# Patient Record
Sex: MALE | Race: BLACK OR AFRICAN AMERICAN | NOT HISPANIC OR LATINO
[De-identification: names, ages, dates, MRNs, and addresses within clinical notes are randomized per-mention and may not be internally consistent; named-entity substitution may affect disease eponyms.]

---

## 2017-01-03 ENCOUNTER — APPOINTMENT (OUTPATIENT)
Dept: CARDIOLOGY | Facility: CLINIC | Age: 52
End: 2017-01-03

## 2017-01-03 ENCOUNTER — NON-APPOINTMENT (OUTPATIENT)
Age: 52
End: 2017-01-03

## 2017-01-03 VITALS
WEIGHT: 268 LBS | OXYGEN SATURATION: 98 % | DIASTOLIC BLOOD PRESSURE: 85 MMHG | HEART RATE: 83 BPM | HEIGHT: 73 IN | SYSTOLIC BLOOD PRESSURE: 166 MMHG | BODY MASS INDEX: 35.52 KG/M2 | RESPIRATION RATE: 17 BRPM

## 2017-01-03 VITALS — HEART RATE: 72 BPM | DIASTOLIC BLOOD PRESSURE: 90 MMHG | SYSTOLIC BLOOD PRESSURE: 110 MMHG

## 2017-01-06 ENCOUNTER — APPOINTMENT (OUTPATIENT)
Dept: FAMILY MEDICINE | Facility: HOSPITAL | Age: 52
End: 2017-01-06

## 2017-01-06 ENCOUNTER — OUTPATIENT (OUTPATIENT)
Dept: OUTPATIENT SERVICES | Facility: HOSPITAL | Age: 52
LOS: 1 days | End: 2017-01-06
Payer: MEDICAID

## 2017-01-06 VITALS
SYSTOLIC BLOOD PRESSURE: 160 MMHG | OXYGEN SATURATION: 98 % | DIASTOLIC BLOOD PRESSURE: 95 MMHG | WEIGHT: 271 LBS | HEART RATE: 60 BPM | TEMPERATURE: 98 F | BODY MASS INDEX: 35.75 KG/M2 | RESPIRATION RATE: 16 BRPM

## 2017-01-06 DIAGNOSIS — R09.81 NASAL CONGESTION: ICD-10-CM

## 2017-01-06 DIAGNOSIS — R05 COUGH: ICD-10-CM

## 2017-01-06 PROCEDURE — 85610 PROTHROMBIN TIME: CPT

## 2017-01-06 PROCEDURE — G0463: CPT

## 2017-01-11 ENCOUNTER — APPOINTMENT (OUTPATIENT)
Dept: ELECTROPHYSIOLOGY | Facility: CLINIC | Age: 52
End: 2017-01-11

## 2017-01-11 ENCOUNTER — NON-APPOINTMENT (OUTPATIENT)
Age: 52
End: 2017-01-11

## 2017-01-11 VITALS
WEIGHT: 266 LBS | OXYGEN SATURATION: 96 % | HEART RATE: 54 BPM | HEIGHT: 73 IN | BODY MASS INDEX: 35.25 KG/M2 | DIASTOLIC BLOOD PRESSURE: 72 MMHG | SYSTOLIC BLOOD PRESSURE: 120 MMHG

## 2017-01-12 RX ORDER — WARFARIN 5 MG/1
5 TABLET ORAL DAILY
Qty: 30 | Refills: 0 | Status: DISCONTINUED | COMMUNITY
Start: 2017-01-06 | End: 2017-01-12

## 2017-01-20 ENCOUNTER — OUTPATIENT (OUTPATIENT)
Dept: OUTPATIENT SERVICES | Facility: HOSPITAL | Age: 52
LOS: 1 days | End: 2017-01-20
Payer: MEDICAID

## 2017-01-20 DIAGNOSIS — Z00.00 ENCOUNTER FOR GENERAL ADULT MEDICAL EXAMINATION WITHOUT ABNORMAL FINDINGS: ICD-10-CM

## 2017-01-20 PROCEDURE — 85610 PROTHROMBIN TIME: CPT

## 2017-01-20 PROCEDURE — 36415 COLL VENOUS BLD VENIPUNCTURE: CPT

## 2017-01-29 ENCOUNTER — MEDICATION RENEWAL (OUTPATIENT)
Age: 52
End: 2017-01-29

## 2017-01-31 ENCOUNTER — APPOINTMENT (OUTPATIENT)
Dept: FAMILY MEDICINE | Facility: HOSPITAL | Age: 52
End: 2017-01-31

## 2017-01-31 ENCOUNTER — OUTPATIENT (OUTPATIENT)
Dept: OUTPATIENT SERVICES | Facility: HOSPITAL | Age: 52
LOS: 1 days | End: 2017-01-31
Payer: MEDICAID

## 2017-01-31 VITALS
HEART RATE: 44 BPM | DIASTOLIC BLOOD PRESSURE: 82 MMHG | OXYGEN SATURATION: 97 % | BODY MASS INDEX: 36.55 KG/M2 | WEIGHT: 277 LBS | RESPIRATION RATE: 16 BRPM | TEMPERATURE: 97.5 F | SYSTOLIC BLOOD PRESSURE: 151 MMHG

## 2017-01-31 VITALS — DIASTOLIC BLOOD PRESSURE: 90 MMHG | HEART RATE: 81 BPM | SYSTOLIC BLOOD PRESSURE: 130 MMHG

## 2017-01-31 DIAGNOSIS — L25.9 UNSPECIFIED CONTACT DERMATITIS, UNSPECIFIED CAUSE: ICD-10-CM

## 2017-01-31 DIAGNOSIS — I50.41 ACUTE COMBINED SYSTOLIC (CONGESTIVE) AND DIASTOLIC (CONGESTIVE) HEART FAILURE: ICD-10-CM

## 2017-01-31 DIAGNOSIS — Z51.81 ENCOUNTER FOR THERAPEUTIC DRUG LEVEL MONITORING: ICD-10-CM

## 2017-02-03 PROCEDURE — G0463: CPT

## 2017-02-03 PROCEDURE — 85610 PROTHROMBIN TIME: CPT

## 2017-02-03 PROCEDURE — 36415 COLL VENOUS BLD VENIPUNCTURE: CPT

## 2017-02-07 ENCOUNTER — NON-APPOINTMENT (OUTPATIENT)
Age: 52
End: 2017-02-07

## 2017-02-07 ENCOUNTER — APPOINTMENT (OUTPATIENT)
Dept: CARDIOLOGY | Facility: CLINIC | Age: 52
End: 2017-02-07

## 2017-02-07 VITALS
BODY MASS INDEX: 36.05 KG/M2 | WEIGHT: 272 LBS | HEIGHT: 73 IN | OXYGEN SATURATION: 90 % | RESPIRATION RATE: 16 BRPM | HEART RATE: 78 BPM | DIASTOLIC BLOOD PRESSURE: 88 MMHG | SYSTOLIC BLOOD PRESSURE: 133 MMHG

## 2017-02-07 VITALS — HEART RATE: 72 BPM

## 2017-02-10 ENCOUNTER — OUTPATIENT (OUTPATIENT)
Dept: OUTPATIENT SERVICES | Facility: HOSPITAL | Age: 52
LOS: 1 days | End: 2017-02-10
Payer: MEDICAID

## 2017-02-10 DIAGNOSIS — I50.9 HEART FAILURE, UNSPECIFIED: ICD-10-CM

## 2017-02-10 PROCEDURE — 36415 COLL VENOUS BLD VENIPUNCTURE: CPT

## 2017-02-10 PROCEDURE — 80069 RENAL FUNCTION PANEL: CPT

## 2017-02-17 ENCOUNTER — OUTPATIENT (OUTPATIENT)
Dept: OUTPATIENT SERVICES | Facility: HOSPITAL | Age: 52
LOS: 1 days | End: 2017-02-17
Payer: MEDICAID

## 2017-02-17 DIAGNOSIS — Z51.81 ENCOUNTER FOR THERAPEUTIC DRUG LEVEL MONITORING: ICD-10-CM

## 2017-02-17 PROCEDURE — 85610 PROTHROMBIN TIME: CPT

## 2017-02-17 PROCEDURE — 36415 COLL VENOUS BLD VENIPUNCTURE: CPT

## 2017-02-24 ENCOUNTER — OUTPATIENT (OUTPATIENT)
Dept: OUTPATIENT SERVICES | Facility: HOSPITAL | Age: 52
LOS: 1 days | End: 2017-02-24
Payer: MEDICAID

## 2017-02-24 DIAGNOSIS — Z51.81 ENCOUNTER FOR THERAPEUTIC DRUG LEVEL MONITORING: ICD-10-CM

## 2017-02-24 PROCEDURE — 36415 COLL VENOUS BLD VENIPUNCTURE: CPT

## 2017-02-24 PROCEDURE — 85610 PROTHROMBIN TIME: CPT

## 2017-02-27 ENCOUNTER — INPATIENT (INPATIENT)
Facility: HOSPITAL | Age: 52
LOS: 0 days | Discharge: HOME | DRG: 227 | End: 2017-02-28
Attending: INTERNAL MEDICINE | Admitting: INTERNAL MEDICINE
Payer: MEDICAID

## 2017-02-27 ENCOUNTER — TRANSCRIPTION ENCOUNTER (OUTPATIENT)
Age: 52
End: 2017-02-27

## 2017-02-27 VITALS
HEIGHT: 73 IN | TEMPERATURE: 98 F | SYSTOLIC BLOOD PRESSURE: 150 MMHG | RESPIRATION RATE: 16 BRPM | DIASTOLIC BLOOD PRESSURE: 90 MMHG | HEART RATE: 84 BPM | WEIGHT: 266.98 LBS | OXYGEN SATURATION: 98 %

## 2017-02-27 DIAGNOSIS — I25.5 ISCHEMIC CARDIOMYOPATHY: ICD-10-CM

## 2017-02-27 DIAGNOSIS — I42 CARDIOMYOPATHY: ICD-10-CM

## 2017-02-27 DIAGNOSIS — Z90.89 ACQUIRED ABSENCE OF OTHER ORGANS: Chronic | ICD-10-CM

## 2017-02-27 DIAGNOSIS — I42.9 CARDIOMYOPATHY, UNSPECIFIED: ICD-10-CM

## 2017-02-27 LAB
ALBUMIN SERPL ELPH-MCNC: 4.2 G/DL — SIGNIFICANT CHANGE UP (ref 3.3–5)
ALP SERPL-CCNC: 53 U/L — SIGNIFICANT CHANGE UP (ref 40–120)
ALT FLD-CCNC: 26 U/L RC — SIGNIFICANT CHANGE UP (ref 10–45)
ANION GAP SERPL CALC-SCNC: 14 MMOL/L — SIGNIFICANT CHANGE UP (ref 5–17)
APTT BLD: 37.9 SEC — HIGH (ref 27.5–37.4)
AST SERPL-CCNC: 26 U/L — SIGNIFICANT CHANGE UP (ref 10–40)
BILIRUB SERPL-MCNC: 0.5 MG/DL — SIGNIFICANT CHANGE UP (ref 0.2–1.2)
BUN SERPL-MCNC: 15 MG/DL — SIGNIFICANT CHANGE UP (ref 7–23)
CALCIUM SERPL-MCNC: 9 MG/DL — SIGNIFICANT CHANGE UP (ref 8.4–10.5)
CHLORIDE SERPL-SCNC: 104 MMOL/L — SIGNIFICANT CHANGE UP (ref 96–108)
CO2 SERPL-SCNC: 25 MMOL/L — SIGNIFICANT CHANGE UP (ref 22–31)
CREAT SERPL-MCNC: 1.06 MG/DL — SIGNIFICANT CHANGE UP (ref 0.5–1.3)
GLUCOSE SERPL-MCNC: 87 MG/DL — SIGNIFICANT CHANGE UP (ref 70–99)
HCT VFR BLD CALC: 43.1 % — SIGNIFICANT CHANGE UP (ref 39–50)
HGB BLD-MCNC: 14.2 G/DL — SIGNIFICANT CHANGE UP (ref 13–17)
INR BLD: 1.39 RATIO — HIGH (ref 0.88–1.16)
MCHC RBC-ENTMCNC: 28.4 PG — SIGNIFICANT CHANGE UP (ref 27–34)
MCHC RBC-ENTMCNC: 32.8 GM/DL — SIGNIFICANT CHANGE UP (ref 32–36)
MCV RBC AUTO: 86.4 FL — SIGNIFICANT CHANGE UP (ref 80–100)
PLATELET # BLD AUTO: 196 K/UL — SIGNIFICANT CHANGE UP (ref 150–400)
POTASSIUM SERPL-MCNC: 3.7 MMOL/L — SIGNIFICANT CHANGE UP (ref 3.5–5.3)
POTASSIUM SERPL-SCNC: 3.7 MMOL/L — SIGNIFICANT CHANGE UP (ref 3.5–5.3)
PROT SERPL-MCNC: 7.4 G/DL — SIGNIFICANT CHANGE UP (ref 6–8.3)
PROTHROM AB SERPL-ACNC: 15.2 SEC — HIGH (ref 10–13.1)
RBC # BLD: 4.99 M/UL — SIGNIFICANT CHANGE UP (ref 4.2–5.8)
RBC # FLD: 14.1 % — SIGNIFICANT CHANGE UP (ref 10.3–14.5)
SODIUM SERPL-SCNC: 143 MMOL/L — SIGNIFICANT CHANGE UP (ref 135–145)
WBC # BLD: 7.7 K/UL — SIGNIFICANT CHANGE UP (ref 3.8–10.5)
WBC # FLD AUTO: 7.7 K/UL — SIGNIFICANT CHANGE UP (ref 3.8–10.5)

## 2017-02-27 PROCEDURE — 71010: CPT | Mod: 26

## 2017-02-27 PROCEDURE — 33249 INSJ/RPLCMT DEFIB W/LEAD(S): CPT | Mod: 59,Q0

## 2017-02-27 PROCEDURE — 93010 ELECTROCARDIOGRAM REPORT: CPT

## 2017-02-27 RX ORDER — FUROSEMIDE 40 MG
40 TABLET ORAL DAILY
Qty: 0 | Refills: 0 | Status: DISCONTINUED | OUTPATIENT
Start: 2017-02-27 | End: 2017-02-28

## 2017-02-27 RX ORDER — HYDRALAZINE HCL 50 MG
25 TABLET ORAL THREE TIMES A DAY
Qty: 0 | Refills: 0 | Status: DISCONTINUED | OUTPATIENT
Start: 2017-02-27 | End: 2017-02-28

## 2017-02-27 RX ORDER — TAMSULOSIN HYDROCHLORIDE 0.4 MG/1
0.4 CAPSULE ORAL AT BEDTIME
Qty: 0 | Refills: 0 | Status: DISCONTINUED | OUTPATIENT
Start: 2017-02-27 | End: 2017-02-28

## 2017-02-27 RX ORDER — ISOSORBIDE DINITRATE 5 MG/1
5 TABLET ORAL THREE TIMES A DAY
Qty: 0 | Refills: 0 | Status: DISCONTINUED | OUTPATIENT
Start: 2017-02-27 | End: 2017-02-28

## 2017-02-27 RX ORDER — ATORVASTATIN CALCIUM 80 MG/1
40 TABLET, FILM COATED ORAL AT BEDTIME
Qty: 0 | Refills: 0 | Status: DISCONTINUED | OUTPATIENT
Start: 2017-02-27 | End: 2017-02-28

## 2017-02-27 RX ORDER — FLUTICASONE PROPIONATE AND SALMETEROL 50; 250 UG/1; UG/1
1 POWDER ORAL; RESPIRATORY (INHALATION)
Qty: 0 | Refills: 0 | Status: DISCONTINUED | OUTPATIENT
Start: 2017-02-27 | End: 2017-02-28

## 2017-02-27 RX ORDER — ASPIRIN/CALCIUM CARB/MAGNESIUM 324 MG
81 TABLET ORAL DAILY
Qty: 0 | Refills: 0 | Status: DISCONTINUED | OUTPATIENT
Start: 2017-02-27 | End: 2017-02-28

## 2017-02-27 RX ORDER — WARFARIN SODIUM 2.5 MG/1
10 TABLET ORAL ONCE
Qty: 0 | Refills: 0 | Status: COMPLETED | OUTPATIENT
Start: 2017-02-27 | End: 2017-02-27

## 2017-02-27 RX ORDER — LISINOPRIL 2.5 MG/1
5 TABLET ORAL DAILY
Qty: 0 | Refills: 0 | Status: DISCONTINUED | OUTPATIENT
Start: 2017-02-27 | End: 2017-02-28

## 2017-02-27 RX ORDER — CEFAZOLIN SODIUM 1 G
1000 VIAL (EA) INJECTION EVERY 8 HOURS
Qty: 0 | Refills: 0 | Status: COMPLETED | OUTPATIENT
Start: 2017-02-27 | End: 2017-02-28

## 2017-02-27 RX ORDER — CARVEDILOL PHOSPHATE 80 MG/1
12.5 CAPSULE, EXTENDED RELEASE ORAL EVERY 12 HOURS
Qty: 0 | Refills: 0 | Status: DISCONTINUED | OUTPATIENT
Start: 2017-02-27 | End: 2017-02-28

## 2017-02-27 RX ORDER — ACETAMINOPHEN 500 MG
650 TABLET ORAL EVERY 6 HOURS
Qty: 0 | Refills: 0 | Status: DISCONTINUED | OUTPATIENT
Start: 2017-02-27 | End: 2017-02-28

## 2017-02-27 RX ORDER — CEFAZOLIN SODIUM 1 G
1000 VIAL (EA) INJECTION EVERY 8 HOURS
Qty: 0 | Refills: 0 | Status: DISCONTINUED | OUTPATIENT
Start: 2017-02-27 | End: 2017-02-27

## 2017-02-27 RX ADMIN — Medication 25 MILLIGRAM(S): at 21:53

## 2017-02-27 RX ADMIN — Medication 20 MILLIGRAM(S): at 17:44

## 2017-02-27 RX ADMIN — ISOSORBIDE DINITRATE 5 MILLIGRAM(S): 5 TABLET ORAL at 17:43

## 2017-02-27 RX ADMIN — Medication 650 MILLIGRAM(S): at 15:15

## 2017-02-27 RX ADMIN — LISINOPRIL 5 MILLIGRAM(S): 2.5 TABLET ORAL at 14:42

## 2017-02-27 RX ADMIN — Medication 25 MILLIGRAM(S): at 14:42

## 2017-02-27 RX ADMIN — Medication 650 MILLIGRAM(S): at 14:42

## 2017-02-27 RX ADMIN — ATORVASTATIN CALCIUM 40 MILLIGRAM(S): 80 TABLET, FILM COATED ORAL at 21:53

## 2017-02-27 RX ADMIN — ISOSORBIDE DINITRATE 5 MILLIGRAM(S): 5 TABLET ORAL at 21:54

## 2017-02-27 RX ADMIN — FLUTICASONE PROPIONATE AND SALMETEROL 1 DOSE(S): 50; 250 POWDER ORAL; RESPIRATORY (INHALATION) at 21:56

## 2017-02-27 RX ADMIN — CARVEDILOL PHOSPHATE 12.5 MILLIGRAM(S): 80 CAPSULE, EXTENDED RELEASE ORAL at 17:44

## 2017-02-27 RX ADMIN — TAMSULOSIN HYDROCHLORIDE 0.4 MILLIGRAM(S): 0.4 CAPSULE ORAL at 21:54

## 2017-02-27 RX ADMIN — Medication 100 MILLIGRAM(S): at 17:43

## 2017-02-27 RX ADMIN — WARFARIN SODIUM 10 MILLIGRAM(S): 2.5 TABLET ORAL at 21:53

## 2017-02-27 RX ADMIN — Medication 40 MILLIGRAM(S): at 14:42

## 2017-02-27 NOTE — H&P CARDIOLOGY - HISTORY OF PRESENT ILLNESS
51 year old male with PMH of parox afib on coumadin, idiopathic cardiomyopathy with chronic systolic heart failure (EF 10-15% on echo on 9/2016, life vest, asthma presented to [facility] with c/o chest pain, SOB.  The patient describes the pain as sharp in nature.  Cardiac enzymes were negative times 3.    Echo on 12/21/16:  Global LV systolic function was severely decreased.  EF estimated at 30-35%, mild-moderateTR, mild AR, moderately enlarged LA,  pulmonary hypertension 52 y/o male with PMH paroxysmal Afib on Coumadin (last dose 2/23/17), cardiomyopathy with combined systolic and diastolic heart failure (EF 30% on 12/21/16 echo), asthma,  HTN, HLD, prediabetes, presents for ICD implant today. 50 y/o male with PMH paroxysmal Afib on Coumadin (last dose 2/23/17), cardiomyopathy with combined systolic and diastolic heart failure (EF 30-35% on 12/21/16 echo) with lifevest in place, asthma,  HTN, HLD, prediabetes, presents for ICD implant today. 52 y/o male with PMH paroxysmal Afib on Coumadin (last dose 2/23/17), cardiomyopathy with combined systolic and diastolic heart failure (EF 30-35% on 12/21/16 echo) with lifevest in place, asthma,  HTN, HLD, prediabetes, with c/o exertional chest pain and dyspnea (12/2016 cardiac cath revealed non-obstructive disease), presents for ICD implant today.

## 2017-02-27 NOTE — H&P CARDIOLOGY - PMH
Acute combined systolic and diastolic congestive heart failure    Anxiety    Asthma    Atrial fibrillation, unspecified type    Cardiomyopathy    Cardiomyopathy, unspecified type    Essential hypertension    Hyperlipidemia, unspecified hyperlipidemia type    Prediabetes    Prolonged QT interval Anxiety    Asthma    Atrial fibrillation, unspecified type    Cardiomyopathy    Cardiomyopathy, unspecified type    Chronic combined systolic and diastolic congestive heart failure    Essential hypertension    Hyperlipidemia, unspecified hyperlipidemia type    Prediabetes    Prolonged QT interval

## 2017-02-28 VITALS
SYSTOLIC BLOOD PRESSURE: 150 MMHG | TEMPERATURE: 98 F | HEART RATE: 99 BPM | OXYGEN SATURATION: 93 % | DIASTOLIC BLOOD PRESSURE: 84 MMHG | RESPIRATION RATE: 17 BRPM

## 2017-02-28 LAB
ANION GAP SERPL CALC-SCNC: 18 MMOL/L — HIGH (ref 5–17)
BASOPHILS # BLD AUTO: 0 K/UL — SIGNIFICANT CHANGE UP (ref 0–0.2)
BASOPHILS # BLD AUTO: 0 K/UL — SIGNIFICANT CHANGE UP (ref 0–0.2)
BASOPHILS NFR BLD AUTO: 0.1 % — SIGNIFICANT CHANGE UP (ref 0–2)
BASOPHILS NFR BLD AUTO: 0.2 % — SIGNIFICANT CHANGE UP (ref 0–2)
BUN SERPL-MCNC: 25 MG/DL — HIGH (ref 7–23)
CALCIUM SERPL-MCNC: 8.4 MG/DL — SIGNIFICANT CHANGE UP (ref 8.4–10.5)
CHLORIDE SERPL-SCNC: 102 MMOL/L — SIGNIFICANT CHANGE UP (ref 96–108)
CO2 SERPL-SCNC: 21 MMOL/L — LOW (ref 22–31)
CREAT SERPL-MCNC: 1.16 MG/DL — SIGNIFICANT CHANGE UP (ref 0.5–1.3)
EOSINOPHIL # BLD AUTO: 0 K/UL — SIGNIFICANT CHANGE UP (ref 0–0.5)
EOSINOPHIL # BLD AUTO: 0 K/UL — SIGNIFICANT CHANGE UP (ref 0–0.5)
EOSINOPHIL NFR BLD AUTO: 0.1 % — SIGNIFICANT CHANGE UP (ref 0–6)
EOSINOPHIL NFR BLD AUTO: 0.2 % — SIGNIFICANT CHANGE UP (ref 0–6)
GLUCOSE SERPL-MCNC: 163 MG/DL — HIGH (ref 70–99)
HBA1C BLD-MCNC: 5.5 % — SIGNIFICANT CHANGE UP (ref 4–5.6)
HCT VFR BLD CALC: 40.2 % — SIGNIFICANT CHANGE UP (ref 39–50)
HCT VFR BLD CALC: 40.3 % — SIGNIFICANT CHANGE UP (ref 39–50)
HGB BLD-MCNC: 13 G/DL — SIGNIFICANT CHANGE UP (ref 13–17)
HGB BLD-MCNC: 13 G/DL — SIGNIFICANT CHANGE UP (ref 13–17)
INR BLD: 1.28 RATIO — HIGH (ref 0.88–1.16)
LG PLATELETS BLD QL AUTO: SLIGHT — SIGNIFICANT CHANGE UP
LYMPHOCYTES # BLD AUTO: 1.2 K/UL — SIGNIFICANT CHANGE UP (ref 1–3.3)
LYMPHOCYTES # BLD AUTO: 1.4 K/UL — SIGNIFICANT CHANGE UP (ref 1–3.3)
LYMPHOCYTES # BLD AUTO: 10.3 % — LOW (ref 13–44)
LYMPHOCYTES # BLD AUTO: 8.8 % — LOW (ref 13–44)
MCHC RBC-ENTMCNC: 28.3 PG — SIGNIFICANT CHANGE UP (ref 27–34)
MCHC RBC-ENTMCNC: 28.3 PG — SIGNIFICANT CHANGE UP (ref 27–34)
MCHC RBC-ENTMCNC: 32.4 GM/DL — SIGNIFICANT CHANGE UP (ref 32–36)
MCHC RBC-ENTMCNC: 32.5 GM/DL — SIGNIFICANT CHANGE UP (ref 32–36)
MCV RBC AUTO: 87.3 FL — SIGNIFICANT CHANGE UP (ref 80–100)
MCV RBC AUTO: 87.4 FL — SIGNIFICANT CHANGE UP (ref 80–100)
MONOCYTES # BLD AUTO: 1 K/UL — HIGH (ref 0–0.9)
MONOCYTES # BLD AUTO: 1.1 K/UL — HIGH (ref 0–0.9)
MONOCYTES NFR BLD AUTO: 7.2 % — SIGNIFICANT CHANGE UP (ref 2–14)
MONOCYTES NFR BLD AUTO: 7.6 % — SIGNIFICANT CHANGE UP (ref 2–14)
NEUTROPHILS # BLD AUTO: 11.3 K/UL — HIGH (ref 1.8–7.4)
NEUTROPHILS # BLD AUTO: 11.3 K/UL — HIGH (ref 1.8–7.4)
NEUTROPHILS NFR BLD AUTO: 81.8 % — HIGH (ref 43–77)
NEUTROPHILS NFR BLD AUTO: 83.7 % — HIGH (ref 43–77)
PLAT MORPH BLD: NORMAL — SIGNIFICANT CHANGE UP
PLATELET # BLD AUTO: 186 K/UL — SIGNIFICANT CHANGE UP (ref 150–400)
PLATELET # BLD AUTO: 202 K/UL — SIGNIFICANT CHANGE UP (ref 150–400)
POTASSIUM SERPL-MCNC: 4.2 MMOL/L — SIGNIFICANT CHANGE UP (ref 3.5–5.3)
POTASSIUM SERPL-SCNC: 4.2 MMOL/L — SIGNIFICANT CHANGE UP (ref 3.5–5.3)
PROTHROM AB SERPL-ACNC: 13.9 SEC — HIGH (ref 10–13.1)
RBC # BLD: 4.6 M/UL — SIGNIFICANT CHANGE UP (ref 4.2–5.8)
RBC # BLD: 4.6 M/UL — SIGNIFICANT CHANGE UP (ref 4.2–5.8)
RBC # FLD: 13.7 % — SIGNIFICANT CHANGE UP (ref 10.3–14.5)
RBC # FLD: 14.2 % — SIGNIFICANT CHANGE UP (ref 10.3–14.5)
RBC BLD AUTO: SIGNIFICANT CHANGE UP
SODIUM SERPL-SCNC: 141 MMOL/L — SIGNIFICANT CHANGE UP (ref 135–145)
WBC # BLD: 13.5 K/UL — HIGH (ref 3.8–10.5)
WBC # BLD: 13.8 K/UL — HIGH (ref 3.8–10.5)
WBC # FLD AUTO: 13.5 K/UL — HIGH (ref 3.8–10.5)
WBC # FLD AUTO: 13.8 K/UL — HIGH (ref 3.8–10.5)

## 2017-02-28 PROCEDURE — 93010 ELECTROCARDIOGRAM REPORT: CPT

## 2017-02-28 RX ORDER — DEXTROSE 50 % IN WATER 50 %
25 SYRINGE (ML) INTRAVENOUS ONCE
Qty: 0 | Refills: 0 | Status: DISCONTINUED | OUTPATIENT
Start: 2017-02-28 | End: 2017-02-28

## 2017-02-28 RX ORDER — CARVEDILOL PHOSPHATE 80 MG/1
1.5 CAPSULE, EXTENDED RELEASE ORAL
Qty: 0 | Refills: 0 | DISCHARGE
Start: 2017-02-28 | End: 2017-03-30

## 2017-02-28 RX ORDER — ISOSORBIDE DINITRATE 5 MG/1
1 TABLET ORAL
Qty: 90 | Refills: 0
Start: 2017-02-28 | End: 2017-03-30

## 2017-02-28 RX ORDER — ATORVASTATIN CALCIUM 80 MG/1
1 TABLET, FILM COATED ORAL
Qty: 0 | Refills: 0 | COMMUNITY

## 2017-02-28 RX ORDER — HYDRALAZINE HCL 50 MG
1 TABLET ORAL
Qty: 90 | Refills: 0
Start: 2017-02-28 | End: 2017-03-30

## 2017-02-28 RX ORDER — CEPHALEXIN 500 MG
1 CAPSULE ORAL
Qty: 3 | Refills: 0 | OUTPATIENT
Start: 2017-02-28 | End: 2017-03-01

## 2017-02-28 RX ORDER — FUROSEMIDE 40 MG
1 TABLET ORAL
Qty: 30 | Refills: 0 | OUTPATIENT
Start: 2017-02-28 | End: 2017-03-30

## 2017-02-28 RX ORDER — ASPIRIN/CALCIUM CARB/MAGNESIUM 324 MG
1 TABLET ORAL
Qty: 90 | Refills: 0 | OUTPATIENT
Start: 2017-02-28 | End: 2017-05-29

## 2017-02-28 RX ORDER — FENTANYL CITRATE 50 UG/ML
25 INJECTION INTRAVENOUS ONCE
Qty: 0 | Refills: 0 | Status: DISCONTINUED | OUTPATIENT
Start: 2017-02-28 | End: 2017-02-28

## 2017-02-28 RX ORDER — GLUCAGON INJECTION, SOLUTION 0.5 MG/.1ML
1 INJECTION, SOLUTION SUBCUTANEOUS ONCE
Qty: 0 | Refills: 0 | Status: DISCONTINUED | OUTPATIENT
Start: 2017-02-28 | End: 2017-02-28

## 2017-02-28 RX ORDER — DEXTROSE 50 % IN WATER 50 %
12.5 SYRINGE (ML) INTRAVENOUS ONCE
Qty: 0 | Refills: 0 | Status: DISCONTINUED | OUTPATIENT
Start: 2017-02-28 | End: 2017-02-28

## 2017-02-28 RX ORDER — SODIUM CHLORIDE 9 MG/ML
1000 INJECTION, SOLUTION INTRAVENOUS
Qty: 0 | Refills: 0 | Status: DISCONTINUED | OUTPATIENT
Start: 2017-02-28 | End: 2017-02-28

## 2017-02-28 RX ORDER — LISINOPRIL 2.5 MG/1
1 TABLET ORAL
Qty: 0 | Refills: 0 | COMMUNITY

## 2017-02-28 RX ORDER — INSULIN LISPRO 100/ML
VIAL (ML) SUBCUTANEOUS AT BEDTIME
Qty: 0 | Refills: 0 | Status: DISCONTINUED | OUTPATIENT
Start: 2017-02-28 | End: 2017-02-28

## 2017-02-28 RX ORDER — WARFARIN SODIUM 2.5 MG/1
1 TABLET ORAL
Qty: 30 | Refills: 0 | OUTPATIENT
Start: 2017-02-28 | End: 2017-03-30

## 2017-02-28 RX ORDER — INSULIN LISPRO 100/ML
VIAL (ML) SUBCUTANEOUS
Qty: 0 | Refills: 0 | Status: DISCONTINUED | OUTPATIENT
Start: 2017-02-28 | End: 2017-02-28

## 2017-02-28 RX ORDER — OXYCODONE HYDROCHLORIDE 5 MG/1
1 TABLET ORAL
Qty: 8 | Refills: 0 | OUTPATIENT
Start: 2017-02-28 | End: 2017-03-02

## 2017-02-28 RX ORDER — CARVEDILOL PHOSPHATE 80 MG/1
1 CAPSULE, EXTENDED RELEASE ORAL
Qty: 60 | Refills: 0 | OUTPATIENT
Start: 2017-02-28 | End: 2017-03-30

## 2017-02-28 RX ORDER — TAMSULOSIN HYDROCHLORIDE 0.4 MG/1
1 CAPSULE ORAL
Qty: 30 | Refills: 0
Start: 2017-02-28 | End: 2017-03-30

## 2017-02-28 RX ORDER — LISINOPRIL 2.5 MG/1
1 TABLET ORAL
Qty: 30 | Refills: 0 | OUTPATIENT
Start: 2017-02-28 | End: 2017-03-30

## 2017-02-28 RX ORDER — ATORVASTATIN CALCIUM 80 MG/1
1 TABLET, FILM COATED ORAL
Qty: 30 | Refills: 0 | OUTPATIENT
Start: 2017-02-28 | End: 2017-03-30

## 2017-02-28 RX ORDER — DEXTROSE 50 % IN WATER 50 %
1 SYRINGE (ML) INTRAVENOUS ONCE
Qty: 0 | Refills: 0 | Status: DISCONTINUED | OUTPATIENT
Start: 2017-02-28 | End: 2017-02-28

## 2017-02-28 RX ADMIN — Medication 100 MILLIGRAM(S): at 01:28

## 2017-02-28 RX ADMIN — Medication 40 MILLIGRAM(S): at 05:23

## 2017-02-28 RX ADMIN — Medication 100 MILLIGRAM(S): at 09:34

## 2017-02-28 RX ADMIN — CARVEDILOL PHOSPHATE 12.5 MILLIGRAM(S): 80 CAPSULE, EXTENDED RELEASE ORAL at 05:23

## 2017-02-28 RX ADMIN — FLUTICASONE PROPIONATE AND SALMETEROL 1 DOSE(S): 50; 250 POWDER ORAL; RESPIRATORY (INHALATION) at 09:00

## 2017-02-28 RX ADMIN — Medication 25 MILLIGRAM(S): at 05:23

## 2017-02-28 RX ADMIN — LISINOPRIL 5 MILLIGRAM(S): 2.5 TABLET ORAL at 05:23

## 2017-02-28 RX ADMIN — ISOSORBIDE DINITRATE 5 MILLIGRAM(S): 5 TABLET ORAL at 05:23

## 2017-02-28 NOTE — DISCHARGE NOTE ADULT - MEDICATION SUMMARY - MEDICATIONS TO TAKE
I will START or STAY ON the medications listed below when I get home from the hospital:    acetaminophen-oxyCODONE 325 mg-5 mg oral tablet  -- 1 tab(s) by mouth every 6 hours, As needed, Severe Pain (7 - 10) MDD:4  -- Indication: For Incisional Pain    aspirin 81 mg oral delayed release tablet  -- 1 tab(s) by mouth once a day MDD:1  -- Indication: For Cardiomyopathy    lisinopril 5 mg oral tablet  -- 1 tab(s) by mouth once a day MDD:1  -- Indication: For Cardiomyopathy    Flomax 0.4 mg oral capsule  -- 1 cap(s) by mouth once a day MDD:1  -- Indication: For BPH    isosorbide dinitrate 5 mg oral tablet  -- 1 tab(s) by mouth 3 times a day MDD:3  -- Indication: For Hypertension    Coumadin 7.5 mg oral tablet  -- 1 tab(s) by mouth once a day MDD:1  -- Indication: For Atrial fibrillation, unspecified type    PARoxetine 20 mg oral tablet  -- 1 tab(s) by mouth once a day  -- Indication: For depression    Lipitor 40 mg oral tablet  -- 1 tab(s) by mouth once a day (at bedtime) MDD:1  -- Indication: For Hyperlipidemia, unspecified hyperlipidemia type    Coreg 12.5 mg oral tablet  -- 1 tab(s) by mouth every 12 hours MDD:2  -- Indication: For Cardiomyopathy    fluticasone-salmeterol 250 mcg-50 mcg inhalation powder  -- 1 puff(s) inhaled 2 times a day   -- Indication: For COPD    Lasix 40 mg oral tablet  -- 1 tab(s) by mouth once a day MDD:1  -- Indication: For Cardiomyopathy    hydrALAZINE 25 mg oral tablet  -- 1 tab(s) by mouth 3 times a day MDD:3  -- Indication: For Hypertension

## 2017-02-28 NOTE — DISCHARGE NOTE ADULT - PLAN OF CARE
Your heart rate will be controlled. Continue with follow-up visits to your electrophysiology team and with your home remote device monitoring (if applicable). Continue your medications as prescribed. Your LDL cholesterol will be less than 70mg/dL Continue with your cholesterol medications. Eat a heart healthy diet that is low in saturated fats and salt, and includes whole grains, fruits, vegetables and lean protein; exercise regularly (consult with your physician or cardiologist first); maintain a heart healthy weight. Continue to follow with your primary physician or cardiologist for treatment goals, continue medication, have liver function testing every 3 months as anti lipid medications can cause liver irritation. If you smoke - quit (A resource to help you stop smoking is the [facility] for Tobacco Control – phone number 861-411-2003.). Your blood pressure will be controlled. Continue with your blood pressure medications; eat a heart healthy diet with low salt diet; exercise regularly (consult with your physician or cardiologist first); maintain a heart healthy weight; if you smoke - quit (A resource to help you stop smoking is the [facility] for Tobacco Control – phone number [phone].); include healthy ways to manage stress. Continue to follow with your primary care physician or cardiologist.

## 2017-02-28 NOTE — DISCHARGE NOTE ADULT - CARE PROVIDERS DIRECT ADDRESSES
,[email or web address],DirectAddress_Unknown,[email or web address]

## 2017-02-28 NOTE — PROVIDER CONTACT NOTE (OTHER) - BACKGROUND
s/p ICD implant, site CDI, open to air, no hematoma, no bleeding, positive b/l radial pulses s/p ICD implant, site CDI, open to air, no bleeding, positive b/l radial pulses

## 2017-02-28 NOTE — DISCHARGE NOTE ADULT - CARE PLAN
Principal Discharge DX:	SOB (shortness of breath)  Goal:	Your heart rate will be controlled.  Instructions for follow-up, activity and diet:	Continue with follow-up visits to your electrophysiology team and with your home remote device monitoring (if applicable). Continue your medications as prescribed.  Secondary Diagnosis:	Hyperlipidemia, unspecified hyperlipidemia type  Goal:	Your LDL cholesterol will be less than 70mg/dL  Instructions for follow-up, activity and diet:	Continue with your cholesterol medications. Eat a heart healthy diet that is low in saturated fats and salt, and includes whole grains, fruits, vegetables and lean protein; exercise regularly (consult with your physician or cardiologist first); maintain a heart healthy weight. Continue to follow with your primary physician or cardiologist for treatment goals, continue medication, have liver function testing every 3 months as anti lipid medications can cause liver irritation. If you smoke - quit (A resource to help you stop smoking is the [facility] [employer] for Tobacco Control – phone number [phone].).  Secondary Diagnosis:	Essential hypertension  Goal:	Your blood pressure will be controlled.  Instructions for follow-up, activity and diet:	Continue with your blood pressure medications; eat a heart healthy diet with low salt diet; exercise regularly (consult with your physician or cardiologist first); maintain a heart healthy weight; if you smoke - quit (A resource to help you stop smoking is the [facility] [employer] Control – phone number [phone].); include healthy ways to manage stress. Continue to follow with your primary care physician or cardiologist. Principal Discharge DX:	SOB (shortness of breath)  Goal:	Your heart rate will be controlled.  Instructions for follow-up, activity and diet:	Continue with follow-up visits to your electrophysiology team and with your home remote device monitoring (if applicable). Continue your medications as prescribed.  Secondary Diagnosis:	Hyperlipidemia, unspecified hyperlipidemia type  Goal:	Your LDL cholesterol will be less than 70mg/dL  Instructions for follow-up, activity and diet:	Continue with your cholesterol medications. Eat a heart healthy diet that is low in saturated fats and salt, and includes whole grains, fruits, vegetables and lean protein; exercise regularly (consult with your physician or cardiologist first); maintain a heart healthy weight. Continue to follow with your primary physician or cardiologist for treatment goals, continue medication, have liver function testing every 3 months as anti lipid medications can cause liver irritation. If you smoke - quit (A resource to help you stop smoking is the [facility] [employer] for Tobacco Control – phone number 279-421-6343.).  Secondary Diagnosis:	Essential hypertension  Goal:	Your blood pressure will be controlled.  Instructions for follow-up, activity and diet:	Continue with your blood pressure medications; eat a heart healthy diet with low salt diet; exercise regularly (consult with your physician or cardiologist first); maintain a heart healthy weight; if you smoke - quit (A resource to help you stop smoking is the [facility] [employer] [facility] – phone number 501-237-8494.); include healthy ways to manage stress. Continue to follow with your primary care physician or cardiologist. Principal Discharge DX:	SOB (shortness of breath)  Goal:	Your heart rate will be controlled.  Instructions for follow-up, activity and diet:	Continue with follow-up visits to your electrophysiology team and with your home remote device monitoring (if applicable). Continue your medications as prescribed.  Secondary Diagnosis:	Hyperlipidemia, unspecified hyperlipidemia type  Goal:	Your LDL cholesterol will be less than 70mg/dL  Instructions for follow-up, activity and diet:	Continue with your cholesterol medications. Eat a heart healthy diet that is low in saturated fats and salt, and includes whole grains, fruits, vegetables and lean protein; exercise regularly (consult with your physician or cardiologist first); maintain a heart healthy weight. Continue to follow with your primary physician or cardiologist for treatment goals, continue medication, have liver function testing every 3 months as anti lipid medications can cause liver irritation. If you smoke - quit (A resource to help you stop smoking is the [facility] [employer] for Tobacco Control – phone number [phone].).  Secondary Diagnosis:	Essential hypertension  Goal:	Your blood pressure will be controlled.  Instructions for follow-up, activity and diet:	Continue with your blood pressure medications; eat a heart healthy diet with low salt diet; exercise regularly (consult with your physician or cardiologist first); maintain a heart healthy weight; if you smoke - quit (A resource to help you stop smoking is the [facility] [employer] Control – phone number [phone].); include healthy ways to manage stress. Continue to follow with your primary care physician or cardiologist. Principal Discharge DX:	SOB (shortness of breath)  Goal:	Your heart rate will be controlled.  Instructions for follow-up, activity and diet:	Continue with follow-up visits to your electrophysiology team and with your home remote device monitoring (if applicable). Continue your medications as prescribed.  Secondary Diagnosis:	Hyperlipidemia, unspecified hyperlipidemia type  Goal:	Your LDL cholesterol will be less than 70mg/dL  Instructions for follow-up, activity and diet:	Continue with your cholesterol medications. Eat a heart healthy diet that is low in saturated fats and salt, and includes whole grains, fruits, vegetables and lean protein; exercise regularly (consult with your physician or cardiologist first); maintain a heart healthy weight. Continue to follow with your primary physician or cardiologist for treatment goals, continue medication, have liver function testing every 3 months as anti lipid medications can cause liver irritation. If you smoke - quit (A resource to help you stop smoking is the [facility] [employer] for Tobacco Control – phone number [phone].).  Secondary Diagnosis:	Essential hypertension  Goal:	Your blood pressure will be controlled.  Instructions for follow-up, activity and diet:	Continue with your blood pressure medications; eat a heart healthy diet with low salt diet; exercise regularly (consult with your physician or cardiologist first); maintain a heart healthy weight; if you smoke - quit (A resource to help you stop smoking is the [facility] [employer] Control – phone number 103-678-5790.); include healthy ways to manage stress. Continue to follow with your primary care physician or cardiologist.

## 2017-02-28 NOTE — DISCHARGE NOTE ADULT - CARE PROVIDER_API CALL
Melvin Penn (MD), Cardiac Electrophysiology; Cardiology; Internal Medicine  [address] [place] [address]  Phone: [phone]  Fax: [phone]    Dinesh go  Phone: (   )    -  Fax: (   )    -

## 2017-02-28 NOTE — DISCHARGE NOTE ADULT - PATIENT PORTAL LINK FT
“You can access the FollowHealth Patient Portal, offered by [facility], by registering with the following website: [email or web address]”

## 2017-02-28 NOTE — DISCHARGE NOTE ADULT - NS MD DC FALL RISK RISK
For information on Fall & Injury Prevention, visit [email or web address]

## 2017-02-28 NOTE — DISCHARGE NOTE ADULT - HOSPITAL COURSE
52 y/o male with PMH paroxysmal Afib on Coumadin (last dose 2/23/17), cardiomyopathy with combined systolic and diastolic heart failure (EF 30-35% on 12/21/16 echo) with lifevest in place, asthma,  HTN, HLD, prediabetes, with c/o exertional chest pain and dyspnea (12/2016 cardiac cath revealed non-obstructive disease), presents for ICD implant today. Pt is now s/p ICD implant 50 y/o male with PMH paroxysmal Afib on Coumadin (last dose 2/23/17), cardiomyopathy with combined systolic and diastolic heart failure (EF 30-35% on 12/21/16 echo) with lifevest in place, asthma,  HTN, HLD, prediabetes, with c/o exertional chest pain and dyspnea (12/2016 cardiac cath revealed non-obstructive disease), presents for ICD implant today. Pt is now s/p ICD implant with hematoma inferior to the incision site resolving after pressure dressing maintained.  Pt seen by EP NP cleared for d/c home on Coumadin with leukocytosis with shift to continue on Keflex 500mg TID x 1 day.  Pt to followup with Dr. Burroughs, Cardiologist on 3/7/17 and with EP as scheduled for wound check at EP clinic.  Pt stable for d/c home.

## 2017-03-03 ENCOUNTER — OUTPATIENT (OUTPATIENT)
Dept: OUTPATIENT SERVICES | Facility: HOSPITAL | Age: 52
LOS: 1 days | End: 2017-03-03
Payer: MEDICAID

## 2017-03-03 ENCOUNTER — APPOINTMENT (OUTPATIENT)
Dept: FAMILY MEDICINE | Facility: HOSPITAL | Age: 52
End: 2017-03-03

## 2017-03-03 VITALS
WEIGHT: 281 LBS | DIASTOLIC BLOOD PRESSURE: 85 MMHG | OXYGEN SATURATION: 95 % | TEMPERATURE: 98 F | BODY MASS INDEX: 37.24 KG/M2 | RESPIRATION RATE: 16 BRPM | HEART RATE: 43 BPM | SYSTOLIC BLOOD PRESSURE: 158 MMHG | HEIGHT: 73 IN

## 2017-03-03 DIAGNOSIS — Z95.810 PRESENCE OF AUTOMATIC (IMPLANTABLE) CARDIAC DEFIBRILLATOR: ICD-10-CM

## 2017-03-03 DIAGNOSIS — Z90.89 ACQUIRED ABSENCE OF OTHER ORGANS: Chronic | ICD-10-CM

## 2017-03-03 DIAGNOSIS — I50.9 HEART FAILURE, UNSPECIFIED: ICD-10-CM

## 2017-03-03 PROCEDURE — G0463: CPT

## 2017-03-03 PROCEDURE — 36415 COLL VENOUS BLD VENIPUNCTURE: CPT

## 2017-03-03 PROCEDURE — 85610 PROTHROMBIN TIME: CPT

## 2017-03-07 ENCOUNTER — APPOINTMENT (OUTPATIENT)
Dept: CARDIOLOGY | Facility: CLINIC | Age: 52
End: 2017-03-07

## 2017-03-07 ENCOUNTER — OUTPATIENT (OUTPATIENT)
Dept: OUTPATIENT SERVICES | Facility: HOSPITAL | Age: 52
LOS: 1 days | End: 2017-03-07
Payer: MEDICAID

## 2017-03-07 ENCOUNTER — NON-APPOINTMENT (OUTPATIENT)
Age: 52
End: 2017-03-07

## 2017-03-07 VITALS
RESPIRATION RATE: 17 BRPM | BODY MASS INDEX: 36.84 KG/M2 | DIASTOLIC BLOOD PRESSURE: 76 MMHG | HEIGHT: 73 IN | WEIGHT: 278 LBS | HEART RATE: 67 BPM | OXYGEN SATURATION: 97 % | SYSTOLIC BLOOD PRESSURE: 116 MMHG

## 2017-03-07 VITALS — DIASTOLIC BLOOD PRESSURE: 80 MMHG | HEART RATE: 60 BPM | SYSTOLIC BLOOD PRESSURE: 120 MMHG

## 2017-03-07 DIAGNOSIS — I50.9 HEART FAILURE, UNSPECIFIED: ICD-10-CM

## 2017-03-07 DIAGNOSIS — Z90.89 ACQUIRED ABSENCE OF OTHER ORGANS: Chronic | ICD-10-CM

## 2017-03-07 PROCEDURE — 36415 COLL VENOUS BLD VENIPUNCTURE: CPT

## 2017-03-07 PROCEDURE — 80069 RENAL FUNCTION PANEL: CPT

## 2017-03-10 ENCOUNTER — NON-APPOINTMENT (OUTPATIENT)
Age: 52
End: 2017-03-10

## 2017-03-10 ENCOUNTER — APPOINTMENT (OUTPATIENT)
Dept: ELECTROPHYSIOLOGY | Facility: CLINIC | Age: 52
End: 2017-03-10

## 2017-03-10 VITALS
WEIGHT: 278 LBS | SYSTOLIC BLOOD PRESSURE: 143 MMHG | HEIGHT: 73 IN | DIASTOLIC BLOOD PRESSURE: 74 MMHG | HEART RATE: 48 BPM | BODY MASS INDEX: 36.84 KG/M2

## 2017-03-10 LAB
INR PPP: 5.13 RATIO
PT BLD: 56.8 SEC

## 2017-03-13 ENCOUNTER — OUTPATIENT (OUTPATIENT)
Dept: OUTPATIENT SERVICES | Facility: HOSPITAL | Age: 52
LOS: 1 days | End: 2017-03-13
Payer: MEDICAID

## 2017-03-13 ENCOUNTER — APPOINTMENT (OUTPATIENT)
Dept: FAMILY MEDICINE | Facility: HOSPITAL | Age: 52
End: 2017-03-13

## 2017-03-13 VITALS
RESPIRATION RATE: 15 BRPM | HEART RATE: 48 BPM | OXYGEN SATURATION: 98 % | SYSTOLIC BLOOD PRESSURE: 138 MMHG | TEMPERATURE: 98 F | DIASTOLIC BLOOD PRESSURE: 88 MMHG | HEIGHT: 73 IN

## 2017-03-13 VITALS — WEIGHT: 286 LBS | BODY MASS INDEX: 37.73 KG/M2

## 2017-03-13 DIAGNOSIS — I25.10 ATHEROSCLEROTIC HEART DISEASE OF NATIVE CORONARY ARTERY WITHOUT ANGINA PECTORIS: ICD-10-CM

## 2017-03-13 DIAGNOSIS — Z90.89 ACQUIRED ABSENCE OF OTHER ORGANS: Chronic | ICD-10-CM

## 2017-03-13 DIAGNOSIS — I50.9 HEART FAILURE, UNSPECIFIED: ICD-10-CM

## 2017-03-13 DIAGNOSIS — R05 COUGH: ICD-10-CM

## 2017-03-13 DIAGNOSIS — I48.91 UNSPECIFIED ATRIAL FIBRILLATION: ICD-10-CM

## 2017-03-13 DIAGNOSIS — Z95.810 PRESENCE OF AUTOMATIC (IMPLANTABLE) CARDIAC DEFIBRILLATOR: ICD-10-CM

## 2017-03-13 PROCEDURE — G0463: CPT

## 2017-03-13 PROCEDURE — 36415 COLL VENOUS BLD VENIPUNCTURE: CPT

## 2017-03-13 PROCEDURE — 85610 PROTHROMBIN TIME: CPT

## 2017-03-17 ENCOUNTER — OUTPATIENT (OUTPATIENT)
Dept: OUTPATIENT SERVICES | Facility: HOSPITAL | Age: 52
LOS: 1 days | End: 2017-03-17
Payer: MEDICAID

## 2017-03-17 ENCOUNTER — APPOINTMENT (OUTPATIENT)
Dept: FAMILY MEDICINE | Facility: HOSPITAL | Age: 52
End: 2017-03-17

## 2017-03-17 VITALS
DIASTOLIC BLOOD PRESSURE: 89 MMHG | OXYGEN SATURATION: 98 % | HEART RATE: 59 BPM | RESPIRATION RATE: 15 BRPM | WEIGHT: 294 LBS | BODY MASS INDEX: 38.97 KG/M2 | SYSTOLIC BLOOD PRESSURE: 122 MMHG | TEMPERATURE: 97.3 F | HEIGHT: 73 IN

## 2017-03-17 DIAGNOSIS — Z90.89 ACQUIRED ABSENCE OF OTHER ORGANS: Chronic | ICD-10-CM

## 2017-03-17 DIAGNOSIS — Z00.00 ENCOUNTER FOR GENERAL ADULT MEDICAL EXAMINATION WITHOUT ABNORMAL FINDINGS: ICD-10-CM

## 2017-03-17 PROCEDURE — 85610 PROTHROMBIN TIME: CPT

## 2017-03-17 PROCEDURE — G0463: CPT

## 2017-03-17 PROCEDURE — 36415 COLL VENOUS BLD VENIPUNCTURE: CPT

## 2017-03-20 ENCOUNTER — OTHER (OUTPATIENT)
Age: 52
End: 2017-03-20

## 2017-03-24 ENCOUNTER — APPOINTMENT (OUTPATIENT)
Dept: FAMILY MEDICINE | Facility: HOSPITAL | Age: 52
End: 2017-03-24

## 2017-03-24 ENCOUNTER — OUTPATIENT (OUTPATIENT)
Dept: OUTPATIENT SERVICES | Facility: HOSPITAL | Age: 52
LOS: 1 days | End: 2017-03-24
Payer: MEDICAID

## 2017-03-24 VITALS
BODY MASS INDEX: 38.83 KG/M2 | RESPIRATION RATE: 15 BRPM | HEIGHT: 73 IN | WEIGHT: 293 LBS | HEART RATE: 62 BPM | TEMPERATURE: 97.2 F

## 2017-03-24 VITALS — SYSTOLIC BLOOD PRESSURE: 144 MMHG | DIASTOLIC BLOOD PRESSURE: 90 MMHG

## 2017-03-24 DIAGNOSIS — Z90.89 ACQUIRED ABSENCE OF OTHER ORGANS: Chronic | ICD-10-CM

## 2017-03-24 DIAGNOSIS — J45.909 UNSPECIFIED ASTHMA, UNCOMPLICATED: ICD-10-CM

## 2017-03-24 PROCEDURE — G0463: CPT

## 2017-03-24 PROCEDURE — 36415 COLL VENOUS BLD VENIPUNCTURE: CPT

## 2017-03-24 PROCEDURE — 85610 PROTHROMBIN TIME: CPT

## 2017-03-31 ENCOUNTER — OUTPATIENT (OUTPATIENT)
Dept: OUTPATIENT SERVICES | Facility: HOSPITAL | Age: 52
LOS: 1 days | End: 2017-03-31
Payer: MEDICAID

## 2017-03-31 DIAGNOSIS — Z51.81 ENCOUNTER FOR THERAPEUTIC DRUG LEVEL MONITORING: ICD-10-CM

## 2017-03-31 DIAGNOSIS — Z90.89 ACQUIRED ABSENCE OF OTHER ORGANS: Chronic | ICD-10-CM

## 2017-03-31 PROCEDURE — 36415 COLL VENOUS BLD VENIPUNCTURE: CPT

## 2017-03-31 PROCEDURE — 85610 PROTHROMBIN TIME: CPT

## 2017-04-05 ENCOUNTER — APPOINTMENT (OUTPATIENT)
Dept: PULMONOLOGY | Facility: CLINIC | Age: 52
End: 2017-04-05

## 2017-04-05 VITALS
BODY MASS INDEX: 39.36 KG/M2 | TEMPERATURE: 97.5 F | DIASTOLIC BLOOD PRESSURE: 100 MMHG | WEIGHT: 297 LBS | OXYGEN SATURATION: 96 % | HEART RATE: 84 BPM | HEIGHT: 73 IN | RESPIRATION RATE: 16 BRPM | SYSTOLIC BLOOD PRESSURE: 170 MMHG

## 2017-04-06 ENCOUNTER — OUTPATIENT (OUTPATIENT)
Dept: OUTPATIENT SERVICES | Facility: HOSPITAL | Age: 52
LOS: 1 days | End: 2017-04-06
Payer: MEDICAID

## 2017-04-06 ENCOUNTER — APPOINTMENT (OUTPATIENT)
Dept: SLEEP CENTER | Facility: CLINIC | Age: 52
End: 2017-04-06

## 2017-04-06 DIAGNOSIS — Z90.89 ACQUIRED ABSENCE OF OTHER ORGANS: Chronic | ICD-10-CM

## 2017-04-06 PROCEDURE — 95810 POLYSOM 6/> YRS 4/> PARAM: CPT

## 2017-04-07 DIAGNOSIS — G47.33 OBSTRUCTIVE SLEEP APNEA (ADULT) (PEDIATRIC): ICD-10-CM

## 2017-04-12 ENCOUNTER — APPOINTMENT (OUTPATIENT)
Dept: PULMONOLOGY | Facility: CLINIC | Age: 52
End: 2017-04-12

## 2017-04-12 VITALS — HEART RATE: 56 BPM | DIASTOLIC BLOOD PRESSURE: 88 MMHG | SYSTOLIC BLOOD PRESSURE: 140 MMHG

## 2017-04-12 VITALS
HEART RATE: 86 BPM | RESPIRATION RATE: 15 BRPM | BODY MASS INDEX: 39.76 KG/M2 | WEIGHT: 300 LBS | DIASTOLIC BLOOD PRESSURE: 100 MMHG | TEMPERATURE: 98.4 F | HEIGHT: 73 IN | SYSTOLIC BLOOD PRESSURE: 160 MMHG

## 2017-04-12 RX ORDER — PREDNISONE 20 MG/1
20 TABLET ORAL
Qty: 6 | Refills: 0 | Status: DISCONTINUED | COMMUNITY
Start: 2017-03-17 | End: 2017-04-12

## 2017-04-15 ENCOUNTER — APPOINTMENT (OUTPATIENT)
Dept: FAMILY MEDICINE | Facility: HOSPITAL | Age: 52
End: 2017-04-15

## 2017-04-15 ENCOUNTER — OUTPATIENT (OUTPATIENT)
Dept: OUTPATIENT SERVICES | Facility: HOSPITAL | Age: 52
LOS: 1 days | End: 2017-04-15
Payer: MEDICAID

## 2017-04-15 VITALS
BODY MASS INDEX: 39.98 KG/M2 | HEART RATE: 86 BPM | SYSTOLIC BLOOD PRESSURE: 148 MMHG | TEMPERATURE: 97.8 F | OXYGEN SATURATION: 96 % | DIASTOLIC BLOOD PRESSURE: 107 MMHG | RESPIRATION RATE: 15 BRPM | WEIGHT: 303 LBS

## 2017-04-15 DIAGNOSIS — E66.01 MORBID (SEVERE) OBESITY DUE TO EXCESS CALORIES: ICD-10-CM

## 2017-04-15 DIAGNOSIS — Z90.89 ACQUIRED ABSENCE OF OTHER ORGANS: Chronic | ICD-10-CM

## 2017-04-15 DIAGNOSIS — Z00.00 ENCOUNTER FOR GENERAL ADULT MEDICAL EXAMINATION WITHOUT ABNORMAL FINDINGS: ICD-10-CM

## 2017-04-15 DIAGNOSIS — R51 HEADACHE: ICD-10-CM

## 2017-04-15 DIAGNOSIS — H53.8 OTHER VISUAL DISTURBANCES: ICD-10-CM

## 2017-04-15 PROCEDURE — G0463: CPT

## 2017-04-15 PROCEDURE — 85610 PROTHROMBIN TIME: CPT

## 2017-04-15 RX ORDER — BENZONATATE 200 MG/1
200 CAPSULE ORAL
Qty: 1 | Refills: 0 | Status: DISCONTINUED | COMMUNITY
Start: 2017-03-17 | End: 2017-04-15

## 2017-04-18 ENCOUNTER — RESULT REVIEW (OUTPATIENT)
Age: 52
End: 2017-04-18

## 2017-04-19 ENCOUNTER — EMERGENCY (EMERGENCY)
Facility: HOSPITAL | Age: 52
LOS: 1 days | Discharge: HOME | End: 2017-04-19
Attending: EMERGENCY MEDICINE | Admitting: EMERGENCY MEDICINE
Payer: MEDICAID

## 2017-04-19 ENCOUNTER — RESULT REVIEW (OUTPATIENT)
Age: 52
End: 2017-04-19

## 2017-04-19 ENCOUNTER — OUTPATIENT (OUTPATIENT)
Dept: OUTPATIENT SERVICES | Facility: HOSPITAL | Age: 52
LOS: 1 days | End: 2017-04-19
Payer: MEDICAID

## 2017-04-19 VITALS
OXYGEN SATURATION: 99 % | RESPIRATION RATE: 18 BRPM | TEMPERATURE: 98 F | DIASTOLIC BLOOD PRESSURE: 93 MMHG | HEART RATE: 94 BPM | SYSTOLIC BLOOD PRESSURE: 189 MMHG

## 2017-04-19 VITALS
RESPIRATION RATE: 19 BRPM | DIASTOLIC BLOOD PRESSURE: 94 MMHG | HEART RATE: 90 BPM | SYSTOLIC BLOOD PRESSURE: 145 MMHG | OXYGEN SATURATION: 97 %

## 2017-04-19 DIAGNOSIS — Z90.89 ACQUIRED ABSENCE OF OTHER ORGANS: Chronic | ICD-10-CM

## 2017-04-19 DIAGNOSIS — Z79.899 OTHER LONG TERM (CURRENT) DRUG THERAPY: ICD-10-CM

## 2017-04-19 DIAGNOSIS — I11.0 HYPERTENSIVE HEART DISEASE WITH HEART FAILURE: ICD-10-CM

## 2017-04-19 DIAGNOSIS — R50.9 FEVER, UNSPECIFIED: ICD-10-CM

## 2017-04-19 DIAGNOSIS — R07.89 OTHER CHEST PAIN: ICD-10-CM

## 2017-04-19 DIAGNOSIS — J45.909 UNSPECIFIED ASTHMA, UNCOMPLICATED: ICD-10-CM

## 2017-04-19 DIAGNOSIS — E78.5 HYPERLIPIDEMIA, UNSPECIFIED: ICD-10-CM

## 2017-04-19 DIAGNOSIS — Z79.82 LONG TERM (CURRENT) USE OF ASPIRIN: ICD-10-CM

## 2017-04-19 DIAGNOSIS — F41.9 ANXIETY DISORDER, UNSPECIFIED: ICD-10-CM

## 2017-04-19 DIAGNOSIS — I48.91 UNSPECIFIED ATRIAL FIBRILLATION: ICD-10-CM

## 2017-04-19 DIAGNOSIS — I50.42 CHRONIC COMBINED SYSTOLIC (CONGESTIVE) AND DIASTOLIC (CONGESTIVE) HEART FAILURE: ICD-10-CM

## 2017-04-19 LAB
ALBUMIN SERPL ELPH-MCNC: 4 G/DL — SIGNIFICANT CHANGE UP (ref 3.3–5)
ALP SERPL-CCNC: 57 U/L — SIGNIFICANT CHANGE UP (ref 40–120)
ALT FLD-CCNC: 24 U/L RC — SIGNIFICANT CHANGE UP (ref 10–45)
ANION GAP SERPL CALC-SCNC: 13 MMOL/L — SIGNIFICANT CHANGE UP (ref 5–17)
APTT BLD: 34.2 SEC — SIGNIFICANT CHANGE UP (ref 27.5–37.4)
AST SERPL-CCNC: 27 U/L — SIGNIFICANT CHANGE UP (ref 10–40)
BASOPHILS # BLD AUTO: 0.1 K/UL — SIGNIFICANT CHANGE UP (ref 0–0.2)
BASOPHILS NFR BLD AUTO: 0.8 % — SIGNIFICANT CHANGE UP (ref 0–2)
BILIRUB SERPL-MCNC: 0.2 MG/DL — SIGNIFICANT CHANGE UP (ref 0.2–1.2)
BUN SERPL-MCNC: 13 MG/DL — SIGNIFICANT CHANGE UP (ref 7–23)
CALCIUM SERPL-MCNC: 8.7 MG/DL — SIGNIFICANT CHANGE UP (ref 8.4–10.5)
CHLORIDE SERPL-SCNC: 105 MMOL/L — SIGNIFICANT CHANGE UP (ref 96–108)
CO2 SERPL-SCNC: 25 MMOL/L — SIGNIFICANT CHANGE UP (ref 22–31)
CREAT SERPL-MCNC: 1.03 MG/DL — SIGNIFICANT CHANGE UP (ref 0.5–1.3)
EOSINOPHIL # BLD AUTO: 0.6 K/UL — HIGH (ref 0–0.5)
EOSINOPHIL NFR BLD AUTO: 7.4 % — HIGH (ref 0–6)
GAS PNL BLDV: SIGNIFICANT CHANGE UP
GLUCOSE SERPL-MCNC: 69 MG/DL — LOW (ref 70–99)
HCT VFR BLD CALC: 41.9 % — SIGNIFICANT CHANGE UP (ref 39–50)
HGB BLD-MCNC: 13.7 G/DL — SIGNIFICANT CHANGE UP (ref 13–17)
INR BLD: 0.98 RATIO — SIGNIFICANT CHANGE UP (ref 0.88–1.16)
LYMPHOCYTES # BLD AUTO: 1.8 K/UL — SIGNIFICANT CHANGE UP (ref 1–3.3)
LYMPHOCYTES # BLD AUTO: 22.6 % — SIGNIFICANT CHANGE UP (ref 13–44)
MCHC RBC-ENTMCNC: 28.8 PG — SIGNIFICANT CHANGE UP (ref 27–34)
MCHC RBC-ENTMCNC: 32.7 GM/DL — SIGNIFICANT CHANGE UP (ref 32–36)
MCV RBC AUTO: 87.9 FL — SIGNIFICANT CHANGE UP (ref 80–100)
MONOCYTES # BLD AUTO: 1.3 K/UL — HIGH (ref 0–0.9)
MONOCYTES NFR BLD AUTO: 17 % — HIGH (ref 2–14)
NEUTROPHILS # BLD AUTO: 4.1 K/UL — SIGNIFICANT CHANGE UP (ref 1.8–7.4)
NEUTROPHILS NFR BLD AUTO: 52.2 % — SIGNIFICANT CHANGE UP (ref 43–77)
PLATELET # BLD AUTO: 159 K/UL — SIGNIFICANT CHANGE UP (ref 150–400)
POTASSIUM SERPL-MCNC: 3.7 MMOL/L — SIGNIFICANT CHANGE UP (ref 3.5–5.3)
POTASSIUM SERPL-SCNC: 3.7 MMOL/L — SIGNIFICANT CHANGE UP (ref 3.5–5.3)
PROT SERPL-MCNC: 7.3 G/DL — SIGNIFICANT CHANGE UP (ref 6–8.3)
PROTHROM AB SERPL-ACNC: 10.6 SEC — SIGNIFICANT CHANGE UP (ref 9.8–12.7)
RBC # BLD: 4.77 M/UL — SIGNIFICANT CHANGE UP (ref 4.2–5.8)
RBC # FLD: 13.7 % — SIGNIFICANT CHANGE UP (ref 10.3–14.5)
SODIUM SERPL-SCNC: 143 MMOL/L — SIGNIFICANT CHANGE UP (ref 135–145)
WBC # BLD: 7.8 K/UL — SIGNIFICANT CHANGE UP (ref 3.8–10.5)
WBC # FLD AUTO: 7.8 K/UL — SIGNIFICANT CHANGE UP (ref 3.8–10.5)

## 2017-04-19 PROCEDURE — 85027 COMPLETE CBC AUTOMATED: CPT

## 2017-04-19 PROCEDURE — 82803 BLOOD GASES ANY COMBINATION: CPT

## 2017-04-19 PROCEDURE — 71046 X-RAY EXAM CHEST 2 VIEWS: CPT

## 2017-04-19 PROCEDURE — 36415 COLL VENOUS BLD VENIPUNCTURE: CPT

## 2017-04-19 PROCEDURE — 83605 ASSAY OF LACTIC ACID: CPT

## 2017-04-19 PROCEDURE — 87040 BLOOD CULTURE FOR BACTERIA: CPT

## 2017-04-19 PROCEDURE — 99284 EMERGENCY DEPT VISIT MOD MDM: CPT | Mod: 25

## 2017-04-19 PROCEDURE — 82947 ASSAY GLUCOSE BLOOD QUANT: CPT

## 2017-04-19 PROCEDURE — 80053 COMPREHEN METABOLIC PANEL: CPT

## 2017-04-19 PROCEDURE — 84132 ASSAY OF SERUM POTASSIUM: CPT

## 2017-04-19 PROCEDURE — 85610 PROTHROMBIN TIME: CPT

## 2017-04-19 PROCEDURE — 99284 EMERGENCY DEPT VISIT MOD MDM: CPT

## 2017-04-19 PROCEDURE — 93005 ELECTROCARDIOGRAM TRACING: CPT

## 2017-04-19 PROCEDURE — 85730 THROMBOPLASTIN TIME PARTIAL: CPT

## 2017-04-19 PROCEDURE — 82330 ASSAY OF CALCIUM: CPT

## 2017-04-19 PROCEDURE — 71020: CPT | Mod: 26

## 2017-04-19 PROCEDURE — 82435 ASSAY OF BLOOD CHLORIDE: CPT

## 2017-04-19 PROCEDURE — 82565 ASSAY OF CREATININE: CPT

## 2017-04-19 PROCEDURE — 84295 ASSAY OF SERUM SODIUM: CPT

## 2017-04-19 PROCEDURE — 85014 HEMATOCRIT: CPT

## 2017-04-19 RX ORDER — SODIUM CHLORIDE 9 MG/ML
1000 INJECTION INTRAMUSCULAR; INTRAVENOUS; SUBCUTANEOUS ONCE
Qty: 0 | Refills: 0 | Status: COMPLETED | OUTPATIENT
Start: 2017-04-19 | End: 2017-04-19

## 2017-04-19 RX ADMIN — SODIUM CHLORIDE 1000 MILLILITER(S): 9 INJECTION INTRAMUSCULAR; INTRAVENOUS; SUBCUTANEOUS at 18:00

## 2017-04-19 NOTE — ED ADULT NURSE NOTE - OBJECTIVE STATEMENT
51y male arrived to ED (ambulatory) complaining of pain in his left shoulder s/p IAD placement. Placement was 3 months ago, pain began 3 days ago with radiation to shoulder. Pain described as sharp and itchy. Patient denies chest pain, SOB, nausea, vomiting, chills, diarrhea.

## 2017-04-19 NOTE — ED PROVIDER NOTE - MEDICAL DECISION MAKING DETAILS
50 y/o M extensive cardiac history 2 mo s/p AICD placement by Dr. Penn, presenting with concern of infection to site secondary to pain at AICD site, warmth and fevers and chills for last two days. PE remarkable only for tenderness, appearnce is noninfectious. Will obtain bloods, CXR, EKG and contaact cardiology.

## 2017-04-19 NOTE — ED PROVIDER NOTE - OBJECTIVE STATEMENT
50 y/o M pmhx paroxysmal afib on coumadin, cardiomyopathy with combined systolic and diastolic heart failure (EF 30-35% on 12/21/16 echo) with lifevest in place, asthma, HTN, HLD, prediabetes, AICD placement performed by Dr. Penn on 2/27 presenting with concern for pain and warmth around AICD site for last 3 days. Pt states that he has been having fever and chills for last 2 days as well, did not check is temperature but felt subjectively very warm. Pt denies any nausea or vomiting. Denies any drainage from site.

## 2017-04-19 NOTE — ED PROVIDER NOTE - PLAN OF CARE
1. Take tylenol as needed for pain   2. Continue your home medications as directed   3. Follow-up with Dr. Penn tomorrow for reevaluation. See him in the clinic. Call in AM to make appointment and see him tomorrow.   4. Return to the ER for any new or worsening symptoms.

## 2017-04-19 NOTE — ED PROVIDER NOTE - ATTENDING CONTRIBUTION TO CARE
I have examined and evaluated this patient with the above resident or PA, and agree with the documented clinical history, exam and plan.   Briefly: 52yo M with complex PMH including mutliple cardiac diagnoses (including combined sys/diastolic heart failure) and is s/p AICD placement 2/27/17; now presenting to ED c/o feeling pain at site of AICD, and feeling like it has shifted in location.  On exam, patient is well appearing, cardiac s1/s2, lungs ctabl, abd soft nt/nd, LE have no significant edema.  Skin:  AICD site appears without erythema; mild tenderness to palpation. No pus draining from site.  Surgical incision appears well-healed without evidence of dehiscence.  Cardiology service consulted and labs, cxr obtained: AICD appears in similar position to prior CXR.  Cardiology agrees that there appears to be no apparent disruption or sign of infection related to AICD.  Plan to dc with outpatient f/u.  -Reji

## 2017-04-19 NOTE — ED PROVIDER NOTE - PHYSICAL EXAMINATION
GEN: Well Appearing, Nontoxic, NAD  HEENT: Symm Facies, PERRL, EOMI, MMM, posterior pharynx clear  CV: No JVD/Bruits or stridor;  RRR w/o m/g/r  RESP: CTAB w/o w/r/r  ABD: Soft, nt/nd, +bs, no guarding/rebound, no RUQ tender;  No CVAT  EXT: No lower extremity edema or calf tenderness. WWP, palpable pulses. FROMx4  SKIN: No erythema, lesions or rash. AICD site appears without erythema; tenderness to palpation.   Neuro: Grossly intact, AOX3 with normal speech, CN II-XII intact; Sensation intact, motor 5/5 throughout; gait normal GEN: Well Appearing, Nontoxic, NAD  HEENT: Symm Facies, PERRL, EOMI, MMM, posterior pharynx clear  CV: No JVD/Bruits or stridor;  irregular rhythm, normal rate w/o m/g/r  RESP: CTAB w/o w/r/r  ABD: Soft, nt/nd, +bs, no guarding/rebound, no RUQ tender;  No CVAT  EXT: No lower extremity edema or calf tenderness. WWP, palpable pulses. FROMx4  SKIN: No erythema, lesions or rash. AICD site appears without erythema; mild tenderness to palpation. No pus draining from site.  Surgical incision appears well-healed without evidence of dehiscence.   Neuro: Grossly intact, AOX3 with normal speech, CN II-XII intact; Sensation intact, motor 5/5 throughout; gait normal

## 2017-04-19 NOTE — ED PROVIDER NOTE - NS ED ROS FT
Constitutional: +fever and chills  Eyes: No visual changes, eye pain or redness  HEENT: No throat pain, ear pain, nasal pain. No nose bleeding.  CV: No chest pain or lower extremity edema  Resp: No SOB no cough  GI: No abd pain. No nausea or vomiting. No diarrhea. No constipation.   : No dysuria, hematuria.   MSK: No musculoskeletal pain  Skin: tenderness to L chest at AICD site, warmth.   Neuro: No headache. No numbness or tingling. No weakness.

## 2017-04-19 NOTE — ED PROVIDER NOTE - CARE PLAN
Principal Discharge DX:	Chest wall pain  Instructions for follow-up, activity and diet:	1. Take tylenol as needed for pain   2. Continue your home medications as directed   3. Follow-up with Dr. Penn tomorrow for reevaluation. See him in the clinic. Call in AM to make appointment and see him tomorrow.   4. Return to the ER for any new or worsening symptoms.

## 2017-04-19 NOTE — ED PROVIDER NOTE - PROGRESS NOTE DETAILS
pt without temperature and without leukocytosis. discussion with cardiology fellow, as patient has not infectious and is highly concerned that his pacemaker moved, and he states patient can go home with follow-up with Dr. Penn in his office tomorrow. -Amanda Tran PA-C

## 2017-04-19 NOTE — ED PROVIDER NOTE - PMH
Anxiety    Asthma    Atrial fibrillation, unspecified type    Cardiomyopathy    Cardiomyopathy, unspecified type    Chronic combined systolic and diastolic congestive heart failure    Essential hypertension    Hyperlipidemia, unspecified hyperlipidemia type    Prediabetes    Prolonged QT interval

## 2017-04-19 NOTE — ED ADULT NURSE NOTE - CHPI ED SYMPTOMS NEG
no numbness/no vomiting/no chills/no dizziness/no weakness/no decreased eating/drinking/no fever/no nausea/no tingling

## 2017-04-24 ENCOUNTER — APPOINTMENT (OUTPATIENT)
Dept: ELECTROPHYSIOLOGY | Facility: CLINIC | Age: 52
End: 2017-04-24

## 2017-04-24 ENCOUNTER — NON-APPOINTMENT (OUTPATIENT)
Age: 52
End: 2017-04-24

## 2017-04-24 VITALS — DIASTOLIC BLOOD PRESSURE: 68 MMHG | OXYGEN SATURATION: 98 % | HEART RATE: 50 BPM | SYSTOLIC BLOOD PRESSURE: 96 MMHG

## 2017-04-24 LAB
CULTURE RESULTS: SIGNIFICANT CHANGE UP
CULTURE RESULTS: SIGNIFICANT CHANGE UP
SPECIMEN SOURCE: SIGNIFICANT CHANGE UP
SPECIMEN SOURCE: SIGNIFICANT CHANGE UP

## 2017-04-26 ENCOUNTER — LABORATORY RESULT (OUTPATIENT)
Age: 52
End: 2017-04-26

## 2017-05-01 ENCOUNTER — APPOINTMENT (OUTPATIENT)
Dept: PULMONOLOGY | Facility: CLINIC | Age: 52
End: 2017-05-01

## 2017-05-02 ENCOUNTER — OUTPATIENT (OUTPATIENT)
Dept: OUTPATIENT SERVICES | Facility: HOSPITAL | Age: 52
LOS: 1 days | End: 2017-05-02
Payer: MEDICAID

## 2017-05-02 DIAGNOSIS — I48.91 UNSPECIFIED ATRIAL FIBRILLATION: ICD-10-CM

## 2017-05-02 DIAGNOSIS — Z90.89 ACQUIRED ABSENCE OF OTHER ORGANS: Chronic | ICD-10-CM

## 2017-05-02 PROCEDURE — 36415 COLL VENOUS BLD VENIPUNCTURE: CPT

## 2017-05-02 PROCEDURE — 85610 PROTHROMBIN TIME: CPT

## 2017-05-16 ENCOUNTER — APPOINTMENT (OUTPATIENT)
Dept: OPHTHALMOLOGY | Facility: CLINIC | Age: 52
End: 2017-05-16

## 2017-05-17 ENCOUNTER — APPOINTMENT (OUTPATIENT)
Dept: PULMONOLOGY | Facility: CLINIC | Age: 52
End: 2017-05-17

## 2017-05-24 NOTE — DISCHARGE NOTE ADULT - NSTOBACCONEVERSMOKERY/N_GEN_A
GASTROENTEROLOGY CONSULT    HPI: Javier Woods is a  88 year old male who presents for follow-up of rectal pressure, for the last year has been complaining of 20-30 urges to have a bowel movement daily, only produces small amount of stool each time. This is causing significant stress, discomfort, inability to sleep due to nocturnal symptoms. He has been back-and-forth to the emergency department twice in April and a saw his primary care provider for this issue earlier in May. Flex sigmoidoscopy on May 4 revealed type rectal sphincter, recommended biofeedback therapy. CT scan has been consistent with diverticulosis. Valium had initially been started, 5 mg at bedtime which helped significantly, however this was discouraged by primary care provider who evidently was concerned about age. Since then he has not been sleeping at all, very frustrated. He uses magnesium citrate every 2 weeks to help produce bowel movements, gives him some good relief for a few days afterwards. Does use a half capful of MiraLAX twice daily.        Past Medical History:   Diagnosis Date   • Anxiety    • Arthritis    • Atrial fibrillation (CMS/[facility])    • Atrial fibrillation, unspecified 5/18/2015   • Coronary artery disease    • Esophageal reflux    • Hyperlipidemia    • Hypertension    • Prostate Cancer 2005   • Subdural hemorrhage (CMS/HCC) 11/2006    Left subdural hemorrhage   • Type II or unspecified type diabetes mellitus without mention of complication, not stated as uncontrolled 6/20/2013   • Unspecified malignant neoplasm of skin, site unspecified    • Varicose veins of lower extremities with ulcer (CMS/HCC) 10/24/2012       Past Surgical History:   Procedure Laterality Date   • ABDOMEN SURGERY  2009    Attempted peritoneal repair with \"artificial skin\" per patient   • ANESTH,SHOULDER,CLOSED PROCEDURE  1948    Left shoulder   • APPENDECTOMY  2006   • CARDIAC VALVE REPLACEMENT  10/2012    2 replaced, one repaired   • COLONOSCOPY REMOVE  LESION BY SNARE  4/10/2014    Diverticulosis/Polyps, F/U PRN (Dr. King)   • COLONOSCOPY W BIOPSY  4/21/2008    Dr. King, Diverticulosis/Hemorrhoids, F/U 10 years   • CRYOSURGICAL ABLATION/PROSTATE  2/11/2005    TCAP, suprapubic tube   • EXCISION OF TONSIL TAGS      Childhood   • LAPAROSCOPY, APPENDECTOMY  1/20/06    Ruptured   • MOHS 1 STAGE UPTO5 TISSUE BLOCKS [facility]  7/14    Dr Mai Mohs skin cancer   • REMV CATARACT EXTRACAP INSERT LENS  4/18/2011    Cataract Removal Lens Implant, right   • REPAIR INCISIONAL HERNIA,REDUCIBLE  8/31/2009    incisional henia with alloderm mesh beneath fascial closures (Dr. Seth)   • REPAIR ING HERNIA,5+Y/O,REDUCIBL      Hernia, inguinal, left   • REPR DFCT ART,AORTOILIAC  5/30/2007    AF2 reconsturction; 18x9 - clarissa   • TONSILLECTOMY AND ADENOIDECTOMY     • VASCULAR SURGERY  2007    Abdominal  aneurysm repair with graft (Dr. Seth)       Current Outpatient Prescriptions   Medication   • lidocaine 5 % rectal cream   • DIAZepam (VALIUM) 5 MG tablet   • HYDROcodone-acetaminophen (NORCO) 5-325 MG per tablet   • Nitroglycerin (RECTIV) 0.4 % Ointment   • traZODone (DESYREL) 50 MG tablet   • tamsulosin (FLOMAX) 0.4 MG Cap   • simvastatin (ZOCOR) 40 MG tablet   • lisinopril-hydroCHLOROthiazide (PRINZIDE,ZESTORETIC) 10-12.5 MG per tablet   • finasteride (PROSCAR) 5 MG tablet   • omeprazole (PRILOSEC) 20 MG capsule   • warfarin (COUMADIN) 4 MG tablet   • aspirin 81 MG tablet   • amoxicillin (AMOXIL) 500 MG capsule   • polyethylene glycol (GLYCOLAX, MIRALAX) packet   • Multiple Vitamins-Minerals (MULTIVITAMIN & MINERAL PO)     No current facility-administered medications for this visit.      Facility-Administered Medications Ordered in Other Visits   Medication   • LIDOCAINE HCL 4 % EX SOLN Pyxis Override       ROS:  GEN: Patient denies fevers, chills, night sweats, anorexia and weight loss.  RESP:  Denies SOB, dyspnea on exertion, cough or hemoptysis.    CARDIAC: Denies anginal chest  pain, orthopnea, peripheral edema.  GI:  See above.    PHYSICAL EXAM:  Visit Vitals   • /87 (BP Location: E, Patient Position: Sitting, Cuff Size: Regular)   • Pulse 74   • Wt 74.8 kg   • BMI 24.37 kg/m2     GEN:  Well developed, well nourished, appears in no acute distress.  SKIN: Warm, intact.  No visible lesions, rashes or nodules.  RESP: Lungs clear throughout without wheezes, rales or rhonchi.  CARDIAC:  RRR with no murmurs, rubs or gallops.  No visible pedal edema.  GI:  Abdomen soft, round, nondistended, nontender, with active bowel sounds.  No guarding or rebound. No palpable organomegaly.     ASSESSMENT:   1. Rectal or anal pain        PLAN:  Rectal pressure and urgency, secondary to tight rectal sphincter. I spent over 30 minutes with this patient with greater than 50% of the time spent counseling, reviewing medications and diagnoses. Patient will start biofeedback at earliest convenience. These orders have been placed. We discussed the options of 5 mg of Ambien to help him sleep, although this will not help decrease rectal pressure. Also discussed nortriptyline 10 mg which could help him sleep and decrease nerve sensitivity, although has the potential to worsen constipation. We will start with half of a tablet of 5 mg Valium twice daily. If worse or not better and symptoms, with the addition of biofeedback therapy as well, will likely proceed with 10 mg nortriptyline.    All questions answered.  The patient indicated understanding of the diagnosis and agreed with the plan of care. Will return to clinic for any new or worsening symptoms.       JEMIMA Solis     No

## 2017-05-30 ENCOUNTER — APPOINTMENT (OUTPATIENT)
Dept: CARDIOLOGY | Facility: CLINIC | Age: 52
End: 2017-05-30

## 2017-05-30 ENCOUNTER — NON-APPOINTMENT (OUTPATIENT)
Age: 52
End: 2017-05-30

## 2017-05-30 VITALS — SYSTOLIC BLOOD PRESSURE: 130 MMHG | HEART RATE: 56 BPM | DIASTOLIC BLOOD PRESSURE: 96 MMHG

## 2017-05-30 VITALS
HEART RATE: 88 BPM | RESPIRATION RATE: 17 BRPM | BODY MASS INDEX: 40.16 KG/M2 | HEIGHT: 73 IN | DIASTOLIC BLOOD PRESSURE: 95 MMHG | SYSTOLIC BLOOD PRESSURE: 151 MMHG | OXYGEN SATURATION: 97 % | WEIGHT: 303 LBS

## 2017-06-06 ENCOUNTER — OUTPATIENT (OUTPATIENT)
Dept: OUTPATIENT SERVICES | Facility: HOSPITAL | Age: 52
LOS: 1 days | End: 2017-06-06
Payer: MEDICAID

## 2017-06-06 DIAGNOSIS — Z90.89 ACQUIRED ABSENCE OF OTHER ORGANS: Chronic | ICD-10-CM

## 2017-06-06 DIAGNOSIS — I48.91 UNSPECIFIED ATRIAL FIBRILLATION: ICD-10-CM

## 2017-06-06 PROCEDURE — 36415 COLL VENOUS BLD VENIPUNCTURE: CPT

## 2017-06-06 PROCEDURE — 85610 PROTHROMBIN TIME: CPT

## 2017-06-07 ENCOUNTER — APPOINTMENT (OUTPATIENT)
Dept: SLEEP CENTER | Facility: CLINIC | Age: 52
End: 2017-06-07

## 2017-06-07 ENCOUNTER — APPOINTMENT (OUTPATIENT)
Dept: PULMONOLOGY | Facility: CLINIC | Age: 52
End: 2017-06-07

## 2017-06-07 ENCOUNTER — OUTPATIENT (OUTPATIENT)
Dept: OUTPATIENT SERVICES | Facility: HOSPITAL | Age: 52
LOS: 1 days | End: 2017-06-07
Payer: MEDICAID

## 2017-06-07 VITALS
WEIGHT: 306 LBS | TEMPERATURE: 99.1 F | BODY MASS INDEX: 40.56 KG/M2 | HEART RATE: 84 BPM | DIASTOLIC BLOOD PRESSURE: 80 MMHG | RESPIRATION RATE: 18 BRPM | SYSTOLIC BLOOD PRESSURE: 150 MMHG | OXYGEN SATURATION: 97 % | HEIGHT: 73 IN

## 2017-06-07 DIAGNOSIS — Z90.89 ACQUIRED ABSENCE OF OTHER ORGANS: Chronic | ICD-10-CM

## 2017-06-07 PROCEDURE — 95811 POLYSOM 6/>YRS CPAP 4/> PARM: CPT

## 2017-06-08 DIAGNOSIS — G47.33 OBSTRUCTIVE SLEEP APNEA (ADULT) (PEDIATRIC): ICD-10-CM

## 2017-06-13 ENCOUNTER — APPOINTMENT (OUTPATIENT)
Dept: ELECTROPHYSIOLOGY | Facility: CLINIC | Age: 52
End: 2017-06-13

## 2017-06-14 ENCOUNTER — APPOINTMENT (OUTPATIENT)
Dept: FAMILY MEDICINE | Facility: HOSPITAL | Age: 52
End: 2017-06-14

## 2017-06-16 ENCOUNTER — NON-APPOINTMENT (OUTPATIENT)
Age: 52
End: 2017-06-16

## 2017-06-16 ENCOUNTER — APPOINTMENT (OUTPATIENT)
Dept: ELECTROPHYSIOLOGY | Facility: CLINIC | Age: 52
End: 2017-06-16

## 2017-06-16 VITALS — OXYGEN SATURATION: 98 % | SYSTOLIC BLOOD PRESSURE: 147 MMHG | DIASTOLIC BLOOD PRESSURE: 85 MMHG | HEART RATE: 74 BPM

## 2017-06-23 ENCOUNTER — APPOINTMENT (OUTPATIENT)
Dept: FAMILY MEDICINE | Facility: HOSPITAL | Age: 52
End: 2017-06-23

## 2017-06-26 ENCOUNTER — OUTPATIENT (OUTPATIENT)
Dept: OUTPATIENT SERVICES | Facility: HOSPITAL | Age: 52
LOS: 1 days | End: 2017-06-26
Payer: MEDICAID

## 2017-06-26 DIAGNOSIS — Z90.89 ACQUIRED ABSENCE OF OTHER ORGANS: Chronic | ICD-10-CM

## 2017-06-26 DIAGNOSIS — I48.91 UNSPECIFIED ATRIAL FIBRILLATION: ICD-10-CM

## 2017-06-26 PROCEDURE — 85610 PROTHROMBIN TIME: CPT

## 2017-06-26 PROCEDURE — 36415 COLL VENOUS BLD VENIPUNCTURE: CPT

## 2017-06-27 ENCOUNTER — MED ADMIN CHARGE (OUTPATIENT)
Age: 52
End: 2017-06-27

## 2017-06-27 ENCOUNTER — APPOINTMENT (OUTPATIENT)
Dept: FAMILY MEDICINE | Facility: HOSPITAL | Age: 52
End: 2017-06-27

## 2017-06-27 ENCOUNTER — OUTPATIENT (OUTPATIENT)
Dept: OUTPATIENT SERVICES | Facility: HOSPITAL | Age: 52
LOS: 1 days | End: 2017-06-27
Payer: MEDICAID

## 2017-06-27 VITALS
TEMPERATURE: 96.9 F | BODY MASS INDEX: 40.82 KG/M2 | OXYGEN SATURATION: 95 % | WEIGHT: 308 LBS | SYSTOLIC BLOOD PRESSURE: 155 MMHG | HEIGHT: 73 IN | RESPIRATION RATE: 16 BRPM | HEART RATE: 93 BPM | DIASTOLIC BLOOD PRESSURE: 114 MMHG

## 2017-06-27 DIAGNOSIS — E78.5 HYPERLIPIDEMIA, UNSPECIFIED: ICD-10-CM

## 2017-06-27 DIAGNOSIS — I50.9 HEART FAILURE, UNSPECIFIED: ICD-10-CM

## 2017-06-27 DIAGNOSIS — I48.91 UNSPECIFIED ATRIAL FIBRILLATION: ICD-10-CM

## 2017-06-27 DIAGNOSIS — Z23 ENCOUNTER FOR IMMUNIZATION: ICD-10-CM

## 2017-06-27 DIAGNOSIS — I25.10 ATHEROSCLEROTIC HEART DISEASE OF NATIVE CORONARY ARTERY WITHOUT ANGINA PECTORIS: ICD-10-CM

## 2017-06-27 DIAGNOSIS — Z90.89 ACQUIRED ABSENCE OF OTHER ORGANS: Chronic | ICD-10-CM

## 2017-06-27 DIAGNOSIS — Z51.81 ENCOUNTER FOR THERAPEUTIC DRUG LEVEL MONITORING: ICD-10-CM

## 2017-06-27 DIAGNOSIS — Z79.01 LONG TERM (CURRENT) USE OF ANTICOAGULANTS: ICD-10-CM

## 2017-06-27 RX ORDER — HYDROCORTISONE 10 MG/G
1 CREAM TOPICAL TWICE DAILY
Qty: 1 | Refills: 0 | Status: DISCONTINUED | COMMUNITY
Start: 2017-01-31 | End: 2017-06-27

## 2017-06-27 RX ORDER — WARFARIN 1 MG/1
1 TABLET ORAL
Qty: 90 | Refills: 2 | Status: DISCONTINUED | COMMUNITY
Start: 2017-01-12 | End: 2017-06-27

## 2017-06-27 RX ORDER — FLUTICASONE PROPIONATE 50 UG/1
50 SPRAY, METERED NASAL DAILY
Qty: 1 | Refills: 0 | Status: DISCONTINUED | COMMUNITY
Start: 2017-03-17 | End: 2017-06-27

## 2017-06-27 RX ORDER — FLUTICASONE PROPIONATE 50 UG/1
50 SPRAY, METERED NASAL DAILY
Qty: 1 | Refills: 0 | Status: DISCONTINUED | COMMUNITY
Start: 2017-01-06 | End: 2017-06-27

## 2017-06-27 RX ORDER — FLUTICASONE PROPIONATE 50 UG/1
50 SPRAY, METERED NASAL DAILY
Qty: 1 | Refills: 0 | Status: DISCONTINUED | COMMUNITY
Start: 2017-03-13 | End: 2017-06-27

## 2017-06-27 RX ORDER — BENZONATATE 100 MG/1
100 CAPSULE ORAL
Qty: 21 | Refills: 0 | Status: DISCONTINUED | COMMUNITY
Start: 2017-01-06 | End: 2017-06-27

## 2017-06-28 ENCOUNTER — APPOINTMENT (OUTPATIENT)
Dept: PULMONOLOGY | Facility: CLINIC | Age: 52
End: 2017-06-28

## 2017-06-29 PROCEDURE — 90732 PPSV23 VACC 2 YRS+ SUBQ/IM: CPT

## 2017-06-29 PROCEDURE — 86803 HEPATITIS C AB TEST: CPT

## 2017-06-29 PROCEDURE — 86762 RUBELLA ANTIBODY: CPT

## 2017-06-29 PROCEDURE — 86765 RUBEOLA ANTIBODY: CPT

## 2017-06-29 PROCEDURE — 90471 IMMUNIZATION ADMIN: CPT

## 2017-06-29 PROCEDURE — 36415 COLL VENOUS BLD VENIPUNCTURE: CPT

## 2017-06-29 PROCEDURE — 86735 MUMPS ANTIBODY: CPT

## 2017-06-29 PROCEDURE — G0463: CPT

## 2017-06-29 PROCEDURE — 86706 HEP B SURFACE ANTIBODY: CPT

## 2017-07-07 ENCOUNTER — APPOINTMENT (OUTPATIENT)
Dept: FAMILY MEDICINE | Facility: HOSPITAL | Age: 52
End: 2017-07-07

## 2017-07-19 ENCOUNTER — APPOINTMENT (OUTPATIENT)
Dept: PULMONOLOGY | Facility: CLINIC | Age: 52
End: 2017-07-19

## 2017-07-19 VITALS
SYSTOLIC BLOOD PRESSURE: 118 MMHG | BODY MASS INDEX: 41.04 KG/M2 | RESPIRATION RATE: 14 BRPM | OXYGEN SATURATION: 97 % | HEART RATE: 79 BPM | WEIGHT: 303 LBS | TEMPERATURE: 97.6 F | DIASTOLIC BLOOD PRESSURE: 70 MMHG | HEIGHT: 72 IN

## 2017-07-24 ENCOUNTER — APPOINTMENT (OUTPATIENT)
Dept: ELECTROPHYSIOLOGY | Facility: CLINIC | Age: 52
End: 2017-07-24

## 2017-07-27 ENCOUNTER — MED ADMIN CHARGE (OUTPATIENT)
Age: 52
End: 2017-07-27

## 2017-07-27 ENCOUNTER — APPOINTMENT (OUTPATIENT)
Dept: FAMILY MEDICINE | Facility: HOSPITAL | Age: 52
End: 2017-07-27

## 2017-07-27 ENCOUNTER — OUTPATIENT (OUTPATIENT)
Dept: OUTPATIENT SERVICES | Facility: HOSPITAL | Age: 52
LOS: 1 days | End: 2017-07-27
Payer: MEDICAID

## 2017-07-27 VITALS
RESPIRATION RATE: 14 BRPM | SYSTOLIC BLOOD PRESSURE: 111 MMHG | BODY MASS INDEX: 41.72 KG/M2 | HEART RATE: 71 BPM | WEIGHT: 308 LBS | TEMPERATURE: 98 F | OXYGEN SATURATION: 95 % | DIASTOLIC BLOOD PRESSURE: 68 MMHG | HEIGHT: 72 IN

## 2017-07-27 DIAGNOSIS — R73.03 PREDIABETES: ICD-10-CM

## 2017-07-27 DIAGNOSIS — E78.5 HYPERLIPIDEMIA, UNSPECIFIED: ICD-10-CM

## 2017-07-27 DIAGNOSIS — E66.01 MORBID (SEVERE) OBESITY DUE TO EXCESS CALORIES: ICD-10-CM

## 2017-07-27 DIAGNOSIS — Z90.89 ACQUIRED ABSENCE OF OTHER ORGANS: Chronic | ICD-10-CM

## 2017-07-27 DIAGNOSIS — Z23 ENCOUNTER FOR IMMUNIZATION: ICD-10-CM

## 2017-07-27 PROCEDURE — 85025 COMPLETE CBC W/AUTO DIFF WBC: CPT

## 2017-07-27 PROCEDURE — 36415 COLL VENOUS BLD VENIPUNCTURE: CPT

## 2017-07-27 PROCEDURE — 80061 LIPID PANEL: CPT

## 2017-07-27 PROCEDURE — G0463: CPT

## 2017-07-27 PROCEDURE — 83036 HEMOGLOBIN GLYCOSYLATED A1C: CPT

## 2017-07-27 PROCEDURE — 80048 BASIC METABOLIC PNL TOTAL CA: CPT

## 2017-08-16 ENCOUNTER — APPOINTMENT (OUTPATIENT)
Dept: PULMONOLOGY | Facility: CLINIC | Age: 52
End: 2017-08-16
Payer: MEDICAID

## 2017-08-16 VITALS
DIASTOLIC BLOOD PRESSURE: 90 MMHG | WEIGHT: 308 LBS | BODY MASS INDEX: 41.72 KG/M2 | HEART RATE: 71 BPM | HEIGHT: 72 IN | SYSTOLIC BLOOD PRESSURE: 140 MMHG | TEMPERATURE: 98.2 F | RESPIRATION RATE: 16 BRPM

## 2017-08-16 PROCEDURE — 99213 OFFICE O/P EST LOW 20 MIN: CPT | Mod: GC

## 2017-08-24 ENCOUNTER — NON-APPOINTMENT (OUTPATIENT)
Age: 52
End: 2017-08-24

## 2017-08-24 ENCOUNTER — APPOINTMENT (OUTPATIENT)
Dept: CARDIOLOGY | Facility: CLINIC | Age: 52
End: 2017-08-24
Payer: MEDICAID

## 2017-08-24 VITALS — SYSTOLIC BLOOD PRESSURE: 110 MMHG | HEART RATE: 70 BPM | DIASTOLIC BLOOD PRESSURE: 70 MMHG

## 2017-08-24 VITALS
WEIGHT: 306 LBS | OXYGEN SATURATION: 96 % | HEART RATE: 70 BPM | DIASTOLIC BLOOD PRESSURE: 71 MMHG | HEIGHT: 72 IN | SYSTOLIC BLOOD PRESSURE: 108 MMHG | BODY MASS INDEX: 41.45 KG/M2 | RESPIRATION RATE: 15 BRPM

## 2017-08-24 PROCEDURE — 93000 ELECTROCARDIOGRAM COMPLETE: CPT

## 2017-08-24 PROCEDURE — 99215 OFFICE O/P EST HI 40 MIN: CPT

## 2017-08-28 ENCOUNTER — OUTPATIENT (OUTPATIENT)
Dept: OUTPATIENT SERVICES | Facility: HOSPITAL | Age: 52
LOS: 1 days | End: 2017-08-28
Payer: MEDICAID

## 2017-08-28 ENCOUNTER — APPOINTMENT (OUTPATIENT)
Dept: FAMILY MEDICINE | Facility: HOSPITAL | Age: 52
End: 2017-08-28

## 2017-08-28 VITALS
TEMPERATURE: 98.4 F | HEART RATE: 94 BPM | BODY MASS INDEX: 40.96 KG/M2 | RESPIRATION RATE: 16 BRPM | OXYGEN SATURATION: 95 % | SYSTOLIC BLOOD PRESSURE: 151 MMHG | WEIGHT: 302 LBS | DIASTOLIC BLOOD PRESSURE: 91 MMHG

## 2017-08-28 DIAGNOSIS — Z86.69 PERSONAL HISTORY OF OTHER DISEASES OF THE NERVOUS SYSTEM AND SENSE ORGANS: ICD-10-CM

## 2017-08-28 DIAGNOSIS — J45.909 UNSPECIFIED ASTHMA, UNCOMPLICATED: ICD-10-CM

## 2017-08-28 DIAGNOSIS — I50.42 CHRONIC COMBINED SYSTOLIC (CONGESTIVE) AND DIASTOLIC (CONGESTIVE) HEART FAILURE: ICD-10-CM

## 2017-08-28 DIAGNOSIS — Z87.2 PERSONAL HISTORY OF DISEASES OF THE SKIN AND SUBCUTANEOUS TISSUE: ICD-10-CM

## 2017-08-28 DIAGNOSIS — R73.03 PREDIABETES.: ICD-10-CM

## 2017-08-28 DIAGNOSIS — L30.4 ERYTHEMA INTERTRIGO: ICD-10-CM

## 2017-08-28 DIAGNOSIS — I45.89 OTHER SPECIFIED CONDUCTION DISORDERS: ICD-10-CM

## 2017-08-28 DIAGNOSIS — I50.41 ACUTE COMBINED SYSTOLIC (CONGESTIVE) AND DIASTOLIC (CONGESTIVE) HEART FAILURE: ICD-10-CM

## 2017-08-28 DIAGNOSIS — Z86.79 PERSONAL HISTORY OF OTHER DISEASES OF THE CIRCULATORY SYSTEM: ICD-10-CM

## 2017-08-28 DIAGNOSIS — I50.20 UNSPECIFIED SYSTOLIC (CONGESTIVE) HEART FAILURE: ICD-10-CM

## 2017-08-28 DIAGNOSIS — Z90.89 ACQUIRED ABSENCE OF OTHER ORGANS: Chronic | ICD-10-CM

## 2017-08-28 DIAGNOSIS — Z79.01 ENCOUNTER FOR THERAPEUTIC DRUG LVL MONITORING: ICD-10-CM

## 2017-08-28 DIAGNOSIS — Z51.81 ENCOUNTER FOR THERAPEUTIC DRUG LVL MONITORING: ICD-10-CM

## 2017-08-28 DIAGNOSIS — Z87.898 PERSONAL HISTORY OF OTHER SPECIFIED CONDITIONS: ICD-10-CM

## 2017-08-28 DIAGNOSIS — I50.30 UNSPECIFIED DIASTOLIC (CONGESTIVE) HEART FAILURE: ICD-10-CM

## 2017-08-28 PROCEDURE — G0463: CPT

## 2017-08-31 ENCOUNTER — APPOINTMENT (OUTPATIENT)
Dept: CARDIOLOGY | Facility: CLINIC | Age: 52
End: 2017-08-31

## 2017-09-06 ENCOUNTER — APPOINTMENT (OUTPATIENT)
Dept: OPHTHALMOLOGY | Facility: CLINIC | Age: 52
End: 2017-09-06
Payer: MEDICAID

## 2017-09-06 PROCEDURE — 92004 COMPRE OPH EXAM NEW PT 1/>: CPT

## 2017-09-18 ENCOUNTER — APPOINTMENT (OUTPATIENT)
Dept: ELECTROPHYSIOLOGY | Facility: CLINIC | Age: 52
End: 2017-09-18
Payer: MEDICAID

## 2017-09-18 ENCOUNTER — APPOINTMENT (OUTPATIENT)
Dept: FAMILY MEDICINE | Facility: HOSPITAL | Age: 52
End: 2017-09-18

## 2017-09-18 ENCOUNTER — MED ADMIN CHARGE (OUTPATIENT)
Age: 52
End: 2017-09-18

## 2017-09-18 ENCOUNTER — OUTPATIENT (OUTPATIENT)
Dept: OUTPATIENT SERVICES | Facility: HOSPITAL | Age: 52
LOS: 1 days | End: 2017-09-18
Payer: MEDICAID

## 2017-09-18 VITALS
RESPIRATION RATE: 16 BRPM | HEART RATE: 77 BPM | SYSTOLIC BLOOD PRESSURE: 120 MMHG | OXYGEN SATURATION: 97 % | DIASTOLIC BLOOD PRESSURE: 80 MMHG | BODY MASS INDEX: 42.45 KG/M2 | WEIGHT: 313 LBS | TEMPERATURE: 98.1 F

## 2017-09-18 DIAGNOSIS — Z23 ENCOUNTER FOR IMMUNIZATION: ICD-10-CM

## 2017-09-18 DIAGNOSIS — Z12.11 ENCOUNTER FOR SCREENING FOR MALIGNANT NEOPLASM OF COLON: ICD-10-CM

## 2017-09-18 DIAGNOSIS — I25.10 ATHEROSCLEROTIC HEART DISEASE OF NATIVE CORONARY ARTERY WITHOUT ANGINA PECTORIS: ICD-10-CM

## 2017-09-18 DIAGNOSIS — Z90.89 ACQUIRED ABSENCE OF OTHER ORGANS: Chronic | ICD-10-CM

## 2017-09-18 DIAGNOSIS — E66.01 MORBID (SEVERE) OBESITY DUE TO EXCESS CALORIES: ICD-10-CM

## 2017-09-18 DIAGNOSIS — I10 ESSENTIAL (PRIMARY) HYPERTENSION: ICD-10-CM

## 2017-09-18 PROCEDURE — 93295 DEV INTERROG REMOTE 1/2/MLT: CPT

## 2017-09-18 PROCEDURE — G0008: CPT

## 2017-09-18 PROCEDURE — G0463: CPT

## 2017-09-25 ENCOUNTER — APPOINTMENT (OUTPATIENT)
Dept: ELECTROPHYSIOLOGY | Facility: CLINIC | Age: 52
End: 2017-09-25

## 2017-09-27 ENCOUNTER — APPOINTMENT (OUTPATIENT)
Dept: PULMONOLOGY | Facility: CLINIC | Age: 52
End: 2017-09-27
Payer: MEDICAID

## 2017-09-27 ENCOUNTER — NON-APPOINTMENT (OUTPATIENT)
Age: 52
End: 2017-09-27

## 2017-09-27 ENCOUNTER — APPOINTMENT (OUTPATIENT)
Dept: CARDIOLOGY | Facility: CLINIC | Age: 52
End: 2017-09-27
Payer: MEDICAID

## 2017-09-27 VITALS
OXYGEN SATURATION: 97 % | RESPIRATION RATE: 15 BRPM | HEART RATE: 65 BPM | WEIGHT: 305 LBS | HEIGHT: 72 IN | SYSTOLIC BLOOD PRESSURE: 130 MMHG | BODY MASS INDEX: 41.31 KG/M2 | DIASTOLIC BLOOD PRESSURE: 80 MMHG

## 2017-09-27 VITALS
DIASTOLIC BLOOD PRESSURE: 80 MMHG | HEART RATE: 97 BPM | BODY MASS INDEX: 40.9 KG/M2 | WEIGHT: 302 LBS | OXYGEN SATURATION: 94 % | SYSTOLIC BLOOD PRESSURE: 121 MMHG | TEMPERATURE: 96.9 F | RESPIRATION RATE: 16 BRPM | HEIGHT: 72 IN

## 2017-09-27 DIAGNOSIS — Z86.79 PERSONAL HISTORY OF OTHER DISEASES OF THE CIRCULATORY SYSTEM: ICD-10-CM

## 2017-09-27 PROCEDURE — 99214 OFFICE O/P EST MOD 30 MIN: CPT

## 2017-09-27 PROCEDURE — 93000 ELECTROCARDIOGRAM COMPLETE: CPT

## 2017-09-27 PROCEDURE — 99213 OFFICE O/P EST LOW 20 MIN: CPT | Mod: GC

## 2017-10-11 ENCOUNTER — APPOINTMENT (OUTPATIENT)
Dept: PULMONOLOGY | Facility: CLINIC | Age: 52
End: 2017-10-11

## 2017-10-18 ENCOUNTER — RX RENEWAL (OUTPATIENT)
Age: 52
End: 2017-10-18

## 2017-10-20 ENCOUNTER — CHART COPY (OUTPATIENT)
Age: 52
End: 2017-10-20

## 2017-10-24 ENCOUNTER — NON-APPOINTMENT (OUTPATIENT)
Age: 52
End: 2017-10-24

## 2017-10-24 ENCOUNTER — APPOINTMENT (OUTPATIENT)
Dept: ELECTROPHYSIOLOGY | Facility: CLINIC | Age: 52
End: 2017-10-24
Payer: MEDICAID

## 2017-10-24 VITALS — HEART RATE: 78 BPM | OXYGEN SATURATION: 97 % | DIASTOLIC BLOOD PRESSURE: 60 MMHG | SYSTOLIC BLOOD PRESSURE: 100 MMHG

## 2017-10-24 PROCEDURE — 93283 PRGRMG EVAL IMPLANTABLE DFB: CPT

## 2017-10-30 ENCOUNTER — OUTPATIENT (OUTPATIENT)
Dept: OUTPATIENT SERVICES | Facility: HOSPITAL | Age: 52
LOS: 1 days | End: 2017-10-30
Payer: MEDICAID

## 2017-10-30 ENCOUNTER — APPOINTMENT (OUTPATIENT)
Dept: FAMILY MEDICINE | Facility: HOSPITAL | Age: 52
End: 2017-10-30

## 2017-10-30 VITALS
WEIGHT: 302 LBS | RESPIRATION RATE: 16 BRPM | TEMPERATURE: 98.1 F | BODY MASS INDEX: 40.96 KG/M2 | OXYGEN SATURATION: 98 % | SYSTOLIC BLOOD PRESSURE: 134 MMHG | DIASTOLIC BLOOD PRESSURE: 83 MMHG | HEART RATE: 76 BPM

## 2017-10-30 DIAGNOSIS — Z90.89 ACQUIRED ABSENCE OF OTHER ORGANS: Chronic | ICD-10-CM

## 2017-10-30 DIAGNOSIS — I48.91 UNSPECIFIED ATRIAL FIBRILLATION: ICD-10-CM

## 2017-10-30 DIAGNOSIS — I50.42 CHRONIC COMBINED SYSTOLIC (CONGESTIVE) AND DIASTOLIC (CONGESTIVE) HEART FAILURE: ICD-10-CM

## 2017-10-30 PROCEDURE — G0463: CPT

## 2017-11-08 ENCOUNTER — APPOINTMENT (OUTPATIENT)
Dept: PULMONOLOGY | Facility: CLINIC | Age: 52
End: 2017-11-08
Payer: MEDICAID

## 2017-11-08 VITALS
SYSTOLIC BLOOD PRESSURE: 120 MMHG | BODY MASS INDEX: 40.77 KG/M2 | TEMPERATURE: 96.9 F | DIASTOLIC BLOOD PRESSURE: 80 MMHG | WEIGHT: 301 LBS | OXYGEN SATURATION: 94 % | HEART RATE: 105 BPM | RESPIRATION RATE: 16 BRPM | HEIGHT: 72 IN

## 2017-11-08 PROCEDURE — 99213 OFFICE O/P EST LOW 20 MIN: CPT | Mod: GC

## 2017-11-20 ENCOUNTER — RX RENEWAL (OUTPATIENT)
Age: 52
End: 2017-11-20

## 2017-11-28 ENCOUNTER — NON-APPOINTMENT (OUTPATIENT)
Age: 52
End: 2017-11-28

## 2017-11-28 ENCOUNTER — APPOINTMENT (OUTPATIENT)
Dept: CARDIOLOGY | Facility: CLINIC | Age: 52
End: 2017-11-28
Payer: MEDICAID

## 2017-11-28 VITALS — DIASTOLIC BLOOD PRESSURE: 90 MMHG | HEART RATE: 72 BPM | SYSTOLIC BLOOD PRESSURE: 120 MMHG

## 2017-11-28 VITALS
RESPIRATION RATE: 16 BRPM | WEIGHT: 312 LBS | SYSTOLIC BLOOD PRESSURE: 147 MMHG | DIASTOLIC BLOOD PRESSURE: 97 MMHG | OXYGEN SATURATION: 96 % | HEIGHT: 72 IN | BODY MASS INDEX: 42.26 KG/M2 | HEART RATE: 68 BPM

## 2017-11-28 PROCEDURE — 93000 ELECTROCARDIOGRAM COMPLETE: CPT

## 2017-11-28 PROCEDURE — 99214 OFFICE O/P EST MOD 30 MIN: CPT

## 2017-12-04 ENCOUNTER — APPOINTMENT (OUTPATIENT)
Dept: FAMILY MEDICINE | Facility: HOSPITAL | Age: 52
End: 2017-12-04

## 2017-12-04 ENCOUNTER — OUTPATIENT (OUTPATIENT)
Dept: OUTPATIENT SERVICES | Facility: HOSPITAL | Age: 52
LOS: 1 days | End: 2017-12-04
Payer: MEDICAID

## 2017-12-04 VITALS
OXYGEN SATURATION: 97 % | WEIGHT: 309 LBS | RESPIRATION RATE: 16 BRPM | SYSTOLIC BLOOD PRESSURE: 136 MMHG | HEART RATE: 76 BPM | TEMPERATURE: 97.5 F | DIASTOLIC BLOOD PRESSURE: 97 MMHG | BODY MASS INDEX: 41.91 KG/M2

## 2017-12-04 DIAGNOSIS — Z90.89 ACQUIRED ABSENCE OF OTHER ORGANS: Chronic | ICD-10-CM

## 2017-12-04 DIAGNOSIS — Z00.00 ENCOUNTER FOR GENERAL ADULT MEDICAL EXAMINATION WITHOUT ABNORMAL FINDINGS: ICD-10-CM

## 2017-12-04 PROCEDURE — G0463: CPT

## 2017-12-05 ENCOUNTER — OTHER (OUTPATIENT)
Age: 52
End: 2017-12-05

## 2017-12-05 DIAGNOSIS — J01.90 ACUTE SINUSITIS, UNSPECIFIED: ICD-10-CM

## 2017-12-05 DIAGNOSIS — J45.909 UNSPECIFIED ASTHMA, UNCOMPLICATED: ICD-10-CM

## 2017-12-15 PROCEDURE — C1898: CPT

## 2017-12-15 PROCEDURE — 94640 AIRWAY INHALATION TREATMENT: CPT

## 2017-12-15 PROCEDURE — C1777: CPT

## 2017-12-15 PROCEDURE — 93005 ELECTROCARDIOGRAM TRACING: CPT

## 2017-12-15 PROCEDURE — C1892: CPT

## 2017-12-15 PROCEDURE — 33249 INSJ/RPLCMT DEFIB W/LEAD(S): CPT | Mod: Q0

## 2017-12-15 PROCEDURE — C1894: CPT

## 2017-12-15 PROCEDURE — C1721: CPT

## 2017-12-15 PROCEDURE — 80053 COMPREHEN METABOLIC PANEL: CPT

## 2017-12-15 PROCEDURE — 80048 BASIC METABOLIC PNL TOTAL CA: CPT

## 2017-12-15 PROCEDURE — 85610 PROTHROMBIN TIME: CPT

## 2017-12-15 PROCEDURE — 83036 HEMOGLOBIN GLYCOSYLATED A1C: CPT

## 2017-12-15 PROCEDURE — 85027 COMPLETE CBC AUTOMATED: CPT

## 2017-12-15 PROCEDURE — 71045 X-RAY EXAM CHEST 1 VIEW: CPT

## 2017-12-15 PROCEDURE — 85730 THROMBOPLASTIN TIME PARTIAL: CPT

## 2017-12-18 ENCOUNTER — OUTPATIENT (OUTPATIENT)
Dept: OUTPATIENT SERVICES | Facility: HOSPITAL | Age: 52
LOS: 1 days | End: 2017-12-18
Payer: MEDICAID

## 2017-12-18 ENCOUNTER — APPOINTMENT (OUTPATIENT)
Dept: FAMILY MEDICINE | Facility: HOSPITAL | Age: 52
End: 2017-12-18

## 2017-12-18 ENCOUNTER — APPOINTMENT (OUTPATIENT)
Dept: ELECTROPHYSIOLOGY | Facility: CLINIC | Age: 52
End: 2017-12-18
Payer: MEDICAID

## 2017-12-18 VITALS
SYSTOLIC BLOOD PRESSURE: 143 MMHG | HEART RATE: 86 BPM | WEIGHT: 315 LBS | BODY MASS INDEX: 42.86 KG/M2 | OXYGEN SATURATION: 96 % | RESPIRATION RATE: 16 BRPM | TEMPERATURE: 97.6 F | DIASTOLIC BLOOD PRESSURE: 88 MMHG

## 2017-12-18 VITALS
OXYGEN SATURATION: 97 % | BODY MASS INDEX: 41.99 KG/M2 | DIASTOLIC BLOOD PRESSURE: 92 MMHG | SYSTOLIC BLOOD PRESSURE: 134 MMHG | HEART RATE: 71 BPM | HEIGHT: 72 IN | WEIGHT: 310 LBS

## 2017-12-18 VITALS — BODY MASS INDEX: 42.45 KG/M2 | WEIGHT: 313 LBS

## 2017-12-18 DIAGNOSIS — Z90.89 ACQUIRED ABSENCE OF OTHER ORGANS: Chronic | ICD-10-CM

## 2017-12-18 DIAGNOSIS — Z00.00 ENCOUNTER FOR GENERAL ADULT MEDICAL EXAMINATION WITHOUT ABNORMAL FINDINGS: ICD-10-CM

## 2017-12-18 PROCEDURE — G0463: CPT

## 2017-12-18 PROCEDURE — 93283 PRGRMG EVAL IMPLANTABLE DFB: CPT

## 2017-12-19 DIAGNOSIS — J01.90 ACUTE SINUSITIS, UNSPECIFIED: ICD-10-CM

## 2018-01-29 ENCOUNTER — APPOINTMENT (OUTPATIENT)
Dept: ELECTROPHYSIOLOGY | Facility: CLINIC | Age: 53
End: 2018-01-29

## 2018-02-27 ENCOUNTER — APPOINTMENT (OUTPATIENT)
Dept: CARDIOLOGY | Facility: CLINIC | Age: 53
End: 2018-02-27
Payer: MEDICAID

## 2018-02-27 ENCOUNTER — NON-APPOINTMENT (OUTPATIENT)
Age: 53
End: 2018-02-27

## 2018-02-27 VITALS
RESPIRATION RATE: 17 BRPM | SYSTOLIC BLOOD PRESSURE: 112 MMHG | OXYGEN SATURATION: 97 % | WEIGHT: 304 LBS | DIASTOLIC BLOOD PRESSURE: 78 MMHG | HEART RATE: 81 BPM | HEIGHT: 72 IN | BODY MASS INDEX: 41.17 KG/M2

## 2018-02-27 VITALS — SYSTOLIC BLOOD PRESSURE: 100 MMHG | HEART RATE: 76 BPM | DIASTOLIC BLOOD PRESSURE: 72 MMHG

## 2018-02-27 PROCEDURE — 93000 ELECTROCARDIOGRAM COMPLETE: CPT

## 2018-02-27 PROCEDURE — 99215 OFFICE O/P EST HI 40 MIN: CPT

## 2018-03-08 ENCOUNTER — OUTPATIENT (OUTPATIENT)
Dept: OUTPATIENT SERVICES | Facility: HOSPITAL | Age: 53
LOS: 1 days | End: 2018-03-08
Payer: MEDICAID

## 2018-03-08 ENCOUNTER — APPOINTMENT (OUTPATIENT)
Dept: FAMILY MEDICINE | Facility: HOSPITAL | Age: 53
End: 2018-03-08

## 2018-03-08 VITALS
RESPIRATION RATE: 16 BRPM | OXYGEN SATURATION: 95 % | HEART RATE: 78 BPM | SYSTOLIC BLOOD PRESSURE: 131 MMHG | TEMPERATURE: 97.3 F | DIASTOLIC BLOOD PRESSURE: 84 MMHG | BODY MASS INDEX: 42.04 KG/M2 | WEIGHT: 310 LBS

## 2018-03-08 DIAGNOSIS — Z90.89 ACQUIRED ABSENCE OF OTHER ORGANS: Chronic | ICD-10-CM

## 2018-03-08 DIAGNOSIS — Z00.00 ENCOUNTER FOR GENERAL ADULT MEDICAL EXAMINATION WITHOUT ABNORMAL FINDINGS: ICD-10-CM

## 2018-03-08 PROCEDURE — G0463: CPT

## 2018-03-08 RX ORDER — AMOXICILLIN AND CLAVULANATE POTASSIUM 875; 125 MG/1; MG/1
875-125 TABLET, COATED ORAL
Qty: 20 | Refills: 0 | Status: DISCONTINUED | COMMUNITY
Start: 2017-12-04 | End: 2018-03-08

## 2018-03-08 RX ORDER — CLOTRIMAZOLE 10 MG/G
1 CREAM TOPICAL TWICE DAILY
Qty: 1 | Refills: 0 | Status: DISCONTINUED | COMMUNITY
Start: 2017-07-27 | End: 2018-03-08

## 2018-03-08 RX ORDER — LORATADINE 5 MG/5 ML
10 SOLUTION, ORAL ORAL DAILY
Qty: 30 | Refills: 2 | Status: DISCONTINUED | COMMUNITY
Start: 2017-03-17 | End: 2018-03-08

## 2018-03-08 RX ORDER — ASPIRIN 81 MG
6.5 TABLET, DELAYED RELEASE (ENTERIC COATED) ORAL
Qty: 1 | Refills: 0 | Status: DISCONTINUED | COMMUNITY
Start: 2017-03-17 | End: 2018-03-08

## 2018-03-08 RX ORDER — FLUTICASONE PROPIONATE 50 UG/1
50 SPRAY, METERED NASAL DAILY
Qty: 1 | Refills: 5 | Status: DISCONTINUED | COMMUNITY
Start: 2017-08-16 | End: 2018-03-08

## 2018-03-13 DIAGNOSIS — F41.9 ANXIETY DISORDER, UNSPECIFIED: ICD-10-CM

## 2018-03-13 DIAGNOSIS — G47.00 INSOMNIA, UNSPECIFIED: ICD-10-CM

## 2018-03-19 ENCOUNTER — APPOINTMENT (OUTPATIENT)
Dept: ELECTROPHYSIOLOGY | Facility: CLINIC | Age: 53
End: 2018-03-19
Payer: MEDICAID

## 2018-03-19 PROCEDURE — 93295 DEV INTERROG REMOTE 1/2/MLT: CPT

## 2018-03-26 ENCOUNTER — APPOINTMENT (OUTPATIENT)
Dept: FAMILY MEDICINE | Facility: HOSPITAL | Age: 53
End: 2018-03-26

## 2018-03-26 ENCOUNTER — APPOINTMENT (OUTPATIENT)
Dept: ELECTROPHYSIOLOGY | Facility: CLINIC | Age: 53
End: 2018-03-26

## 2018-04-04 ENCOUNTER — APPOINTMENT (OUTPATIENT)
Dept: FAMILY MEDICINE | Facility: HOSPITAL | Age: 53
End: 2018-04-04

## 2018-04-11 ENCOUNTER — NON-APPOINTMENT (OUTPATIENT)
Age: 53
End: 2018-04-11

## 2018-04-11 ENCOUNTER — APPOINTMENT (OUTPATIENT)
Dept: PULMONOLOGY | Facility: CLINIC | Age: 53
End: 2018-04-11
Payer: MEDICAID

## 2018-04-11 VITALS
TEMPERATURE: 97.6 F | RESPIRATION RATE: 19 BRPM | SYSTOLIC BLOOD PRESSURE: 170 MMHG | DIASTOLIC BLOOD PRESSURE: 90 MMHG | OXYGEN SATURATION: 97 % | HEART RATE: 78 BPM | HEIGHT: 72 IN | BODY MASS INDEX: 42.66 KG/M2 | WEIGHT: 315 LBS

## 2018-04-11 PROCEDURE — 99213 OFFICE O/P EST LOW 20 MIN: CPT | Mod: GC

## 2018-04-12 LAB
ANION GAP SERPL CALC-SCNC: 15 MMOL/L
BASOPHILS # BLD AUTO: 0.02 K/UL
BASOPHILS NFR BLD AUTO: 0.3 %
BUN SERPL-MCNC: 13 MG/DL
CALCIUM SERPL-MCNC: 9.1 MG/DL
CHLORIDE SERPL-SCNC: 104 MMOL/L
CO2 SERPL-SCNC: 23 MMOL/L
CREAT SERPL-MCNC: 1 MG/DL
EOSINOPHIL # BLD AUTO: 0.21 K/UL
EOSINOPHIL NFR BLD AUTO: 3.2 %
GLUCOSE SERPL-MCNC: 82 MG/DL
HCT VFR BLD CALC: 41 %
HGB BLD-MCNC: 12.8 G/DL
IMM GRANULOCYTES NFR BLD AUTO: 0.3 %
LYMPHOCYTES # BLD AUTO: 1.9 K/UL
LYMPHOCYTES NFR BLD AUTO: 29.4 %
MAN DIFF?: NORMAL
MCHC RBC-ENTMCNC: 27.6 PG
MCHC RBC-ENTMCNC: 31.2 GM/DL
MCV RBC AUTO: 88.6 FL
MONOCYTES # BLD AUTO: 0.84 K/UL
MONOCYTES NFR BLD AUTO: 13 %
NEUTROPHILS # BLD AUTO: 3.48 K/UL
NEUTROPHILS NFR BLD AUTO: 53.8 %
NT-PROBNP SERPL-MCNC: 111 PG/ML
PLATELET # BLD AUTO: 202 K/UL
POTASSIUM SERPL-SCNC: 4.1 MMOL/L
RBC # BLD: 4.63 M/UL
RBC # FLD: 15.2 %
SODIUM SERPL-SCNC: 142 MMOL/L
WBC # FLD AUTO: 6.47 K/UL

## 2018-04-18 ENCOUNTER — OUTPATIENT (OUTPATIENT)
Dept: OUTPATIENT SERVICES | Facility: HOSPITAL | Age: 53
LOS: 1 days | End: 2018-04-18
Payer: MEDICAID

## 2018-04-18 ENCOUNTER — APPOINTMENT (OUTPATIENT)
Dept: FAMILY MEDICINE | Facility: HOSPITAL | Age: 53
End: 2018-04-18

## 2018-04-18 VITALS — DIASTOLIC BLOOD PRESSURE: 80 MMHG | SYSTOLIC BLOOD PRESSURE: 140 MMHG

## 2018-04-18 VITALS
OXYGEN SATURATION: 97 % | HEIGHT: 72 IN | TEMPERATURE: 98.1 F | DIASTOLIC BLOOD PRESSURE: 100 MMHG | WEIGHT: 315 LBS | RESPIRATION RATE: 18 BRPM | SYSTOLIC BLOOD PRESSURE: 156 MMHG | BODY MASS INDEX: 42.66 KG/M2 | HEART RATE: 81 BPM

## 2018-04-18 DIAGNOSIS — Z90.89 ACQUIRED ABSENCE OF OTHER ORGANS: Chronic | ICD-10-CM

## 2018-04-18 DIAGNOSIS — Z00.00 ENCOUNTER FOR GENERAL ADULT MEDICAL EXAMINATION WITHOUT ABNORMAL FINDINGS: ICD-10-CM

## 2018-04-19 DIAGNOSIS — E78.5 HYPERLIPIDEMIA, UNSPECIFIED: ICD-10-CM

## 2018-04-19 PROCEDURE — 36415 COLL VENOUS BLD VENIPUNCTURE: CPT

## 2018-04-19 PROCEDURE — 80061 LIPID PANEL: CPT

## 2018-04-19 PROCEDURE — G0463: CPT

## 2018-04-23 ENCOUNTER — APPOINTMENT (OUTPATIENT)
Dept: FAMILY MEDICINE | Facility: HOSPITAL | Age: 53
End: 2018-04-23

## 2018-04-23 DIAGNOSIS — R06.09 OTHER FORMS OF DYSPNEA: ICD-10-CM

## 2018-04-30 ENCOUNTER — APPOINTMENT (OUTPATIENT)
Dept: ELECTROPHYSIOLOGY | Facility: CLINIC | Age: 53
End: 2018-04-30

## 2018-05-09 ENCOUNTER — APPOINTMENT (OUTPATIENT)
Dept: PULMONOLOGY | Facility: CLINIC | Age: 53
End: 2018-05-09
Payer: MEDICAID

## 2018-05-09 VITALS
RESPIRATION RATE: 16 BRPM | HEART RATE: 69 BPM | SYSTOLIC BLOOD PRESSURE: 140 MMHG | DIASTOLIC BLOOD PRESSURE: 70 MMHG | BODY MASS INDEX: 41.72 KG/M2 | HEIGHT: 72 IN | OXYGEN SATURATION: 96 % | WEIGHT: 308 LBS | TEMPERATURE: 97.5 F

## 2018-05-09 PROCEDURE — 99213 OFFICE O/P EST LOW 20 MIN: CPT | Mod: GC

## 2018-05-14 ENCOUNTER — APPOINTMENT (OUTPATIENT)
Dept: FAMILY MEDICINE | Facility: HOSPITAL | Age: 53
End: 2018-05-14

## 2018-06-04 ENCOUNTER — APPOINTMENT (OUTPATIENT)
Dept: FAMILY MEDICINE | Facility: HOSPITAL | Age: 53
End: 2018-06-04

## 2018-06-06 ENCOUNTER — APPOINTMENT (OUTPATIENT)
Dept: CARDIOLOGY | Facility: CLINIC | Age: 53
End: 2018-06-06
Payer: MEDICAID

## 2018-06-06 ENCOUNTER — NON-APPOINTMENT (OUTPATIENT)
Age: 53
End: 2018-06-06

## 2018-06-06 VITALS
HEART RATE: 45 BPM | WEIGHT: 301 LBS | BODY MASS INDEX: 40.77 KG/M2 | OXYGEN SATURATION: 97 % | DIASTOLIC BLOOD PRESSURE: 79 MMHG | SYSTOLIC BLOOD PRESSURE: 138 MMHG | RESPIRATION RATE: 15 BRPM | HEIGHT: 72 IN

## 2018-06-06 VITALS — DIASTOLIC BLOOD PRESSURE: 60 MMHG | SYSTOLIC BLOOD PRESSURE: 120 MMHG | HEART RATE: 70 BPM

## 2018-06-06 PROCEDURE — 93000 ELECTROCARDIOGRAM COMPLETE: CPT

## 2018-06-06 PROCEDURE — 99214 OFFICE O/P EST MOD 30 MIN: CPT

## 2018-06-13 ENCOUNTER — APPOINTMENT (OUTPATIENT)
Dept: FAMILY MEDICINE | Facility: HOSPITAL | Age: 53
End: 2018-06-13

## 2018-06-19 ENCOUNTER — OUTPATIENT (OUTPATIENT)
Dept: OUTPATIENT SERVICES | Facility: HOSPITAL | Age: 53
LOS: 1 days | End: 2018-06-19
Payer: MEDICAID

## 2018-06-19 ENCOUNTER — APPOINTMENT (OUTPATIENT)
Dept: SLEEP CENTER | Facility: CLINIC | Age: 53
End: 2018-06-19
Payer: MEDICAID

## 2018-06-19 DIAGNOSIS — Z90.89 ACQUIRED ABSENCE OF OTHER ORGANS: Chronic | ICD-10-CM

## 2018-06-19 PROCEDURE — 95811 POLYSOM 6/>YRS CPAP 4/> PARM: CPT

## 2018-06-19 PROCEDURE — 95811 POLYSOM 6/>YRS CPAP 4/> PARM: CPT | Mod: 26

## 2018-06-21 ENCOUNTER — TRANSCRIPTION ENCOUNTER (OUTPATIENT)
Age: 53
End: 2018-06-21

## 2018-06-21 DIAGNOSIS — G47.33 OBSTRUCTIVE SLEEP APNEA (ADULT) (PEDIATRIC): ICD-10-CM

## 2018-06-25 ENCOUNTER — APPOINTMENT (OUTPATIENT)
Dept: ELECTROPHYSIOLOGY | Facility: CLINIC | Age: 53
End: 2018-06-25
Payer: MEDICAID

## 2018-06-25 VITALS
WEIGHT: 300 LBS | DIASTOLIC BLOOD PRESSURE: 85 MMHG | BODY MASS INDEX: 40.69 KG/M2 | SYSTOLIC BLOOD PRESSURE: 128 MMHG | OXYGEN SATURATION: 97 % | HEART RATE: 57 BPM

## 2018-06-25 PROCEDURE — 93283 PRGRMG EVAL IMPLANTABLE DFB: CPT

## 2018-07-09 ENCOUNTER — RESULT REVIEW (OUTPATIENT)
Age: 53
End: 2018-07-09

## 2018-07-20 PROBLEM — R94.31 ABNORMAL ELECTROCARDIOGRAM [ECG] [EKG]: Chronic | Status: ACTIVE | Noted: 2017-02-27

## 2018-07-20 PROBLEM — I10 ESSENTIAL (PRIMARY) HYPERTENSION: Chronic | Status: ACTIVE | Noted: 2017-02-27

## 2018-07-20 PROBLEM — F41.9 ANXIETY DISORDER, UNSPECIFIED: Chronic | Status: ACTIVE | Noted: 2017-02-27

## 2018-07-20 PROBLEM — R73.03 PREDIABETES: Chronic | Status: ACTIVE | Noted: 2017-02-27

## 2018-07-20 PROBLEM — I42.9 CARDIOMYOPATHY, UNSPECIFIED: Chronic | Status: ACTIVE | Noted: 2017-02-27

## 2018-07-20 PROBLEM — I48.91 UNSPECIFIED ATRIAL FIBRILLATION: Chronic | Status: ACTIVE | Noted: 2017-02-27

## 2018-07-30 ENCOUNTER — APPOINTMENT (OUTPATIENT)
Dept: PULMONOLOGY | Facility: CLINIC | Age: 53
End: 2018-07-30
Payer: MEDICAID

## 2018-07-30 VITALS
TEMPERATURE: 97.1 F | HEART RATE: 80 BPM | WEIGHT: 313 LBS | RESPIRATION RATE: 18 BRPM | DIASTOLIC BLOOD PRESSURE: 70 MMHG | HEIGHT: 72 IN | BODY MASS INDEX: 42.39 KG/M2 | OXYGEN SATURATION: 96 % | SYSTOLIC BLOOD PRESSURE: 130 MMHG

## 2018-07-30 PROCEDURE — 99215 OFFICE O/P EST HI 40 MIN: CPT | Mod: GC

## 2018-08-01 ENCOUNTER — OUTPATIENT (OUTPATIENT)
Dept: OUTPATIENT SERVICES | Facility: HOSPITAL | Age: 53
LOS: 1 days | End: 2018-08-01

## 2018-08-01 DIAGNOSIS — Z90.89 ACQUIRED ABSENCE OF OTHER ORGANS: Chronic | ICD-10-CM

## 2018-08-27 ENCOUNTER — INPATIENT (INPATIENT)
Facility: HOSPITAL | Age: 53
LOS: 2 days | Discharge: HOME | End: 2018-08-30
Attending: INTERNAL MEDICINE | Admitting: INTERNAL MEDICINE
Payer: MEDICAID

## 2018-08-27 ENCOUNTER — APPOINTMENT (OUTPATIENT)
Dept: PULMONOLOGY | Facility: CLINIC | Age: 53
End: 2018-08-27
Payer: MEDICAID

## 2018-08-27 VITALS
TEMPERATURE: 97.6 F | HEART RATE: 76 BPM | SYSTOLIC BLOOD PRESSURE: 150 MMHG | WEIGHT: 315 LBS | BODY MASS INDEX: 42.66 KG/M2 | HEIGHT: 72 IN | DIASTOLIC BLOOD PRESSURE: 96 MMHG | RESPIRATION RATE: 16 BRPM

## 2018-08-27 VITALS
HEART RATE: 82 BPM | TEMPERATURE: 99 F | OXYGEN SATURATION: 98 % | RESPIRATION RATE: 20 BRPM | DIASTOLIC BLOOD PRESSURE: 113 MMHG | SYSTOLIC BLOOD PRESSURE: 211 MMHG

## 2018-08-27 DIAGNOSIS — I48.91 UNSPECIFIED ATRIAL FIBRILLATION: ICD-10-CM

## 2018-08-27 DIAGNOSIS — I10 ESSENTIAL (PRIMARY) HYPERTENSION: ICD-10-CM

## 2018-08-27 DIAGNOSIS — E78.5 HYPERLIPIDEMIA, UNSPECIFIED: ICD-10-CM

## 2018-08-27 DIAGNOSIS — Z90.89 ACQUIRED ABSENCE OF OTHER ORGANS: Chronic | ICD-10-CM

## 2018-08-27 DIAGNOSIS — G47.30 SLEEP APNEA, UNSPECIFIED: ICD-10-CM

## 2018-08-27 DIAGNOSIS — R07.9 CHEST PAIN, UNSPECIFIED: ICD-10-CM

## 2018-08-27 DIAGNOSIS — J45.909 UNSPECIFIED ASTHMA, UNCOMPLICATED: ICD-10-CM

## 2018-08-27 DIAGNOSIS — Z29.9 ENCOUNTER FOR PROPHYLACTIC MEASURES, UNSPECIFIED: ICD-10-CM

## 2018-08-27 DIAGNOSIS — I50.9 HEART FAILURE, UNSPECIFIED: ICD-10-CM

## 2018-08-27 LAB
ALBUMIN SERPL ELPH-MCNC: 4.3 G/DL — SIGNIFICANT CHANGE UP (ref 3.3–5)
ALP SERPL-CCNC: 63 U/L — SIGNIFICANT CHANGE UP (ref 40–120)
ALT FLD-CCNC: 28 U/L — SIGNIFICANT CHANGE UP (ref 4–41)
APTT BLD: 35.6 SEC — SIGNIFICANT CHANGE UP (ref 27.5–37.4)
AST SERPL-CCNC: 29 U/L — SIGNIFICANT CHANGE UP (ref 4–40)
BASOPHILS # BLD AUTO: 0.02 K/UL — SIGNIFICANT CHANGE UP (ref 0–0.2)
BASOPHILS NFR BLD AUTO: 0.3 % — SIGNIFICANT CHANGE UP (ref 0–2)
BILIRUB SERPL-MCNC: 0.3 MG/DL — SIGNIFICANT CHANGE UP (ref 0.2–1.2)
BLD GP AB SCN SERPL QL: NEGATIVE — SIGNIFICANT CHANGE UP
BUN SERPL-MCNC: 14 MG/DL — SIGNIFICANT CHANGE UP (ref 7–23)
CALCIUM SERPL-MCNC: 8.9 MG/DL — SIGNIFICANT CHANGE UP (ref 8.4–10.5)
CHLORIDE SERPL-SCNC: 102 MMOL/L — SIGNIFICANT CHANGE UP (ref 98–107)
CO2 SERPL-SCNC: 24 MMOL/L — SIGNIFICANT CHANGE UP (ref 22–31)
CREAT SERPL-MCNC: 1.06 MG/DL — SIGNIFICANT CHANGE UP (ref 0.5–1.3)
EOSINOPHIL # BLD AUTO: 0.28 K/UL — SIGNIFICANT CHANGE UP (ref 0–0.5)
EOSINOPHIL NFR BLD AUTO: 4.7 % — SIGNIFICANT CHANGE UP (ref 0–6)
GLUCOSE SERPL-MCNC: 82 MG/DL — SIGNIFICANT CHANGE UP (ref 70–99)
HCT VFR BLD CALC: 42.3 % — SIGNIFICANT CHANGE UP (ref 39–50)
HGB BLD-MCNC: 13.3 G/DL — SIGNIFICANT CHANGE UP (ref 13–17)
IMM GRANULOCYTES # BLD AUTO: 0.02 # — SIGNIFICANT CHANGE UP
IMM GRANULOCYTES NFR BLD AUTO: 0.3 % — SIGNIFICANT CHANGE UP (ref 0–1.5)
INR BLD: 0.94 — SIGNIFICANT CHANGE UP (ref 0.88–1.17)
LYMPHOCYTES # BLD AUTO: 1.72 K/UL — SIGNIFICANT CHANGE UP (ref 1–3.3)
LYMPHOCYTES # BLD AUTO: 29 % — SIGNIFICANT CHANGE UP (ref 13–44)
MCHC RBC-ENTMCNC: 27.4 PG — SIGNIFICANT CHANGE UP (ref 27–34)
MCHC RBC-ENTMCNC: 31.4 % — LOW (ref 32–36)
MCV RBC AUTO: 87 FL — SIGNIFICANT CHANGE UP (ref 80–100)
MONOCYTES # BLD AUTO: 0.7 K/UL — SIGNIFICANT CHANGE UP (ref 0–0.9)
MONOCYTES NFR BLD AUTO: 11.8 % — SIGNIFICANT CHANGE UP (ref 2–14)
NEUTROPHILS # BLD AUTO: 3.19 K/UL — SIGNIFICANT CHANGE UP (ref 1.8–7.4)
NEUTROPHILS NFR BLD AUTO: 53.9 % — SIGNIFICANT CHANGE UP (ref 43–77)
NRBC # FLD: 0 — SIGNIFICANT CHANGE UP
OB PNL STL: NEGATIVE — SIGNIFICANT CHANGE UP
PLATELET # BLD AUTO: 205 K/UL — SIGNIFICANT CHANGE UP (ref 150–400)
PMV BLD: 11 FL — SIGNIFICANT CHANGE UP (ref 7–13)
POTASSIUM SERPL-MCNC: 4 MMOL/L — SIGNIFICANT CHANGE UP (ref 3.5–5.3)
POTASSIUM SERPL-SCNC: 4 MMOL/L — SIGNIFICANT CHANGE UP (ref 3.5–5.3)
PROT SERPL-MCNC: 7.6 G/DL — SIGNIFICANT CHANGE UP (ref 6–8.3)
PROTHROM AB SERPL-ACNC: 10.8 SEC — SIGNIFICANT CHANGE UP (ref 9.8–13.1)
RBC # BLD: 4.86 M/UL — SIGNIFICANT CHANGE UP (ref 4.2–5.8)
RBC # FLD: 14.2 % — SIGNIFICANT CHANGE UP (ref 10.3–14.5)
RH IG SCN BLD-IMP: POSITIVE — SIGNIFICANT CHANGE UP
RH IG SCN BLD-IMP: POSITIVE — SIGNIFICANT CHANGE UP
SODIUM SERPL-SCNC: 140 MMOL/L — SIGNIFICANT CHANGE UP (ref 135–145)
TROPONIN T, HIGH SENSITIVITY: 15 NG/L — SIGNIFICANT CHANGE UP (ref ?–14)
TROPONIN T, HIGH SENSITIVITY: 16 NG/L — SIGNIFICANT CHANGE UP (ref ?–14)
WBC # BLD: 5.93 K/UL — SIGNIFICANT CHANGE UP (ref 3.8–10.5)
WBC # FLD AUTO: 5.93 K/UL — SIGNIFICANT CHANGE UP (ref 3.8–10.5)

## 2018-08-27 PROCEDURE — 71046 X-RAY EXAM CHEST 2 VIEWS: CPT | Mod: 26

## 2018-08-27 PROCEDURE — 99215 OFFICE O/P EST HI 40 MIN: CPT

## 2018-08-27 RX ORDER — FLUOXETINE HCL 10 MG
20 CAPSULE ORAL DAILY
Qty: 0 | Refills: 0 | Status: DISCONTINUED | OUTPATIENT
Start: 2018-08-27 | End: 2018-08-30

## 2018-08-27 RX ORDER — CARVEDILOL PHOSPHATE 80 MG/1
12.5 CAPSULE, EXTENDED RELEASE ORAL EVERY 12 HOURS
Qty: 0 | Refills: 0 | Status: DISCONTINUED | OUTPATIENT
Start: 2018-08-27 | End: 2018-08-27

## 2018-08-27 RX ORDER — ISOSORBIDE DINITRATE 5 MG/1
5 TABLET ORAL THREE TIMES A DAY
Qty: 0 | Refills: 0 | Status: DISCONTINUED | OUTPATIENT
Start: 2018-08-28 | End: 2018-08-30

## 2018-08-27 RX ORDER — FUROSEMIDE 40 MG
80 TABLET ORAL DAILY
Qty: 0 | Refills: 0 | Status: DISCONTINUED | OUTPATIENT
Start: 2018-08-27 | End: 2018-08-27

## 2018-08-27 RX ORDER — ACETAMINOPHEN 500 MG
650 TABLET ORAL ONCE
Qty: 0 | Refills: 0 | Status: COMPLETED | OUTPATIENT
Start: 2018-08-27 | End: 2018-08-27

## 2018-08-27 RX ORDER — APIXABAN 2.5 MG/1
5 TABLET, FILM COATED ORAL EVERY 12 HOURS
Qty: 0 | Refills: 0 | Status: DISCONTINUED | OUTPATIENT
Start: 2018-08-27 | End: 2018-08-30

## 2018-08-27 RX ORDER — HYDRALAZINE HCL 50 MG
25 TABLET ORAL ONCE
Qty: 0 | Refills: 0 | Status: COMPLETED | OUTPATIENT
Start: 2018-08-27 | End: 2018-08-27

## 2018-08-27 RX ORDER — ATORVASTATIN CALCIUM 80 MG/1
80 TABLET, FILM COATED ORAL AT BEDTIME
Qty: 0 | Refills: 0 | Status: DISCONTINUED | OUTPATIENT
Start: 2018-08-27 | End: 2018-08-30

## 2018-08-27 RX ORDER — CARVEDILOL PHOSPHATE 80 MG/1
18.75 CAPSULE, EXTENDED RELEASE ORAL EVERY 12 HOURS
Qty: 0 | Refills: 0 | Status: DISCONTINUED | OUTPATIENT
Start: 2018-08-27 | End: 2018-08-30

## 2018-08-27 RX ORDER — ASPIRIN/CALCIUM CARB/MAGNESIUM 324 MG
81 TABLET ORAL DAILY
Qty: 0 | Refills: 0 | Status: DISCONTINUED | OUTPATIENT
Start: 2018-08-28 | End: 2018-08-30

## 2018-08-27 RX ORDER — FUROSEMIDE 40 MG
40 TABLET ORAL AT BEDTIME
Qty: 0 | Refills: 0 | Status: DISCONTINUED | OUTPATIENT
Start: 2018-08-27 | End: 2018-08-27

## 2018-08-27 RX ORDER — FUROSEMIDE 40 MG
40 TABLET ORAL DAILY
Qty: 0 | Refills: 0 | Status: DISCONTINUED | OUTPATIENT
Start: 2018-08-27 | End: 2018-08-30

## 2018-08-27 RX ORDER — ALBUTEROL 90 UG/1
2 AEROSOL, METERED ORAL EVERY 6 HOURS
Qty: 0 | Refills: 0 | Status: DISCONTINUED | OUTPATIENT
Start: 2018-08-27 | End: 2018-08-30

## 2018-08-27 RX ORDER — LORATADINE 10 MG/1
10 TABLET ORAL DAILY
Qty: 0 | Refills: 0 | Status: DISCONTINUED | OUTPATIENT
Start: 2018-08-27 | End: 2018-08-30

## 2018-08-27 RX ORDER — LISINOPRIL 2.5 MG/1
20 TABLET ORAL DAILY
Qty: 0 | Refills: 0 | Status: DISCONTINUED | OUTPATIENT
Start: 2018-08-27 | End: 2018-08-30

## 2018-08-27 RX ORDER — LISINOPRIL 2.5 MG/1
1 TABLET ORAL
Qty: 0 | Refills: 0 | COMMUNITY

## 2018-08-27 RX ORDER — FLUTICASONE PROPIONATE 50 MCG
1 SPRAY, SUSPENSION NASAL
Qty: 0 | Refills: 0 | Status: DISCONTINUED | OUTPATIENT
Start: 2018-08-27 | End: 2018-08-30

## 2018-08-27 RX ORDER — MONTELUKAST 4 MG/1
10 TABLET, CHEWABLE ORAL DAILY
Qty: 0 | Refills: 0 | Status: DISCONTINUED | OUTPATIENT
Start: 2018-08-27 | End: 2018-08-30

## 2018-08-27 RX ORDER — HYDRALAZINE HCL 50 MG
25 TABLET ORAL THREE TIMES A DAY
Qty: 0 | Refills: 0 | Status: DISCONTINUED | OUTPATIENT
Start: 2018-08-28 | End: 2018-08-30

## 2018-08-27 RX ORDER — LORATADINE 10 MG/1
1 TABLET ORAL
Qty: 0 | Refills: 0 | COMMUNITY

## 2018-08-27 RX ORDER — ASPIRIN/CALCIUM CARB/MAGNESIUM 324 MG
162 TABLET ORAL ONCE
Qty: 0 | Refills: 0 | Status: COMPLETED | OUTPATIENT
Start: 2018-08-27 | End: 2018-08-27

## 2018-08-27 RX ORDER — TAMSULOSIN HYDROCHLORIDE 0.4 MG/1
0.4 CAPSULE ORAL AT BEDTIME
Qty: 0 | Refills: 0 | Status: DISCONTINUED | OUTPATIENT
Start: 2018-08-27 | End: 2018-08-30

## 2018-08-27 RX ORDER — ISOSORBIDE DINITRATE 5 MG/1
5 TABLET ORAL ONCE
Qty: 0 | Refills: 0 | Status: COMPLETED | OUTPATIENT
Start: 2018-08-27 | End: 2018-08-27

## 2018-08-27 RX ADMIN — Medication 25 MILLIGRAM(S): at 21:18

## 2018-08-27 RX ADMIN — Medication 20 MILLIGRAM(S): at 23:10

## 2018-08-27 RX ADMIN — ATORVASTATIN CALCIUM 80 MILLIGRAM(S): 80 TABLET, FILM COATED ORAL at 23:10

## 2018-08-27 RX ADMIN — Medication 162 MILLIGRAM(S): at 21:18

## 2018-08-27 RX ADMIN — APIXABAN 5 MILLIGRAM(S): 2.5 TABLET, FILM COATED ORAL at 23:10

## 2018-08-27 RX ADMIN — TAMSULOSIN HYDROCHLORIDE 0.4 MILLIGRAM(S): 0.4 CAPSULE ORAL at 23:11

## 2018-08-27 RX ADMIN — ISOSORBIDE DINITRATE 5 MILLIGRAM(S): 5 TABLET ORAL at 21:18

## 2018-08-27 RX ADMIN — LISINOPRIL 20 MILLIGRAM(S): 2.5 TABLET ORAL at 23:11

## 2018-08-27 RX ADMIN — Medication 650 MILLIGRAM(S): at 20:58

## 2018-08-27 NOTE — H&P ADULT - NSHPRISKHIVSCREEN_GEN_ALL_CORE
Patient awake and alert watching television with parents at the bedside. Patient tolerated food and fluids. Awaiting disposition, will continue to monitor. Unable to offer due to clinical condition

## 2018-08-27 NOTE — ED PROVIDER NOTE - CARE PLAN
Principal Discharge DX:	Chest pain  Secondary Diagnosis:	Cardiomyopathy  Secondary Diagnosis:	Atrial fibrillation, unspecified type

## 2018-08-27 NOTE — ED ADULT NURSE NOTE - NSIMPLEMENTINTERV_GEN_ALL_ED
Implemented All Universal Safety Interventions:  [place] to call system. Call bell, personal items and telephone within reach. Instruct patient to call for assistance. Room bathroom lighting operational. Non-slip footwear when patient is off stretcher. Physically safe environment: no spills, clutter or unnecessary equipment. Stretcher in lowest position, wheels locked, appropriate side rails in place.

## 2018-08-27 NOTE — H&P ADULT - ASSESSMENT
53 y/o M with h/o paroxysmal a. fib on eliquis, cardiomyopathy, CHF with EF 30-35% with lifevest, asthma, HTN, HLD, prediabetes, ICD, anxiety, asthma presents to the Ed for chest pain. Admit to telemetry to r/o ACS.

## 2018-08-27 NOTE — H&P ADULT - ATTENDING COMMENTS
Pt seen and examined at bedside. Agree with assessment and plan as above   Admitted with acute on chronic systolic heart failure   s/p ICD  IV lasix   Monitor UO  Obtain outpatient cardiac records

## 2018-08-27 NOTE — H&P ADULT - NSHPPHYSICALEXAM_GEN_ALL_CORE
GENERAL APPEARANCE: Well developed, well nourished, alert and cooperative, and appears to be in no acute distress.  HEAD: normocephalic.  EYES: PERRL, EOMI.   EARS: External auditory canals clear, hearing grossly intact.  NECK: Neck supple, non-tender without lymphadenopathy, masses or thyromegaly.  CARDIAC: Normal S1 and S2. No S3, S4. + murmur. Rhythm is regular.   LUNGS: Clear to auscultation without rales, rhonchi, wheezing or diminished breath sounds.  ABDOMEN: Positive bowel sounds. Soft, nondistended, nontender. No guarding or rebound. No masses.  EXTREMITIES: No significant deformity or joint abnormality. No edema. Peripheral pulses intact.   SKIN: Skin normal color, texture and turgor with no lesions or eruptions.  PSYCHIATRIC: The mental examination revealed the patient was oriented to person, place, and time. The patient was able to demonstrate good judgement and reason, without hallucinations, abnormal affect or abnormal behaviors during the examination. Patient is not suicidal.

## 2018-08-27 NOTE — ED PROVIDER NOTE - OBJECTIVE STATEMENT
52M pmhx paroxysmal afib on coumadin, cardiomyopathy with combined systolic and diastolic heart failure (EF 30-35% on 12/21/16 echo) with lifevest in place, asthma, HTN, HLD, prediabetes, AICD placement c/o chest pain x5 days, not worsening but persistent, describes it as pressure like. Pt hasn't had these symptoms before. Reason pt chose today to come back is because his cardiologist (pt does not recall his name) told him he should come in for a cardiac workup today. On ROS pt endorses some blood in his stool as well but denies f/c, ab pain, n/v/d. No recent travel, surgeries.

## 2018-08-27 NOTE — ED ADULT NURSE REASSESSMENT NOTE - NS ED NURSE REASSESS COMMENT FT1
pt resting, offers no additional complaints at this time. cardiac monitor in place in nsr. vs as noted. MD Galarza aware of VS, no further intervention at this time. safety maintained, plan to admit pt to hospital.

## 2018-08-27 NOTE — ED ADULT TRIAGE NOTE - CHIEF COMPLAINT QUOTE
Pt sent from sleep clinic for dizzy spells, CP, SOB, feels like room is spinning, and blood in stool

## 2018-08-27 NOTE — ED PROVIDER NOTE - PMH
Afib    AICD (automatic cardioverter/defibrillator) present    Anxiety    Asthma    Atrial fibrillation, unspecified type    Cardiomyopathy    Cardiomyopathy, unspecified type    CHF (congestive heart failure)    Chronic combined systolic and diastolic congestive heart failure    Essential hypertension    Hyperlipidemia    Hyperlipidemia, unspecified hyperlipidemia type    Hypertension    Prediabetes    Prolonged QT interval

## 2018-08-27 NOTE — H&P ADULT - PROBLEM SELECTOR PLAN 1
Admit to telemetry.   EKG PRN chest pain. Trend CE.   Pending NST vs. cardiac cath.   TTE ordered.   check cbc,tsh,lipid, hemoglobin a1c, bmp with mag and phos.  f/u MD note Admit to telemetry.   EKG PRN chest pain. Trend CE.   TTE ordered.   check cbc,tsh,lipid, hemoglobin a1c, bmp with mag and phos.

## 2018-08-27 NOTE — H&P ADULT - PROBLEM SELECTOR PLAN 2
Patient is euvolemic at this time.   Continue with lasix 40 mg QD. lasix 40 mg IV bid   Monitor UO and Cr

## 2018-08-27 NOTE — ED ADULT NURSE NOTE - OBJECTIVE STATEMENT
received pt to room 17 for evaluation of mid-sternal cp radiating to left arm, back and neck x 5 days, with constant sob, dizziness, n/v and subjective fevers. pt presents awake a&ox4, endorses dizziness, denies ha or visual disturbances. skin warm, dry, appropriate for race. respirations even, unlabored. endorses cp, sob. abdomen soft nontender nondistended. endorses n/v, denies diarrhea, endorses subjective fevers and chills. pt also endorsing b/l le edema. + pedal pulses noted. ivl placed, bloods drawn and sent. cardiac monitor in place in nsr. safety maintained, will reassess.

## 2018-08-27 NOTE — ED PROVIDER NOTE - PROGRESS NOTE DETAILS
AK: Accepted by Tele Doc of Day. Paging Tele PA. Likely get stress test. Patient does NOT know name of his cardiologist.

## 2018-08-27 NOTE — H&P ADULT - NSHPREVIEWOFSYSTEMS_GEN_ALL_CORE
Constitutional: No fever, fatigue or weight loss.  Skin: No rash.  Eyes: No recent vision problems or eye pain.  ENT: No congestion, ear pain, or sore throat.  Endocrine: No thyroid problems.  Cardiovascular: + chest pain. No palpitation.  Respiratory: + SOB. No cough,  congestion, or wheezing.  Gastrointestinal: No abdominal pain, nausea, vomiting, or diarrhea.  Genitourinary: No dysuria.  Musculoskeletal: No joint swelling.  Neurologic: No headache.

## 2018-08-27 NOTE — H&P ADULT - NSHPLABSRESULTS_GEN_ALL_CORE
LABS:                        13.3   5.93  )-----------( 205      ( 27 Aug 2018 17:26 )             42.3     08-27    140  |  102  |  14  ----------------------------<  82  4.0   |  24  |  1.06    Ca    8.9      27 Aug 2018 17:26    TPro  7.6  /  Alb  4.3  /  TBili  0.3  /  DBili  x   /  AST  29  /  ALT  28  /  AlkPhos  63  08-27    PT/INR - ( 27 Aug 2018 17:26 )   PT: 10.8 SEC;   INR: 0.94          PTT - ( 27 Aug 2018 17:26 )  PTT:35.6 SEC    CAPILLARY BLOOD GLUCOSE    EKG shows NSR @ 67 bpm TWI in III, AVF, V1

## 2018-08-27 NOTE — ED PROVIDER NOTE - GASTROINTESTINAL, MLM
Abdomen soft, non-tender, no guarding. Abdomen soft, non-tender, no guarding. Rectal: no external hemorrhoids, no masses, brown stool.

## 2018-08-27 NOTE — H&P ADULT - HISTORY OF PRESENT ILLNESS
53 y/o M with h/o a. fib on eliquis, PATTIE on CPAP, cardiomyopathy, CHF with EF 30-35%),asthma, HTN, HLD, prediabetes, AICD, presents to the ED for chest pain. Pt states he has been having chest pain for 5 days. Pt states the chest pain is on the left side of the chest radiating to the left arm. Pt states the pain is intermittent and last for a couple of minutes. Pt also states it is pleuritic. Pt states chest pain is nonexertional and nonpositional. Pt states he also has a headache which is pressure like and in the back of the head. Pt states his headache starts when his blood pressure becomes high. Pt states he saw his doctor told who told him to go to the ER to be evaluated. Pt states he doesn't remember when he got a cardiac cath or stress test last time. Pt denies LOC, syncope, fever, chills, N/V/D/C, recent infection, palpitations, PND, swelling, abdominal pain, dysuria, urinary/bowel incontinence or any other complaints at this time.

## 2018-08-27 NOTE — ED PROVIDER NOTE - ATTENDING CONTRIBUTION TO CARE
I performed a face-to-face evaluation of the patient and performed a history and physical examination. I agree with the history and physical examination.    Geovanni: Middle-age man w/ CHF and PATTIE p/w painless rectal bleeding, nasal congestion and on-and-off HA, and SSCP radiating to L chest and L arm. Appears well. No external hemorrhoids. Check trop and CBC. Possible CDU.

## 2018-08-27 NOTE — H&P ADULT - PMH
Afib    AICD (automatic cardioverter/defibrillator) present    Anxiety    Asthma    Atrial fibrillation, unspecified type    Cardiomyopathy, unspecified type    CHF (congestive heart failure)    Essential hypertension    Hyperlipidemia    PATTIE (obstructive sleep apnea)    Prediabetes    Prolonged QT interval

## 2018-08-28 PROBLEM — I50.42 CHRONIC COMBINED SYSTOLIC (CONGESTIVE) AND DIASTOLIC (CONGESTIVE) HEART FAILURE: Chronic | Status: INACTIVE | Noted: 2017-02-27 | Resolved: 2018-08-27

## 2018-08-28 PROBLEM — E78.5 HYPERLIPIDEMIA, UNSPECIFIED: Chronic | Status: INACTIVE | Noted: 2017-02-27 | Resolved: 2018-08-27

## 2018-08-28 LAB
BUN SERPL-MCNC: 16 MG/DL — SIGNIFICANT CHANGE UP (ref 7–23)
CALCIUM SERPL-MCNC: 8.7 MG/DL — SIGNIFICANT CHANGE UP (ref 8.4–10.5)
CHLORIDE SERPL-SCNC: 102 MMOL/L — SIGNIFICANT CHANGE UP (ref 98–107)
CHOLEST SERPL-MCNC: 195 MG/DL — SIGNIFICANT CHANGE UP (ref 120–199)
CK MB BLD-MCNC: 7.21 NG/ML — HIGH (ref 1–6.6)
CK MB BLD-MCNC: 7.23 NG/ML — HIGH (ref 1–6.6)
CK SERPL-CCNC: 849 U/L — HIGH (ref 30–200)
CK SERPL-CCNC: 857 U/L — HIGH (ref 30–200)
CO2 SERPL-SCNC: 23 MMOL/L — SIGNIFICANT CHANGE UP (ref 22–31)
CREAT SERPL-MCNC: 1.04 MG/DL — SIGNIFICANT CHANGE UP (ref 0.5–1.3)
GLUCOSE SERPL-MCNC: 112 MG/DL — HIGH (ref 70–99)
HBA1C BLD-MCNC: 6 % — HIGH (ref 4–5.6)
HCT VFR BLD CALC: 41.7 % — SIGNIFICANT CHANGE UP (ref 39–50)
HDLC SERPL-MCNC: 46 MG/DL — SIGNIFICANT CHANGE UP (ref 35–55)
HGB BLD-MCNC: 13.3 G/DL — SIGNIFICANT CHANGE UP (ref 13–17)
LIPID PNL WITH DIRECT LDL SERPL: 130 MG/DL — SIGNIFICANT CHANGE UP
MAGNESIUM SERPL-MCNC: 2.2 MG/DL — SIGNIFICANT CHANGE UP (ref 1.6–2.6)
MCHC RBC-ENTMCNC: 27.9 PG — SIGNIFICANT CHANGE UP (ref 27–34)
MCHC RBC-ENTMCNC: 31.9 % — LOW (ref 32–36)
MCV RBC AUTO: 87.6 FL — SIGNIFICANT CHANGE UP (ref 80–100)
NRBC # FLD: 0 — SIGNIFICANT CHANGE UP
PHOSPHATE SERPL-MCNC: 3.9 MG/DL — SIGNIFICANT CHANGE UP (ref 2.5–4.5)
PLATELET # BLD AUTO: 204 K/UL — SIGNIFICANT CHANGE UP (ref 150–400)
PMV BLD: 10.9 FL — SIGNIFICANT CHANGE UP (ref 7–13)
POTASSIUM SERPL-MCNC: 3.8 MMOL/L — SIGNIFICANT CHANGE UP (ref 3.5–5.3)
POTASSIUM SERPL-SCNC: 3.8 MMOL/L — SIGNIFICANT CHANGE UP (ref 3.5–5.3)
RBC # BLD: 4.76 M/UL — SIGNIFICANT CHANGE UP (ref 4.2–5.8)
RBC # FLD: 14.6 % — HIGH (ref 10.3–14.5)
SODIUM SERPL-SCNC: 138 MMOL/L — SIGNIFICANT CHANGE UP (ref 135–145)
TRIGL SERPL-MCNC: 209 MG/DL — HIGH (ref 10–149)
TROPONIN T, HIGH SENSITIVITY: 14 NG/L — SIGNIFICANT CHANGE UP (ref ?–14)
TROPONIN T, HIGH SENSITIVITY: 16 NG/L — SIGNIFICANT CHANGE UP (ref ?–14)
TSH SERPL-MCNC: 2.56 UIU/ML — SIGNIFICANT CHANGE UP (ref 0.27–4.2)
WBC # BLD: 5.98 K/UL — SIGNIFICANT CHANGE UP (ref 3.8–10.5)
WBC # FLD AUTO: 5.98 K/UL — SIGNIFICANT CHANGE UP (ref 3.8–10.5)

## 2018-08-28 RX ADMIN — APIXABAN 5 MILLIGRAM(S): 2.5 TABLET, FILM COATED ORAL at 11:38

## 2018-08-28 RX ADMIN — CARVEDILOL PHOSPHATE 18.75 MILLIGRAM(S): 80 CAPSULE, EXTENDED RELEASE ORAL at 05:38

## 2018-08-28 RX ADMIN — Medication 1 SPRAY(S): at 18:20

## 2018-08-28 RX ADMIN — ATORVASTATIN CALCIUM 80 MILLIGRAM(S): 80 TABLET, FILM COATED ORAL at 22:26

## 2018-08-28 RX ADMIN — Medication 25 MILLIGRAM(S): at 16:01

## 2018-08-28 RX ADMIN — ISOSORBIDE DINITRATE 5 MILLIGRAM(S): 5 TABLET ORAL at 05:39

## 2018-08-28 RX ADMIN — Medication 1 SPRAY(S): at 10:03

## 2018-08-28 RX ADMIN — Medication 650 MILLIGRAM(S): at 00:18

## 2018-08-28 RX ADMIN — ISOSORBIDE DINITRATE 5 MILLIGRAM(S): 5 TABLET ORAL at 16:01

## 2018-08-28 RX ADMIN — LORATADINE 10 MILLIGRAM(S): 10 TABLET ORAL at 11:38

## 2018-08-28 RX ADMIN — Medication 40 MILLIGRAM(S): at 05:39

## 2018-08-28 RX ADMIN — MONTELUKAST 10 MILLIGRAM(S): 4 TABLET, CHEWABLE ORAL at 11:38

## 2018-08-28 RX ADMIN — CARVEDILOL PHOSPHATE 18.75 MILLIGRAM(S): 80 CAPSULE, EXTENDED RELEASE ORAL at 18:20

## 2018-08-28 RX ADMIN — LISINOPRIL 20 MILLIGRAM(S): 2.5 TABLET ORAL at 05:39

## 2018-08-28 RX ADMIN — APIXABAN 5 MILLIGRAM(S): 2.5 TABLET, FILM COATED ORAL at 18:20

## 2018-08-28 RX ADMIN — Medication 20 MILLIGRAM(S): at 11:38

## 2018-08-28 RX ADMIN — ISOSORBIDE DINITRATE 5 MILLIGRAM(S): 5 TABLET ORAL at 22:26

## 2018-08-28 RX ADMIN — Medication 81 MILLIGRAM(S): at 11:38

## 2018-08-28 RX ADMIN — Medication 25 MILLIGRAM(S): at 22:26

## 2018-08-28 RX ADMIN — Medication 25 MILLIGRAM(S): at 05:39

## 2018-08-28 RX ADMIN — TAMSULOSIN HYDROCHLORIDE 0.4 MILLIGRAM(S): 0.4 CAPSULE ORAL at 22:27

## 2018-08-28 NOTE — CONSULT NOTE ADULT - ASSESSMENT
53 y/o M with h/o paroxysmal a. fib on eliquis, cardiomyopathy, CHF with EF 30-35% s/p aicd, asthma, HTN, HLD, prediabetes, anxiety, asthma admitted with chest pain and chf exac    Chest pain   Atypical   mildly positive CK  obtain outpatient records.  cards f/u  tele monitor    echo       CHF  diuresis with lasix iv as per cards  daily wt  strict i/o  f/u echo    : Atrial fibrillation  AC with eliquis  rate control with coreg    melena  monitor cbc  ppi  gi consult pending      Asthma.  stable  Continue with fluticasone and albuterol.     cont other home meds

## 2018-08-28 NOTE — CONSULT NOTE ADULT - SUBJECTIVE AND OBJECTIVE BOX
OFE KHALIL  52y Male  MRN:[id number]    Patient is a 52y old  Male who presents with a chief complaint of chest pain (27 Aug 2018 22:01)    HPI:  51 y/o M with h/o a. fib on eliquis, PATTIE on CPAP, cardiomyopathy, CHF with EF 30-35%),asthma, HTN, HLD, prediabetes, AICD, presents to the ED for chest pain. Pt states he has been having chest pain for 5 days. Pt states the chest pain is on the left side of the chest radiating to the left arm. Pt states the pain is intermittent and last for a couple of minutes. Pt also states it is pleuritic. Pt states chest pain is nonexertional and nonpositional. Pt states he also has a headache which is pressure like and in the back of the head. Pt states his headache starts when his blood pressure becomes high. Pt states he saw his doctor told who told him to go to the ER to be evaluated. Pt states he doesn't remember when he got a cardiac cath or stress test last time. Pt denies LOC, syncope, fever, chills, N/V/D/C, recent infection, palpitations, PND, swelling, abdominal pain, dysuria, urinary/bowel incontinence or any other complaints at this time. (27 Aug 2018 22:01)      Patient seen and evaluated at bedside. No acute events overnight except as noted.    Interval HPI: no further chest pain	    PAST MEDICAL & SURGICAL HISTORY:  PATTIE (obstructive sleep apnea)  Afib  CHF (congestive heart failure)  Hyperlipidemia  AICD (automatic cardioverter/defibrillator) present  Cardiomyopathy, unspecified type  Prolonged QT interval  Prediabetes  Essential hypertension  Atrial fibrillation, unspecified type  Anxiety  Asthma  History of tonsillectomy    soc:  non smoker  no drug/alcohol abuse    fam hx:  non cont       REVIEW OF SYSTEMS:  Constitutional: No fever, chills, fatigue or weight loss.  Skin: No rash.  Eyes: No recent vision problems or eye pain.  ENT: No congestion, ear pain, or sore throat.  Endocrine: No thyroid problems.  Cardiovascular: as per hpi  Respiratory: as per hpi  Gastrointestinal: No abdominal pain, nausea, vomiting, or diarrhea.  Genitourinary: No dysuria.  Musculoskeletal: No joint swelling.  Neurologic: No headache.    VITALS:  Vital Signs Last 24 Hrs  T(C): 36.7 (28 Aug 2018 20:14), Max: 36.8 (27 Aug 2018 22:52)  T(F): 98 (28 Aug 2018 20:14), Max: 98.3 (27 Aug 2018 22:52)  HR: 69 (28 Aug 2018 22:16) (63 - 92)  BP: 124/86 (28 Aug 2018 22:16) (110/63 - 158/112)  BP(mean): --  RR: 18 (28 Aug 2018 20:14) (15 - 18)  SpO2: 97% (28 Aug 2018 20:14) (95% - 100%)  CAPILLARY BLOOD GLUCOSE        I&O's Summary      PHYSICAL EXAM:  GENERAL: NAD, well-developed  HEAD:  Atraumatic, Normocephalic  EYES: EOMI, PERRLA, conjunctiva and sclera clear  NECK: Supple, No JVD  CHEST/LUNG: Clear to auscultation bilaterally; No wheeze  HEART: S1, S2; No murmurs, rubs, or gallops  ABDOMEN: Soft, Nontender, Nondistended; Bowel sounds present  EXTREMITIES:  2+ Peripheral Pulses, No clubbing, cyanosis. + edema  PSYCH: Normal affect  NEUROLOGY: AAOX3; non-focal  SKIN: No rashes or lesions    Consultant(s) Notes Reviewed:  [x ] YES  [ ] NO  Care Discussed with Consultants/Other Providers [ x] YES  [ ] NO    MEDS:  MEDICATIONS  (STANDING):  apixaban 5 milliGRAM(s) Oral every 12 hours  aspirin  chewable 81 milliGRAM(s) Oral daily  atorvastatin 80 milliGRAM(s) Oral at bedtime  carvedilol 18.75 milliGRAM(s) Oral every 12 hours  FLUoxetine 20 milliGRAM(s) Oral daily  fluticasone propionate 50 MICROgram(s)/spray Nasal Spray 1 Spray(s) Both Nostrils two times a day  furosemide    Tablet 40 milliGRAM(s) Oral daily  hydrALAZINE 25 milliGRAM(s) Oral three times a day  isosorbide   dinitrate Tablet (ISORDIL) 5 milliGRAM(s) Oral three times a day  lisinopril 20 milliGRAM(s) Oral daily  loratadine 10 milliGRAM(s) Oral daily  montelukast 10 milliGRAM(s) Oral daily  tamsulosin 0.4 milliGRAM(s) Oral at bedtime    MEDICATIONS  (PRN):  ALBUTerol    90 MICROgram(s) HFA Inhaler 2 Puff(s) Inhalation every 6 hours PRN Shortness of Breath and/or Wheezing    ALLERGIES:  No Known Allergies      LABS:                        13.3   5.98  )-----------( 204      ( 28 Aug 2018 10:00 )             41.7     08-28    138  |  102  |  16  ----------------------------<  112<H>  3.8   |  23  |  1.04    Ca    8.7      28 Aug 2018 05:30  Phos  3.9     08-28  Mg     2.2     08-28    TPro  7.6  /  Alb  4.3  /  TBili  0.3  /  DBili  x   /  AST  29  /  ALT  28  /  AlkPhos  63  08-27    PT/INR - ( 27 Aug 2018 17:26 )   PT: 10.8 SEC;   INR: 0.94          PTT - ( 27 Aug 2018 17:26 )  PTT:35.6 SEC  CARDIAC MARKERS ( 28 Aug 2018 05:30 )  x     / x     / 849 u/L / 7.21 ng/mL / x      CARDIAC MARKERS ( 28 Aug 2018 00:55 )  x     / x     / 857 u/L / 7.23 ng/mL / x          LIVER FUNCTIONS - ( 27 Aug 2018 17:26 )  Alb: 4.3 g/dL / Pro: 7.6 g/dL / ALK PHOS: 63 u/L / ALT: 28 u/L / AST: 29 u/L / GGT: x             TSH: Thyroid Stimulating Hormone, Serum: 2.56 uIU/mL (08-28 @ 05:30)    A1c:Hemoglobin A1C, Whole Blood: 6.0 % (08-28 @ 10:00)

## 2018-08-29 DIAGNOSIS — Z71.89 OTHER SPECIFIED COUNSELING: ICD-10-CM

## 2018-08-29 LAB
BUN SERPL-MCNC: 19 MG/DL — SIGNIFICANT CHANGE UP (ref 7–23)
CALCIUM SERPL-MCNC: 8.5 MG/DL — SIGNIFICANT CHANGE UP (ref 8.4–10.5)
CHLORIDE SERPL-SCNC: 103 MMOL/L — SIGNIFICANT CHANGE UP (ref 98–107)
CO2 SERPL-SCNC: 22 MMOL/L — SIGNIFICANT CHANGE UP (ref 22–31)
CREAT SERPL-MCNC: 1.02 MG/DL — SIGNIFICANT CHANGE UP (ref 0.5–1.3)
GLUCOSE SERPL-MCNC: 92 MG/DL — SIGNIFICANT CHANGE UP (ref 70–99)
HCT VFR BLD CALC: 38.1 % — LOW (ref 39–50)
HGB BLD-MCNC: 12.5 G/DL — LOW (ref 13–17)
MAGNESIUM SERPL-MCNC: 2.2 MG/DL — SIGNIFICANT CHANGE UP (ref 1.6–2.6)
MCHC RBC-ENTMCNC: 28.9 PG — SIGNIFICANT CHANGE UP (ref 27–34)
MCHC RBC-ENTMCNC: 32.8 % — SIGNIFICANT CHANGE UP (ref 32–36)
MCV RBC AUTO: 88 FL — SIGNIFICANT CHANGE UP (ref 80–100)
NRBC # FLD: 0 — SIGNIFICANT CHANGE UP
PLATELET # BLD AUTO: 191 K/UL — SIGNIFICANT CHANGE UP (ref 150–400)
PMV BLD: 11 FL — SIGNIFICANT CHANGE UP (ref 7–13)
POTASSIUM SERPL-MCNC: 4 MMOL/L — SIGNIFICANT CHANGE UP (ref 3.5–5.3)
POTASSIUM SERPL-SCNC: 4 MMOL/L — SIGNIFICANT CHANGE UP (ref 3.5–5.3)
RBC # BLD: 4.33 M/UL — SIGNIFICANT CHANGE UP (ref 4.2–5.8)
RBC # FLD: 14.5 % — SIGNIFICANT CHANGE UP (ref 10.3–14.5)
SODIUM SERPL-SCNC: 138 MMOL/L — SIGNIFICANT CHANGE UP (ref 135–145)
WBC # BLD: 5.62 K/UL — SIGNIFICANT CHANGE UP (ref 3.8–10.5)
WBC # FLD AUTO: 5.62 K/UL — SIGNIFICANT CHANGE UP (ref 3.8–10.5)

## 2018-08-29 PROCEDURE — 93306 TTE W/DOPPLER COMPLETE: CPT | Mod: 26

## 2018-08-29 RX ORDER — ACETAMINOPHEN 500 MG
650 TABLET ORAL ONCE
Qty: 0 | Refills: 0 | Status: COMPLETED | OUTPATIENT
Start: 2018-08-29 | End: 2018-08-29

## 2018-08-29 RX ADMIN — Medication 25 MILLIGRAM(S): at 13:28

## 2018-08-29 RX ADMIN — LORATADINE 10 MILLIGRAM(S): 10 TABLET ORAL at 13:28

## 2018-08-29 RX ADMIN — MONTELUKAST 10 MILLIGRAM(S): 4 TABLET, CHEWABLE ORAL at 13:28

## 2018-08-29 RX ADMIN — ISOSORBIDE DINITRATE 5 MILLIGRAM(S): 5 TABLET ORAL at 13:28

## 2018-08-29 RX ADMIN — Medication 650 MILLIGRAM(S): at 20:53

## 2018-08-29 RX ADMIN — Medication 650 MILLIGRAM(S): at 20:23

## 2018-08-29 RX ADMIN — Medication 40 MILLIGRAM(S): at 13:27

## 2018-08-29 RX ADMIN — Medication 20 MILLIGRAM(S): at 13:27

## 2018-08-29 RX ADMIN — ISOSORBIDE DINITRATE 5 MILLIGRAM(S): 5 TABLET ORAL at 06:39

## 2018-08-29 RX ADMIN — Medication 25 MILLIGRAM(S): at 21:05

## 2018-08-29 RX ADMIN — TAMSULOSIN HYDROCHLORIDE 0.4 MILLIGRAM(S): 0.4 CAPSULE ORAL at 21:05

## 2018-08-29 RX ADMIN — CARVEDILOL PHOSPHATE 18.75 MILLIGRAM(S): 80 CAPSULE, EXTENDED RELEASE ORAL at 17:20

## 2018-08-29 RX ADMIN — Medication 1 SPRAY(S): at 17:20

## 2018-08-29 RX ADMIN — Medication 81 MILLIGRAM(S): at 13:28

## 2018-08-29 RX ADMIN — APIXABAN 5 MILLIGRAM(S): 2.5 TABLET, FILM COATED ORAL at 17:20

## 2018-08-29 RX ADMIN — ATORVASTATIN CALCIUM 80 MILLIGRAM(S): 80 TABLET, FILM COATED ORAL at 21:05

## 2018-08-29 RX ADMIN — APIXABAN 5 MILLIGRAM(S): 2.5 TABLET, FILM COATED ORAL at 06:40

## 2018-08-29 RX ADMIN — LISINOPRIL 20 MILLIGRAM(S): 2.5 TABLET ORAL at 13:28

## 2018-08-29 RX ADMIN — Medication 25 MILLIGRAM(S): at 06:40

## 2018-08-29 RX ADMIN — ISOSORBIDE DINITRATE 5 MILLIGRAM(S): 5 TABLET ORAL at 21:05

## 2018-08-29 RX ADMIN — CARVEDILOL PHOSPHATE 18.75 MILLIGRAM(S): 80 CAPSULE, EXTENDED RELEASE ORAL at 06:40

## 2018-08-29 RX ADMIN — Medication 1 SPRAY(S): at 06:40

## 2018-08-29 NOTE — PROGRESS NOTE ADULT - PROBLEM SELECTOR PLAN 6
Check lipid  Continue with current medications.   Low salt, low cholesterol diet
Check lipid  Continue with current medications.   Low salt, low cholesterol diet

## 2018-08-29 NOTE — PROGRESS NOTE ADULT - ASSESSMENT
53 y/o M with h/o paroxysmal a. fib on eliquis, cardiomyopathy, CHF with EF 30-35% s/p aicd, asthma, HTN, HLD, prediabetes, anxiety, asthma admitted with chest pain and chf exac    Chest pain   Atypical   mildly positive CK  obtain outpatient records.  cards f/u  tele monitor    echo pending       CHF  diuresis with lasix iv as per cards  daily wt  strict i/o  f/u echo    : Atrial fibrillation  AC with eliquis  rate control with coreg    reported melena  monitor cbc  ppi  gi consult pending  f/u guaiac -- negative      Asthma.  stable  Continue with fluticasone and albuterol.     cont other home meds
53 y/o M with h/o paroxysmal a. fib on eliquis, cardiomyopathy, CHF with EF 30-35% with lifevest, asthma, HTN, HLD, prediabetes, ICD, anxiety, asthma presents to the Ed for chest pain. Admit to telemetry to r/o ACS.
53 y/o M with h/o paroxysmal a. fib on eliquis, cardiomyopathy, CHF with EF 30-35% with lifevest, asthma, HTN, HLD, prediabetes, ICD, anxiety, asthma presents to the Ed for chest pain. Admit to telemetry to r/o ACS.

## 2018-08-29 NOTE — PROGRESS NOTE ADULT - PROBLEM SELECTOR PLAN 3
[id number] score of 2. Continue with coreg.   For now Continue with eliquis.  GI consult given ?[email or web address]
[id number] score of 2. Continue with coreg.   For now Continue with eliquis.  GI consult given ?gabrielleena

## 2018-08-29 NOTE — CHART NOTE - NSCHARTNOTEFT_GEN_A_CORE
Patient complained of pain, numbness and tingling at ICD site for 1 hour.  ICD was placed at [facility] Feb 2017 and has not had ay pain for past year.  Denies chest pain or radiating pain.      On exam: ICD site with healed incision.  No erythema or swelling noted but has tenderness on palpation    A/P 51 y/o M with h/o paroxysmal AFib on Eliquis, cardiomyopathy, CHF with EF 30-35% with ICD placed Feb 2017, asthma, HTN, HLD, prediabetes, anxiety presents to the ED for chest pain    1. ICD site pain: will order Tylenol 650mg x 1 for pain.  If still with complaints will need further evaluation.  Continue to monitor.

## 2018-08-29 NOTE — PROGRESS NOTE ADULT - SUBJECTIVE AND OBJECTIVE BOX
OFE KHALIL  52y Male  MRN:[id number]    Patient is a 52y old  Male who presents with a chief complaint of chest pain (27 Aug 2018 22:01)    HPI:  53 y/o M with h/o a. fib on eliquis, PATTIE on CPAP, cardiomyopathy, CHF with EF 30-35%),asthma, HTN, HLD, prediabetes, AICD, presents to the ED for chest pain. Pt states he has been having chest pain for 5 days. Pt states the chest pain is on the left side of the chest radiating to the left arm. Pt states the pain is intermittent and last for a couple of minutes. Pt also states it is pleuritic. Pt states chest pain is nonexertional and nonpositional. Pt states he also has a headache which is pressure like and in the back of the head. Pt states his headache starts when his blood pressure becomes high. Pt states he saw his doctor told who told him to go to the ER to be evaluated. Pt states he doesn't remember when he got a cardiac cath or stress test last time. Pt denies LOC, syncope, fever, chills, N/V/D/C, recent infection, palpitations, PND, swelling, abdominal pain, dysuria, urinary/bowel incontinence or any other complaints at this time. (27 Aug 2018 22:01)      Patient seen and evaluated at bedside. No acute events overnight except as noted.    Interval HPI: no further chest pain.  feels well.    PAST MEDICAL & SURGICAL HISTORY:  PATTIE (obstructive sleep apnea)  Afib  CHF (congestive heart failure)  Hyperlipidemia  AICD (automatic cardioverter/defibrillator) present  Cardiomyopathy, unspecified type  Prolonged QT interval  Prediabetes  Essential hypertension  Atrial fibrillation, unspecified type  Anxiety  Asthma  History of tonsillectomy    soc:  non smoker  no drug/alcohol abuse    fam hx:  non cont       REVIEW OF SYSTEMS:  as per hpi    VITALS:  Vital Signs Last 24 Hrs  T(C): 36.6 (29 Aug 2018 13:18), Max: 37 (29 Aug 2018 02:15)  T(F): 97.9 (29 Aug 2018 13:18), Max: 98.6 (29 Aug 2018 02:15)  HR: 74 (29 Aug 2018 13:28) (63 - 92)  BP: 125/78 (29 Aug 2018 13:28) (103/67 - 133/83)  BP(mean): --  RR: 17 (29 Aug 2018 13:18) (17 - 18)  SpO2: 98% (29 Aug 2018 13:18) (95% - 98%)      PHYSICAL EXAM:  GENERAL: NAD, well-developed  HEAD:  Atraumatic, Normocephalic  EYES: EOMI, PERRLA, conjunctiva and sclera clear  NECK: Supple, No JVD  CHEST/LUNG: Clear to auscultation bilaterally; No wheeze  HEART: S1, S2; No murmurs, rubs, or gallops  ABDOMEN: Soft, Nontender, Nondistended; Bowel sounds present  EXTREMITIES:  2+ Peripheral Pulses, No clubbing, cyanosis. + edema  PSYCH: Normal affect  NEUROLOGY: AAOX3; non-focal  SKIN: No rashes or lesions    Consultant(s) Notes Reviewed:  [x ] YES  [ ] NO  Care Discussed with Consultants/Other Providers [ x] YES  [ ] NO    MEDS:  MEDICATIONS  (STANDING):  apixaban 5 milliGRAM(s) Oral every 12 hours  aspirin  chewable 81 milliGRAM(s) Oral daily  atorvastatin 80 milliGRAM(s) Oral at bedtime  carvedilol 18.75 milliGRAM(s) Oral every 12 hours  FLUoxetine 20 milliGRAM(s) Oral daily  fluticasone propionate 50 MICROgram(s)/spray Nasal Spray 1 Spray(s) Both Nostrils two times a day  furosemide    Tablet 40 milliGRAM(s) Oral daily  hydrALAZINE 25 milliGRAM(s) Oral three times a day  isosorbide   dinitrate Tablet (ISORDIL) 5 milliGRAM(s) Oral three times a day  lisinopril 20 milliGRAM(s) Oral daily  loratadine 10 milliGRAM(s) Oral daily  montelukast 10 milliGRAM(s) Oral daily  tamsulosin 0.4 milliGRAM(s) Oral at bedtime    MEDICATIONS  (PRN):  ALBUTerol    90 MICROgram(s) HFA Inhaler 2 Puff(s) Inhalation every 6 hours PRN Shortness of Breath and/or Wheezing      ALLERGIES:  No Known Allergies      LABS:                                       12.5   5.62  )-----------( 191      ( 29 Aug 2018 05:21 )             38.1   08-29    138  |  103  |  19  ----------------------------<  92  4.0   |  22  |  1.02    Ca    8.5      29 Aug 2018 05:21  Phos  3.9     08-28  Mg     2.2     08-29    TPro  7.6  /  Alb  4.3  /  TBili  0.3  /  DBili  x   /  AST  29  /  ALT  28  /  AlkPhos  63  08-27
Subjective: Patient seen and examined. No new events except as noted.     REVIEW OF SYSTEMS:    CONSTITUTIONAL: No weakness, fevers or chills  EYES/ENT: No visual changes;  No vertigo or throat pain   NECK: No pain or stiffness  RESPIRATORY: No cough, wheezing, hemoptysis; No shortness of breath  CARDIOVASCULAR: No chest pain or palpitations  GASTROINTESTINAL: No abdominal or epigastric pain. No nausea, vomiting, or hematemesis; No diarrhea or constipation. No melena or hematochezia.  GENITOURINARY: No dysuria, frequency or hematuria  NEUROLOGICAL: No numbness or weakness  SKIN: No itching, burning, rashes, or lesions   All other review of systems is negative unless indicated above.    MEDICATIONS:  MEDICATIONS  (STANDING):  apixaban 5 milliGRAM(s) Oral every 12 hours  aspirin  chewable 81 milliGRAM(s) Oral daily  atorvastatin 80 milliGRAM(s) Oral at bedtime  carvedilol 18.75 milliGRAM(s) Oral every 12 hours  FLUoxetine 20 milliGRAM(s) Oral daily  fluticasone propionate 50 MICROgram(s)/spray Nasal Spray 1 Spray(s) Both Nostrils two times a day  furosemide    Tablet 40 milliGRAM(s) Oral daily  hydrALAZINE 25 milliGRAM(s) Oral three times a day  isosorbide   dinitrate Tablet (ISORDIL) 5 milliGRAM(s) Oral three times a day  lisinopril 20 milliGRAM(s) Oral daily  loratadine 10 milliGRAM(s) Oral daily  montelukast 10 milliGRAM(s) Oral daily  tamsulosin 0.4 milliGRAM(s) Oral at bedtime      PHYSICAL EXAM:  T(C): 36.5 (08-29-18 @ 06:15), Max: 37 (08-29-18 @ 02:15)  HR: 70 (08-29-18 @ 07:25) (63 - 92)  BP: 103/67 (08-29-18 @ 06:15) (103/67 - 133/83)  RR: 17 (08-29-18 @ 06:15) (17 - 18)  SpO2: 96% (08-29-18 @ 07:25) (95% - 98%)  Wt(kg): --  I&O's Summary    Height (cm): 185.42 (08-28 @ 22:16)  Weight (kg): 144 (08-28 @ 22:16)  BMI (kg/m2): 41.9 (08-28 @ 22:16)  BSA (m2): 2.62 (08-28 @ 22:16)    Appearance: Normal	  HEENT:   Normal oral mucosa, PERRL, EOMI	  Lymphatic: No lymphadenopathy , no edema  Cardiovascular: Normal S1 S2, No JVD, No murmurs , Peripheral pulses palpable 2+ bilaterally  Respiratory: Lungs clear to auscultation, normal effort 	  Gastrointestinal:  Soft, Non-tender, + BS	  Skin: No rashes, No ecchymoses, No cyanosis, warm to touch  Musculoskeletal: Normal range of motion, normal strength  Psychiatry:  Mood & affect appropriate  Ext: + edema      LABS:    CARDIAC MARKERS:  CARDIAC MARKERS ( 28 Aug 2018 05:30 )  x     / x     / 849 u/L / 7.21 ng/mL / x      CARDIAC MARKERS ( 28 Aug 2018 00:55 )  x     / x     / 857 u/L / 7.23 ng/mL / x                                    12.5   5.62  )-----------( 191      ( 29 Aug 2018 05:21 )             38.1     08-29    138  |  103  |  19  ----------------------------<  92  4.0   |  22  |  1.02    Ca    8.5      29 Aug 2018 05:21  Phos  3.9     08-28  Mg     2.2     08-29    TPro  7.6  /  Alb  4.3  /  TBili  0.3  /  DBili  x   /  AST  29  /  ALT  28  /  AlkPhos  63  08-27    proBNP:   Lipid Profile:   HgA1c:   TSH:             TELEMETRY: SR	    ECG:  	  RADIOLOGY:   DIAGNOSTIC TESTING:  [ ] Echocardiogram:  [ ]  Catheterization:  [ ] Stress Test:    OTHER:
Subjective: Patient seen and examined. No new events except as noted.   +SOb   No cp   +bloody stools   REVIEW OF SYSTEMS:    CONSTITUTIONAL: + weakness, fevers or chills  EYES/ENT: No visual changes;  No vertigo or throat pain   NECK: No pain or stiffness  RESPIRATORY: No cough, wheezing, hemoptysis; + shortness of breath  CARDIOVASCULAR: No chest pain or palpitations  GASTROINTESTINAL: No abdominal or epigastric pain. No nausea, vomiting, or hematemesis; No diarrhea or constipation. No melena + hematochezia.  GENITOURINARY: No dysuria, frequency or hematuria  NEUROLOGICAL: No numbness or weakness  SKIN: No itching, burning, rashes, or lesions   All other review of systems is negative unless indicated above.    MEDICATIONS:  MEDICATIONS  (STANDING):  apixaban 5 milliGRAM(s) Oral every 12 hours  aspirin  chewable 81 milliGRAM(s) Oral daily  atorvastatin 80 milliGRAM(s) Oral at bedtime  carvedilol 18.75 milliGRAM(s) Oral every 12 hours  FLUoxetine 20 milliGRAM(s) Oral daily  fluticasone propionate 50 MICROgram(s)/spray Nasal Spray 1 Spray(s) Both Nostrils two times a day  furosemide    Tablet 40 milliGRAM(s) Oral daily  hydrALAZINE 25 milliGRAM(s) Oral three times a day  isosorbide   dinitrate Tablet (ISORDIL) 5 milliGRAM(s) Oral three times a day  lisinopril 20 milliGRAM(s) Oral daily  loratadine 10 milliGRAM(s) Oral daily  montelukast 10 milliGRAM(s) Oral daily  tamsulosin 0.4 milliGRAM(s) Oral at bedtime      PHYSICAL EXAM:  T(C): 36.8 (08-28-18 @ 03:58), Max: 37 (08-27-18 @ 14:48)  HR: 63 (08-28-18 @ 03:58) (63 - 82)  BP: 140/94 (08-28-18 @ 03:58) (135/106 - 211/113)  RR: 16 (08-28-18 @ 03:58) (16 - 20)  SpO2: 100% (08-28-18 @ 03:58) (98% - 100%)  Wt(kg): --  I&O's Summary        Appearance: Normal	  HEENT:   Normal oral mucosa, PERRL, EOMI	  Lymphatic: No lymphadenopathy  Cardiovascular: irregular S1 S2, No JVD, No murmurs , Peripheral pulses palpable 2+ bilaterally  Respiratory: +crackles   Gastrointestinal:  Soft, Non-tender, + BS	  Skin: No rashes, No ecchymoses, No cyanosis, warm to touch  Musculoskeletal: Normal range of motion, normal strength  Psychiatry:  Mood & affect appropriate  Ext: + edema      LABS:    CARDIAC MARKERS:  CARDIAC MARKERS ( 28 Aug 2018 00:55 )  x     / x     / 857 u/L / 7.23 ng/mL / x                                    13.3   5.93  )-----------( 205      ( 27 Aug 2018 17:26 )             42.3     08-27    140  |  102  |  14  ----------------------------<  82  4.0   |  24  |  1.06    Ca    8.9      27 Aug 2018 17:26    TPro  7.6  /  Alb  4.3  /  TBili  0.3  /  DBili  x   /  AST  29  /  ALT  28  /  AlkPhos  63  08-27    proBNP:   Lipid Profile:   HgA1c:   TSH:             TELEMETRY: AF	    ECG:  	  RADIOLOGY:   DIAGNOSTIC TESTING:  [ ] Echocardiogram:  [ ]  Catheterization:  [ ] Stress Test:    OTHER:

## 2018-08-29 NOTE — PROGRESS NOTE ADULT - PROBLEM SELECTOR PLAN 5
Routine blood pressure check.  Continue with current medications.   Low salt,low cholesterol, DASH diet
Routine blood pressure check.  Continue with current medications.   Low salt,low cholesterol, DASH diet

## 2018-08-30 ENCOUNTER — TRANSCRIPTION ENCOUNTER (OUTPATIENT)
Age: 53
End: 2018-08-30

## 2018-08-30 VITALS — WEIGHT: 315 LBS

## 2018-08-30 LAB
BASOPHILS # BLD AUTO: 0.02 K/UL — SIGNIFICANT CHANGE UP (ref 0–0.2)
BASOPHILS NFR BLD AUTO: 0.4 % — SIGNIFICANT CHANGE UP (ref 0–2)
BUN SERPL-MCNC: 17 MG/DL — SIGNIFICANT CHANGE UP (ref 7–23)
CALCIUM SERPL-MCNC: 8.6 MG/DL — SIGNIFICANT CHANGE UP (ref 8.4–10.5)
CHLORIDE SERPL-SCNC: 103 MMOL/L — SIGNIFICANT CHANGE UP (ref 98–107)
CO2 SERPL-SCNC: 24 MMOL/L — SIGNIFICANT CHANGE UP (ref 22–31)
CREAT SERPL-MCNC: 1.03 MG/DL — SIGNIFICANT CHANGE UP (ref 0.5–1.3)
EOSINOPHIL # BLD AUTO: 0.34 K/UL — SIGNIFICANT CHANGE UP (ref 0–0.5)
EOSINOPHIL NFR BLD AUTO: 6.1 % — HIGH (ref 0–6)
GLUCOSE SERPL-MCNC: 85 MG/DL — SIGNIFICANT CHANGE UP (ref 70–99)
HCT VFR BLD CALC: 39.5 % — SIGNIFICANT CHANGE UP (ref 39–50)
HGB BLD-MCNC: 12.4 G/DL — LOW (ref 13–17)
IMM GRANULOCYTES # BLD AUTO: 0.02 # — SIGNIFICANT CHANGE UP
IMM GRANULOCYTES NFR BLD AUTO: 0.4 % — SIGNIFICANT CHANGE UP (ref 0–1.5)
LYMPHOCYTES # BLD AUTO: 1.54 K/UL — SIGNIFICANT CHANGE UP (ref 1–3.3)
LYMPHOCYTES # BLD AUTO: 27.5 % — SIGNIFICANT CHANGE UP (ref 13–44)
MAGNESIUM SERPL-MCNC: 2.2 MG/DL — SIGNIFICANT CHANGE UP (ref 1.6–2.6)
MCHC RBC-ENTMCNC: 27.5 PG — SIGNIFICANT CHANGE UP (ref 27–34)
MCHC RBC-ENTMCNC: 31.4 % — LOW (ref 32–36)
MCV RBC AUTO: 87.6 FL — SIGNIFICANT CHANGE UP (ref 80–100)
MONOCYTES # BLD AUTO: 0.74 K/UL — SIGNIFICANT CHANGE UP (ref 0–0.9)
MONOCYTES NFR BLD AUTO: 13.2 % — SIGNIFICANT CHANGE UP (ref 2–14)
NEUTROPHILS # BLD AUTO: 2.94 K/UL — SIGNIFICANT CHANGE UP (ref 1.8–7.4)
NEUTROPHILS NFR BLD AUTO: 52.4 % — SIGNIFICANT CHANGE UP (ref 43–77)
NRBC # FLD: 0 — SIGNIFICANT CHANGE UP
PLATELET # BLD AUTO: 179 K/UL — SIGNIFICANT CHANGE UP (ref 150–400)
PMV BLD: 11 FL — SIGNIFICANT CHANGE UP (ref 7–13)
POTASSIUM SERPL-MCNC: 4.1 MMOL/L — SIGNIFICANT CHANGE UP (ref 3.5–5.3)
POTASSIUM SERPL-SCNC: 4.1 MMOL/L — SIGNIFICANT CHANGE UP (ref 3.5–5.3)
RBC # BLD: 4.51 M/UL — SIGNIFICANT CHANGE UP (ref 4.2–5.8)
RBC # FLD: 14.6 % — HIGH (ref 10.3–14.5)
SODIUM SERPL-SCNC: 141 MMOL/L — SIGNIFICANT CHANGE UP (ref 135–145)
WBC # BLD: 5.6 K/UL — SIGNIFICANT CHANGE UP (ref 3.8–10.5)
WBC # FLD AUTO: 5.6 K/UL — SIGNIFICANT CHANGE UP (ref 3.8–10.5)

## 2018-08-30 RX ORDER — FLUTICASONE PROPIONATE 50 MCG
1 SPRAY, SUSPENSION NASAL
Qty: 0 | Refills: 0 | DISCHARGE
Start: 2018-08-30

## 2018-08-30 RX ORDER — FUROSEMIDE 40 MG
1 TABLET ORAL
Qty: 0 | Refills: 0 | COMMUNITY

## 2018-08-30 RX ADMIN — CARVEDILOL PHOSPHATE 18.75 MILLIGRAM(S): 80 CAPSULE, EXTENDED RELEASE ORAL at 06:01

## 2018-08-30 RX ADMIN — Medication 25 MILLIGRAM(S): at 13:08

## 2018-08-30 RX ADMIN — Medication 81 MILLIGRAM(S): at 13:08

## 2018-08-30 RX ADMIN — LISINOPRIL 20 MILLIGRAM(S): 2.5 TABLET ORAL at 06:01

## 2018-08-30 RX ADMIN — Medication 40 MILLIGRAM(S): at 06:01

## 2018-08-30 RX ADMIN — Medication 20 MILLIGRAM(S): at 13:08

## 2018-08-30 RX ADMIN — Medication 1 SPRAY(S): at 06:00

## 2018-08-30 RX ADMIN — APIXABAN 5 MILLIGRAM(S): 2.5 TABLET, FILM COATED ORAL at 06:01

## 2018-08-30 RX ADMIN — Medication 25 MILLIGRAM(S): at 06:01

## 2018-08-30 RX ADMIN — LORATADINE 10 MILLIGRAM(S): 10 TABLET ORAL at 13:08

## 2018-08-30 RX ADMIN — ISOSORBIDE DINITRATE 5 MILLIGRAM(S): 5 TABLET ORAL at 06:00

## 2018-08-30 RX ADMIN — ISOSORBIDE DINITRATE 5 MILLIGRAM(S): 5 TABLET ORAL at 13:08

## 2018-08-30 RX ADMIN — MONTELUKAST 10 MILLIGRAM(S): 4 TABLET, CHEWABLE ORAL at 13:08

## 2018-08-30 NOTE — DISCHARGE NOTE ADULT - MEDICATION SUMMARY - MEDICATIONS TO TAKE
I will START or STAY ON the medications listed below when I get home from the hospital:    aspirin 81 mg oral tablet  -- 1 tab(s) by mouth once a day  -- Indication: For Cardiomyopathy    lisinopril 20 mg oral tablet  -- 1 tab(s) by mouth once a day  -- Indication: For Hypertension    Flomax 0.4 mg oral capsule  -- 1 cap(s) by mouth once a day MDD:1  -- Indication: For BPH    isosorbide dinitrate 5 mg oral tablet  -- 1 tab(s) by mouth 3 times a day MDD:3  -- Indication: For Hypertension    Eliquis 5 mg oral tablet  -- 1 tab(s) by mouth 2 times a day  -- Indication: For Afib    FLUoxetine 20 mg oral capsule  -- 1 cap(s) by mouth once a day  -- Indication: For Depression    loratadine 10 mg oral tablet  -- 1 tab(s) by mouth once a day  -- Indication: For Allergy    atorvastatin 80 mg oral tablet  -- 1 tab(s) by mouth once a day  -- Indication: For Hyperlipidemia    Coreg 12.5 mg oral tablet  -- 1.5 tab(s) by mouth every 12 hours  -- Indication: For Hypertension    albuterol 90 mcg/inh inhalation aerosol  -- 2 puff(s) inhaled 4 times a day, As Needed  -- Indication: For Asthma    fluticasone-salmeterol 250 mcg-50 mcg inhalation powder  -- 1 puff(s) inhaled 2 times a day   -- Indication: For Asthma    furosemide 40 mg oral tablet  -- Indication: For CHF (congestive heart failure)    montelukast 10 mg oral tablet  -- 1 tab(s) by mouth once a day  -- Indication: For Asthma    fluticasone 50 mcg/inh nasal spray  -- 1 spray(s) into nose 2 times a day  -- Indication: For Allergy    hydrALAZINE 25 mg oral tablet  -- 1 tab(s) by mouth 3 times a day MDD:3  -- Indication: For Hypertension I will START or STAY ON the medications listed below when I get home from the hospital:    aspirin 81 mg oral tablet  -- 1 tab(s) by mouth once a day  -- Indication: For Cardiomyopathy    lisinopril 20 mg oral tablet  -- 1 tab(s) by mouth once a day  -- Indication: For Hypertension    Flomax 0.4 mg oral capsule  -- 1 cap(s) by mouth once a day MDD:1  -- Indication: For BPH    isosorbide dinitrate 5 mg oral tablet  -- 1 tab(s) by mouth 3 times a day MDD:3  -- Indication: For Hypertension    Eliquis 5 mg oral tablet  -- 1 tab(s) by mouth 2 times a day  -- Indication: For Afib    FLUoxetine 20 mg oral capsule  -- 1 cap(s) by mouth once a day  -- Indication: For Depression    loratadine 10 mg oral tablet  -- 1 tab(s) by mouth once a day  -- Indication: For Allergy    atorvastatin 80 mg oral tablet  -- 1 tab(s) by mouth once a day  -- Indication: For Hyperlipidemia    Coreg 12.5 mg oral tablet  -- 1.5 tab(s) by mouth every 12 hours  -- Indication: For Hypertension    albuterol 90 mcg/inh inhalation aerosol  -- 2 puff(s) inhaled 4 times a day, As Needed  -- Indication: For Asthma    fluticasone-salmeterol 250 mcg-50 mcg inhalation powder  -- 1 puff(s) inhaled 2 times a day   -- Indication: For Asthma    furosemide 40 mg oral tablet  -- 1 tab(s) by mouth once a day  -- Indication: For CHF (congestive heart failure)    montelukast 10 mg oral tablet  -- 1 tab(s) by mouth once a day  -- Indication: For Asthma    fluticasone 50 mcg/inh nasal spray  -- 1 spray(s) into nose 2 times a day  -- Indication: For Allergy    hydrALAZINE 25 mg oral tablet  -- 1 tab(s) by mouth 3 times a day MDD:3  -- Indication: For Hypertension

## 2018-08-30 NOTE — DISCHARGE NOTE ADULT - PATIENT PORTAL LINK FT
You can access the [facility] Portal, offered by [facility], by registering with the following website: [email or web address]

## 2018-08-30 NOTE — DISCHARGE NOTE ADULT - SECONDARY DIAGNOSIS.
CHF (congestive heart failure) Atrial fibrillation, unspecified type Essential hypertension Hyperlipidemia Asthma PATTIE (obstructive sleep apnea)

## 2018-08-30 NOTE — DISCHARGE NOTE ADULT - PLAN OF CARE
Rule out acute coronary syndrome No acute cardiac event. Please follow up with Dr. Gonzales in his office on Monday for further management, Please call office to confirm appointment. To relieve and prevent worsening symptoms associated with congestive heart failure, to improve quality of life, and to treat underlying conditions such as coronary heart disease, high blood pressure, or diabetes, and to maintain euvolemia. Low salt diet, fluid restriction to 1500 ml daily, monitor your fluid intake and weight daily, exercise as tolerated 30 minutes daily, and follow up with your physician within 1 to 2 weeks. Please take your medications as prescribed.  Continue to take your blood thinner as prescribed and follow with your physician to monitor your levels.  Low fat diet, reduce caffeine intake, and exercise at least 30 minutes daily. Low sodium and fat diet, continue anti-hypertensive medications, and follow up with primary care physician. Low fat diet, exercise daily and continue current medications. Follow up with primary care physician and cardiologist for management. Continue current medications as prescribed.  Avoid exposures to environmental allergens such as carpets, pets and both first-hand and second-hand smoking.  During seasonal allergy period, take a shower as soon as you get home and change your clothes immediately.  Follow up your routine medical appointments. Continue CPAP

## 2018-08-30 NOTE — DISCHARGE NOTE ADULT - CARE PROVIDER_[facility] CALL
Beka Gonzales (), Cardiology; Internal Medicine  [address] [place] [address]  [address]  [place] [address]  Phone: [phone]  Fax: [phone]

## 2018-08-30 NOTE — DISCHARGE NOTE ADULT - CARE PLAN
Principal Discharge DX:	Chest pain  Goal:	Rule out acute coronary syndrome  Assessment and plan of treatment:	No acute cardiac event. Please follow up with Dr. Gonzales in his office on Monday for further management, Please call office to confirm appointment.  Secondary Diagnosis:	CHF (congestive heart failure)  Goal:	To relieve and prevent worsening symptoms associated with congestive heart failure, to improve quality of life, and to treat underlying conditions such as coronary heart disease, high blood pressure, or diabetes, and to maintain euvolemia.  Assessment and plan of treatment:	Low salt diet, fluid restriction to 1500 ml daily, monitor your fluid intake and weight daily, exercise as tolerated 30 minutes daily, and follow up with your physician within 1 to 2 weeks.  Secondary Diagnosis:	Atrial fibrillation, unspecified type  Assessment and plan of treatment:	Please take your medications as prescribed.  Continue to take your blood thinner as prescribed and follow with your physician to monitor your levels.  Low fat diet, reduce caffeine intake, and exercise at least 30 minutes daily.  Secondary Diagnosis:	Essential hypertension  Assessment and plan of treatment:	Low sodium and fat diet, continue anti-hypertensive medications, and follow up with primary care physician.  Secondary Diagnosis:	Hyperlipidemia  Assessment and plan of treatment:	Low fat diet, exercise daily and continue current medications. Follow up with primary care physician and cardiologist for management.  Secondary Diagnosis:	Asthma  Assessment and plan of treatment:	Continue current medications as prescribed.  Avoid exposures to environmental allergens such as carpets, pets and both first-hand and second-hand smoking.  During seasonal allergy period, take a shower as soon as you get home and change your clothes immediately.  Follow up your routine medical appointments.  Secondary Diagnosis:	PATTIE (obstructive sleep apnea)  Assessment and plan of treatment:	Continue CPAP

## 2018-08-30 NOTE — DISCHARGE NOTE ADULT - HOSPITAL COURSE
51 y/o M with h/o a. fib on eliquis, PATTIE on CPAP, cardiomyopathy, CHF with EF 30-35%),asthma, HTN, HLD, prediabetes, AICD, presents to the ED for chest pain. Pt states he has been having chest pain for 5 days. Pt states the chest pain is on the left side of the chest radiating to the left arm. Pt states the pain is intermittent and last for a couple of minutes. Pt also states it is pleuritic. Pt states chest pain is nonexertional and nonpositional. Pt states he also has a headache which is pressure like and in the back of the head. Pt states his headache starts when his blood pressure becomes high. Pt states he saw his doctor told who told him to go to the ER to be evaluated. Pt states he doesn't remember when he got a cardiac cath or stress test last time. Pt denies LOC, syncope, fever, chills, N/V/D/C, recent infection, palpitations, PND, swelling, abdominal pain, dysuria, urinary/bowel incontinence or any other complaints at this time.    Test Results:  Labs:  Trop 16->15  EKG shows NSR @ 67 bpm TWI in III, AVF, V1    CXR: Clear lungs. No pleural effusions or pneumothorax.Left chest wall AICD present with intact leads overlying right atrial and ventricular regions. Cardiac and mediastinal silhouettes otherwise within normal limits. Trachea midline.Unremarkable osseous structures.     8/29 - echo - 1. Mitral annular calcification, otherwise normal mitral  valve. Mild mitral regurgitation.  2. Severe left ventricular enlargement.  3. Endocardial visualization enhanced with intravenous  injection of echo contrast (Definity).  Severe global left  ventricular systolic dysfunction.  4. Right ventricular enlargement with decreased right  ventricular systolic function. Device wire is noted in the  right heart. EF 34%    + Chest pain.  Atypical ACS ruled out with 2 sets neg CE, no events on tele    + CHF (congestive heart failure).  Cont lasix    + Atrial fibrillation, : [id number] score of 2. Continue with coreg. For now Continue with eliquis.    + Asthma Continue with fluticasone and albuterol.     + Hypertension.  Plan: Routine blood pressure check. Continue with current medications. Low salt,low cholesterol, DASH diet.     + Hyperlipidemia. Continue with current medications. Low salt, low cholesterol diet.    + Sleep apnea. Continue with CPAP.     Discussed case with Dr. Gonzales, pt cleared for discharge with outpatient follow up. 53 y/o M with h/o a. fib on eliquis, PATTIE on CPAP, cardiomyopathy, CHF with EF 30-35%),asthma, HTN, HLD, prediabetes, AICD, presents to the ED for atypical chest pain. Pt states he has been having chest pain for 5 days. Pt states the chest pain is on the left side of the chest radiating to the left arm. Pt states the pain is intermittent and last for a couple of minutes. Pt also states it is pleuritic. Pt states chest pain is nonexertional and nonpositional. Pt states he also has a headache which is pressure like and in the back of the head. Pt states his headache starts when his blood pressure becomes high. Pt states he saw his doctor told who told him to go to the ER to be evaluated. Pt states he doesn't remember when he got a cardiac cath or stress test last time. Pt denies LOC, syncope, fever, chills, N/V/D/C, recent infection, palpitations, PND, swelling, abdominal pain, dysuria, urinary/bowel incontinence or any other complaints at this time.  He was treated for acute on chronic systolic heart failure.   Test Results:  Labs:  Trop 16->15  EKG shows NSR @ 67 bpm TWI in III, AVF, V1    CXR: Clear lungs. No pleural effusions or pneumothorax.Left chest wall AICD present with intact leads overlying right atrial and ventricular regions. Cardiac and mediastinal silhouettes otherwise within normal limits. Trachea midline.Unremarkable osseous structures.     8/29 - echo - 1. Mitral annular calcification, otherwise normal mitral  valve. Mild mitral regurgitation.  2. Severe left ventricular enlargement.  3. Endocardial visualization enhanced with intravenous  injection of echo contrast (Definity).  Severe global left  ventricular systolic dysfunction.  4. Right ventricular enlargement with decreased right  ventricular systolic function. Device wire is noted in the  right heart. EF 34%    + Chest pain.  Atypical ACS ruled out with 2 sets neg CE, no events on tele    + CHF (congestive heart failure).  Cont lasix    + Atrial fibrillation, : [id number] score of 2. Continue with coreg. For now Continue with eliquis.    + Asthma Continue with fluticasone and albuterol.     + Hypertension.  Plan: Routine blood pressure check. Continue with current medications. Low salt,low cholesterol, DASH diet.     + Hyperlipidemia. Continue with current medications. Low salt, low cholesterol diet.    + Sleep apnea. Continue with CPAP.     Discussed case with Dr. Gonzales, pt cleared for discharge with outpatient follow up.

## 2018-09-05 ENCOUNTER — OUTPATIENT (OUTPATIENT)
Dept: OUTPATIENT SERVICES | Facility: HOSPITAL | Age: 53
LOS: 1 days | End: 2018-09-05
Payer: MEDICAID

## 2018-09-05 ENCOUNTER — APPOINTMENT (OUTPATIENT)
Dept: FAMILY MEDICINE | Facility: HOSPITAL | Age: 53
End: 2018-09-05

## 2018-09-05 ENCOUNTER — MED ADMIN CHARGE (OUTPATIENT)
Age: 53
End: 2018-09-05

## 2018-09-05 VITALS
DIASTOLIC BLOOD PRESSURE: 73 MMHG | HEART RATE: 62 BPM | BODY MASS INDEX: 42.66 KG/M2 | TEMPERATURE: 97.5 F | SYSTOLIC BLOOD PRESSURE: 118 MMHG | WEIGHT: 315 LBS | RESPIRATION RATE: 16 BRPM | OXYGEN SATURATION: 96 % | HEIGHT: 72 IN

## 2018-09-05 DIAGNOSIS — Z00.00 ENCOUNTER FOR GENERAL ADULT MEDICAL EXAMINATION WITHOUT ABNORMAL FINDINGS: ICD-10-CM

## 2018-09-05 DIAGNOSIS — Z90.89 ACQUIRED ABSENCE OF OTHER ORGANS: Chronic | ICD-10-CM

## 2018-09-05 PROBLEM — G47.33 OBSTRUCTIVE SLEEP APNEA (ADULT) (PEDIATRIC): Chronic | Status: ACTIVE | Noted: 2018-08-27

## 2018-09-05 PROCEDURE — G0463: CPT

## 2018-09-05 NOTE — ASSESSMENT
[FreeTextEntry1] : # A-fib on Eliquis\par - Cont Eliquis 5 mg BID and Coreg\par \par # Bloody Bowel Movements\par - Check FOBT\par - GI follow up \par - CBC from 8/30 reviewed \par \par # HTN - stable\par - Cont meds at the same dosages\par - Low salt diet\par \par # HLD \par - Cont Lipitor to 80 mg QD\par \par # CHF with mild dyspnea on exertion\par - Cont Lasix \par - f/u with Pulmonology\par - Cont Cardio follow up\par \par # PATTIE\par - Cont CPAP\par - Cont Pulm follow up\par

## 2018-09-05 NOTE — HISTORY OF PRESENT ILLNESS
[de-identified] : \par Pt is a 52 year old male with PMH of CHF (diastolic & systolic EF ~30%), s/p AICD, CAD, Pre-diabetes, PATTIE, HTN, A-fib on Eliquis, asthma, obesity presents to the clinic for follow up post hospitalization. Pt was admitted to the hospital last week for chest pain r/o ACS. He was monitored on tele. Cardiac enzymes were NEG, echo was unchanged, no events on tele and patient was discharged home without any change in his medications. Pt also states before his admission he notice blood in his stool for 1 week. Initially they was bright red blood which turned dark and resolved. Did not have anymore episodes. No swelling of his legs. No recent weight gain. Pt has been using his CPAP every night. Denies Orthopnea, Chest pain, fever, chills or PND. \par  \par

## 2018-09-05 NOTE — PHYSICAL EXAM
[No Acute Distress] : no acute distress [Well Nourished] : well nourished [Well Developed] : well developed [Well-Appearing] : well-appearing [No Respiratory Distress] : no respiratory distress  [Clear to Auscultation] : lungs were clear to auscultation bilaterally [No Accessory Muscle Use] : no accessory muscle use [Normal Rate] : normal rate  [Regular Rhythm] : with a regular rhythm [Normal S1, S2] : normal S1 and S2 [No Murmur] : no murmur heard [Soft] : abdomen soft [Non Tender] : non-tender [Non-distended] : non-distended [No Masses] : no abdominal mass palpated [No HSM] : no HSM [Normal Bowel Sounds] : normal bowel sounds [Normal Affect] : the affect was normal [Normal Insight/Judgement] : insight and judgment were intact [de-identified] : No leg edema

## 2018-09-07 DIAGNOSIS — Z23 ENCOUNTER FOR IMMUNIZATION: ICD-10-CM

## 2018-09-07 DIAGNOSIS — Z12.11 ENCOUNTER FOR SCREENING FOR MALIGNANT NEOPLASM OF COLON: ICD-10-CM

## 2018-09-07 DIAGNOSIS — K92.1 MELENA: ICD-10-CM

## 2018-09-17 ENCOUNTER — APPOINTMENT (OUTPATIENT)
Dept: ELECTROPHYSIOLOGY | Facility: CLINIC | Age: 53
End: 2018-09-17
Payer: MEDICAID

## 2018-09-17 DIAGNOSIS — Z95.810 PRESENCE OF AUTOMATIC (IMPLANTABLE) CARDIAC DEFIBRILLATOR: ICD-10-CM

## 2018-09-17 PROCEDURE — 93295 DEV INTERROG REMOTE 1/2/MLT: CPT

## 2018-09-17 PROCEDURE — 93296 REM INTERROG EVL PM/IDS: CPT

## 2018-09-19 ENCOUNTER — APPOINTMENT (OUTPATIENT)
Dept: FAMILY MEDICINE | Facility: HOSPITAL | Age: 53
End: 2018-09-19

## 2018-10-01 ENCOUNTER — OUTPATIENT (OUTPATIENT)
Dept: OUTPATIENT SERVICES | Facility: HOSPITAL | Age: 53
LOS: 1 days | End: 2018-10-01
Payer: MEDICAID

## 2018-10-01 DIAGNOSIS — Z90.89 ACQUIRED ABSENCE OF OTHER ORGANS: Chronic | ICD-10-CM

## 2018-10-01 PROCEDURE — G9001: CPT

## 2018-10-04 ENCOUNTER — APPOINTMENT (OUTPATIENT)
Dept: CARDIOLOGY | Facility: CLINIC | Age: 53
End: 2018-10-04
Payer: MEDICAID

## 2018-10-04 ENCOUNTER — NON-APPOINTMENT (OUTPATIENT)
Age: 53
End: 2018-10-04

## 2018-10-04 VITALS
RESPIRATION RATE: 15 BRPM | BODY MASS INDEX: 42.66 KG/M2 | WEIGHT: 315 LBS | OXYGEN SATURATION: 96 % | DIASTOLIC BLOOD PRESSURE: 89 MMHG | HEIGHT: 72 IN | SYSTOLIC BLOOD PRESSURE: 139 MMHG | HEART RATE: 64 BPM

## 2018-10-04 PROCEDURE — 93000 ELECTROCARDIOGRAM COMPLETE: CPT

## 2018-10-04 PROCEDURE — 99214 OFFICE O/P EST MOD 30 MIN: CPT

## 2018-10-04 PROCEDURE — 93306 TTE W/DOPPLER COMPLETE: CPT

## 2018-10-08 ENCOUNTER — APPOINTMENT (OUTPATIENT)
Dept: FAMILY MEDICINE | Facility: HOSPITAL | Age: 53
End: 2018-10-08

## 2018-10-12 ENCOUNTER — RX RENEWAL (OUTPATIENT)
Age: 53
End: 2018-10-12

## 2018-10-13 ENCOUNTER — RX RENEWAL (OUTPATIENT)
Age: 53
End: 2018-10-13

## 2018-10-17 ENCOUNTER — APPOINTMENT (OUTPATIENT)
Dept: PULMONOLOGY | Facility: CLINIC | Age: 53
End: 2018-10-17

## 2018-10-20 ENCOUNTER — APPOINTMENT (OUTPATIENT)
Dept: FAMILY MEDICINE | Facility: HOSPITAL | Age: 53
End: 2018-10-20

## 2018-10-20 NOTE — PHYSICAL EXAM
[No Acute Distress] : no acute distress [Well Nourished] : well nourished [Well Developed] : well developed [Well-Appearing] : well-appearing [No Respiratory Distress] : no respiratory distress  [Clear to Auscultation] : lungs were clear to auscultation bilaterally [No Accessory Muscle Use] : no accessory muscle use [Normal Rate] : normal rate  [Regular Rhythm] : with a regular rhythm [Normal S1, S2] : normal S1 and S2 [No Murmur] : no murmur heard [Soft] : abdomen soft [Non Tender] : non-tender [Non-distended] : non-distended [No Masses] : no abdominal mass palpated [No HSM] : no HSM [Normal Bowel Sounds] : normal bowel sounds [Normal Affect] : the affect was normal [Alert and Oriented x3] : oriented to person, place, and time [Normal Insight/Judgement] : insight and judgment were intact [de-identified] : No leg edema

## 2018-10-20 NOTE — ASSESSMENT
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

## 2018-10-20 NOTE — HISTORY OF PRESENT ILLNESS
[de-identified] : Pt is a 52 year old male with PMH of CHF (diastolic & systolic EF ~52% seen on recent echo 10/4/2018), s/p AICD, CAD, Pre-diabetes, PATTIE, HTN, A-fib on Eliquis, asthma, obesity presents to the clinic for follow up. No swelling of his legs. No recent weight gain. Pt has been using his CPAP every night. Denies Orthopnea, Chest pain, fever, chills or PND. \par

## 2018-10-31 DIAGNOSIS — Z71.89 OTHER SPECIFIED COUNSELING: ICD-10-CM

## 2018-11-19 ENCOUNTER — RX RENEWAL (OUTPATIENT)
Age: 53
End: 2018-11-19

## 2018-11-26 ENCOUNTER — APPOINTMENT (OUTPATIENT)
Dept: FAMILY MEDICINE | Facility: HOSPITAL | Age: 53
End: 2018-11-26

## 2018-11-26 ENCOUNTER — OUTPATIENT (OUTPATIENT)
Dept: OUTPATIENT SERVICES | Facility: HOSPITAL | Age: 53
LOS: 1 days | End: 2018-11-26
Payer: MEDICAID

## 2018-11-26 VITALS
SYSTOLIC BLOOD PRESSURE: 143 MMHG | HEIGHT: 72 IN | BODY MASS INDEX: 42.66 KG/M2 | DIASTOLIC BLOOD PRESSURE: 92 MMHG | WEIGHT: 315 LBS | HEART RATE: 78 BPM | OXYGEN SATURATION: 98 % | TEMPERATURE: 98.1 F | RESPIRATION RATE: 16 BRPM

## 2018-11-26 DIAGNOSIS — Z00.00 ENCOUNTER FOR GENERAL ADULT MEDICAL EXAMINATION WITHOUT ABNORMAL FINDINGS: ICD-10-CM

## 2018-11-26 DIAGNOSIS — Z90.89 ACQUIRED ABSENCE OF OTHER ORGANS: Chronic | ICD-10-CM

## 2018-11-26 PROCEDURE — G0463: CPT

## 2018-11-26 NOTE — ASSESSMENT
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 Risks of delay discussed with patient in detail. Pt understood and stated will make GI appointment as soon as possible\par \par \par # Colon Cancer Screening\par - GI follow up for colonoscopy\par

## 2018-11-26 NOTE — PHYSICAL EXAM
[No Acute Distress] : no acute distress [Well Nourished] : well nourished [Well Developed] : well developed [Well-Appearing] : well-appearing [No Respiratory Distress] : no respiratory distress  [Clear to Auscultation] : lungs were clear to auscultation bilaterally [No Accessory Muscle Use] : no accessory muscle use [Normal Rate] : normal rate  [Regular Rhythm] : with a regular rhythm [Normal S1, S2] : normal S1 and S2 [No Murmur] : no murmur heard [No Varicosities] : no varicosities [Soft] : abdomen soft [Non Tender] : non-tender [Non-distended] : non-distended [No Masses] : no abdominal mass palpated [No HSM] : no HSM [Normal Bowel Sounds] : normal bowel sounds [Normal Affect] : the affect was normal [Alert and Oriented x3] : oriented to person, place, and time [Normal Insight/Judgement] : insight and judgment were intact [de-identified] : Mild pitting edema b/l lower extrimities

## 2018-11-26 NOTE — HISTORY OF PRESENT ILLNESS
[de-identified] : Pt is a 53 year old male with PMH of CHF (diastolic & systolic EF ~52% seen on recent echo 10/4/2018), s/p AICD, CAD, Pre-diabetes, PATTIE, HTN, Depression, A-fib on Eliquis, asthma, obesity presents to the clinic for follow up. No swelling of his legs. No recent weight gain. Pt has been using his CPAP every night. Denies Orthopnea, Chest pain, fever, chills or PND. \par \par Discussed patients 1 isolated episode of bloody stool. Pt states he hasn’t had nay more bloody stools. Pt was busy and therefore did not make a GI appointment. Discussed the importance of prompt GI follow up for colonoscopy in detail. Risks of delay discussed with patient in detail. Pt understood and stated will make GI appointment as soon as possible

## 2018-11-30 DIAGNOSIS — I50.42 CHRONIC COMBINED SYSTOLIC (CONGESTIVE) AND DIASTOLIC (CONGESTIVE) HEART FAILURE: ICD-10-CM

## 2018-11-30 DIAGNOSIS — I48.91 UNSPECIFIED ATRIAL FIBRILLATION: ICD-10-CM

## 2018-11-30 DIAGNOSIS — Z12.11 ENCOUNTER FOR SCREENING FOR MALIGNANT NEOPLASM OF COLON: ICD-10-CM

## 2018-11-30 DIAGNOSIS — K92.1 MELENA: ICD-10-CM

## 2018-11-30 DIAGNOSIS — J45.909 UNSPECIFIED ASTHMA, UNCOMPLICATED: ICD-10-CM

## 2018-12-26 ENCOUNTER — APPOINTMENT (OUTPATIENT)
Dept: ELECTROPHYSIOLOGY | Facility: CLINIC | Age: 53
End: 2018-12-26
Payer: MEDICAID

## 2018-12-26 ENCOUNTER — RX RENEWAL (OUTPATIENT)
Age: 53
End: 2018-12-26

## 2018-12-26 VITALS
OXYGEN SATURATION: 98 % | SYSTOLIC BLOOD PRESSURE: 127 MMHG | HEART RATE: 78 BPM | WEIGHT: 305 LBS | BODY MASS INDEX: 41.37 KG/M2 | DIASTOLIC BLOOD PRESSURE: 77 MMHG

## 2018-12-26 PROCEDURE — 93283 PRGRMG EVAL IMPLANTABLE DFB: CPT

## 2018-12-27 ENCOUNTER — RX RENEWAL (OUTPATIENT)
Age: 53
End: 2018-12-27

## 2019-01-03 ENCOUNTER — APPOINTMENT (OUTPATIENT)
Dept: CARDIOLOGY | Facility: CLINIC | Age: 54
End: 2019-01-03

## 2019-01-09 ENCOUNTER — APPOINTMENT (OUTPATIENT)
Dept: PULMONOLOGY | Facility: CLINIC | Age: 54
End: 2019-01-09
Payer: MEDICAID

## 2019-01-09 VITALS
BODY MASS INDEX: 42.66 KG/M2 | DIASTOLIC BLOOD PRESSURE: 92 MMHG | HEIGHT: 72 IN | TEMPERATURE: 98.1 F | HEART RATE: 91 BPM | RESPIRATION RATE: 16 BRPM | SYSTOLIC BLOOD PRESSURE: 137 MMHG | WEIGHT: 315 LBS

## 2019-01-09 PROCEDURE — 99213 OFFICE O/P EST LOW 20 MIN: CPT | Mod: GC

## 2019-01-09 NOTE — HISTORY OF PRESENT ILLNESS
[FreeTextEntry1] : Pt is a 52M with PMHx combined CHF s/p AICD (last EF 52%), Afib (on Eliquis), obesity, asthma, chronic insomnia, severe PATTIE, and anxiety presenting to pulmonary clinic for follow-up visit. \par \par Pt says that a few weeks ago he had a coughing fit that was so severe that he passed out. For 2 weeks pt had dizziness, coughing, and dyspnea which is now improved. No fevers, sore throat, runny nose/PND, or chest pain. Dyspnea is worst at night. Pt usually puts his CPAP machine on and falls asleep ~30 minutes late after which he wakes up at night (x2 weeks) but wakes up in the middle of the night with dyspnea. He sits up, starts coughing, goes to the bathroom, and then lies back down and is able to fall back asleep with CPAP on. Pt does feel some post-nasal dripping and also with periods of rhinorrhea. Pt's cough is normally dry, but then starts coughing up clearish-yellow mucous ~45 minutes later. This has happened before and pt improved with Flonase drops. He denies heartburn or wheezing. \par \par The patient was diagnosed with PATTIE in 2017 (AHI 30.9/h overall and 63.2/h during REM sleep and is on CPAP 12 cmH20 via [facility] with good compliance.) He also has chronic insomnia for which he uses melatonin. He is ~2 months overdue for new filters and tubing, and has been calling [facility] regularly. He also uses a humidifier which he cleans with soap and vinegar daily. \par

## 2019-01-09 NOTE — REVIEW OF SYSTEMS
[Shortness Of Breath] : shortness of breath [A.M. Dry Mouth] : a.m. dry mouth [Edema] : ~T edema was present [CHF] : congestive heart failure [Obesity] : obesity [A.M. Headache] : headache present upon awakening [Arthralgias] : arthralgias [Anxious] : anxious [Nasal Congestion] : nasal congestion [Postnasal Drip] : postnasal drip [As Noted in HPI] : as noted in HPI [Cough] : cough [Negative] : Psychiatric [Fever] : no fever [Fatigue] : no fatigue [Recent Wt Gain (___ Lbs)] : no recent weight gain [Recent Wt Loss (___ Lbs)] : no recent weight loss [Eye Irritation] : no ~T irritation of the eyes [Epistaxis] : no nosebleeds [Sinus Problems] : no sinus problems [Sore Throat] : no sore throat [Dry Mouth] : no dry mouth [Sputum] : not coughing up ~M sputum [Dyspnea] : no dyspnea [Chest Tightness] : no chest tightness [Pleuritic Pain] : no pleuritic pain [Wheezing] : no wheezing [Heartburn] : no heartburn [Snoring] : no snoring [Witnessed Apneas] : no witnessed apnea [Chest Pain] : no chest pain [Palpitations] : no palpitations [Thyroid Disease] : no thyroid disease [Diabetes] : no diabetes  [Depression] : no depression [Difficulty Initiating Sleep] : no difficulty falling asleep [Lower Extremity Discomfort] : no lower extremity discomfort [Unusual Sleep Behavior] : no unusual sleep behavior [Cataplexy] :  no cataplexy [FreeTextEntry7] : blood in stool for 5 days

## 2019-01-09 NOTE — PHYSICAL EXAM
[Normal Appearance] : normal appearance [General Appearance - In No Acute Distress] : no acute distress [Normal Conjunctiva] : the conjunctiva exhibited no abnormalities [Nail Clubbing] : no clubbing of the fingernails [Cyanosis, Localized] : no localized cyanosis [No Focal Deficits] : no focal deficits [Oriented To Time, Place, And Person] : oriented to person, place, and time [Affect] : the affect was normal [Mood] : the mood was normal [Elongated Uvula] : elongated uvula [Enlarged Base of the Tongue] : enlargement of the base of the tongue [Respiration, Rhythm And Depth] : normal respiratory rhythm and effort [Involuntary Movements] : no involuntary movements were seen [Skin Color & Pigmentation] : normal skin color and pigmentation [General Appearance - Well Developed] : well developed [General Appearance - Well Nourished] : well nourished [Normal Oropharynx] : normal oropharynx [IV] : IV [Neck Appearance] : the appearance of the neck was normal [Neck Cervical Mass (___cm)] : no neck mass was observed [Heart Rate And Rhythm] : heart rate and rhythm were normal [Heart Sounds] : normal S1 and S2 [Murmurs] : no murmurs present [Arterial Pulses Normal] : the arterial pulses were normal [Edema] : no peripheral edema present [] : no respiratory distress [Exaggerated Use Of Accessory Muscles For Inspiration] : no accessory muscle use [Auscultation Breath Sounds / Voice Sounds] : lungs were clear to auscultation bilaterally [Abnormal Walk] : normal gait [Impaired Insight] : insight and judgment were intact [FreeTextEntry1] : Enlarged tonsils b/l [FreeTextEntry2] : no edema noted

## 2019-01-09 NOTE — ASSESSMENT
[FreeTextEntry1] : Pt is a 52M with PMHx combined CHF s/p AICD (last EF 52%), Afib (on Eliquis), obesity, asthma, chronic insomnia, severe PATTIE, and anxiety presenting to pulmonary clinic for follow-up visit. Pt doing well and is compliant with current medications and CPAP therapy. He c/o post-viral upper airway cough syndrome, which is also inhibiting him from his regular sleep schedule. \par -Recommend 0.9%NS nasal drops for irrigation followed by nasal Fluticasone BID x2 weeks\par -c/w current Advair therapy. Albuterol prn. \par -Cardiac medical management optimized. \par -c/w CPAP therapy @ [id number]. Pt to bring in his chip at next visit. Will need to re-contact [facility] for new supplies asap\par -RTC in 2 weeks if symptoms worsen or do not improve

## 2019-01-16 ENCOUNTER — NON-APPOINTMENT (OUTPATIENT)
Age: 54
End: 2019-01-16

## 2019-01-16 ENCOUNTER — APPOINTMENT (OUTPATIENT)
Dept: CARDIOLOGY | Facility: CLINIC | Age: 54
End: 2019-01-16
Payer: MEDICAID

## 2019-01-16 VITALS
HEART RATE: 83 BPM | HEIGHT: 72 IN | BODY MASS INDEX: 42.66 KG/M2 | WEIGHT: 315 LBS | RESPIRATION RATE: 15 BRPM | SYSTOLIC BLOOD PRESSURE: 144 MMHG | OXYGEN SATURATION: 95 % | DIASTOLIC BLOOD PRESSURE: 96 MMHG

## 2019-01-16 VITALS — DIASTOLIC BLOOD PRESSURE: 84 MMHG | HEART RATE: 76 BPM | SYSTOLIC BLOOD PRESSURE: 120 MMHG

## 2019-01-16 PROCEDURE — 93000 ELECTROCARDIOGRAM COMPLETE: CPT

## 2019-01-16 PROCEDURE — 99214 OFFICE O/P EST MOD 30 MIN: CPT

## 2019-01-16 NOTE — REASON FOR VISIT
[Consultation] : a consultation regarding [FreeTextEntry1] : This is a 53-year-old man presents for cardiac clearance for colonoscopy. The patient has a history of dilated cardiomyopathy and no significant coronary disease. He has been doing fairly well over the last several years. He has had no recent hospitalizations. He feels well at this time. He denies chest discomfort shortness of breath palpitations dizziness or syncope. His last echocardiogram in October of 2014 revealed mildly depressed left ventricular systolic function and an ejection fraction of 53%. He underwent AICD interrogation at [facility] on December 26, 2018 which revealed normal device function. The patient had had paroxysms of atrial fibrillation in the past. He is currently on anticoagulation with Eliquis which will be stopped 3 days prior to the procedure.

## 2019-01-16 NOTE — ASSESSMENT
[FreeTextEntry1] : In summary, the patient is a 53-year-old man scheduled for colonoscopy. There is no absolute contraindication to the procedure and the patient is thereby "cardiac cleared".\par \par I have advised him to hold his Lasix the day prior to the procedure and the day of the procedure to avoid overt dehydration.

## 2019-01-16 NOTE — PHYSICAL EXAM
[General Appearance - Well Developed] : well developed [Normal Appearance] : normal appearance [Well Groomed] : well groomed [General Appearance - Well Nourished] : well nourished [No Deformities] : no deformities [General Appearance - In No Acute Distress] : no acute distress [Normal Oral Mucosa] : normal oral mucosa [No Oral Pallor] : no oral pallor [No Oral Cyanosis] : no oral cyanosis [Normal Jugular Venous A Waves Present] : normal jugular venous A waves present [Normal Jugular Venous V Waves Present] : normal jugular venous V waves present [No Jugular Venous Blair A Waves] : no jugular venous blair A waves [Respiration, Rhythm And Depth] : normal respiratory rhythm and effort [Exaggerated Use Of Accessory Muscles For Inspiration] : no accessory muscle use [Auscultation Breath Sounds / Voice Sounds] : lungs were clear to auscultation bilaterally [Abdomen Soft] : soft [Abdomen Tenderness] : non-tender [Abdomen Mass (___ Cm)] : no abdominal mass palpated [Abnormal Walk] : normal gait [Gait - Sufficient For Exercise Testing] : the gait was sufficient for exercise testing [Nail Clubbing] : no clubbing of the fingernails [Cyanosis, Localized] : no localized cyanosis [Petechial Hemorrhages (___cm)] : no petechial hemorrhages [Skin Color & Pigmentation] : normal skin color and pigmentation [] : no rash [No Venous Stasis] : no venous stasis [Skin Lesions] : no skin lesions [No Skin Ulcers] : no skin ulcer [No Xanthoma] : no  xanthoma was observed [Oriented To Time, Place, And Person] : oriented to person, place, and time [Affect] : the affect was normal [Mood] : the mood was normal [No Anxiety] : not feeling anxious [Normal Rate] : normal [Normal S1] : normal S1 [Normal S2] : normal S2 [No Murmur] : no murmurs heard [2+] : left 2+ [No Abnormalities] : the abdominal aorta was not enlarged and no bruit was heard [No Pitting Edema] : no pitting edema present [S3] : no S3 [S4] : no S4 [Right Carotid Bruit] : no bruit heard over the right carotid [Left Carotid Bruit] : no bruit heard over the left carotid [Right Femoral Bruit] : no bruit heard over the right femoral artery [Left Femoral Bruit] : no bruit heard over the left femoral artery

## 2019-02-13 ENCOUNTER — APPOINTMENT (OUTPATIENT)
Dept: PULMONOLOGY | Facility: CLINIC | Age: 54
End: 2019-02-13

## 2019-02-16 PROBLEM — Z95.810 AICD (AUTOMATIC CARDIOVERTER/DEFIBRILLATOR) PRESENT: Status: RESOLVED | Noted: 2017-03-03 | Resolved: 2019-02-16

## 2019-03-15 ENCOUNTER — RX RENEWAL (OUTPATIENT)
Age: 54
End: 2019-03-15

## 2019-04-17 ENCOUNTER — APPOINTMENT (OUTPATIENT)
Dept: ELECTROPHYSIOLOGY | Facility: HOSPITAL | Age: 54
End: 2019-04-17
Payer: MEDICARE

## 2019-04-17 ENCOUNTER — APPOINTMENT (OUTPATIENT)
Dept: ELECTROPHYSIOLOGY | Facility: CLINIC | Age: 54
End: 2019-04-17

## 2019-04-17 PROCEDURE — 93295 DEV INTERROG REMOTE 1/2/MLT: CPT

## 2019-04-17 PROCEDURE — 93296 REM INTERROG EVL PM/IDS: CPT

## 2019-05-08 ENCOUNTER — RX RENEWAL (OUTPATIENT)
Age: 54
End: 2019-05-08

## 2019-05-16 ENCOUNTER — RX RENEWAL (OUTPATIENT)
Age: 54
End: 2019-05-16

## 2019-05-16 ENCOUNTER — NON-APPOINTMENT (OUTPATIENT)
Age: 54
End: 2019-05-16

## 2019-05-16 ENCOUNTER — APPOINTMENT (OUTPATIENT)
Dept: CARDIOLOGY | Facility: CLINIC | Age: 54
End: 2019-05-16
Payer: MEDICARE

## 2019-05-16 VITALS
HEART RATE: 94 BPM | OXYGEN SATURATION: 94 % | DIASTOLIC BLOOD PRESSURE: 83 MMHG | WEIGHT: 315 LBS | RESPIRATION RATE: 17 BRPM | HEIGHT: 73 IN | BODY MASS INDEX: 41.75 KG/M2 | SYSTOLIC BLOOD PRESSURE: 121 MMHG

## 2019-05-16 VITALS — SYSTOLIC BLOOD PRESSURE: 121 MMHG | HEART RATE: 72 BPM | DIASTOLIC BLOOD PRESSURE: 88 MMHG

## 2019-05-16 PROCEDURE — 99214 OFFICE O/P EST MOD 30 MIN: CPT

## 2019-05-16 PROCEDURE — 93000 ELECTROCARDIOGRAM COMPLETE: CPT

## 2019-05-16 NOTE — REASON FOR VISIT
[Follow-Up - Clinic] : a clinic follow-up of [Cardiomyopathy] : cardiomyopathy [Hypertension] : hypertension [FreeTextEntry1] : Patient returns for followup. Feeling well. Offers no complaints of chest discomfort shortness of breath palpitations dizziness or syncope.\par

## 2019-05-16 NOTE — ASSESSMENT
[FreeTextEntry1] : depression:\par 1 cardiomyopathy currently asymptomatic with improved ef\par 3. History of recurrent atrial fibrillation on Eliquis\par 4. Hypertension well controlled\[place] 6. very mild cad\par \par Plan:\par 1. continue current regimen.

## 2019-05-16 NOTE — PHYSICAL EXAM
[General Appearance - Well Developed] : well developed [Normal Appearance] : normal appearance [Well Groomed] : well groomed [General Appearance - Well Nourished] : well nourished [No Deformities] : no deformities [General Appearance - In No Acute Distress] : no acute distress [Normal Oral Mucosa] : normal oral mucosa [No Oral Pallor] : no oral pallor [No Oral Cyanosis] : no oral cyanosis [Normal Jugular Venous A Waves Present] : normal jugular venous A waves present [Normal Jugular Venous V Waves Present] : normal jugular venous V waves present [No Jugular Venous Blair A Waves] : no jugular venous blair A waves [Respiration, Rhythm And Depth] : normal respiratory rhythm and effort [Exaggerated Use Of Accessory Muscles For Inspiration] : no accessory muscle use [Auscultation Breath Sounds / Voice Sounds] : lungs were clear to auscultation bilaterally [Abdomen Soft] : soft [Abdomen Tenderness] : non-tender [Abdomen Mass (___ Cm)] : no abdominal mass palpated [Gait - Sufficient For Exercise Testing] : the gait was sufficient for exercise testing [Abnormal Walk] : normal gait [Nail Clubbing] : no clubbing of the fingernails [Cyanosis, Localized] : no localized cyanosis [Petechial Hemorrhages (___cm)] : no petechial hemorrhages [Skin Color & Pigmentation] : normal skin color and pigmentation [] : no rash [No Venous Stasis] : no venous stasis [Skin Lesions] : no skin lesions [No Skin Ulcers] : no skin ulcer [No Xanthoma] : no  xanthoma was observed [Oriented To Time, Place, And Person] : oriented to person, place, and time [Affect] : the affect was normal [Mood] : the mood was normal [No Anxiety] : not feeling anxious [Normal Rate] : normal [Premature Beats] : regular with premature beats [Normal S1] : normal S1 [Normal S2] : normal S2 [No Murmur] : no murmurs heard [S4] : no S4 [S3] : no S3 [Right Carotid Bruit] : no bruit heard over the right carotid [Left Carotid Bruit] : no bruit heard over the left carotid [Right Femoral Bruit] : no bruit heard over the right femoral artery [Left Femoral Bruit] : no bruit heard over the left femoral artery [2+] : left 2+ [No Pitting Edema] : no pitting edema present [No Abnormalities] : the abdominal aorta was not enlarged and no bruit was heard

## 2019-05-17 ENCOUNTER — APPOINTMENT (OUTPATIENT)
Dept: FAMILY MEDICINE | Facility: HOSPITAL | Age: 54
End: 2019-05-17

## 2019-05-17 ENCOUNTER — OUTPATIENT (OUTPATIENT)
Dept: OUTPATIENT SERVICES | Facility: HOSPITAL | Age: 54
LOS: 1 days | End: 2019-05-17
Payer: MEDICARE

## 2019-05-17 VITALS
TEMPERATURE: 98.6 F | HEART RATE: 70 BPM | OXYGEN SATURATION: 96 % | WEIGHT: 315 LBS | DIASTOLIC BLOOD PRESSURE: 118 MMHG | RESPIRATION RATE: 18 BRPM | BODY MASS INDEX: 45.65 KG/M2 | SYSTOLIC BLOOD PRESSURE: 153 MMHG

## 2019-05-17 VITALS — SYSTOLIC BLOOD PRESSURE: 145 MMHG | DIASTOLIC BLOOD PRESSURE: 90 MMHG

## 2019-05-17 DIAGNOSIS — Z90.89 ACQUIRED ABSENCE OF OTHER ORGANS: Chronic | ICD-10-CM

## 2019-05-17 DIAGNOSIS — K92.1 MELENA: ICD-10-CM

## 2019-05-17 DIAGNOSIS — Z00.00 ENCOUNTER FOR GENERAL ADULT MEDICAL EXAMINATION WITHOUT ABNORMAL FINDINGS: ICD-10-CM

## 2019-05-17 DIAGNOSIS — E66.01 MORBID (SEVERE) OBESITY DUE TO EXCESS CALORIES: ICD-10-CM

## 2019-05-17 PROCEDURE — 87389 HIV-1 AG W/HIV-1&-2 AB AG IA: CPT

## 2019-05-17 PROCEDURE — 80061 LIPID PANEL: CPT

## 2019-05-17 PROCEDURE — 83036 HEMOGLOBIN GLYCOSYLATED A1C: CPT

## 2019-05-17 PROCEDURE — G0463: CPT

## 2019-05-17 PROCEDURE — 80053 COMPREHEN METABOLIC PANEL: CPT

## 2019-05-17 RX ORDER — ALBUTEROL SULFATE 90 UG/1
108 (90 BASE) AEROSOL, METERED RESPIRATORY (INHALATION)
Qty: 1 | Refills: 3 | Status: DISCONTINUED | COMMUNITY
Start: 2017-06-07 | End: 2019-05-17

## 2019-05-17 RX ORDER — LORATADINE 10 MG/1
10 TABLET ORAL
Qty: 30 | Refills: 4 | Status: DISCONTINUED | COMMUNITY
Start: 2018-10-13 | End: 2019-05-17

## 2019-05-17 NOTE — COUNSELING
[Weight management counseling provided] : Weight management [Healthy eating counseling provided] : healthy eating [Activity counseling provided] : activity [Low Salt Diet] : Low salt diet [___ min/wk activity recommended] : [unfilled] min/wk activity recommended [Walking] : Walking [None] : None [Good understanding] : Patient has a good understanding of lifestyle changes and the steps needed to achieve self management goals [ - Annual Depression Screening] : Annual Depression Screening

## 2019-05-17 NOTE — HEALTH RISK ASSESSMENT
[Good] : ~his/her~  mood as  good [] : No [No falls in past year] : Patient reported no falls in the past year [0] : 2) Feeling down, depressed, or hopeless: Not at all (0) [[id number]] : 0 [None] : None [Fully functional (bathing, dressing, toileting, transferring, walking, feeding)] : Fully functional (bathing, dressing, toileting, transferring, walking, feeding) [Fully functional (using the telephone, shopping, preparing meals, housekeeping, doing laundry, using] : Fully functional and needs no help or supervision to perform IADLs (using the telephone, shopping, preparing meals, housekeeping, doing laundry, using transportation, managing medications and managing finances)

## 2019-05-17 NOTE — ASSESSMENT
[FreeTextEntry1] : #HM\par - CBC, CMP, HIV\par \par #Morbid Obesity\par - Diet and Exercise\par - A1C\par \par #HLD\par - Lipid panel\par - Cont Atorvastatin\par \par #BRBPR /colon CA screening\par - Resolved\par - Patient to follow up with GI ASAP\par - Risk and benefits discussed with patient \par \par #HTN\par - Elevated today, patient did not take Medications\par - 145/90 on repeat manual\par - Cont Coreg, hydralazine and Lisinopril\par \par #CHF\par - Cont Lasix, Coreg, ImDur\par \par #PATTIE\par - Cont CPAP at night \par \par RTC in 3 months for follow up\par \par \par

## 2019-05-17 NOTE — PHYSICAL EXAM
[No Acute Distress] : no acute distress [Well Nourished] : well nourished [Well Developed] : well developed [Well-Appearing] : well-appearing [Normal Sclera/Conjunctiva] : normal sclera/conjunctiva [EOMI] : extraocular movements intact [PERRL] : pupils equal round and reactive to light [Normal Outer Ear/Nose] : the outer ears and nose were normal in appearance [Normal Oropharynx] : the oropharynx was normal [No JVD] : no jugular venous distention [Supple] : supple [No Lymphadenopathy] : no lymphadenopathy [Thyroid Normal, No Nodules] : the thyroid was normal and there were no nodules present [No Respiratory Distress] : no respiratory distress  [Clear to Auscultation] : lungs were clear to auscultation bilaterally [No Accessory Muscle Use] : no accessory muscle use [Normal S1, S2] : normal S1 and S2 [No Murmur] : no murmur heard [No Carotid Bruits] : no carotid bruits [No Varicosities] : no varicosities [No Abdominal Bruit] : a ~M bruit was not heard ~T in the abdomen [Pedal Pulses Present] : the pedal pulses are present [No Extremity Clubbing/Cyanosis] : no extremity clubbing/cyanosis [No Palpable Aorta] : no palpable aorta [Soft] : abdomen soft [Non Tender] : non-tender [Non-distended] : non-distended [No Masses] : no abdominal mass palpated [No HSM] : no HSM [Normal Bowel Sounds] : normal bowel sounds [Normal Posterior Cervical Nodes] : no posterior cervical lymphadenopathy [Normal Anterior Cervical Nodes] : no anterior cervical lymphadenopathy [No CVA Tenderness] : no CVA  tenderness [No Joint Swelling] : no joint swelling [No Spinal Tenderness] : no spinal tenderness [Grossly Normal Strength/Tone] : grossly normal strength/tone [No Rash] : no rash [Normal Gait] : normal gait [Coordination Grossly Intact] : coordination grossly intact [No Focal Deficits] : no focal deficits [Deep Tendon Reflexes (DTR)] : deep tendon reflexes were 2+ and symmetric [Speech Grossly Normal] : speech grossly normal [Memory Grossly Normal] : memory grossly normal [Normal Affect] : the affect was normal [Alert and Oriented x3] : oriented to person, place, and time [Normal Mood] : the mood was normal [Normal Insight/Judgement] : insight and judgment were intact [de-identified] : Morbidly Obese [de-identified] : Irregularly Irregular rate and rhythm [de-identified] : +2 bilateral LE edema up to knees

## 2019-05-17 NOTE — HISTORY OF PRESENT ILLNESS
[de-identified] : 52 YO M with PMH of CHF (diastolic & systolic EF ~30%), s/p AICD, CAD, Pre-diabetes, PATTIE, HTN, A-fib on Eliquis, asthma, obesity presents for CPE.  Patient saw his cardiologist yesterday and does not have any complaints at this time.  He did not taking his medications this morning in anticipation of todays visit.  Reports 2 pillow orthopnea. Occasionally gets SOB after long walks.  BRBPR has resolved but he did not make a GI appointment due to having the flu.  Gets bilateral LE swelling which is improved by compression stockings.  Wears CPAP at night for PATTIE.  Feels palpitations every few days.  Denies chest pain, abdominal pain, headaches, visual changes, nausea, vomiting.

## 2019-05-20 DIAGNOSIS — E78.5 HYPERLIPIDEMIA, UNSPECIFIED: ICD-10-CM

## 2019-05-20 DIAGNOSIS — I48.91 UNSPECIFIED ATRIAL FIBRILLATION: ICD-10-CM

## 2019-05-20 DIAGNOSIS — E66.01 MORBID (SEVERE) OBESITY DUE TO EXCESS CALORIES: ICD-10-CM

## 2019-05-20 DIAGNOSIS — I50.42 CHRONIC COMBINED SYSTOLIC (CONGESTIVE) AND DIASTOLIC (CONGESTIVE) HEART FAILURE: ICD-10-CM

## 2019-05-20 LAB
ALBUMIN SERPL ELPH-MCNC: 4.3 G/DL
ALP BLD-CCNC: 78 U/L
ALT SERPL-CCNC: 59 U/L
ANION GAP SERPL CALC-SCNC: 12 MMOL/L
AST SERPL-CCNC: 40 U/L
BASOPHILS # BLD AUTO: 0.02 K/UL
BASOPHILS NFR BLD AUTO: 0.3 %
BILIRUB SERPL-MCNC: 0.2 MG/DL
BUN SERPL-MCNC: 15 MG/DL
CALCIUM SERPL-MCNC: 9 MG/DL
CHLORIDE SERPL-SCNC: 104 MMOL/L
CHOLEST SERPL-MCNC: 147 MG/DL
CHOLEST/HDLC SERPL: 3.2 RATIO
CO2 SERPL-SCNC: 25 MMOL/L
CREAT SERPL-MCNC: 1.11 MG/DL
EOSINOPHIL # BLD AUTO: 0.48 K/UL
EOSINOPHIL NFR BLD AUTO: 6.3 %
ESTIMATED AVERAGE GLUCOSE: 194 MG/DL
GLUCOSE SERPL-MCNC: 172 MG/DL
HBA1C MFR BLD HPLC: 8.4 %
HCT VFR BLD CALC: 41.4 %
HDLC SERPL-MCNC: 46 MG/DL
HGB BLD-MCNC: 13 G/DL
HIV1+2 AB SPEC QL IA.RAPID: NONREACTIVE
IMM GRANULOCYTES NFR BLD AUTO: 0.5 %
LDLC SERPL CALC-MCNC: 67 MG/DL
LYMPHOCYTES # BLD AUTO: 1.95 K/UL
LYMPHOCYTES NFR BLD AUTO: 25.5 %
MAN DIFF?: NORMAL
MCHC RBC-ENTMCNC: 28.6 PG
MCHC RBC-ENTMCNC: 31.4 GM/DL
MCV RBC AUTO: 91 FL
MONOCYTES # BLD AUTO: 1.03 K/UL
MONOCYTES NFR BLD AUTO: 13.4 %
NEUTROPHILS # BLD AUTO: 4.14 K/UL
NEUTROPHILS NFR BLD AUTO: 54 %
PLATELET # BLD AUTO: 200 K/UL
POTASSIUM SERPL-SCNC: 4.1 MMOL/L
PROT SERPL-MCNC: 7.4 G/DL
RBC # BLD: 4.55 M/UL
RBC # FLD: 14.1 %
SODIUM SERPL-SCNC: 141 MMOL/L
TRIGL SERPL-MCNC: 170 MG/DL
WBC # FLD AUTO: 7.66 K/UL

## 2019-06-05 ENCOUNTER — APPOINTMENT (OUTPATIENT)
Dept: PULMONOLOGY | Facility: CLINIC | Age: 54
End: 2019-06-05
Payer: MEDICARE

## 2019-06-05 VITALS
WEIGHT: 315 LBS | SYSTOLIC BLOOD PRESSURE: 120 MMHG | HEIGHT: 73 IN | BODY MASS INDEX: 41.75 KG/M2 | RESPIRATION RATE: 16 BRPM | HEART RATE: 89 BPM | DIASTOLIC BLOOD PRESSURE: 74 MMHG | TEMPERATURE: 97 F | OXYGEN SATURATION: 94 %

## 2019-06-05 PROCEDURE — 99213 OFFICE O/P EST LOW 20 MIN: CPT | Mod: GC

## 2019-06-05 NOTE — HISTORY OF PRESENT ILLNESS
[FreeTextEntry1] : 52 y/o male with PMH of CHF (last EF of 52%), Afib (on Eliquis), morbid obesity, mild asthma, chronic insomnia, severe PATTIE (AHI 63.2h during REM) and anxiety presenting for follow up. \par \par Chronic cough, acting up for the past 3 weeks. Cough usually dry, yellow mucus occasionally for the past 3 weeks. No fevers or chills. No body aches. Has some chest pain when he coughs. +Rhinorrhea, +congestion. No nausea or vomiting. \par \par Stopped using his inhalers 1 year ago. Doesn't feel any difference in his breathing. Has no SOB at rest, but PEREZ with incline and occasionally when walking on flat ground. Uses nasal spray. \par Uses a CPAP mask at a pressure of 12 cmH2O.

## 2019-06-05 NOTE — DISCUSSION/SUMMARY
[FreeTextEntry1] : 54 y/o male with PMH of CHF (last EF of 52%), Afib (on Eliquis), morbid obesity, mild asthma, chronic insomnia, severe PATTIE (AHI 63.2h during REM) and anxiety presenting for follow up. \par \par PATTIE: \par - Needs to bring the chip from the CPAP machine in to sleep clinic for evaluation. \par - F/u in sleep clinic\par \par Asthma/SOB\par No reversibility seen on PFTs, very mild obstruction. has been off inhalers for a year and does not feel any different. Can continue to hold off on inhalers. Suspect his morbid obesity and heart failure are the main contributors to his PEREZ\par - weight loss encouraged\par - medication compliance encouraged\par \par f/u with pulmonary in 6 months\par

## 2019-06-05 NOTE — PHYSICAL EXAM
[Normal Conjunctiva] : the conjunctiva exhibited no abnormalities [Heart Rate And Rhythm] : heart rate and rhythm were normal [Neck Appearance] : the appearance of the neck was normal [Heart Sounds] : normal S1 and S2 [Abdomen Soft] : soft [Respiration, Rhythm And Depth] : normal respiratory rhythm and effort [Nail Clubbing] : no clubbing of the fingernails [Abnormal Walk] : normal gait [Cyanosis, Localized] : no localized cyanosis [Non-Pitting] : non-pitting [] : no rash [No Focal Deficits] : no focal deficits [Oriented To Time, Place, And Person] : oriented to person, place, and time [Affect] : the affect was normal [FreeTextEntry1] : poor insight

## 2019-06-07 ENCOUNTER — MEDICATION RENEWAL (OUTPATIENT)
Age: 54
End: 2019-06-07

## 2019-06-15 ENCOUNTER — FORM ENCOUNTER (OUTPATIENT)
Age: 54
End: 2019-06-15

## 2019-06-18 ENCOUNTER — APPOINTMENT (OUTPATIENT)
Dept: FAMILY MEDICINE | Facility: CLINIC | Age: 54
End: 2019-06-18

## 2019-07-01 ENCOUNTER — FORM ENCOUNTER (OUTPATIENT)
Age: 54
End: 2019-07-01

## 2019-07-18 ENCOUNTER — APPOINTMENT (OUTPATIENT)
Dept: ELECTROPHYSIOLOGY | Facility: HOSPITAL | Age: 54
End: 2019-07-18
Payer: MEDICARE

## 2019-07-18 PROCEDURE — 93295 DEV INTERROG REMOTE 1/2/MLT: CPT

## 2019-07-18 PROCEDURE — 93296 REM INTERROG EVL PM/IDS: CPT

## 2019-08-07 ENCOUNTER — APPOINTMENT (OUTPATIENT)
Dept: FAMILY MEDICINE | Facility: HOSPITAL | Age: 54
End: 2019-08-07

## 2019-08-07 ENCOUNTER — RESULT CHARGE (OUTPATIENT)
Age: 54
End: 2019-08-07

## 2019-08-07 ENCOUNTER — OUTPATIENT (OUTPATIENT)
Dept: OUTPATIENT SERVICES | Facility: HOSPITAL | Age: 54
LOS: 1 days | End: 2019-08-07
Payer: MEDICARE

## 2019-08-07 VITALS
WEIGHT: 315 LBS | SYSTOLIC BLOOD PRESSURE: 132 MMHG | BODY MASS INDEX: 45.39 KG/M2 | TEMPERATURE: 99 F | HEART RATE: 69 BPM | DIASTOLIC BLOOD PRESSURE: 87 MMHG | OXYGEN SATURATION: 97 % | RESPIRATION RATE: 16 BRPM

## 2019-08-07 DIAGNOSIS — Z00.00 ENCOUNTER FOR GENERAL ADULT MEDICAL EXAMINATION WITHOUT ABNORMAL FINDINGS: ICD-10-CM

## 2019-08-07 DIAGNOSIS — Z90.89 ACQUIRED ABSENCE OF OTHER ORGANS: Chronic | ICD-10-CM

## 2019-08-07 DIAGNOSIS — I34.0 NONRHEUMATIC MITRAL (VALVE) INSUFFICIENCY: ICD-10-CM

## 2019-08-07 DIAGNOSIS — J30.9 ALLERGIC RHINITIS, UNSPECIFIED: ICD-10-CM

## 2019-08-07 LAB
ALBUMIN SERPL ELPH-MCNC: 4.4 G/DL
ALP BLD-CCNC: 77 U/L
ALT SERPL-CCNC: 34 U/L
ANION GAP SERPL CALC-SCNC: 13 MMOL/L
AST SERPL-CCNC: 26 U/L
BILIRUB SERPL-MCNC: 0.3 MG/DL
BUN SERPL-MCNC: 18 MG/DL
CALCIUM SERPL-MCNC: 9.1 MG/DL
CHLORIDE SERPL-SCNC: 104 MMOL/L
CO2 SERPL-SCNC: 22 MMOL/L
CREAT SERPL-MCNC: 1.03 MG/DL
GLUCOSE SERPL-MCNC: 231 MG/DL
HBA1C MFR BLD HPLC: 7.3
POTASSIUM SERPL-SCNC: 4.3 MMOL/L
PROT SERPL-MCNC: 7 G/DL
SODIUM SERPL-SCNC: 139 MMOL/L

## 2019-08-07 PROCEDURE — G0463: CPT

## 2019-08-07 PROCEDURE — 80053 COMPREHEN METABOLIC PANEL: CPT

## 2019-08-07 NOTE — ASSESSMENT
[FreeTextEntry1] : 53 year old male with PMHx of CHF (diastolic and systolic HF EF 52%), s/p AICD, DM, HTN, afib on eliquis, asthma, PATTIE on CPAP here for f/u.\par \par DM2\par - HbA1c 7.3 from 8.4\par - counseled on diet and exercise\par - metformin\par \par Afib\par - continue eliquis\par - continue carvedilol\par - holter monitor in place\par - f/u with cardiologist\par \par Allergic rhinitis\par  - continue xyzal and flonase\par \par Morbid obesity \par - counseled on diet and exercise\par \par HTN\par - continue hydralazine and coreg\par \par CHF\par - s/p AICD\par - continue lasix, imdur, entresto\par - low salt diet\par - monitor weight \par - f/u with cardiologist\par \par PATTIE\par - continue CPAP\par - f/u with pulmonologist\par \par Asthma\par - no recent exacerbation\par - albuterol inhaler PRN\par - f/u with pulmonologist\par \par f/u in 3 months\par D/w Dr. Trent\par

## 2019-08-07 NOTE — HISTORY OF PRESENT ILLNESS
[FreeTextEntry1] : F/u [de-identified] : 53 year old male with PMHx of CHF (diastolic and systolic HF EF 52%), s/p AICD, DM, HTN, afib on eliquis, asthma, PATTIE on CPAP here for f/u. States that he has had palpitations of late which is followed by his cardiologist. States that he has been going in and out of afib, currently has Holter monitor in place. Has a f/u appointment with cardiologist on 8/13. SOB is at baseline. Patient can't lay flat w/o getting SOB. Also endorses PEREZ when walking for long distances and in the hot weather. In addition c/o intermittent nasal congestion, rhinorrhea, sneezing and PND since the summer which is relieved with xyzal and flonase. Denies chest pain, LE edema, weight gain, fever, chills, nausea, vomiting.

## 2019-08-07 NOTE — REVIEW OF SYSTEMS
[Postnasal Drip] : postnasal drip [Itching] : itching [Nasal Discharge] : nasal discharge [Palpitations] : palpitations [Orthopena] : orthopnea [Shortness Of Breath] : shortness of breath [Dyspnea on Exertion] : dyspnea on exertion [Negative] : Constitutional [Chest Pain] : no chest pain [Lower Ext Edema] : no lower extremity edema [Paroysmal Nocturnal Dyspnea] : no paroysmal nocturnal dyspnea [Cough] : no cough [Abdominal Pain] : no abdominal pain [Nausea] : no nausea [Vomiting] : no vomiting

## 2019-08-07 NOTE — PHYSICAL EXAM
[No Acute Distress] : no acute distress [Normal Sclera/Conjunctiva] : normal sclera/conjunctiva [EOMI] : extraocular movements intact [No Accessory Muscle Use] : no accessory muscle use [No Respiratory Distress] : no respiratory distress  [Clear to Auscultation] : lungs were clear to auscultation bilaterally [Normal Rate] : normal rate  [Regular Rhythm] : with a regular rhythm [Normal S1, S2] : normal S1 and S2 [No Edema] : there was no peripheral edema [Soft] : abdomen soft [Non Tender] : non-tender [Normal Gait] : normal gait [Alert and Oriented x3] : oriented to person, place, and time [Normal Mood] : the mood was normal [de-identified] : Obese [de-identified] : holter monitor on chest wall  [de-identified] : + distention

## 2019-08-08 DIAGNOSIS — I34.0 NONRHEUMATIC MITRAL (VALVE) INSUFFICIENCY: ICD-10-CM

## 2019-08-08 DIAGNOSIS — I48.91 UNSPECIFIED ATRIAL FIBRILLATION: ICD-10-CM

## 2019-08-08 DIAGNOSIS — I50.42 CHRONIC COMBINED SYSTOLIC (CONGESTIVE) AND DIASTOLIC (CONGESTIVE) HEART FAILURE: ICD-10-CM

## 2019-08-08 DIAGNOSIS — E78.5 HYPERLIPIDEMIA, UNSPECIFIED: ICD-10-CM

## 2019-08-08 DIAGNOSIS — G47.33 OBSTRUCTIVE SLEEP APNEA (ADULT) (PEDIATRIC): ICD-10-CM

## 2019-08-08 DIAGNOSIS — I10 ESSENTIAL (PRIMARY) HYPERTENSION: ICD-10-CM

## 2019-08-08 DIAGNOSIS — J45.909 UNSPECIFIED ASTHMA, UNCOMPLICATED: ICD-10-CM

## 2019-08-08 DIAGNOSIS — E11.9 TYPE 2 DIABETES MELLITUS WITHOUT COMPLICATIONS: ICD-10-CM

## 2019-09-16 ENCOUNTER — TRANSCRIPTION ENCOUNTER (OUTPATIENT)
Age: 54
End: 2019-09-16

## 2019-09-19 ENCOUNTER — APPOINTMENT (OUTPATIENT)
Dept: CARDIOLOGY | Facility: CLINIC | Age: 54
End: 2019-09-19
Payer: MEDICARE

## 2019-09-19 ENCOUNTER — NON-APPOINTMENT (OUTPATIENT)
Age: 54
End: 2019-09-19

## 2019-09-19 VITALS — HEART RATE: 48 BPM | DIASTOLIC BLOOD PRESSURE: 80 MMHG | SYSTOLIC BLOOD PRESSURE: 110 MMHG

## 2019-09-19 VITALS
OXYGEN SATURATION: 98 % | HEART RATE: 63 BPM | WEIGHT: 315 LBS | BODY MASS INDEX: 41.75 KG/M2 | SYSTOLIC BLOOD PRESSURE: 125 MMHG | RESPIRATION RATE: 15 BRPM | HEIGHT: 73 IN | DIASTOLIC BLOOD PRESSURE: 83 MMHG

## 2019-09-19 PROCEDURE — 93000 ELECTROCARDIOGRAM COMPLETE: CPT

## 2019-09-19 PROCEDURE — 99214 OFFICE O/P EST MOD 30 MIN: CPT

## 2019-09-19 RX ORDER — CARVEDILOL 12.5 MG/1
12.5 TABLET, FILM COATED ORAL
Qty: 180 | Refills: 3 | Status: DISCONTINUED | COMMUNITY
Start: 2017-08-24 | End: 2019-09-19

## 2019-09-19 NOTE — PHYSICAL EXAM
[General Appearance - Well Developed] : well developed [Normal Appearance] : normal appearance [Well Groomed] : well groomed [General Appearance - Well Nourished] : well nourished [No Deformities] : no deformities [General Appearance - In No Acute Distress] : no acute distress [Normal Oral Mucosa] : normal oral mucosa [No Oral Pallor] : no oral pallor [No Oral Cyanosis] : no oral cyanosis [Normal Jugular Venous A Waves Present] : normal jugular venous A waves present [Normal Jugular Venous V Waves Present] : normal jugular venous V waves present [No Jugular Venous Blair A Waves] : no jugular venous blair A waves [Respiration, Rhythm And Depth] : normal respiratory rhythm and effort [Exaggerated Use Of Accessory Muscles For Inspiration] : no accessory muscle use [Auscultation Breath Sounds / Voice Sounds] : lungs were clear to auscultation bilaterally [Abdomen Soft] : soft [Abdomen Tenderness] : non-tender [Abdomen Mass (___ Cm)] : no abdominal mass palpated [Abnormal Walk] : normal gait [Gait - Sufficient For Exercise Testing] : the gait was sufficient for exercise testing [Nail Clubbing] : no clubbing of the fingernails [Cyanosis, Localized] : no localized cyanosis [Petechial Hemorrhages (___cm)] : no petechial hemorrhages [Skin Color & Pigmentation] : normal skin color and pigmentation [] : no rash [No Venous Stasis] : no venous stasis [Skin Lesions] : no skin lesions [No Skin Ulcers] : no skin ulcer [No Xanthoma] : no  xanthoma was observed [Oriented To Time, Place, And Person] : oriented to person, place, and time [Affect] : the affect was normal [Mood] : the mood was normal [No Anxiety] : not feeling anxious [Normal Rate] : normal [Premature Beats] : regular with premature beats [Normal S1] : normal S1 [Normal S2] : normal S2 [S3] : no S3 [S4] : no S4 [No Murmur] : no murmurs heard [Right Carotid Bruit] : no bruit heard over the right carotid [Left Carotid Bruit] : no bruit heard over the left carotid [Right Femoral Bruit] : no bruit heard over the right femoral artery [Left Femoral Bruit] : no bruit heard over the left femoral artery [2+] : left 2+ [No Abnormalities] : the abdominal aorta was not enlarged and no bruit was heard [No Pitting Edema] : no pitting edema present

## 2019-09-19 NOTE — ED PROVIDER NOTE - TEMPLATE, MLM
Please let patient know that I got results back from EMG. It shows a pinched nerve closer to the spine than the shoulder which I previously discussed with patient were both options. Because this test confirmed findings, I will order MRI to see if we should stick with just PT or engage in other consults or therapies (like neurosurgery to release the nerves) tell her not to worry too much as therapy can really help this problem and please get update on how its going so far   
Pt returned call. Discussed Dr. Bliss's note/recommendations and gave her Central Scheduling's # and let her know she also needs Creatinine level drawn within 30 days of MRI. Pt verbalized understanding and thanked me for call.  
This RN left non-detailed msg re: Dr. Bliss's note. Also entered Creatinine order \"pre-MRI\" for pt.  
Cardiac

## 2019-09-19 NOTE — REASON FOR VISIT
[Follow-Up - Clinic] : a clinic follow-up of [Atrial Fibrillation] : atrial fibrillation [Cardiomyopathy] : cardiomyopathy [FreeTextEntry1] : Patient returns for followup. Feeling well. Offers no complaints of chest discomfort shortness of breath palpitations dizziness or syncope.\par

## 2019-09-25 ENCOUNTER — APPOINTMENT (OUTPATIENT)
Dept: ELECTROPHYSIOLOGY | Facility: CLINIC | Age: 54
End: 2019-09-25
Payer: MEDICARE

## 2019-09-25 VITALS
OXYGEN SATURATION: 96 % | SYSTOLIC BLOOD PRESSURE: 136 MMHG | DIASTOLIC BLOOD PRESSURE: 87 MMHG | HEIGHT: 73 IN | HEART RATE: 79 BPM

## 2019-09-25 PROCEDURE — 93283 PRGRMG EVAL IMPLANTABLE DFB: CPT

## 2019-10-07 ENCOUNTER — RX RENEWAL (OUTPATIENT)
Age: 54
End: 2019-10-07

## 2019-10-18 ENCOUNTER — APPOINTMENT (OUTPATIENT)
Dept: ELECTROPHYSIOLOGY | Facility: CLINIC | Age: 54
End: 2019-10-18

## 2019-11-08 ENCOUNTER — MEDICATION RENEWAL (OUTPATIENT)
Age: 54
End: 2019-11-08

## 2019-12-11 ENCOUNTER — APPOINTMENT (OUTPATIENT)
Dept: PULMONOLOGY | Facility: CLINIC | Age: 54
End: 2019-12-11

## 2019-12-26 ENCOUNTER — APPOINTMENT (OUTPATIENT)
Dept: ELECTROPHYSIOLOGY | Facility: CLINIC | Age: 54
End: 2019-12-26
Payer: MEDICARE

## 2019-12-26 PROCEDURE — 93295 DEV INTERROG REMOTE 1/2/MLT: CPT

## 2019-12-26 PROCEDURE — 93296 REM INTERROG EVL PM/IDS: CPT

## 2020-01-16 ENCOUNTER — APPOINTMENT (OUTPATIENT)
Dept: CARDIOLOGY | Facility: CLINIC | Age: 55
End: 2020-01-16
Payer: MEDICARE

## 2020-01-16 ENCOUNTER — NON-APPOINTMENT (OUTPATIENT)
Age: 55
End: 2020-01-16

## 2020-01-16 VITALS
WEIGHT: 315 LBS | OXYGEN SATURATION: 97 % | HEIGHT: 73 IN | DIASTOLIC BLOOD PRESSURE: 80 MMHG | SYSTOLIC BLOOD PRESSURE: 121 MMHG | HEART RATE: 71 BPM | BODY MASS INDEX: 41.75 KG/M2 | RESPIRATION RATE: 16 BRPM

## 2020-01-16 VITALS — DIASTOLIC BLOOD PRESSURE: 80 MMHG | HEART RATE: 72 BPM | SYSTOLIC BLOOD PRESSURE: 120 MMHG

## 2020-01-16 PROCEDURE — 99214 OFFICE O/P EST MOD 30 MIN: CPT

## 2020-01-16 PROCEDURE — 93000 ELECTROCARDIOGRAM COMPLETE: CPT

## 2020-01-16 NOTE — REASON FOR VISIT
[Follow-Up - Clinic] : a clinic follow-up of [Cardiomyopathy] : cardiomyopathy [Atrial Fibrillation] : atrial fibrillation [FreeTextEntry1] : Patient returns for followup. Feeling well. Offers no complaints of chest discomfort shortness of breath palpitations dizziness or syncope.\par

## 2020-01-16 NOTE — PHYSICAL EXAM
[Normal Appearance] : normal appearance [General Appearance - Well Developed] : well developed [General Appearance - Well Nourished] : well nourished [Well Groomed] : well groomed [No Deformities] : no deformities [General Appearance - In No Acute Distress] : no acute distress [Normal Oral Mucosa] : normal oral mucosa [Normal Jugular Venous A Waves Present] : normal jugular venous A waves present [No Oral Cyanosis] : no oral cyanosis [No Oral Pallor] : no oral pallor [Normal Jugular Venous V Waves Present] : normal jugular venous V waves present [No Jugular Venous Blair A Waves] : no jugular venous blair A waves [Respiration, Rhythm And Depth] : normal respiratory rhythm and effort [Exaggerated Use Of Accessory Muscles For Inspiration] : no accessory muscle use [Abdomen Soft] : soft [Auscultation Breath Sounds / Voice Sounds] : lungs were clear to auscultation bilaterally [Abdomen Tenderness] : non-tender [Abdomen Mass (___ Cm)] : no abdominal mass palpated [Gait - Sufficient For Exercise Testing] : the gait was sufficient for exercise testing [Abnormal Walk] : normal gait [Cyanosis, Localized] : no localized cyanosis [Petechial Hemorrhages (___cm)] : no petechial hemorrhages [Nail Clubbing] : no clubbing of the fingernails [Skin Color & Pigmentation] : normal skin color and pigmentation [] : no rash [No Venous Stasis] : no venous stasis [No Skin Ulcers] : no skin ulcer [Skin Lesions] : no skin lesions [Oriented To Time, Place, And Person] : oriented to person, place, and time [No Xanthoma] : no  xanthoma was observed [No Anxiety] : not feeling anxious [Mood] : the mood was normal [Affect] : the affect was normal [Premature Beats] : regular with premature beats [Normal Rate] : normal [Normal S1] : normal S1 [Normal S2] : normal S2 [S3] : no S3 [S4] : no S4 [No Murmur] : no murmurs heard [Right Carotid Bruit] : no bruit heard over the right carotid [Left Carotid Bruit] : no bruit heard over the left carotid [Right Femoral Bruit] : no bruit heard over the right femoral artery [Left Femoral Bruit] : no bruit heard over the left femoral artery [No Abnormalities] : the abdominal aorta was not enlarged and no bruit was heard [2+] : right 2+ [No Pitting Edema] : no pitting edema present

## 2020-02-10 ENCOUNTER — APPOINTMENT (OUTPATIENT)
Dept: PULMONOLOGY | Facility: CLINIC | Age: 55
End: 2020-02-10
Payer: MEDICARE

## 2020-02-10 VITALS
HEART RATE: 72 BPM | WEIGHT: 235 LBS | SYSTOLIC BLOOD PRESSURE: 109 MMHG | HEIGHT: 61 IN | TEMPERATURE: 98.2 F | DIASTOLIC BLOOD PRESSURE: 70 MMHG | RESPIRATION RATE: 18 BRPM | OXYGEN SATURATION: 94 % | BODY MASS INDEX: 44.37 KG/M2

## 2020-02-10 PROCEDURE — 99214 OFFICE O/P EST MOD 30 MIN: CPT | Mod: GC

## 2020-02-13 NOTE — REVIEW OF SYSTEMS
[Fatigue] : fatigue [Nasal Congestion] : nasal congestion [Postnasal Drip] : postnasal drip [Shortness Of Breath] : shortness of breath [A.M. Dry Mouth] : a.m. dry mouth [Edema] : ~T edema was present [Obesity] : obesity [CHF] : congestive heart failure [Arthralgias] : arthralgias [Anxious] : anxious [Awakes Unrefreshed] : nonrestorative sleep [Unintentional Sleep while inactive] : unintentional sleep while inactive [Awakes With Dry Mouth] : awakes with dry mouth [EDS: ESS=____] : daytime somnolence: ESS=[unfilled] [Negative] : Gastrointestinal [Chest Pain] : no chest pain [Palpitations] : no palpitations [Thyroid Disease] : no thyroid disease [Diabetes] : no diabetes  [A.M. Headache] : no headache present upon awakening [Snoring] : no snoring [Witnessed Apneas] : demonstrated no ~M apnea [Depression] : no depression [Awakes With Headache] : awakes without a headache [Unintentional Sleep while active] : no unintentional sleep while active [Recent Wt Loss (___ Lbs)] : no recent weight loss [Recent Wt Gain (___ Lbs)] : no recent weight gain [Difficulty Initiating Sleep] : no difficulty falling asleep [Lower Extremity Discomfort] : no lower extremity discomfort [Unusual Sleep Behavior] : no unusual sleep behavior [Cataplexy] :  no cataplexy

## 2020-02-13 NOTE — ASSESSMENT
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 Therefore, will request his [employer] company[employer] to link the patient to our account to review objective efficacy of therapy and compliance.  \par -He was referred to Dr. Younger for weight management. The role of weight loss as it relates to PATTIE was explained to the patient. \par \par -Stimulus Control and Sleep Hygiene measures were reviewed with the patient. He was advised against napping in the evening and to remain active during the day as much as possible to ensure nocturnal sleep quality. \par \par The importance in treating his severe PATTIE for symptomatic relief, quality of life improvement, and to decrease medical risks, were stressed. Follow up in 1 year or sooner as needed.

## 2020-02-13 NOTE — HISTORY OF PRESENT ILLNESS
[WASO: ___] : Wake time after sleep onset is [unfilled] [Daytime Sleep: ___] : daytime sleep: [unfilled] [CPAP: ___ cmH2O] : CPAP: [unfilled] cmH2O [To Bed: ___] : ~he/she~ goes to bed at [unfilled] [Sleep Onset Latency: ___ minutes] : sleep onset latency of [unfilled] minutes reported [Arises: ___] : arises at [unfilled] [Nocturnal Awakenings: ___] : ~he/she~ typically has [unfilled] nocturnal awakenings [TST: ___] : Total sleep time is [unfilled] [Daytime Somnolence] : daytime somnolence [ESS: ___] : ESS score [unfilled] [FreeTextEntry1] : This is a 54 year old male with severe PATTIE on CPAP presenting for follow-up visit. \par \par COMORBID medical conditions include: CHF (2018 EF 53 %), HTN, Afib (on Eliquis), HLD, Mitral Regurgitation, s/p AICD, Obesity, Asthma, Anxiety, \par \par DX PSG (4/6/2017) showed an AHI of 30.9/hr overall and 63.2/h during REM sleep, with a T-90 of 53.6 %. Latest CPAP Titration (6/19/2018) shows optimal pressure of 8 cmH20. He uses CPAP on a nightly basis for the duration of his sleep with improvement in his sleep quality and resolution to snoring, witnessed apneas, nocturnal awakenings with gasping and choking sensations.. The pressure and full face mask are well tolerated. [facility] [place] regularly. \par \par In terms of his chronic insomnia, he uses melatonin 3mg nightly and states that this helps him a great deal. However, he reports difficulty initiating sleep since his uncle passed away 2-months ago. He plays video games in bed until he falls asleep. Despite being in bed for 12-hours (12 am to 12 pm) he is only sleeping 5-hours/night with associated daytime sleepiness primarily with inactivity. As advised by his cardiologist, patient does not drive due to the cardiac medications he is taking.

## 2020-02-13 NOTE — PHYSICAL EXAM
[Normal Appearance] : normal appearance [Normal Conjunctiva] : the conjunctiva exhibited no abnormalities [General Appearance - In No Acute Distress] : no acute distress [Elongated Uvula] : elongated uvula [Enlarged Base of the Tongue] : enlargement of the base of the tongue [IV] : IV [Neck Appearance] : the appearance of the neck was normal [Heart Rate And Rhythm] : heart rate was normal and rhythm regular [Heart Sounds] : normal S1 and S2 [] : no respiratory distress [Respiration, Rhythm And Depth] : normal respiratory rhythm and effort [Exaggerated Use Of Accessory Muscles For Inspiration] : no accessory muscle use [Auscultation Breath Sounds / Voice Sounds] : lungs were clear to auscultation bilaterally [Involuntary Movements] : no involuntary movements were seen [Nail Clubbing] : no clubbing of the fingernails [Cyanosis, Localized] : no localized cyanosis [Skin Color & Pigmentation] : normal skin color and pigmentation [No Focal Deficits] : no focal deficits [Affect] : the affect was normal [Oriented To Time, Place, And Person] : oriented to person, place, and time [Mood] : the mood was normal [FreeTextEntry2] : no edema noted

## 2020-03-25 ENCOUNTER — APPOINTMENT (OUTPATIENT)
Dept: ELECTROPHYSIOLOGY | Facility: CLINIC | Age: 55
End: 2020-03-25

## 2020-03-25 ENCOUNTER — APPOINTMENT (OUTPATIENT)
Dept: ELECTROPHYSIOLOGY | Facility: CLINIC | Age: 55
End: 2020-03-25
Payer: MEDICARE

## 2020-03-25 PROCEDURE — 93295 DEV INTERROG REMOTE 1/2/MLT: CPT

## 2020-03-25 PROCEDURE — 93296 REM INTERROG EVL PM/IDS: CPT

## 2020-05-11 ENCOUNTER — APPOINTMENT (OUTPATIENT)
Dept: PULMONOLOGY | Facility: CLINIC | Age: 55
End: 2020-05-11

## 2020-05-12 ENCOUNTER — APPOINTMENT (OUTPATIENT)
Dept: PULMONOLOGY | Facility: CLINIC | Age: 55
End: 2020-05-12

## 2020-06-24 ENCOUNTER — NON-APPOINTMENT (OUTPATIENT)
Age: 55
End: 2020-06-24

## 2020-06-24 ENCOUNTER — APPOINTMENT (OUTPATIENT)
Dept: CARDIOLOGY | Facility: CLINIC | Age: 55
End: 2020-06-24
Payer: MEDICARE

## 2020-06-24 VITALS
TEMPERATURE: 98.5 F | DIASTOLIC BLOOD PRESSURE: 77 MMHG | BODY MASS INDEX: 59.47 KG/M2 | RESPIRATION RATE: 16 BRPM | OXYGEN SATURATION: 94 % | WEIGHT: 315 LBS | HEART RATE: 55 BPM | HEIGHT: 61 IN | SYSTOLIC BLOOD PRESSURE: 124 MMHG

## 2020-06-24 VITALS — HEART RATE: 84 BPM | DIASTOLIC BLOOD PRESSURE: 82 MMHG | SYSTOLIC BLOOD PRESSURE: 120 MMHG

## 2020-06-24 PROCEDURE — 93306 TTE W/DOPPLER COMPLETE: CPT

## 2020-06-24 PROCEDURE — 93000 ELECTROCARDIOGRAM COMPLETE: CPT

## 2020-06-24 PROCEDURE — 99214 OFFICE O/P EST MOD 30 MIN: CPT

## 2020-06-24 NOTE — REASON FOR VISIT
[Follow-Up - Clinic] : a clinic follow-up of [Atrial Fibrillation] : atrial fibrillation [Heart Failure] : congestive heart failure [FreeTextEntry1] : Patient returns for followup. Feeling well. Offers no complaints of chest discomfort shortness of breath palpitations dizziness or syncope.He underwent echocardiography today which revealed normally function and no evidence of significant valvular stenosis or insufficiency\par

## 2020-06-24 NOTE — ASSESSMENT
[FreeTextEntry1] : Impression\par 1 cardiomyopathy currently asymptomatic with improved ef\par 3. History of recurrent atrial fibrillation on Eliquis\par 4. Hypertension well controlled\[place] 6. very mild cad\par \par Plan:\par 1. continue current regimen.

## 2020-06-24 NOTE — PHYSICAL EXAM
[General Appearance - Well Developed] : well developed [General Appearance - Well Nourished] : well nourished [Well Groomed] : well groomed [Normal Appearance] : normal appearance [No Deformities] : no deformities [General Appearance - In No Acute Distress] : no acute distress [Normal Oral Mucosa] : normal oral mucosa [No Oral Pallor] : no oral pallor [No Oral Cyanosis] : no oral cyanosis [Normal Jugular Venous V Waves Present] : normal jugular venous V waves present [Normal Jugular Venous A Waves Present] : normal jugular venous A waves present [Respiration, Rhythm And Depth] : normal respiratory rhythm and effort [No Jugular Venous Blair A Waves] : no jugular venous blair A waves [Auscultation Breath Sounds / Voice Sounds] : lungs were clear to auscultation bilaterally [Exaggerated Use Of Accessory Muscles For Inspiration] : no accessory muscle use [Abdomen Soft] : soft [Abdomen Tenderness] : non-tender [Abdomen Mass (___ Cm)] : no abdominal mass palpated [Abnormal Walk] : normal gait [Gait - Sufficient For Exercise Testing] : the gait was sufficient for exercise testing [Cyanosis, Localized] : no localized cyanosis [Nail Clubbing] : no clubbing of the fingernails [Petechial Hemorrhages (___cm)] : no petechial hemorrhages [Skin Color & Pigmentation] : normal skin color and pigmentation [] : no ischemic changes [Skin Lesions] : no skin lesions [No Venous Stasis] : no venous stasis [No Skin Ulcers] : no skin ulcer [No Xanthoma] : no  xanthoma was observed [Oriented To Time, Place, And Person] : oriented to person, place, and time [Affect] : the affect was normal [Mood] : the mood was normal [Normal Rate] : normal [Premature Beats] : regular with premature beats [No Anxiety] : not feeling anxious [Normal S2] : normal S2 [Normal S1] : normal S1 [No Murmur] : no murmurs heard [S3] : no S3 [S4] : no S4 [Right Carotid Bruit] : no bruit heard over the right carotid [Left Carotid Bruit] : no bruit heard over the left carotid [Right Femoral Bruit] : no bruit heard over the right femoral artery [Left Femoral Bruit] : no bruit heard over the left femoral artery [2+] : left 2+ [No Pitting Edema] : no pitting edema present [No Abnormalities] : the abdominal aorta was not enlarged and no bruit was heard

## 2020-06-25 ENCOUNTER — APPOINTMENT (OUTPATIENT)
Dept: ELECTROPHYSIOLOGY | Facility: CLINIC | Age: 55
End: 2020-06-25
Payer: MEDICARE

## 2020-06-25 PROCEDURE — 93295 DEV INTERROG REMOTE 1/2/MLT: CPT

## 2020-06-25 PROCEDURE — 93296 REM INTERROG EVL PM/IDS: CPT

## 2020-09-29 ENCOUNTER — APPOINTMENT (OUTPATIENT)
Dept: ELECTROPHYSIOLOGY | Facility: CLINIC | Age: 55
End: 2020-09-29
Payer: MEDICARE

## 2020-09-29 ENCOUNTER — APPOINTMENT (OUTPATIENT)
Dept: ELECTROPHYSIOLOGY | Facility: CLINIC | Age: 55
End: 2020-09-29

## 2020-09-29 PROCEDURE — 93295 DEV INTERROG REMOTE 1/2/MLT: CPT

## 2020-09-29 PROCEDURE — 93296 REM INTERROG EVL PM/IDS: CPT

## 2020-10-20 NOTE — PROGRESS NOTE ADULT - PROBLEM SELECTOR PROBLEM 1
Discussed the importance of blood pressure control in the prevention of ocular complications.
Chest pain
Chest pain

## 2020-11-30 ENCOUNTER — RX RENEWAL (OUTPATIENT)
Age: 55
End: 2020-11-30

## 2020-12-02 ENCOUNTER — RX RENEWAL (OUTPATIENT)
Age: 55
End: 2020-12-02

## 2020-12-09 ENCOUNTER — APPOINTMENT (OUTPATIENT)
Dept: PULMONOLOGY | Facility: CLINIC | Age: 55
End: 2020-12-09
Payer: MEDICARE

## 2020-12-09 VITALS
OXYGEN SATURATION: 94 % | HEIGHT: 61 IN | RESPIRATION RATE: 15 BRPM | HEART RATE: 85 BPM | TEMPERATURE: 98.1 F | DIASTOLIC BLOOD PRESSURE: 84 MMHG | SYSTOLIC BLOOD PRESSURE: 143 MMHG

## 2020-12-09 PROCEDURE — 99213 OFFICE O/P EST LOW 20 MIN: CPT

## 2020-12-09 PROCEDURE — 99072 ADDL SUPL MATRL&STAF TM PHE: CPT

## 2020-12-09 NOTE — ASSESSMENT
[FreeTextEntry1] : 52 y/o male with PMH of CHF (last EF of 52%), Afib (on Eliquis), morbid obesity, mild asthma, chronic insomnia, severe PATTIE (AHI 63.2h during REM) and anxiety presenting for follow up. Will speak with Dr. Licona regarding any sleep aides that she may deem appropriate and get back in touch with patient.

## 2020-12-09 NOTE — HISTORY OF PRESENT ILLNESS
[FreeTextEntry1] : 52 y/o male with PMH of CHF (last EF of 52%), Afib (on Eliquis), morbid obesity, mild asthma, chronic insomnia, severe PATTIE (AHI 63.2h during REM) and anxiety presenting for follow up. \par \par He has been having a very difficult time falling to sleep.  He will usually be able to fall to sleep around 6:30 AM and then will wake up around 2 PM.  He tries to go to sleep at 12:30AM but he states his mind will race and he is wide awake.  He does use the CPAP nightly despite being awake most of this time.  \par Uses a CPAP mask at a pressure of 12 cmH2O.

## 2020-12-10 ENCOUNTER — NON-APPOINTMENT (OUTPATIENT)
Age: 55
End: 2020-12-10

## 2020-12-10 ENCOUNTER — APPOINTMENT (OUTPATIENT)
Dept: CARDIOLOGY | Facility: CLINIC | Age: 55
End: 2020-12-10
Payer: MEDICARE

## 2020-12-10 VITALS — SYSTOLIC BLOOD PRESSURE: 120 MMHG | HEART RATE: 72 BPM | DIASTOLIC BLOOD PRESSURE: 80 MMHG

## 2020-12-10 VITALS
BODY MASS INDEX: 59.47 KG/M2 | TEMPERATURE: 98.1 F | DIASTOLIC BLOOD PRESSURE: 75 MMHG | SYSTOLIC BLOOD PRESSURE: 124 MMHG | WEIGHT: 315 LBS | HEIGHT: 61 IN | HEART RATE: 68 BPM | RESPIRATION RATE: 15 BRPM | OXYGEN SATURATION: 96 %

## 2020-12-10 PROCEDURE — 93000 ELECTROCARDIOGRAM COMPLETE: CPT

## 2020-12-10 PROCEDURE — 99214 OFFICE O/P EST MOD 30 MIN: CPT

## 2020-12-10 PROCEDURE — 99072 ADDL SUPL MATRL&STAF TM PHE: CPT

## 2020-12-29 ENCOUNTER — OUTPATIENT (OUTPATIENT)
Dept: OUTPATIENT SERVICES | Facility: HOSPITAL | Age: 55
LOS: 1 days | End: 2020-12-29
Payer: MEDICARE

## 2020-12-29 ENCOUNTER — APPOINTMENT (OUTPATIENT)
Dept: FAMILY MEDICINE | Facility: HOSPITAL | Age: 55
End: 2020-12-29

## 2020-12-29 ENCOUNTER — RESULT CHARGE (OUTPATIENT)
Age: 55
End: 2020-12-29

## 2020-12-29 ENCOUNTER — APPOINTMENT (OUTPATIENT)
Dept: ELECTROPHYSIOLOGY | Facility: CLINIC | Age: 55
End: 2020-12-29
Payer: MEDICARE

## 2020-12-29 VITALS
SYSTOLIC BLOOD PRESSURE: 128 MMHG | BODY MASS INDEX: 64.62 KG/M2 | RESPIRATION RATE: 15 BRPM | DIASTOLIC BLOOD PRESSURE: 87 MMHG | WEIGHT: 315 LBS

## 2020-12-29 VITALS — OXYGEN SATURATION: 97 % | HEART RATE: 73 BPM | TEMPERATURE: 98 F

## 2020-12-29 DIAGNOSIS — F51.04 PSYCHOPHYSIOLOGIC INSOMNIA: ICD-10-CM

## 2020-12-29 DIAGNOSIS — Z90.89 ACQUIRED ABSENCE OF OTHER ORGANS: Chronic | ICD-10-CM

## 2020-12-29 DIAGNOSIS — Z00.00 ENCOUNTER FOR GENERAL ADULT MEDICAL EXAMINATION WITHOUT ABNORMAL FINDINGS: ICD-10-CM

## 2020-12-29 PROCEDURE — 93296 REM INTERROG EVL PM/IDS: CPT

## 2020-12-29 PROCEDURE — 93295 DEV INTERROG REMOTE 1/2/MLT: CPT

## 2020-12-29 RX ORDER — GLUCOSAMINE HCL/CHONDROITIN SU 500-400 MG
3 CAPSULE ORAL AT BEDTIME
Qty: 30 | Refills: 0 | Status: DISCONTINUED | COMMUNITY
Start: 2018-03-08 | End: 2020-12-29

## 2020-12-29 RX ORDER — ALBUTEROL SULFATE 90 UG/1
108 (90 BASE) AEROSOL, METERED RESPIRATORY (INHALATION)
Qty: 1 | Refills: 0 | Status: DISCONTINUED | COMMUNITY
Start: 2019-08-07 | End: 2020-12-29

## 2020-12-29 NOTE — PHYSICAL EXAM
[No Acute Distress] : no acute distress [Normal Sclera/Conjunctiva] : normal sclera/conjunctiva [EOMI] : extraocular movements intact [No Respiratory Distress] : no respiratory distress  [No Accessory Muscle Use] : no accessory muscle use [Clear to Auscultation] : lungs were clear to auscultation bilaterally [Normal Rate] : normal rate  [Regular Rhythm] : with a regular rhythm [Normal S1, S2] : normal S1 and S2 [No Edema] : there was no peripheral edema [Soft] : abdomen soft [Non Tender] : non-tender [Grossly Normal Strength/Tone] : grossly normal strength/tone [Normal Gait] : normal gait [Alert and Oriented x3] : oriented to person, place, and time [Normal Mood] : the mood was normal [None] : no ulcers in either foot were found [] : with full ROM [de-identified] : Obese [de-identified] : + distention [TWNoteComboBox3] : +1 [TWNoteComboBox4] : +1

## 2020-12-29 NOTE — HISTORY OF PRESENT ILLNESS
[FreeTextEntry1] : CPE [de-identified] : 55 year old male with PMHx of CHF(diastolic and systolic HF EF 55%), s/p AICD, DM, HTN, afib on eliquis, asthma, PATTIE on CPAP here for CPE. No acute complaints or concerns. Pt is taking his medications as prescribed. Denies chest pain, LE edema, orthopnea, PND, palpitations, weight gain, fever, chills, nausea, vomiting.

## 2020-12-29 NOTE — REVIEW OF SYSTEMS
[Negative] : Neurological [Lower Ext Edema] : no lower extremity edema [Abdominal Pain] : no abdominal pain [Nausea] : no nausea [Vomiting] : no vomiting

## 2020-12-29 NOTE — ASSESSMENT
[FreeTextEntry1] : 53 year old male with PMHx of CHF (diastolic and systolic HF EF 52%), s/p AICD, DM, HTN, afib on eliquis, asthma, PATTIE on CPAP here for f/u.\par \par DM2\par - controlled \par -  A1c 6.6\par - continue metformin\par - low carb diet\par - continue statin \par - urine microalbumin/creatinine \par - opthalmology referral given \par - podiatry referral given \par \par HTN\par - controlled \par - continue hydralazine and carvedilol \par - low salt diet\par - weight loss \par \par Morbid obesity \par - lifestyle modifications discussed\par - weight check in 3 months \par \par Afib\par - continue eliquis\par - continue carvedilol \par - continue to follow cardiology \par \par CHF\par - s/p AICD\par - continue lasix, isosorbide dinitrate and carvedilol \par - low salt diet\par - monitor weight \par - continue to follow cardiologist\par \par Asthma\par - no recent exacerbation\par - albuterol inhaler PRN\par - continue singulair \par - continue to follow pulmonologist\par \par PATTIE\par - continue CPAP\par - continue to follow pulmonologist\par \par Healthcare maintenance \par - influenza UTD\par - FIT kit given \par - labs \par \par F/u in 3 months\par D/w Dr. Farias\par

## 2020-12-31 DIAGNOSIS — Z12.11 ENCOUNTER FOR SCREENING FOR MALIGNANT NEOPLASM OF COLON: ICD-10-CM

## 2020-12-31 DIAGNOSIS — E66.01 MORBID (SEVERE) OBESITY DUE TO EXCESS CALORIES: ICD-10-CM

## 2020-12-31 DIAGNOSIS — E11.9 TYPE 2 DIABETES MELLITUS WITHOUT COMPLICATIONS: ICD-10-CM

## 2020-12-31 DIAGNOSIS — E78.5 HYPERLIPIDEMIA, UNSPECIFIED: ICD-10-CM

## 2020-12-31 DIAGNOSIS — I10 ESSENTIAL (PRIMARY) HYPERTENSION: ICD-10-CM

## 2021-01-06 PROCEDURE — 87389 HIV-1 AG W/HIV-1&-2 AB AG IA: CPT

## 2021-01-06 PROCEDURE — G0463: CPT

## 2021-01-06 PROCEDURE — 85025 COMPLETE CBC W/AUTO DIFF WBC: CPT

## 2021-01-06 PROCEDURE — 82043 UR ALBUMIN QUANTITATIVE: CPT

## 2021-01-06 PROCEDURE — 82274 ASSAY TEST FOR BLOOD FECAL: CPT

## 2021-01-06 PROCEDURE — 80061 LIPID PANEL: CPT

## 2021-01-06 PROCEDURE — 80053 COMPREHEN METABOLIC PANEL: CPT

## 2021-01-07 ENCOUNTER — NON-APPOINTMENT (OUTPATIENT)
Age: 56
End: 2021-01-07

## 2021-01-07 LAB — HEMOCCULT STL QL IA: POSITIVE

## 2021-01-09 LAB
ALBUMIN SERPL ELPH-MCNC: 4.6 G/DL
ALP BLD-CCNC: 78 U/L
ALT SERPL-CCNC: 28 U/L
ANION GAP SERPL CALC-SCNC: 12 MMOL/L
AST SERPL-CCNC: 22 U/L
BASOPHILS # BLD AUTO: 0.03 K/UL
BASOPHILS NFR BLD AUTO: 0.4 %
BILIRUB SERPL-MCNC: 0.3 MG/DL
BUN SERPL-MCNC: 11 MG/DL
CALCIUM SERPL-MCNC: 8.6 MG/DL
CHLORIDE SERPL-SCNC: 103 MMOL/L
CHOLEST SERPL-MCNC: 125 MG/DL
CO2 SERPL-SCNC: 25 MMOL/L
CREAT SERPL-MCNC: 1.07 MG/DL
CREAT SPEC-SCNC: 200 MG/DL
EOSINOPHIL # BLD AUTO: 0.33 K/UL
EOSINOPHIL NFR BLD AUTO: 4.7 %
GLUCOSE SERPL-MCNC: 114 MG/DL
HBA1C MFR BLD HPLC: 6.6
HCT VFR BLD CALC: 42.1 %
HDLC SERPL-MCNC: 41 MG/DL
HGB BLD-MCNC: 12.9 G/DL
HIV1+2 AB SPEC QL IA.RAPID: NONREACTIVE
IMM GRANULOCYTES NFR BLD AUTO: 0.3 %
LDLC SERPL CALC-MCNC: 56 MG/DL
LYMPHOCYTES # BLD AUTO: 1.7 K/UL
LYMPHOCYTES NFR BLD AUTO: 24.1 %
MAN DIFF?: NORMAL
MCHC RBC-ENTMCNC: 28.4 PG
MCHC RBC-ENTMCNC: 30.6 GM/DL
MCV RBC AUTO: 92.5 FL
MICROALBUMIN 24H UR DL<=1MG/L-MCNC: 3.8 MG/DL
MICROALBUMIN/CREAT 24H UR-RTO: 19 MG/G
MONOCYTES # BLD AUTO: 0.99 K/UL
MONOCYTES NFR BLD AUTO: 14 %
NEUTROPHILS # BLD AUTO: 3.99 K/UL
NEUTROPHILS NFR BLD AUTO: 56.5 %
NONHDLC SERPL-MCNC: 84 MG/DL
PLATELET # BLD AUTO: 251 K/UL
POTASSIUM SERPL-SCNC: 4.3 MMOL/L
PROT SERPL-MCNC: 7.5 G/DL
RBC # BLD: 4.55 M/UL
RBC # FLD: 14.2 %
SODIUM SERPL-SCNC: 140 MMOL/L
TRIGL SERPL-MCNC: 136 MG/DL
WBC # FLD AUTO: 7.06 K/UL

## 2021-03-16 ENCOUNTER — APPOINTMENT (OUTPATIENT)
Dept: PULMONOLOGY | Facility: CLINIC | Age: 56
End: 2021-03-16
Payer: MEDICARE

## 2021-03-16 VITALS
TEMPERATURE: 97.2 F | RESPIRATION RATE: 15 BRPM | OXYGEN SATURATION: 94 % | DIASTOLIC BLOOD PRESSURE: 78 MMHG | SYSTOLIC BLOOD PRESSURE: 137 MMHG | HEART RATE: 100 BPM

## 2021-03-16 PROCEDURE — 99072 ADDL SUPL MATRL&STAF TM PHE: CPT

## 2021-03-16 PROCEDURE — 99213 OFFICE O/P EST LOW 20 MIN: CPT

## 2021-03-16 NOTE — PHYSICAL EXAM
[No Acute Distress] : no acute distress [Normal Rate/Rhythm] : normal rate/rhythm [Normal S1, S2] : normal s1, s2 [No Murmurs] : no murmurs [No Resp Distress] : no resp distress [Clear to Auscultation Bilaterally] : clear to auscultation bilaterally [No Clubbing] : no clubbing [1+ Pitting] : 1+ pitting [No Focal Deficits] : no focal deficits [Oriented x3] : oriented x3 [Normal Affect] : normal affect

## 2021-03-16 NOTE — HISTORY OF PRESENT ILLNESS
[FreeTextEntry1] : 52 y/o male with PMH of CHF (last EF of 52%), Afib (on Eliquis), morbid obesity, mild asthma, chronic insomnia, severe PATTIE (AHI 63.2h during REM) and anxiety presenting for follow up. \par \par He has been having a very difficult time falling to sleep.  He has been using his CPAP nightly and brought his chip today, which shows excellent compliance.  \par He denies any significant dyspnea, cough, wheezing, or chest discomfort.  Not currently on any respiratory inhalers.

## 2021-03-16 NOTE — ASSESSMENT
[FreeTextEntry1] : 54 y/o male with PMH of CHF (last EF of 52%), Afib (on Eliquis), morbid obesity, mild asthma, chronic insomnia, severe PATTIE (AHI 63.2h during REM) and anxiety presenting for follow up. Encouraged pt to follow up with sleep medicine.  He does not currently have any respiratory issues.  \par Chip shows compliance

## 2021-03-23 ENCOUNTER — APPOINTMENT (OUTPATIENT)
Dept: PULMONOLOGY | Facility: CLINIC | Age: 56
End: 2021-03-23
Payer: MEDICARE

## 2021-03-23 VITALS
SYSTOLIC BLOOD PRESSURE: 136 MMHG | WEIGHT: 240 LBS | BODY MASS INDEX: 45.31 KG/M2 | HEIGHT: 61 IN | TEMPERATURE: 97 F | OXYGEN SATURATION: 95 % | RESPIRATION RATE: 15 BRPM | DIASTOLIC BLOOD PRESSURE: 80 MMHG | HEART RATE: 117 BPM

## 2021-03-23 PROCEDURE — 99072 ADDL SUPL MATRL&STAF TM PHE: CPT

## 2021-03-23 PROCEDURE — 99215 OFFICE O/P EST HI 40 MIN: CPT

## 2021-03-23 NOTE — HISTORY OF PRESENT ILLNESS
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 Sleep disordered breathing events were associated with severe oxygen desaturations. He has been on CPAP at 12 cm H20 since then with improvement in sleep quality, daytime sleepiness and snoring. \par He has also gained weight in the past several months, is less active. \par \par Data from machine was reviewed and is as follows:\par DME -- [facility]\par Device -- Dreamstation CPAP pro\par Percent days with device usage in last 30 days -- 100%\par Average usage (days used) -- 9h 43min\par Average treatment related SAMIA -- 5.6/h\par Mask leakage -- none\par Pressure -- 12 cm H20\par \par He also has delayed sleep phase syndrome -- sleeps from 5am to 12:30pm and wishes to advance his schedule so that he can be up for most of the day.

## 2021-03-23 NOTE — PHYSICAL EXAM
[General Appearance - In No Acute Distress] : no acute distress [Normal Conjunctiva] : the conjunctiva exhibited no abnormalities [IV] : IV [Neck Appearance] : the appearance of the neck was normal [] : the neck was supple [Heart Rate And Rhythm] : heart rate was normal and rhythm regular [Heart Sounds] : normal S1 and S2 [Respiration, Rhythm And Depth] : normal respiratory rhythm and effort [Auscultation Breath Sounds / Voice Sounds] : lungs were clear to auscultation bilaterally [Abnormal Walk] : normal gait [Involuntary Movements] : no involuntary movements were seen [Nail Clubbing] : no clubbing of the fingernails [Non-Pitting] : non-pitting [Skin Color & Pigmentation] : normal skin color and pigmentation [No Focal Deficits] : no focal deficits [Oriented To Time, Place, And Person] : oriented to person, place, and time [Affect] : the affect was normal [Normal Oropharynx] : abnormal oropharynx

## 2021-03-23 NOTE — REVIEW OF SYSTEMS
[Fatigue] : fatigue [Recent Wt Gain (___ Lbs)] : recent [unfilled] ~Ulb weight gain [Nasal Congestion] : nasal congestion [Postnasal Drip] : postnasal drip [Shortness Of Breath] : shortness of breath [A.M. Dry Mouth] : a.m. dry mouth [Edema] : ~T edema was present [CHF] : congestive heart failure [Obesity] : obesity [A.M. Headache] : headache present upon awakening [Arthralgias] : arthralgias [Anxious] : anxious [Negative] : Genitourinary [Snoring] : no snoring [Witnessed Apneas] : no witnessed apnea [Chest Pain] : no chest pain [Palpitations] : no palpitations [Thyroid Disease] : no thyroid disease [Diabetes] : no diabetes  [Depression] : no depression [Difficulty Initiating Sleep] : no difficulty falling asleep [Lower Extremity Discomfort] : no lower extremity discomfort [Unusual Sleep Behavior] : no unusual sleep behavior [Cataplexy] :  no cataplexy [FreeTextEntry7] : blood in stool for 5 days

## 2021-03-23 NOTE — ASSESSMENT
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 Sleep disordered breathing events were associated with severe oxygen desaturations. He has been on CPAP at 12 cm H20 since then with improvement in sleep quality, daytime sleepiness and snoring. \par He has also gained weight in the past several months, is less active. \par \par Data from machine was reviewed and is as follows:\par DME -- [facility]\par Device -- Dreamstation CPAP pro\par Percent days with device usage in last 30 days -- 100%\par Average usage (days used) -- 9h 43min\par Average treatment related SAMIA -- 5.6/h\par Mask leakage -- none\par Pressure -- 12 cm H20\par \par He also has delayed sleep phase syndrome -- sleeps from 5am to 12:30pm and wishes to advance his schedule so that he can be up for most of the day. \par \par Patient to continue CPAP nightly\par Will raise pressure to 14 cm h20\par I explained the rationale for treatment of PATTIE -- to improve quality of life, daytime function and to decrease the cardiometabolic and other medical risks that are associated with untreated PATTIE. The patient verbalized understanding.\par \par Obesity, weight gain\par I explained the relationship between obesity and obstructive sleep apnea and the role of weight loss in improving severity of PATTIE.\par Patient to try exercise regimen\par

## 2021-03-30 ENCOUNTER — NON-APPOINTMENT (OUTPATIENT)
Age: 56
End: 2021-03-30

## 2021-03-30 ENCOUNTER — APPOINTMENT (OUTPATIENT)
Dept: ELECTROPHYSIOLOGY | Facility: CLINIC | Age: 56
End: 2021-03-30
Payer: MEDICARE

## 2021-03-30 PROCEDURE — 93295 DEV INTERROG REMOTE 1/2/MLT: CPT

## 2021-03-30 PROCEDURE — 93296 REM INTERROG EVL PM/IDS: CPT

## 2021-04-30 ENCOUNTER — OUTPATIENT (OUTPATIENT)
Dept: OUTPATIENT SERVICES | Facility: HOSPITAL | Age: 56
LOS: 1 days | End: 2021-04-30
Payer: MEDICARE

## 2021-04-30 ENCOUNTER — RESULT CHARGE (OUTPATIENT)
Age: 56
End: 2021-04-30

## 2021-04-30 ENCOUNTER — APPOINTMENT (OUTPATIENT)
Dept: FAMILY MEDICINE | Facility: HOSPITAL | Age: 56
End: 2021-04-30

## 2021-04-30 VITALS
TEMPERATURE: 98 F | WEIGHT: 315 LBS | OXYGEN SATURATION: 95 % | SYSTOLIC BLOOD PRESSURE: 118 MMHG | HEART RATE: 86 BPM | RESPIRATION RATE: 15 BRPM | BODY MASS INDEX: 64.62 KG/M2 | DIASTOLIC BLOOD PRESSURE: 87 MMHG

## 2021-04-30 DIAGNOSIS — Z90.89 ACQUIRED ABSENCE OF OTHER ORGANS: Chronic | ICD-10-CM

## 2021-04-30 DIAGNOSIS — Z00.00 ENCOUNTER FOR GENERAL ADULT MEDICAL EXAMINATION WITHOUT ABNORMAL FINDINGS: ICD-10-CM

## 2021-04-30 PROCEDURE — G0463: CPT

## 2021-04-30 RX ORDER — ELECTROLYTES/DEXTROSE
31G X 6 MM SOLUTION, ORAL ORAL
Qty: 1 | Refills: 3 | Status: ACTIVE | COMMUNITY
Start: 2021-04-30 | End: 1900-01-01

## 2021-05-03 DIAGNOSIS — E11.9 TYPE 2 DIABETES MELLITUS WITHOUT COMPLICATIONS: ICD-10-CM

## 2021-05-03 DIAGNOSIS — R19.5 OTHER FECAL ABNORMALITIES: ICD-10-CM

## 2021-05-03 NOTE — ASSESSMENT
[FreeTextEntry1] : 55 year old male with PMHx of CHF (diastolic and systolic HF EF 52%), s/p AICD, DM, HTN, afib on eliquis, asthma, PATTIE on CPAP here for f/u.\par \par DM2\par - uncontrolled \par -  A1c 10.9\par - continue metformin\par - will start basaglar 10 units QHS for now \par - would benefit from SGLT2 inhibitor given comorbidities \par - advised to check fasting and 2h post prandial blood sugar and bring log next visit \par - low carb diet\par - continue statin \par - DM educator referral \par - nutrition referral given previously \par \par HTN\par - controlled \par - continue hydralazine and carvedilol \par - low salt diet\par - weight loss \par \par Morbid obesity \par - lifestyle modifications discussed\par - weight check in 3 months \par \par Afib\par - continue eliquis\par - continue carvedilol \par - continue to follow cardiology \par \par CHF\par - s/p AICD\par - continue lasix, isosorbide dinitrate, carvedilol and entresto \par - low salt diet\par - monitor weight \par - continue to follow cardiologist\par \par Asthma\par - no recent exacerbation\par - albuterol inhaler PRN\par - continue singulair \par - continue to follow pulmonologist\par \par PATTIE\par - continue CPAP\par - continue to follow pulmonologist\par \par Healthcare maintenance \par - influenza UTD\par - positive FIT - GI referral given \par - s/p COVD-19 vaccine \par \par F/u in 2 weeks \par D/w Dr. Benavides \par

## 2021-05-03 NOTE — PHYSICAL EXAM
[Normal Sclera/Conjunctiva] : normal sclera/conjunctiva [EOMI] : extraocular movements intact [No Respiratory Distress] : no respiratory distress  [No Accessory Muscle Use] : no accessory muscle use [Clear to Auscultation] : lungs were clear to auscultation bilaterally [Normal Rate] : normal rate  [Regular Rhythm] : with a regular rhythm [Normal S1, S2] : normal S1 and S2 [No Edema] : there was no peripheral edema [Soft] : abdomen soft [Non Tender] : non-tender [Grossly Normal Strength/Tone] : grossly normal strength/tone [Normal Gait] : normal gait [Alert and Oriented x3] : oriented to person, place, and time [Normal Mood] : the mood was normal [de-identified] : Obese [de-identified] : + distention [TWNoteComboBox3] : False [TWNoteComboBox4] : False

## 2021-05-03 NOTE — HISTORY OF PRESENT ILLNESS
[FreeTextEntry1] : F/u DM [de-identified] : 55 year old male with PMHx of CHF(diastolic and systolic HF EF 55%), s/p AICD, DM, HTN, afib on eliquis, asthma, PATTIE on CPAP here for CPE. No acute complaints or concerns. Pt is taking his medications as prescribed. Denies chest pain, LE edema, orthopnea, PND, palpitations, weight gain, fever, chills, nausea, vomiting.

## 2021-05-24 ENCOUNTER — APPOINTMENT (OUTPATIENT)
Dept: ELECTROPHYSIOLOGY | Facility: CLINIC | Age: 56
End: 2021-05-24
Payer: MEDICARE

## 2021-05-24 VITALS
WEIGHT: 245 LBS | BODY MASS INDEX: 46.26 KG/M2 | SYSTOLIC BLOOD PRESSURE: 104 MMHG | OXYGEN SATURATION: 96 % | DIASTOLIC BLOOD PRESSURE: 70 MMHG | HEART RATE: 92 BPM | HEIGHT: 61 IN

## 2021-05-24 PROCEDURE — 93283 PRGRMG EVAL IMPLANTABLE DFB: CPT

## 2021-06-17 ENCOUNTER — APPOINTMENT (OUTPATIENT)
Dept: CARDIOLOGY | Facility: CLINIC | Age: 56
End: 2021-06-17
Payer: MEDICARE

## 2021-06-17 ENCOUNTER — NON-APPOINTMENT (OUTPATIENT)
Age: 56
End: 2021-06-17

## 2021-06-17 VITALS
DIASTOLIC BLOOD PRESSURE: 82 MMHG | SYSTOLIC BLOOD PRESSURE: 116 MMHG | TEMPERATURE: 97.2 F | OXYGEN SATURATION: 98 % | HEART RATE: 96 BPM | HEIGHT: 61 IN | WEIGHT: 247 LBS | RESPIRATION RATE: 15 BRPM | BODY MASS INDEX: 46.63 KG/M2

## 2021-06-17 VITALS — HEART RATE: 78 BPM | DIASTOLIC BLOOD PRESSURE: 80 MMHG | SYSTOLIC BLOOD PRESSURE: 100 MMHG

## 2021-06-17 PROCEDURE — 99214 OFFICE O/P EST MOD 30 MIN: CPT

## 2021-06-17 PROCEDURE — 93000 ELECTROCARDIOGRAM COMPLETE: CPT

## 2021-06-17 NOTE — REASON FOR VISIT
[Arrhythmia/ECG Abnorrmalities] : arrhythmia/ECG abnormalities [Structural Heart and Valve Disease] : structural heart and valve disease [FreeTextEntry1] : Patient returns for followup. Feeling well. Offers no complaints of chest discomfort shortness of breath palpitations dizziness or syncope.\par

## 2021-06-17 NOTE — PHYSICAL EXAM
[General Appearance - Well Developed] : well developed [Normal Appearance] : normal appearance [Well Groomed] : well groomed [General Appearance - Well Nourished] : well nourished [No Deformities] : no deformities [General Appearance - In No Acute Distress] : no acute distress [Normal Oral Mucosa] : normal oral mucosa [No Oral Pallor] : no oral pallor [No Oral Cyanosis] : no oral cyanosis [Normal Jugular Venous A Waves Present] : normal jugular venous A waves present [Normal Jugular Venous V Waves Present] : normal jugular venous V waves present [No Jugular Venous Blair A Waves] : no jugular venous blair A waves [Respiration, Rhythm And Depth] : normal respiratory rhythm and effort [Exaggerated Use Of Accessory Muscles For Inspiration] : no accessory muscle use [Auscultation Breath Sounds / Voice Sounds] : lungs were clear to auscultation bilaterally [Abdomen Soft] : soft [Abdomen Tenderness] : non-tender [Abdomen Mass (___ Cm)] : no abdominal mass palpated [Abnormal Walk] : normal gait [Gait - Sufficient For Exercise Testing] : the gait was sufficient for exercise testing [Nail Clubbing] : no clubbing of the fingernails [Cyanosis, Localized] : no localized cyanosis [Petechial Hemorrhages (___cm)] : no petechial hemorrhages [Skin Color & Pigmentation] : normal skin color and pigmentation [] : no rash [No Venous Stasis] : no venous stasis [Skin Lesions] : no skin lesions [No Skin Ulcers] : no skin ulcer [No Xanthoma] : no  xanthoma was observed [Oriented To Time, Place, And Person] : oriented to person, place, and time [Affect] : the affect was normal [Mood] : the mood was normal [No Anxiety] : not feeling anxious [Normal Rate] : normal [Premature Beats] : regular with premature beats [Normal S1] : normal S1 [S3] : no S3 [Normal S2] : normal S2 [S4] : no S4 [No Murmur] : no murmurs heard [Right Carotid Bruit] : no bruit heard over the right carotid [Left Carotid Bruit] : no bruit heard over the left carotid [Right Femoral Bruit] : no bruit heard over the right femoral artery [Left Femoral Bruit] : no bruit heard over the left femoral artery [2+] : left 2+ [No Abnormalities] : the abdominal aorta was not enlarged and no bruit was heard [No Pitting Edema] : no pitting edema present

## 2021-06-29 ENCOUNTER — APPOINTMENT (OUTPATIENT)
Dept: ELECTROPHYSIOLOGY | Facility: CLINIC | Age: 56
End: 2021-06-29

## 2021-07-02 ENCOUNTER — APPOINTMENT (OUTPATIENT)
Dept: ELECTROPHYSIOLOGY | Facility: CLINIC | Age: 56
End: 2021-07-02
Payer: MEDICARE

## 2021-07-02 VITALS
HEART RATE: 85 BPM | DIASTOLIC BLOOD PRESSURE: 72 MMHG | SYSTOLIC BLOOD PRESSURE: 108 MMHG | OXYGEN SATURATION: 97 % | HEIGHT: 61 IN

## 2021-07-02 PROCEDURE — 93283 PRGRMG EVAL IMPLANTABLE DFB: CPT

## 2021-07-07 LAB
BASOPHILS # BLD AUTO: 0.05 K/UL
BASOPHILS NFR BLD AUTO: 0.6 %
EOSINOPHIL # BLD AUTO: 0.5 K/UL
EOSINOPHIL NFR BLD AUTO: 6.1 %
HCT VFR BLD CALC: 39.8 %
HGB BLD-MCNC: 12.7 G/DL
IMM GRANULOCYTES NFR BLD AUTO: 0.4 %
INR PPP: 1.12 RATIO
LYMPHOCYTES # BLD AUTO: 2.4 K/UL
LYMPHOCYTES NFR BLD AUTO: 29.1 %
MAN DIFF?: NORMAL
MCHC RBC-ENTMCNC: 27.7 PG
MCHC RBC-ENTMCNC: 31.9 GM/DL
MCV RBC AUTO: 86.9 FL
MONOCYTES # BLD AUTO: 0.93 K/UL
MONOCYTES NFR BLD AUTO: 11.3 %
NEUTROPHILS # BLD AUTO: 4.34 K/UL
NEUTROPHILS NFR BLD AUTO: 52.5 %
PLATELET # BLD AUTO: 290 K/UL
PT BLD: 13.3 SEC
RBC # BLD: 4.58 M/UL
RBC # FLD: 13.4 %
WBC # FLD AUTO: 8.25 K/UL

## 2021-07-08 ENCOUNTER — OUTPATIENT (OUTPATIENT)
Dept: OUTPATIENT SERVICES | Facility: HOSPITAL | Age: 56
LOS: 1 days | End: 2021-07-08
Payer: COMMERCIAL

## 2021-07-08 ENCOUNTER — NON-APPOINTMENT (OUTPATIENT)
Age: 56
End: 2021-07-08

## 2021-07-08 VITALS
DIASTOLIC BLOOD PRESSURE: 87 MMHG | SYSTOLIC BLOOD PRESSURE: 132 MMHG | OXYGEN SATURATION: 98 % | RESPIRATION RATE: 16 BRPM | HEART RATE: 84 BPM

## 2021-07-08 VITALS
TEMPERATURE: 98 F | SYSTOLIC BLOOD PRESSURE: 129 MMHG | DIASTOLIC BLOOD PRESSURE: 96 MMHG | WEIGHT: 304.9 LBS | HEART RATE: 88 BPM | RESPIRATION RATE: 20 BRPM | HEIGHT: 73 IN | OXYGEN SATURATION: 96 %

## 2021-07-08 DIAGNOSIS — Z90.89 ACQUIRED ABSENCE OF OTHER ORGANS: Chronic | ICD-10-CM

## 2021-07-08 DIAGNOSIS — Z45.010 ENCOUNTER FOR CHECKING AND TESTING OF CARDIAC PACEMAKER PULSE GENERATOR [BATTERY]: ICD-10-CM

## 2021-07-08 DIAGNOSIS — Z95.810 PRESENCE OF AUTOMATIC (IMPLANTABLE) CARDIAC DEFIBRILLATOR: Chronic | ICD-10-CM

## 2021-07-08 LAB
ALBUMIN SERPL ELPH-MCNC: 4.4 G/DL
ALP BLD-CCNC: 86 U/L
ALT SERPL-CCNC: 22 U/L
ANION GAP SERPL CALC-SCNC: 16 MMOL/L
AST SERPL-CCNC: 16 U/L
BILIRUB SERPL-MCNC: 0.3 MG/DL
BUN SERPL-MCNC: 18 MG/DL
CALCIUM SERPL-MCNC: 9.2 MG/DL
CHLORIDE SERPL-SCNC: 103 MMOL/L
CO2 SERPL-SCNC: 20 MMOL/L
CREAT SERPL-MCNC: 1.03 MG/DL
GLUCOSE BLDC GLUCOMTR-MCNC: 167 MG/DL — HIGH (ref 70–99)
GLUCOSE BLDC GLUCOMTR-MCNC: 183 MG/DL — HIGH (ref 70–99)
GLUCOSE SERPL-MCNC: 184 MG/DL
POTASSIUM SERPL-SCNC: 4.1 MMOL/L
PROT SERPL-MCNC: 7.3 G/DL
SARS-COV-2 N GENE NPH QL NAA+PROBE: NOT DETECTED
SODIUM SERPL-SCNC: 139 MMOL/L

## 2021-07-08 PROCEDURE — 93005 ELECTROCARDIOGRAM TRACING: CPT

## 2021-07-08 PROCEDURE — C1889: CPT

## 2021-07-08 PROCEDURE — 33263 RMVL & RPLCMT DFB GEN 2 LEAD: CPT

## 2021-07-08 PROCEDURE — C1721: CPT

## 2021-07-08 PROCEDURE — 93010 ELECTROCARDIOGRAM REPORT: CPT

## 2021-07-08 PROCEDURE — 82962 GLUCOSE BLOOD TEST: CPT

## 2021-07-08 RX ORDER — CEPHALEXIN 500 MG
1 CAPSULE ORAL
Qty: 9 | Refills: 0
Start: 2021-07-08 | End: 2021-07-10

## 2021-07-08 RX ORDER — FLUOXETINE HCL 10 MG
1 CAPSULE ORAL
Qty: 0 | Refills: 0 | DISCHARGE

## 2021-07-08 RX ORDER — APIXABAN 2.5 MG/1
1 TABLET, FILM COATED ORAL
Qty: 0 | Refills: 0 | DISCHARGE

## 2021-07-08 RX ORDER — LISINOPRIL 2.5 MG/1
1 TABLET ORAL
Qty: 0 | Refills: 0 | DISCHARGE

## 2021-07-08 RX ORDER — ACETAMINOPHEN 500 MG
650 TABLET ORAL EVERY 6 HOURS
Refills: 0 | Status: DISCONTINUED | OUTPATIENT
Start: 2021-07-08 | End: 2021-07-22

## 2021-07-08 RX ORDER — LORATADINE 10 MG/1
1 TABLET ORAL
Qty: 0 | Refills: 0 | DISCHARGE

## 2021-07-08 RX ORDER — MONTELUKAST 4 MG/1
1 TABLET, CHEWABLE ORAL
Qty: 0 | Refills: 0 | DISCHARGE

## 2021-07-08 RX ORDER — ALBUTEROL 90 UG/1
2 AEROSOL, METERED ORAL
Qty: 0 | Refills: 0 | DISCHARGE

## 2021-07-08 NOTE — ASU DISCHARGE PLAN (ADULT/PEDIATRIC) - CARE PROVIDER_API CALL
Helen Rodriguez (MD; PhD)  Cardiac Electrophysiology; Cardiovascular Disease; Internal Medicine  [facility] - Dept of Cardiology, [address] [place] [address]  Phone: [phone]  Fax: [phone]  Follow Up Time:    Helen Rodriguez (MD; PhD)  Cardiac Electrophysiology; Cardiovascular Disease; Internal Medicine  [facility] - Dept of Cardiology, [address] [place] [address]  Phone: [phone]  Fax: [phone]  Scheduled Appointment: 07/21/2021 08:40 AM

## 2021-07-08 NOTE — ASU DISCHARGE PLAN (ADULT/PEDIATRIC) - PROVIDER TOKENS
PROVIDER:[TOKEN:[[id number]]] PROVIDER:[TOKEN:[[id number]],SCHEDULEDAPPT:[07/21/2021],SCHEDULEDAPPTTIME:[08:40 AM]]

## 2021-07-08 NOTE — H&P CARDIOLOGY - HISTORY OF PRESENT ILLNESS
54 y/o M with h/o a. fib on eliquis, PATTIE on CPAP, cardiomyopathy, CHF with EF 30-35%), asthma, HTN, HLD, mild CAD,  prediabetes, AICD, now for  Dual chamber ICD gen change on 7/8/21

## 2021-07-08 NOTE — CHART NOTE - NSCHARTNOTEFT_GEN_A_CORE
Last Name:  Jaylen     First Name: Allen  	:  2021        Medical Record#:     [id number]                                                   Procedure performed:	      	Replacement of dual ICD (32368)     :			 Rocael Rodriguez MD    Procedure Date:                                 2021    Referring MD:                                    Dinesh Burroughs MD    Preprocedure Indications and Diagnoses:     1. ICD Battery Depletion (premature and under advisory)  2. Nonischemic cardiomyopathy  3. VT previously treated by ICD  4. LVEF:  31-35% when ICD was implanted, 55% recently  5. NYHA Class:  II    Postprocedure Diagnoses: Successful pacemaker generator replacement      1. New ICD Generator:  : Medtronic Model#   [id number]      Serial Number# [id number]  MRI Compatible ?  Position:    Left Suprapectoral   Previous ICD:   2. Explanted Pacemaker Generator:  : Medtronic Model#   Medtronic [id number] serial #[id number]  from 2017  3. Lead 1:   Access:   Left subclavian   Position:  RV apex  : Medtronic Model#   [id number]   active fix  Serial Number#  [id number] from 2017  4. Lead 2:   Access:   Left subclavian   Position:  RAA  : From [employer] Model#   [id number]    passive   Serial Number#  [id number] from 2017  5. Surgicel applied to pocket: No  6. Tyrx pouch:  Yes [id number]    Complications:  None    EBL:   <10cc    Fluoroscopy: None    Anesthesia: MAC with EP anesthesia. Local: 2% lidocaine with epi  Preprocedure antibiotics:  2 gm Cefazolin IV    Brief History:  Allen York is a 55 year old man who needs urgent replacement of his ICD due to premature battery depletion.            Procedure Details:    The patient was brought to the Electrophysiology Laboratory in the post absorptive state after informed consent was obtained and prepped and draped in the usual manner. Formal time out was performed.   2% lidocaine with epi was infiltrated along the left subclavicular lesion.  An incision was made with a 10 blade along the previous incision. Using sharp and blunt dissection and electrocautery, the pocket was accessed, and the previous pulse generator was removed. The leads were examined and found to be intact. The leads were tested and the new pulse generator was connected and the set screw were tightened.    Lead testing:  Lead			Amplitude (mV)	     Pacing threshold (V/ms)   Impedance (ohms)     RA capped                                       3.5	          0.5/0.4	                          304	  RV apex                                         10.5	        0.75/0.4	                          475	  HV lead			                                                                                 69	                    Bleeding sites were cauterized. The pocket was irrigated with antibiotic solution The generator was placed in the pocket in a Tyrx pouch. The pocket was then closed with 2-0 Vicryl (interrupted), 3-0 Vicryl, 4-0 Monocryl, 4-0 Monocryl (uninterrupted).  Steri-Strips were used for the skin.    Device programmed to DDD-AAI  Sponge and needle counts were correct X 2.  Lower rate (ppm): 60 Upper rate (ppm):  130. The patient will go the CRS and then return to the [facility] and go home later today within one hour. Follow up in 7-10 days for wound check. He will restart apixaban in 48 hours.
s/p AICD generator change Medtronic DDD   Tolerated procedure well - no complaints   VSS    Left ACW site benign- no hematoma or bleeding    Plan per Dr Jennifer Golden 500mg po TID x 3 days (RX to [facility])  Resume Eliquis on Saturday AM 7/10  Report given to [facility] ACP

## 2021-07-09 ENCOUNTER — NON-APPOINTMENT (OUTPATIENT)
Age: 56
End: 2021-07-09

## 2021-07-18 NOTE — PHYSICAL EXAM
[General Appearance - Well Developed] : well developed [Normal Appearance] : normal appearance [Well Groomed] : well groomed [General Appearance - Well Nourished] : well nourished [No Deformities] : no deformities [General Appearance - In No Acute Distress] : no acute distress [Heart Rate And Rhythm] : heart rate and rhythm were normal [Heart Sounds] : normal S1 and S2 [Murmurs] : no murmurs present [Arterial Pulses Normal] : the arterial pulses were normal [] : no respiratory distress [Respiration, Rhythm And Depth] : normal respiratory rhythm and effort [Bowel Sounds] : normal bowel sounds [Abdomen Soft] : soft [Abdomen Tenderness] : non-tender [Nail Clubbing] : no clubbing of the fingernails [Cyanosis, Localized] : no localized cyanosis

## 2021-07-18 NOTE — PROCEDURE
[No] : not [NSR] : normal sinus rhythm [ICD] : Implantable cardioverter-defibrillator [Threshold Testing Performed] : Threshold testing was performed [Lead Imp:  ___ohms] : lead impedance was [unfilled] ohms [Sensing Amplitude ___mv] : sensing amplitude was [unfilled] mv [___V @] : [unfilled] V [___ ms] : [unfilled] ms [de-identified] : Medtronic [de-identified] : Kade [de-identified] : [id number] [de-identified] : 2/27/2017 [de-identified] : AAI <-> DDD [de-identified] : RRT on 6/22/21

## 2021-07-18 NOTE — DISCUSSION/SUMMARY
[ICD Generator Malfunction] : there is ICD generator malfunction [FreeTextEntry2] : Premature RRT on 6/22/21.  Estimated remaining battery longevity of 5 years 9 months at time of last interrogation on 5/24/21.  Medtronic contacted, recommendation to replace the device.  We will schedule the patient for a generator change within 1-2 weeks.

## 2021-07-18 NOTE — REVIEW OF SYSTEMS
[Fever] : no fever [Headache] : no headache [Chills] : no chills [Feeling Fatigued] : not feeling fatigued [Blurry Vision] : no blurred vision [Earache] : no earache [Discharge From Ears] : no discharge from the ears [Hearing Loss] : no hearing loss [Sore Throat] : no sore throat [Sinus Pressure] : no sinus pressure [SOB] : no shortness of breath [Dyspnea on exertion] : not dyspnea during exertion [Chest Discomfort] : no chest discomfort [Leg Claudication] : no intermittent leg claudication [Palpitations] : no palpitations [Orthopnea] : no orthopnea [Syncope] : no syncope [Cough] : no cough [Abdominal Pain] : no abdominal pain [Nausea] : no nausea [Vomiting] : no vomiting [Change in Appetite] : no change in appetite [Change In The Stool] : no change in stool [Dysphagia] : no dysphagia [Constipation] : no constipation [Blood in stool] : no blood in stoo [Urinary Frequency] : no change in urinary frequency [Hematuria] : no hematuria [Erectile Dysfunction] : no erectile dysfunction [Joint Pain] : no joint pain [Joint Swelling] : no joint swelling [Myalgia] : no myalgia [Rash] : no rash [Itching] : no itching [Skin Lesions] : no skin lesions [Dizziness] : no dizziness [Tremor] : no tremor was seen [Numbness (Hypoesthesia)] : no numbness [Convulsions] : no convulsions [Tingling (Paresthesia)] : no tingling [Weakness] : no weakness [Limb Weakness (Paresis)] : no limb weakness (Paresis) [Confusion] : no confusion was observed [Under Stress] : not under stress [Easy Bleeding] : no tendency for easy bleeding

## 2021-07-18 NOTE — HISTORY OF PRESENT ILLNESS
[Palpitations] : no palpitations [SOB] : no dyspnea [Syncope] : no syncope [Dizziness] : no dizziness [Chest Pain] : no chest pain or discomfort [ICD Shocks] : no recent ICD shocks [Shoulder Pain] : no shoulder pain [Pain at Site] : no pain at device site [Erythema at Site] : no erythema at device site [Swelling at Site] : no swelling at device site [de-identified] : Mr. Allen York is a 55 year old male with a history of HFpEF (s/p AICD), DM II, HTN, Atrial Fibrillation (on Eliquis), asthma, PATTIE (on CPAP) presents to the Device Clinic today for an interrogation of hisMedtronic Evera dual chamber ICD prompted by "beeping/siren" sounds emanating from his device.

## 2021-07-21 ENCOUNTER — APPOINTMENT (OUTPATIENT)
Dept: ELECTROPHYSIOLOGY | Facility: CLINIC | Age: 56
End: 2021-07-21
Payer: MEDICARE

## 2021-07-21 ENCOUNTER — NON-APPOINTMENT (OUTPATIENT)
Age: 56
End: 2021-07-21

## 2021-07-21 VITALS — SYSTOLIC BLOOD PRESSURE: 93 MMHG | HEART RATE: 84 BPM | DIASTOLIC BLOOD PRESSURE: 59 MMHG | OXYGEN SATURATION: 93 %

## 2021-07-21 PROCEDURE — 93283 PRGRMG EVAL IMPLANTABLE DFB: CPT

## 2021-07-21 PROCEDURE — 99024 POSTOP FOLLOW-UP VISIT: CPT

## 2021-09-21 ENCOUNTER — NON-APPOINTMENT (OUTPATIENT)
Age: 56
End: 2021-09-21

## 2021-10-27 ENCOUNTER — APPOINTMENT (OUTPATIENT)
Dept: ELECTROPHYSIOLOGY | Facility: CLINIC | Age: 56
End: 2021-10-27

## 2021-10-28 ENCOUNTER — APPOINTMENT (OUTPATIENT)
Dept: ELECTROPHYSIOLOGY | Facility: CLINIC | Age: 56
End: 2021-10-28
Payer: MEDICARE

## 2021-10-28 ENCOUNTER — NON-APPOINTMENT (OUTPATIENT)
Age: 56
End: 2021-10-28

## 2021-10-28 PROCEDURE — 93296 REM INTERROG EVL PM/IDS: CPT | Mod: NC

## 2021-10-28 PROCEDURE — 93295 DEV INTERROG REMOTE 1/2/MLT: CPT | Mod: NC

## 2021-11-02 ENCOUNTER — APPOINTMENT (OUTPATIENT)
Dept: FAMILY MEDICINE | Facility: HOSPITAL | Age: 56
End: 2021-11-02

## 2021-11-02 ENCOUNTER — RESULT CHARGE (OUTPATIENT)
Age: 56
End: 2021-11-02

## 2021-11-02 ENCOUNTER — MED ADMIN CHARGE (OUTPATIENT)
Age: 56
End: 2021-11-02

## 2021-11-02 ENCOUNTER — OUTPATIENT (OUTPATIENT)
Dept: OUTPATIENT SERVICES | Facility: HOSPITAL | Age: 56
LOS: 1 days | End: 2021-11-02
Payer: MEDICARE

## 2021-11-02 ENCOUNTER — APPOINTMENT (OUTPATIENT)
Dept: PULMONOLOGY | Facility: CLINIC | Age: 56
End: 2021-11-02
Payer: MEDICARE

## 2021-11-02 VITALS
TEMPERATURE: 97.8 F | RESPIRATION RATE: 16 BRPM | HEIGHT: 73 IN | DIASTOLIC BLOOD PRESSURE: 70 MMHG | OXYGEN SATURATION: 96 % | SYSTOLIC BLOOD PRESSURE: 107 MMHG | HEART RATE: 74 BPM

## 2021-11-02 DIAGNOSIS — J06.9 ACUTE UPPER RESPIRATORY INFECTION, UNSPECIFIED: ICD-10-CM

## 2021-11-02 DIAGNOSIS — Z12.11 ENCOUNTER FOR SCREENING FOR MALIGNANT NEOPLASM OF COLON: ICD-10-CM

## 2021-11-02 DIAGNOSIS — Z92.29 PERSONAL HISTORY OF OTHER DRUG THERAPY: ICD-10-CM

## 2021-11-02 DIAGNOSIS — H10.13 ACUTE ATOPIC CONJUNCTIVITIS, BILATERAL: ICD-10-CM

## 2021-11-02 DIAGNOSIS — Z90.89 ACQUIRED ABSENCE OF OTHER ORGANS: Chronic | ICD-10-CM

## 2021-11-02 DIAGNOSIS — Z87.898 PERSONAL HISTORY OF OTHER SPECIFIED CONDITIONS: ICD-10-CM

## 2021-11-02 DIAGNOSIS — Z23 ENCOUNTER FOR IMMUNIZATION: ICD-10-CM

## 2021-11-02 DIAGNOSIS — R21 RASH AND OTHER NONSPECIFIC SKIN ERUPTION: ICD-10-CM

## 2021-11-02 DIAGNOSIS — H61.23 IMPACTED CERUMEN, BILATERAL: ICD-10-CM

## 2021-11-02 DIAGNOSIS — Z95.810 PRESENCE OF AUTOMATIC (IMPLANTABLE) CARDIAC DEFIBRILLATOR: Chronic | ICD-10-CM

## 2021-11-02 DIAGNOSIS — Z87.09 PERSONAL HISTORY OF OTHER DISEASES OF THE RESPIRATORY SYSTEM: ICD-10-CM

## 2021-11-02 DIAGNOSIS — E66.9 OBESITY, UNSPECIFIED: ICD-10-CM

## 2021-11-02 DIAGNOSIS — Z00.00 ENCOUNTER FOR GENERAL ADULT MEDICAL EXAMINATION WITHOUT ABNORMAL FINDINGS: ICD-10-CM

## 2021-11-02 DIAGNOSIS — G47.19 OTHER HYPERSOMNIA: ICD-10-CM

## 2021-11-02 DIAGNOSIS — Z86.69 PERSONAL HISTORY OF OTHER DISEASES OF THE NERVOUS SYSTEM AND SENSE ORGANS: ICD-10-CM

## 2021-11-02 DIAGNOSIS — G47.33 OBSTRUCTIVE SLEEP APNEA (ADULT) (PEDIATRIC): ICD-10-CM

## 2021-11-02 DIAGNOSIS — Z01.818 ENCOUNTER FOR OTHER PREPROCEDURAL EXAMINATION: ICD-10-CM

## 2021-11-02 DIAGNOSIS — R06.00 DYSPNEA, UNSPECIFIED: ICD-10-CM

## 2021-11-02 PROCEDURE — 83550 IRON BINDING TEST: CPT

## 2021-11-02 PROCEDURE — 83036 HEMOGLOBIN GLYCOSYLATED A1C: CPT

## 2021-11-02 PROCEDURE — 85025 COMPLETE CBC W/AUTO DIFF WBC: CPT

## 2021-11-02 PROCEDURE — 80053 COMPREHEN METABOLIC PANEL: CPT

## 2021-11-02 PROCEDURE — 84466 ASSAY OF TRANSFERRIN: CPT

## 2021-11-02 PROCEDURE — 99213 OFFICE O/P EST LOW 20 MIN: CPT | Mod: GC

## 2021-11-02 PROCEDURE — G0463: CPT

## 2021-11-02 PROCEDURE — 82043 UR ALBUMIN QUANTITATIVE: CPT

## 2021-11-02 PROCEDURE — 84443 ASSAY THYROID STIM HORMONE: CPT

## 2021-11-02 PROCEDURE — 80061 LIPID PANEL: CPT

## 2021-11-02 NOTE — PHYSICAL EXAM
[No Acute Distress] : no acute distress [Normal Oropharynx] : normal oropharynx [Normal Appearance] : normal appearance [No Neck Mass] : no neck mass [Normal S1, S2] : normal s1, s2 [No Resp Distress] : no resp distress [Clear to Auscultation Bilaterally] : clear to auscultation bilaterally [No Abnormalities] : no abnormalities [Normal Gait] : normal gait [No Clubbing] : no clubbing

## 2021-11-02 NOTE — HISTORY OF PRESENT ILLNESS
[TextBox_4] : Patient is a morbidly obese 57 yo M w/ HF, Afib (Eliquis), Asthma, severe PATTIE (CPAP 14 cm H2O) and delayed sleep phase syndrome who presents to pulmonary clinic for follow up. Patient was last seen in pulm clinic on 3/2021 during which he was continued on CPAP therapy. \par \par Patient currently feeling well and is here for a check up. Patient endorses compliance with his CPAP, using it every day for about 9 to 10 hours. Endorses bedtime of 1AM, waketime of 10AM. Endorses sleep latency of 30 minutes. Endorses sleeping throughout the night. Still awaiting replacement machine from recall. Denies any asthma attacks and does not even have a rescue inhaler anymore. Patient endorses not having needed to use Albuterol for over a year. Uses flonase for nasal congestion. \par \par Denies any fevers, chills, nasal congestion, SOB, PEREZ. Patient endorses walking 5 miles every other day.

## 2021-11-02 NOTE — ASSESSMENT
[FreeTextEntry1] : Patient is a morbidly obese 55 yo M w/ HF, Afib (Eliquis), Asthma, severe PATTIE (CPAP 14 cm H2O) and delayed sleep phase syndrome who presents to pulmonary clinic for follow up. Patient was last seen in pulm clinic on 3/2021 during which he was continued on CPAP therapy. \par \par #Asthma - Well controlled\par - c/w Albuterol PRN\par \par #CPAP - compliant\par - c/w CPAP therapy\par \par RTC 6 months\par \par Arnaud Nolen F3\par d/w Dr. Whittington

## 2021-11-03 DIAGNOSIS — E11.9 TYPE 2 DIABETES MELLITUS WITHOUT COMPLICATIONS: ICD-10-CM

## 2021-11-03 DIAGNOSIS — D64.9 ANEMIA, UNSPECIFIED: ICD-10-CM

## 2021-11-03 DIAGNOSIS — E78.5 HYPERLIPIDEMIA, UNSPECIFIED: ICD-10-CM

## 2021-11-03 DIAGNOSIS — R19.5 OTHER FECAL ABNORMALITIES: ICD-10-CM

## 2021-11-03 DIAGNOSIS — I50.42 CHRONIC COMBINED SYSTOLIC (CONGESTIVE) AND DIASTOLIC (CONGESTIVE) HEART FAILURE: ICD-10-CM

## 2021-11-03 DIAGNOSIS — Z23 ENCOUNTER FOR IMMUNIZATION: ICD-10-CM

## 2021-11-14 PROBLEM — Z23 ENCOUNTER FOR IMMUNIZATION: Status: ACTIVE | Noted: 2021-11-02

## 2021-11-14 PROBLEM — Z87.09 HISTORY OF PARANASAL SINUS CONGESTION: Status: RESOLVED | Noted: 2017-01-06 | Resolved: 2021-11-14

## 2021-11-14 PROBLEM — G47.19 EXCESSIVE DAYTIME SLEEPINESS: Status: RESOLVED | Noted: 2017-04-05 | Resolved: 2021-11-14

## 2021-11-14 PROBLEM — G47.33 OBSTRUCTIVE SLEEP APNEA, ADULT: Status: RESOLVED | Noted: 2018-07-30 | Resolved: 2021-11-14

## 2021-11-14 PROBLEM — Z86.69 HISTORY OF OBSTRUCTIVE SLEEP APNEA: Status: RESOLVED | Noted: 2017-06-26 | Resolved: 2021-11-14

## 2021-11-14 PROBLEM — E66.9 OBESITY, CLASS II, BMI 35-39.9: Status: RESOLVED | Noted: 2018-07-30 | Resolved: 2021-11-14

## 2021-11-14 PROBLEM — Z92.29 HISTORY OF MEASLES, MUMPS, AND RUBELLA VACCINATION: Status: RESOLVED | Noted: 2017-07-27 | Resolved: 2021-11-14

## 2021-11-14 PROBLEM — Z87.09 HISTORY OF ACUTE SINUSITIS: Status: RESOLVED | Noted: 2017-12-04 | Resolved: 2021-11-14

## 2021-11-14 PROBLEM — Z01.818 PREOP TESTING: Status: RESOLVED | Noted: 2021-07-06 | Resolved: 2021-11-14

## 2021-11-14 PROBLEM — J06.9 VIRAL URI: Status: RESOLVED | Noted: 2017-03-17 | Resolved: 2021-11-14

## 2021-11-14 PROBLEM — H10.13 ALLERGIC CONJUNCTIVITIS OF BOTH EYES: Status: RESOLVED | Noted: 2017-09-07 | Resolved: 2021-11-14

## 2021-11-14 PROBLEM — Z87.898 HISTORY OF INSOMNIA: Status: RESOLVED | Noted: 2018-03-08 | Resolved: 2021-11-14

## 2021-11-14 PROBLEM — Z92.29 HISTORY OF PNEUMOCOCCAL VACCINATION: Status: RESOLVED | Noted: 2017-06-27 | Resolved: 2021-11-14

## 2021-11-14 PROBLEM — Z23 NEED FOR TDAP VACCINATION: Status: RESOLVED | Noted: 2017-06-27 | Resolved: 2021-11-14

## 2021-11-14 PROBLEM — H61.23 IMPACTED CERUMEN OF BOTH EARS: Status: RESOLVED | Noted: 2017-03-17 | Resolved: 2021-11-14

## 2021-11-14 PROBLEM — R06.00 DYSPNEA ON EXERTION: Status: RESOLVED | Noted: 2017-04-06 | Resolved: 2021-11-14

## 2021-11-14 PROBLEM — Z92.29 HISTORY OF INFLUENZA VACCINATION: Status: RESOLVED | Noted: 2017-09-18 | Resolved: 2021-11-14

## 2021-11-14 PROBLEM — J06.9 VIRAL URI WITH COUGH: Status: RESOLVED | Noted: 2017-03-17 | Resolved: 2021-11-14

## 2021-11-14 PROBLEM — Z86.69 HISTORY OF OBSTRUCTIVE SLEEP APNEA: Status: RESOLVED | Noted: 2017-04-12 | Resolved: 2021-11-14

## 2021-11-14 PROBLEM — Z12.11 COLON CANCER SCREENING: Status: RESOLVED | Noted: 2020-12-29 | Resolved: 2021-11-14

## 2021-11-14 PROBLEM — Z92.29 HISTORY OF INFLUENZA VACCINATION: Status: RESOLVED | Noted: 2018-09-05 | Resolved: 2021-11-14

## 2021-11-14 PROBLEM — R21 SKIN RASH: Status: RESOLVED | Noted: 2017-07-27 | Resolved: 2021-11-14

## 2021-11-14 PROBLEM — Z87.898 HISTORY OF POSTNASAL DRIP: Status: RESOLVED | Noted: 2017-03-13 | Resolved: 2021-11-14

## 2021-11-14 PROBLEM — H61.23 EXCESSIVE CERUMEN IN BOTH EAR CANALS: Status: RESOLVED | Noted: 2020-12-29 | Resolved: 2021-11-14

## 2021-11-14 PROBLEM — Z87.898 HISTORY OF HEADACHE: Status: RESOLVED | Noted: 2017-04-15 | Resolved: 2021-11-14

## 2021-11-14 PROBLEM — Z12.11 COLON CANCER SCREENING: Status: RESOLVED | Noted: 2017-08-28 | Resolved: 2021-11-14

## 2021-11-14 LAB
ALBUMIN SERPL ELPH-MCNC: 4.8 G/DL
ALP BLD-CCNC: 78 U/L
ALT SERPL-CCNC: 20 U/L
ANION GAP SERPL CALC-SCNC: 16 MMOL/L
AST SERPL-CCNC: 15 U/L
BASOPHILS # BLD AUTO: 0.02 K/UL
BASOPHILS NFR BLD AUTO: 0.3 %
BILIRUB SERPL-MCNC: 0.3 MG/DL
BUN SERPL-MCNC: 14 MG/DL
CALCIUM SERPL-MCNC: 8.9 MG/DL
CHLORIDE SERPL-SCNC: 106 MMOL/L
CHOLEST SERPL-MCNC: 90 MG/DL
CO2 SERPL-SCNC: 19 MMOL/L
CREAT SERPL-MCNC: 1.2 MG/DL
CREAT SPEC-SCNC: 234 MG/DL
EOSINOPHIL # BLD AUTO: 0.32 K/UL
EOSINOPHIL NFR BLD AUTO: 4.9 %
ESTIMATED AVERAGE GLUCOSE: 126 MG/DL
GLUCOSE SERPL-MCNC: 72 MG/DL
HBA1C MFR BLD HPLC: 6
HBA1C MFR BLD HPLC: 6 %
HCT VFR BLD CALC: 47.2 %
HDLC SERPL-MCNC: 38 MG/DL
HGB BLD-MCNC: 14.6 G/DL
IMM GRANULOCYTES NFR BLD AUTO: 0.2 %
IRON SATN MFR SERPL: 20 %
IRON SERPL-MCNC: 63 UG/DL
LDLC SERPL CALC-MCNC: 35 MG/DL
LYMPHOCYTES # BLD AUTO: 1.93 K/UL
LYMPHOCYTES NFR BLD AUTO: 29.8 %
MAN DIFF?: NORMAL
MCHC RBC-ENTMCNC: 27 PG
MCHC RBC-ENTMCNC: 30.9 GM/DL
MCV RBC AUTO: 87.2 FL
MICROALBUMIN 24H UR DL<=1MG/L-MCNC: 2.5 MG/DL
MICROALBUMIN/CREAT 24H UR-RTO: 11 MG/G
MONOCYTES # BLD AUTO: 0.7 K/UL
MONOCYTES NFR BLD AUTO: 10.8 %
NEUTROPHILS # BLD AUTO: 3.49 K/UL
NEUTROPHILS NFR BLD AUTO: 54 %
NONHDLC SERPL-MCNC: 52 MG/DL
PLATELET # BLD AUTO: 230 K/UL
POTASSIUM SERPL-SCNC: 4 MMOL/L
PROT SERPL-MCNC: 7.7 G/DL
RBC # BLD: 5.41 M/UL
RBC # FLD: 15.4 %
SODIUM SERPL-SCNC: 140 MMOL/L
TIBC SERPL-MCNC: 323 UG/DL
TRANSFERRIN SERPL-MCNC: 273 MG/DL
TRIGL SERPL-MCNC: 89 MG/DL
TSH SERPL-ACNC: 1.11 UIU/ML
UIBC SERPL-MCNC: 260 UG/DL
WBC # FLD AUTO: 6.47 K/UL

## 2021-11-14 RX ORDER — LORATADINE 10 MG/1
10 TABLET ORAL DAILY
Qty: 30 | Refills: 5 | Status: DISCONTINUED | COMMUNITY
Start: 2017-08-16 | End: 2021-11-02

## 2021-11-14 RX ORDER — SODIUM CHLORIDE 0.9 %
0.9 AMPUL (ML) NASAL
Qty: 1 | Refills: 0 | Status: DISCONTINUED | COMMUNITY
Start: 2019-01-09 | End: 2021-11-02

## 2021-11-14 RX ORDER — ASPIRIN 81 MG
6.5 TABLET, DELAYED RELEASE (ENTERIC COATED) ORAL
Qty: 1 | Refills: 0 | Status: DISCONTINUED | COMMUNITY
Start: 2020-12-29 | End: 2021-11-02

## 2021-11-14 RX ORDER — FLUTICASONE PROPIONATE 50 UG/1
50 SPRAY, METERED NASAL
Qty: 1 | Refills: 1 | Status: DISCONTINUED | COMMUNITY
Start: 2019-01-09 | End: 2021-11-02

## 2021-11-15 NOTE — PHYSICAL EXAM
[No Acute Distress] : no acute distress [No Lymphadenopathy] : no lymphadenopathy [Supple] : supple [Thyroid Normal, No Nodules] : the thyroid was normal and there were no nodules present [Normal Rate] : normal rate  [Regular Rhythm] : with a regular rhythm [Normal S1, S2] : normal S1 and S2 [No Edema] : there was no peripheral edema [No Extremity Clubbing/Cyanosis] : no extremity clubbing/cyanosis [Normal] : no rash [No Focal Deficits] : no focal deficits [Normal Affect] : the affect was normal [Normal Insight/Judgement] : insight and judgment were intact [Comprehensive Foot Exam Normal] : Right and left foot were examined and both feet are normal. No ulcers in either foot. Toes are normal and with full ROM.  Normal tactile sensation with monofilament testing throughout both feet [de-identified] : (+)morbidly obese  [de-identified] : (+)JVD [de-identified] : (+)murmur

## 2021-11-15 NOTE — HISTORY OF PRESENT ILLNESS
[Asthma] : Asthma [Congestive Heart Failure] : Congestive Heart Failure [Diabetes Mellitus] : Diabetes Mellitus [Hyperlipidemia] : Hyperlipidemia [Hypertension] : Hypertension [Obesity] : Obesity [No episodes] : No hypoglycemic episodes since the last visit. [Understanding of foot care] : Patient expressed understanding of foot care [Most Recent A1C: ___] : Most recent A1C was [unfilled] [High Intensity] : Patient is currently on high intensity statin therapy [Does not check BP] : The patient is not checking blood pressure [<130/90] : Target blood pressure is  <130/90 [Target goal met] : BP target goal met [High Intensity therapy] : Patient is currently on high intensity statin therapy [Doing Well] : Patient is doing well [Managed with medications] : managed with  medication [Combined] : combined systolic and diastolic [LVEF___%] : The patient's last LV ejection fraction was [unfilled]% [With moderate physical activity] : Shortness of breath with moderate physical activity [Does not check weight] : The patient is not checking weight [Stable] : The condition is stable [Stable] : Overall status has been stable [None] : Patient does not have any asthma related symptoms [No Weight Changes] : No weight changes [Sedentary] : Patient is sedentary [FreeTextEntry6] : 57 y/o M with PMHx of Afib (on Eliquis), HFrEF (EF: 30-35%)s/p AICD placement, non-obstructive CAD,  Cardiomyopathy, HTN, HLD,  PATTIE on CPAP, Asthma, DM, Anxiety, Anemia and Hx of Positive FIT test presents to clinic for chronic disease management. Currently has no acute complaints.  [de-identified] : Currently on Metformin, Basaglar, Jardiance and weekly Ozempic  [de-identified] : Currently on Coreg, Lasix, Hydralazine, Isosorbide DiNitrate  [Orthopnea] : no orthopnea [Edema] : no edema [Symptoms Limit Activities] : Symptoms do not limit ~his/her~ activities [FreeTextEntry1] : #Atrial Fibrillation\par -on Coreg and Eliquis

## 2021-11-15 NOTE — ASSESSMENT
[FreeTextEntry1] : 57 y/o M with PMHx of Afib (on Eliquis), HFrEF (EF: 30-35%)s/p AICD placement, non-obstructive CAD,  Cardiomyopathy, HTN, HLD,  PATTIE on CPAP, Asthma, DM, Anxiety, Anemia and Hx of Positive FIT\par \par \par #Afib \par -CHADSVaSc: 4 (min) (HF, HTN, DM, Vascular Disease)\par -c/w Eliquis 5mg \par -c/w Coreg 6.25mg  BID\par -currently being managed by cardiology \par \par \par #HFrEF (EF: 30-35%)s/p AICD placement\par -c/w Lasix 40mg \par -c/w Coreg 6.25mg BID \par -c/w Entresto  mg \par -c/w Isosorbide DiNitrate 5mg  + Hydralazine 25mg TID \par -c/w Jardiance 10mg \par -currently being managed by cardiology \par \par #non-obstructive CAD\par -c/w Coreg\par -c/w Statin \par -On ARB\par \par #DM\par -A1c: 6.0\par -urine/micro ordered \par -opthalmology referral given \par -c/w Metformin 1000mg BID \par -c/w Basaglar 10u QHS \par -c/w Jardiance 10mg \par -c/w Ozempic \par -currently being managed by endocrinology \par \par #HTN, \par -c/w Coreg 6.25mg BID \par -c/w Isosorbide DiNitrate 5mg  + Hydralazine 25mg TID \par -c/w Lasix 40mg \par \par #HLD,  \par -c/w Atorvastatin 80mg QHS\par \par #PATTIE on CPAP,\par -c/w CPAP\par -weight loss discussed with patient\par -currently being managed by pulmonologist \par \par #Asthma\par -c/w Albuterol \par -c/w Montelukast \par \par #Anxiety\par -c/w Fluoxetine \par \par #Anemia and Hx of Positive FIT\par -repeat CBC, iron panel ordered \par -GI referral for colonoscopy given\par \par \par #Morbid Obesity \par -Recommend moderate-intensity physical activity \par -Counselled on low-calorie meals \par -Encourage weight loss \par -target goal: ~1 lb/week \par \par #HCM\par SCREENING \par -HIV/HCV: NEGATIVE \par -Colon Cancer: (+)FIT, GI referral for colonoscopy given  \par \par  IMMUNIZATIONS\par -Flu:  UTD \par -TDaP: UTD \par -Pneumonia: UTD  \par \par \par \par f/u on CBC w/diff; CMP; TSH w/ reflex T4, Lipid Panel, A1c, iron panel, urine micro\par \par \par #RTC in 2-3 weeks \par #Advised to bring all his medications \par \par \par Case and plan discussed with Dr. Pippa Beebe \par \par \par \par \par

## 2021-12-03 ENCOUNTER — APPOINTMENT (OUTPATIENT)
Dept: FAMILY MEDICINE | Facility: HOSPITAL | Age: 56
End: 2021-12-03

## 2021-12-03 ENCOUNTER — OUTPATIENT (OUTPATIENT)
Dept: OUTPATIENT SERVICES | Facility: HOSPITAL | Age: 56
LOS: 1 days | End: 2021-12-03
Payer: MEDICARE

## 2021-12-03 VITALS
BODY MASS INDEX: 40.9 KG/M2 | DIASTOLIC BLOOD PRESSURE: 82 MMHG | OXYGEN SATURATION: 96 % | TEMPERATURE: 97.9 F | RESPIRATION RATE: 15 BRPM | HEART RATE: 74 BPM | WEIGHT: 310 LBS | SYSTOLIC BLOOD PRESSURE: 122 MMHG

## 2021-12-03 DIAGNOSIS — Z00.00 ENCOUNTER FOR GENERAL ADULT MEDICAL EXAMINATION WITHOUT ABNORMAL FINDINGS: ICD-10-CM

## 2021-12-03 DIAGNOSIS — D64.9 ANEMIA, UNSPECIFIED: ICD-10-CM

## 2021-12-03 DIAGNOSIS — Z95.810 PRESENCE OF AUTOMATIC (IMPLANTABLE) CARDIAC DEFIBRILLATOR: Chronic | ICD-10-CM

## 2021-12-03 DIAGNOSIS — Z90.89 ACQUIRED ABSENCE OF OTHER ORGANS: Chronic | ICD-10-CM

## 2021-12-03 DIAGNOSIS — R19.5 OTHER FECAL ABNORMALITIES: ICD-10-CM

## 2021-12-03 PROCEDURE — G0463: CPT

## 2021-12-09 DIAGNOSIS — I48.91 UNSPECIFIED ATRIAL FIBRILLATION: ICD-10-CM

## 2021-12-09 DIAGNOSIS — I10 ESSENTIAL (PRIMARY) HYPERTENSION: ICD-10-CM

## 2021-12-09 DIAGNOSIS — E11.9 TYPE 2 DIABETES MELLITUS WITHOUT COMPLICATIONS: ICD-10-CM

## 2021-12-21 ENCOUNTER — NON-APPOINTMENT (OUTPATIENT)
Age: 56
End: 2021-12-21

## 2021-12-21 ENCOUNTER — APPOINTMENT (OUTPATIENT)
Dept: CARDIOLOGY | Facility: CLINIC | Age: 56
End: 2021-12-21
Payer: MEDICARE

## 2021-12-21 VITALS — DIASTOLIC BLOOD PRESSURE: 80 MMHG | HEART RATE: 64 BPM | SYSTOLIC BLOOD PRESSURE: 120 MMHG

## 2021-12-21 VITALS
TEMPERATURE: 97.4 F | RESPIRATION RATE: 16 BRPM | HEART RATE: 64 BPM | OXYGEN SATURATION: 96 % | SYSTOLIC BLOOD PRESSURE: 135 MMHG | HEIGHT: 73 IN | WEIGHT: 306 LBS | DIASTOLIC BLOOD PRESSURE: 94 MMHG | BODY MASS INDEX: 40.56 KG/M2

## 2021-12-21 PROCEDURE — 93000 ELECTROCARDIOGRAM COMPLETE: CPT

## 2021-12-21 PROCEDURE — 99214 OFFICE O/P EST MOD 30 MIN: CPT

## 2021-12-21 NOTE — ASSESSMENT
[FreeTextEntry1] : Impression\par 1 cardiomyopathy currently asymptomatic \par 3. History of recurrent atrial fibrillation on Eliquis currently in sinus rhythm\par 4. Hypertension well controlled\par 5. AICD monitored at [place]\par 6. very mild cad\par \par Plan:\par 1. continue current regimen. \par 2. Echo later today or tomw to assess ef

## 2021-12-21 NOTE — REASON FOR VISIT
[Arrhythmia/ECG Abnorrmalities] : arrhythmia/ECG abnormalities [Structural Heart and Valve Disease] : structural heart and valve disease [FreeTextEntry1] : Patient returns for followup. Feeling well. Offers no complaints of chest discomfort shortness of breath palpitations dizziness or syncope.He is walking 3 miles per day without difficulty\par

## 2021-12-22 ENCOUNTER — APPOINTMENT (OUTPATIENT)
Dept: CARDIOLOGY | Facility: CLINIC | Age: 56
End: 2021-12-22
Payer: MEDICARE

## 2021-12-22 PROCEDURE — 93306 TTE W/DOPPLER COMPLETE: CPT

## 2022-01-17 PROBLEM — R19.5 POSITIVE FIT (FECAL IMMUNOCHEMICAL TEST): Status: ACTIVE | Noted: 2021-11-02

## 2022-01-17 PROBLEM — D64.9 ANEMIA: Status: ACTIVE | Noted: 2021-11-02

## 2022-01-17 NOTE — PHYSICAL EXAM
[No Acute Distress] : no acute distress [No Lymphadenopathy] : no lymphadenopathy [Supple] : supple [Thyroid Normal, No Nodules] : the thyroid was normal and there were no nodules present [Normal Rate] : normal rate  [Regular Rhythm] : with a regular rhythm [Normal S1, S2] : normal S1 and S2 [No Edema] : there was no peripheral edema [No Extremity Clubbing/Cyanosis] : no extremity clubbing/cyanosis [Normal] : no rash [No Focal Deficits] : no focal deficits [Normal Affect] : the affect was normal [Normal Insight/Judgement] : insight and judgment were intact [Comprehensive Foot Exam Normal] : Right and left foot were examined and both feet are normal. No ulcers in either foot. Toes are normal and with full ROM.  Normal tactile sensation with monofilament testing throughout both feet [de-identified] : (+)morbidly obese  [de-identified] : (+)JVD [de-identified] : (+)murmur

## 2022-01-17 NOTE — HISTORY OF PRESENT ILLNESS
[Asthma] : Asthma [Congestive Heart Failure] : Congestive Heart Failure [Diabetes Mellitus] : Diabetes Mellitus [Hyperlipidemia] : Hyperlipidemia [Hypertension] : Hypertension [Obesity] : Obesity [Patient was last seen on ___] : Patient was last seen on [unfilled] [No episodes] : No hypoglycemic episodes since the last visit. [Understanding of foot care] : Patient expressed understanding of foot care [Most Recent A1C: ___] : Most recent A1C was [unfilled] [High Intensity] : Patient is currently on high intensity statin therapy [Does not check BP] : The patient is not checking blood pressure [<130/90] : Target blood pressure is  <130/90 [Target goal met] : BP target goal met [Doing Well] : Patient is doing well [Managed with medications] : managed with  medication [High Intensity therapy] : Patient is currently on high intensity statin therapy [Combined] : combined systolic and diastolic [LVEF___%] : The patient's last LV ejection fraction was [unfilled]% [With moderate physical activity] : Shortness of breath with moderate physical activity [Does not check weight] : The patient is not checking weight [Stable] : The condition is stable [Stable] : Overall status has been stable [None] : Patient does not have any asthma related symptoms [Weight Loss ___ Pounds] : Weight loss of [unfilled] pounds [Following Diet Successfully] : Following the diet successfully [Sedentary] : Patient is sedentary [FreeTextEntry6] : 55 y/o M with PMHx of Afib (on Eliquis), HFrEF (EF: 30-35%)s/p AICD placement, non-obstructive CAD, Cardiomyopathy, HTN, HLD,  PATTIE on CPAP, Asthma, DM, Anxiety, Anemia and Hx of Positive FIT test presents to clinic for chronic disease management, labs results and medication refill. Reports compliance with medications. Patient did not bring his BP/DM logs or medications for review. Lab results discussed with the patient.  [de-identified] : Currently on Metformin, Basaglar, Jardiance and weekly Ozempic. Was evaluated by endocrinology. Reports no changes in the medications.  [de-identified] : Currently on Coreg, Lasix, Hydralazine, Isosorbide DiNitrate  [Orthopnea] : no orthopnea [Edema] : no edema [Symptoms Limit Activities] : Symptoms do not limit ~his/her~ activities [FreeTextEntry1] : #Atrial Fibrillation\par -on Coreg and Eliquis \par -requesting refill on Eliquis

## 2022-01-17 NOTE — ASSESSMENT
[FreeTextEntry1] : 57 y/o M with PMHx of Afib (on Eliquis), HFrEF (EF: 30-35%)s/p AICD placement, non-obstructive CAD,  Cardiomyopathy, HTN, HLD,  PATTIE on CPAP, Asthma, DM, Anxiety, Anemia and Hx of Positive FIT\par \par \par #Afib \par -CHADSVaSc: 4 (min) (HF, HTN, DM, Vascular Disease)\par -c/w Eliquis 5mg, refill sent to pharmacy \par -c/w Coreg 6.25mg  BID\par -currently being managed by cardiology, has a f/u appt \par \par \par #HFrEF (EF: 30-35%)s/p AICD placement\par -c/w Lasix 40mg \par -c/w Coreg 6.25mg BID \par -c/w Entresto  mg \par -c/w Isosorbide DiNitrate 5mg  + Hydralazine 25mg TID \par -c/w Jardiance 10mg \par -currently being managed by cardiology \par \par #non-obstructive CAD\par -c/w Coreg\par -c/w Statin \par -On ARB\par \par #DM\par -A1c: 6.0\par -urine/micro: reviewed  \par -opthalmology referral given \par -c/w Metformin 1000mg BID \par -c/w Basaglar 10u QHS \par -c/w Jardiance 10mg \par -c/w Ozempic \par -currently being managed by endocrinology, s/p recent Endocrinology eval. No changes in medications or doses, per pt \par \par #HTN, \par -c/w Coreg 6.25mg BID \par -c/w Isosorbide DiNitrate 5mg  + Hydralazine 25mg TID \par -c/w Lasix 40mg \par \par #HLD,  \par -c/w Atorvastatin 80mg QHS\par \par #PATTIE on CPAP,\par -c/w CPAP\par -weight loss discussed with patient\par -currently being managed by pulmonologist \par \par #Asthma\par -c/w Albuterol \par -c/w Montelukast \par \par #Anxiety\par -c/w Fluoxetine \par \par #Anemia and Hx of Positive FIT\par -repeat CBC: H&H has normalized; Iron studies: normal, (Ferritin was not performed) \par -GI referral for colonoscopy given for positive FIT results \par \par \par #Morbid Obesity \par -Recommend moderate-intensity physical activity \par -Counselled on low-calorie meals \par -Encourage weight loss \par -target goal: ~1 lb/week \par \par #HCM\par SCREENING \par -HIV/HCV: NEGATIVE \par -Colon Cancer: (+)FIT, GI referral for colonoscopy given \par -Lung Cancer Screening: NOT A CANDIDATE, currently does NOT meet the criteria for LDCT \par \par  IMMUNIZATIONS\par -Flu:  UTD \par -TDaP: UTD \par -Pneumonia: UTD  \par \par #RTC in 2-3 months\par \par f/u with Cardiology, Endocrinology, Pulmonologist and Ophthalmologist \par #Advised to bring all his medications every visit \par \par \par Case and plan discussed with Dr. Cassidy Abbott \par \par \par \par \par

## 2022-01-17 NOTE — PHYSICAL EXAM
[No Acute Distress] : no acute distress [No Lymphadenopathy] : no lymphadenopathy [Supple] : supple [Thyroid Normal, No Nodules] : the thyroid was normal and there were no nodules present [Normal Rate] : normal rate  [Regular Rhythm] : with a regular rhythm [Normal S1, S2] : normal S1 and S2 [No Edema] : there was no peripheral edema [No Extremity Clubbing/Cyanosis] : no extremity clubbing/cyanosis [Normal] : no rash [No Focal Deficits] : no focal deficits [Normal Affect] : the affect was normal [Normal Insight/Judgement] : insight and judgment were intact [Comprehensive Foot Exam Normal] : Right and left foot were examined and both feet are normal. No ulcers in either foot. Toes are normal and with full ROM.  Normal tactile sensation with monofilament testing throughout both feet [de-identified] : (+)morbidly obese  [de-identified] : (+)JVD [de-identified] : (+)murmur

## 2022-01-17 NOTE — ASSESSMENT
[FreeTextEntry1] : 55 y/o M with PMHx of Afib (on Eliquis), HFrEF (EF: 30-35%)s/p AICD placement, non-obstructive CAD,  Cardiomyopathy, HTN, HLD,  PATTIE on CPAP, Asthma, DM, Anxiety, Anemia and Hx of Positive FIT\par \par \par #Afib \par -CHADSVaSc: 4 (min) (HF, HTN, DM, Vascular Disease)\par -c/w Eliquis 5mg, refill sent to pharmacy \par -c/w Coreg 6.25mg  BID\par -currently being managed by cardiology, has a f/u appt \par \par \par #HFrEF (EF: 30-35%)s/p AICD placement\par -c/w Lasix 40mg \par -c/w Coreg 6.25mg BID \par -c/w Entresto  mg \par -c/w Isosorbide DiNitrate 5mg  + Hydralazine 25mg TID \par -c/w Jardiance 10mg \par -currently being managed by cardiology \par \par #non-obstructive CAD\par -c/w Coreg\par -c/w Statin \par -On ARB\par \par #DM\par -A1c: 6.0\par -urine/micro: reviewed  \par -opthalmology referral given \par -c/w Metformin 1000mg BID \par -c/w Basaglar 10u QHS \par -c/w Jardiance 10mg \par -c/w Ozempic \par -currently being managed by endocrinology, s/p recent Endocrinology eval. No changes in medications or doses, per pt \par \par #HTN, \par -c/w Coreg 6.25mg BID \par -c/w Isosorbide DiNitrate 5mg  + Hydralazine 25mg TID \par -c/w Lasix 40mg \par \par #HLD,  \par -c/w Atorvastatin 80mg QHS\par \par #PATTIE on CPAP,\par -c/w CPAP\par -weight loss discussed with patient\par -currently being managed by pulmonologist \par \par #Asthma\par -c/w Albuterol \par -c/w Montelukast \par \par #Anxiety\par -c/w Fluoxetine \par \par #Anemia and Hx of Positive FIT\par -repeat CBC: H&H has normalized; Iron studies: normal, (Ferritin was not performed) \par -GI referral for colonoscopy given for positive FIT results \par \par \par #Morbid Obesity \par -Recommend moderate-intensity physical activity \par -Counselled on low-calorie meals \par -Encourage weight loss \par -target goal: ~1 lb/week \par \par #HCM\par SCREENING \par -HIV/HCV: NEGATIVE \par -Colon Cancer: (+)FIT, GI referral for colonoscopy given \par -Lung Cancer Screening: NOT A CANDIDATE, currently does NOT meet the criteria for LDCT \par \par  IMMUNIZATIONS\par -Flu:  UTD \par -TDaP: UTD \par -Pneumonia: UTD  \par \par #RTC in 2-3 months\par \par f/u with Cardiology, Endocrinology, Pulmonologist and Ophthalmologist \par #Advised to bring all his medications every visit \par \par \par Case and plan discussed with Dr. Cassidy Abbott \par \par \par \par \par

## 2022-01-17 NOTE — HISTORY OF PRESENT ILLNESS
[Asthma] : Asthma [Congestive Heart Failure] : Congestive Heart Failure [Diabetes Mellitus] : Diabetes Mellitus [Hyperlipidemia] : Hyperlipidemia [Hypertension] : Hypertension [Obesity] : Obesity [Patient was last seen on ___] : Patient was last seen on [unfilled] [No episodes] : No hypoglycemic episodes since the last visit. [Understanding of foot care] : Patient expressed understanding of foot care [Most Recent A1C: ___] : Most recent A1C was [unfilled] [High Intensity] : Patient is currently on high intensity statin therapy [Does not check BP] : The patient is not checking blood pressure [<130/90] : Target blood pressure is  <130/90 [Target goal met] : BP target goal met [Doing Well] : Patient is doing well [Managed with medications] : managed with  medication [High Intensity therapy] : Patient is currently on high intensity statin therapy [Combined] : combined systolic and diastolic [LVEF___%] : The patient's last LV ejection fraction was [unfilled]% [With moderate physical activity] : Shortness of breath with moderate physical activity [Does not check weight] : The patient is not checking weight [Stable] : The condition is stable [Stable] : Overall status has been stable [None] : Patient does not have any asthma related symptoms [Weight Loss ___ Pounds] : Weight loss of [unfilled] pounds [Following Diet Successfully] : Following the diet successfully [Sedentary] : Patient is sedentary [FreeTextEntry6] : 55 y/o M with PMHx of Afib (on Eliquis), HFrEF (EF: 30-35%)s/p AICD placement, non-obstructive CAD, Cardiomyopathy, HTN, HLD,  PATTIE on CPAP, Asthma, DM, Anxiety, Anemia and Hx of Positive FIT test presents to clinic for chronic disease management, labs results and medication refill. Reports compliance with medications. Patient did not bring his BP/DM logs or medications for review. Lab results discussed with the patient.  [de-identified] : Currently on Metformin, Basaglar, Jardiance and weekly Ozempic. Was evaluated by endocrinology. Reports no changes in the medications.  [de-identified] : Currently on Coreg, Lasix, Hydralazine, Isosorbide DiNitrate  [Orthopnea] : no orthopnea [Edema] : no edema [Symptoms Limit Activities] : Symptoms do not limit ~his/her~ activities [FreeTextEntry1] : #Atrial Fibrillation\par -on Coreg and Eliquis \par -requesting refill on Eliquis

## 2022-01-19 ENCOUNTER — OUTPATIENT (OUTPATIENT)
Dept: OUTPATIENT SERVICES | Facility: HOSPITAL | Age: 57
LOS: 1 days | End: 2022-01-19
Payer: SELF-PAY

## 2022-01-19 ENCOUNTER — NON-APPOINTMENT (OUTPATIENT)
Age: 57
End: 2022-01-19

## 2022-01-19 ENCOUNTER — APPOINTMENT (OUTPATIENT)
Dept: FAMILY MEDICINE | Facility: HOSPITAL | Age: 57
End: 2022-01-19

## 2022-01-19 VITALS
DIASTOLIC BLOOD PRESSURE: 79 MMHG | HEART RATE: 82 BPM | BODY MASS INDEX: 41.96 KG/M2 | RESPIRATION RATE: 14 BRPM | WEIGHT: 315 LBS | SYSTOLIC BLOOD PRESSURE: 123 MMHG | OXYGEN SATURATION: 96 % | TEMPERATURE: 97.8 F

## 2022-01-19 DIAGNOSIS — Z90.89 ACQUIRED ABSENCE OF OTHER ORGANS: Chronic | ICD-10-CM

## 2022-01-19 DIAGNOSIS — Z00.00 ENCOUNTER FOR GENERAL ADULT MEDICAL EXAMINATION WITHOUT ABNORMAL FINDINGS: ICD-10-CM

## 2022-01-19 DIAGNOSIS — E11.9 ENCOUNTER FOR EXAMINATION OF EYES AND VISION W/OUT ABNORMAL FINDINGS: ICD-10-CM

## 2022-01-19 DIAGNOSIS — Z95.810 PRESENCE OF AUTOMATIC (IMPLANTABLE) CARDIAC DEFIBRILLATOR: Chronic | ICD-10-CM

## 2022-01-19 DIAGNOSIS — Z01.00 ENCOUNTER FOR EXAMINATION OF EYES AND VISION W/OUT ABNORMAL FINDINGS: ICD-10-CM

## 2022-01-19 PROCEDURE — G0463: CPT

## 2022-01-20 ENCOUNTER — NON-APPOINTMENT (OUTPATIENT)
Age: 57
End: 2022-01-20

## 2022-01-20 ENCOUNTER — RX RENEWAL (OUTPATIENT)
Age: 57
End: 2022-01-20

## 2022-01-22 ENCOUNTER — NON-APPOINTMENT (OUTPATIENT)
Age: 57
End: 2022-01-22

## 2022-01-25 DIAGNOSIS — Z01.00 ENCOUNTER FOR EXAMINATION OF EYES AND VISION WITHOUT ABNORMAL FINDINGS: ICD-10-CM

## 2022-01-27 ENCOUNTER — APPOINTMENT (OUTPATIENT)
Dept: ELECTROPHYSIOLOGY | Facility: CLINIC | Age: 57
End: 2022-01-27
Payer: MEDICARE

## 2022-01-27 ENCOUNTER — NON-APPOINTMENT (OUTPATIENT)
Age: 57
End: 2022-01-27

## 2022-01-27 PROCEDURE — 93295 DEV INTERROG REMOTE 1/2/MLT: CPT

## 2022-01-27 PROCEDURE — 93296 REM INTERROG EVL PM/IDS: CPT

## 2022-01-31 PROBLEM — Z01.00 DIABETIC EYE EXAM: Status: ACTIVE | Noted: 2022-01-19

## 2022-01-31 NOTE — PLAN
[FreeTextEntry1] : - Fundoscopic Examination done today \par - Pt informed that results will be interpreted by opthalmologist and repeat yearly if results  WNL\par (If results abnormal patient will be called and informed- patient aware that abnormal results entails further opthalmologic evaluation)\par - Patient verbalized understanding, all questions and concerns addressed. \par \par Above dicussed with Dr. Abbott\par Patient to RTC  in 2 months with Dr. Pantoja.\par

## 2022-01-31 NOTE — PHYSICAL EXAM
[No Acute Distress] : no acute distress [No Respiratory Distress] : no respiratory distress  [No Accessory Muscle Use] : no accessory muscle use [Clear to Auscultation] : lungs were clear to auscultation bilaterally [Normal Rate] : normal rate  [Regular Rhythm] : with a regular rhythm [Normal S1, S2] : normal S1 and S2

## 2022-01-31 NOTE — HISTORY OF PRESENT ILLNESS
[FreeTextEntry1] : diabetic eye clinic  [de-identified] : Patient is a 57 y/o M/ with a PMH of T2DM presenting for fundoscopic exam. Patient denies any acute complaints. Denies any changes in vision since last being seen. Patient's last A1c was 6.0 in November 2021.  Patient last microalbumin demonstrates normal renal function. Diabetes currently managed with ozempic, basaglar 10 units qhs, jardiance 10 mg, metformin 1000 mg BID. \par

## 2022-03-02 ENCOUNTER — APPOINTMENT (OUTPATIENT)
Dept: FAMILY MEDICINE | Facility: HOSPITAL | Age: 57
End: 2022-03-02

## 2022-03-02 ENCOUNTER — OUTPATIENT (OUTPATIENT)
Dept: OUTPATIENT SERVICES | Facility: HOSPITAL | Age: 57
LOS: 1 days | End: 2022-03-02
Payer: MEDICARE

## 2022-03-02 ENCOUNTER — NON-APPOINTMENT (OUTPATIENT)
Age: 57
End: 2022-03-02

## 2022-03-02 ENCOUNTER — RESULT CHARGE (OUTPATIENT)
Age: 57
End: 2022-03-02

## 2022-03-02 VITALS
WEIGHT: 315 LBS | SYSTOLIC BLOOD PRESSURE: 101 MMHG | RESPIRATION RATE: 14 BRPM | BODY MASS INDEX: 41.75 KG/M2 | TEMPERATURE: 97.6 F | HEIGHT: 73 IN | DIASTOLIC BLOOD PRESSURE: 65 MMHG | OXYGEN SATURATION: 96 % | HEART RATE: 65 BPM

## 2022-03-02 DIAGNOSIS — Z95.810 PRESENCE OF AUTOMATIC (IMPLANTABLE) CARDIAC DEFIBRILLATOR: Chronic | ICD-10-CM

## 2022-03-02 DIAGNOSIS — Z90.89 ACQUIRED ABSENCE OF OTHER ORGANS: Chronic | ICD-10-CM

## 2022-03-02 DIAGNOSIS — Z00.00 ENCOUNTER FOR GENERAL ADULT MEDICAL EXAMINATION WITHOUT ABNORMAL FINDINGS: ICD-10-CM

## 2022-03-02 PROCEDURE — 82947 ASSAY GLUCOSE BLOOD QUANT: CPT

## 2022-03-02 PROCEDURE — G0463: CPT

## 2022-03-02 NOTE — HISTORY OF PRESENT ILLNESS
[FreeTextEntry1] : Funduscopic exam [de-identified] : POCT A1c 5.7\par \par f/u 1-2 wks for DMII f/u\par \par PMH DMII presenting for funduscopic exam. Pt denies any acute complaints. Denies any changes in vision since last being seen. \par \par Pt last A1c was  on\par \par Pt last CMP/Urine creatinine demonstrates renal function\par \par Medications currently taking:\par \par \par A/P:\par Pt informed that results will be interpreted by ophthalmologist and repeat yearly if results WNL.\par If results abnormal, pt will be coalled and informed. Patient aware that abnormal results entails further ophthalmologic evaluation.\par Pt verbalized understanding, all questions and concerns addressed.\par \par Case d/w\par \par

## 2022-03-03 ENCOUNTER — NON-APPOINTMENT (OUTPATIENT)
Age: 57
End: 2022-03-03

## 2022-03-03 DIAGNOSIS — E11.9 TYPE 2 DIABETES MELLITUS WITHOUT COMPLICATIONS: ICD-10-CM

## 2022-03-07 ENCOUNTER — OUTPATIENT (OUTPATIENT)
Dept: OUTPATIENT SERVICES | Facility: HOSPITAL | Age: 57
LOS: 1 days | End: 2022-03-07
Payer: MEDICARE

## 2022-03-07 DIAGNOSIS — Z95.810 PRESENCE OF AUTOMATIC (IMPLANTABLE) CARDIAC DEFIBRILLATOR: Chronic | ICD-10-CM

## 2022-03-07 DIAGNOSIS — Z20.828 CONTACT WITH AND (SUSPECTED) EXPOSURE TO OTHER VIRAL COMMUNICABLE DISEASES: ICD-10-CM

## 2022-03-07 DIAGNOSIS — Z90.89 ACQUIRED ABSENCE OF OTHER ORGANS: Chronic | ICD-10-CM

## 2022-03-07 LAB — SARS-COV-2 RNA SPEC QL NAA+PROBE: SIGNIFICANT CHANGE UP

## 2022-03-07 PROCEDURE — U0005: CPT

## 2022-03-07 PROCEDURE — U0003: CPT

## 2022-03-08 ENCOUNTER — TRANSCRIPTION ENCOUNTER (OUTPATIENT)
Age: 57
End: 2022-03-08

## 2022-03-09 ENCOUNTER — OUTPATIENT (OUTPATIENT)
Dept: OUTPATIENT SERVICES | Facility: HOSPITAL | Age: 57
LOS: 1 days | End: 2022-03-09
Payer: MEDICARE

## 2022-03-09 DIAGNOSIS — Z95.810 PRESENCE OF AUTOMATIC (IMPLANTABLE) CARDIAC DEFIBRILLATOR: Chronic | ICD-10-CM

## 2022-03-09 DIAGNOSIS — Z90.89 ACQUIRED ABSENCE OF OTHER ORGANS: Chronic | ICD-10-CM

## 2022-03-09 DIAGNOSIS — Z12.11 ENCOUNTER FOR SCREENING FOR MALIGNANT NEOPLASM OF COLON: ICD-10-CM

## 2022-03-09 LAB — GLUCOSE BLDC GLUCOMTR-MCNC: 84 MG/DL — SIGNIFICANT CHANGE UP (ref 70–99)

## 2022-03-09 PROCEDURE — 82962 GLUCOSE BLOOD TEST: CPT

## 2022-03-09 PROCEDURE — G0121: CPT

## 2022-03-09 RX ORDER — SODIUM CHLORIDE 9 MG/ML
500 INJECTION INTRAMUSCULAR; INTRAVENOUS; SUBCUTANEOUS
Refills: 0 | Status: COMPLETED | OUTPATIENT
Start: 2022-03-09 | End: 2022-03-09

## 2022-03-09 RX ADMIN — SODIUM CHLORIDE 75 MILLILITER(S): 9 INJECTION INTRAMUSCULAR; INTRAVENOUS; SUBCUTANEOUS at 12:59

## 2022-03-16 ENCOUNTER — NON-APPOINTMENT (OUTPATIENT)
Age: 57
End: 2022-03-16

## 2022-03-19 ENCOUNTER — APPOINTMENT (OUTPATIENT)
Dept: FAMILY MEDICINE | Facility: HOSPITAL | Age: 57
End: 2022-03-19

## 2022-03-19 ENCOUNTER — OUTPATIENT (OUTPATIENT)
Dept: OUTPATIENT SERVICES | Facility: HOSPITAL | Age: 57
LOS: 1 days | End: 2022-03-19
Payer: MEDICARE

## 2022-03-19 VITALS
DIASTOLIC BLOOD PRESSURE: 69 MMHG | BODY MASS INDEX: 41.96 KG/M2 | RESPIRATION RATE: 14 BRPM | TEMPERATURE: 96.6 F | HEART RATE: 53 BPM | SYSTOLIC BLOOD PRESSURE: 106 MMHG | OXYGEN SATURATION: 95 % | WEIGHT: 315 LBS

## 2022-03-19 DIAGNOSIS — Z90.89 ACQUIRED ABSENCE OF OTHER ORGANS: Chronic | ICD-10-CM

## 2022-03-19 DIAGNOSIS — Z00.00 ENCOUNTER FOR GENERAL ADULT MEDICAL EXAMINATION WITHOUT ABNORMAL FINDINGS: ICD-10-CM

## 2022-03-19 DIAGNOSIS — Z95.810 PRESENCE OF AUTOMATIC (IMPLANTABLE) CARDIAC DEFIBRILLATOR: Chronic | ICD-10-CM

## 2022-03-19 PROCEDURE — G0463: CPT

## 2022-04-20 ENCOUNTER — NON-APPOINTMENT (OUTPATIENT)
Age: 57
End: 2022-04-20

## 2022-04-25 ENCOUNTER — APPOINTMENT (OUTPATIENT)
Dept: CARDIOLOGY | Facility: CLINIC | Age: 57
End: 2022-04-25
Payer: MEDICARE

## 2022-04-25 ENCOUNTER — NON-APPOINTMENT (OUTPATIENT)
Age: 57
End: 2022-04-25

## 2022-04-25 VITALS — HEART RATE: 80 BPM | SYSTOLIC BLOOD PRESSURE: 110 MMHG | DIASTOLIC BLOOD PRESSURE: 80 MMHG

## 2022-04-25 VITALS
HEIGHT: 73 IN | OXYGEN SATURATION: 97 % | BODY MASS INDEX: 41.75 KG/M2 | TEMPERATURE: 97.6 F | RESPIRATION RATE: 16 BRPM | WEIGHT: 315 LBS | HEART RATE: 80 BPM

## 2022-04-25 DIAGNOSIS — I49.3 VENTRICULAR PREMATURE DEPOLARIZATION: ICD-10-CM

## 2022-04-25 DIAGNOSIS — R94.31 ABNORMAL ELECTROCARDIOGRAM [ECG] [EKG]: ICD-10-CM

## 2022-04-25 PROCEDURE — 99215 OFFICE O/P EST HI 40 MIN: CPT

## 2022-04-25 PROCEDURE — 93000 ELECTROCARDIOGRAM COMPLETE: CPT

## 2022-04-25 RX ORDER — FLUOXETINE HYDROCHLORIDE 20 MG/1
20 TABLET ORAL DAILY
Qty: 90 | Refills: 0 | Status: DISCONTINUED | COMMUNITY
Start: 2018-04-18 | End: 2022-04-25

## 2022-04-25 NOTE — ASSESSMENT
[FreeTextEntry1] : Impression\par 1 cardiomyopathy currently asymptomatic \par 3. History of recurrent atrial fibrillation on Eliquis currently in sinus rhythm\par 4. Hypertension well controlled\par 5. AICD monitored at [facility]/ patient with qt prologation which he has had in the past according to the medical record\par 6. very mild cad\par \par Plan:\par 1.For now, have gone over the patient's medications with him. Fluoxetine could have been a possible etiology but the patient states he has not taken his for some time (he went home and phoned me and told me that he is not taking it.) I have gone over his other medications and none of them would appear to be in etiology.\par 2. Will get renal profile and magnesium level to assess for any possible metabolic abnormalities. Patient to continue with remote pacer and ICD monitoring thru [facility]

## 2022-04-25 NOTE — REASON FOR VISIT
[Arrhythmia/ECG Abnorrmalities] : arrhythmia/ECG abnormalities [Structural Heart and Valve Disease] : structural heart and valve disease [Coronary Artery Disease] : coronary artery disease [FreeTextEntry1] : Patient returns for followup. Feeling well. Offers no complaints of chest discomfort shortness of breath palpitations dizziness or syncope.\par

## 2022-04-28 ENCOUNTER — NON-APPOINTMENT (OUTPATIENT)
Age: 57
End: 2022-04-28

## 2022-04-28 ENCOUNTER — APPOINTMENT (OUTPATIENT)
Dept: ELECTROPHYSIOLOGY | Facility: CLINIC | Age: 57
End: 2022-04-28
Payer: MEDICARE

## 2022-04-28 PROCEDURE — 93296 REM INTERROG EVL PM/IDS: CPT

## 2022-04-28 PROCEDURE — 93295 DEV INTERROG REMOTE 1/2/MLT: CPT

## 2022-05-01 NOTE — PHYSICAL EXAM
[No Acute Distress] : no acute distress [No Lymphadenopathy] : no lymphadenopathy [Supple] : supple [Thyroid Normal, No Nodules] : the thyroid was normal and there were no nodules present [Normal Rate] : normal rate  [Regular Rhythm] : with a regular rhythm [Normal S1, S2] : normal S1 and S2 [No Edema] : there was no peripheral edema [No Extremity Clubbing/Cyanosis] : no extremity clubbing/cyanosis [Normal] : no rash [No Focal Deficits] : no focal deficits [Normal Affect] : the affect was normal [Normal Insight/Judgement] : insight and judgment were intact [Comprehensive Foot Exam Normal] : Right and left foot were examined and both feet are normal. No ulcers in either foot. Toes are normal and with full ROM.  Normal tactile sensation with monofilament testing throughout both feet [de-identified] : (+)morbidly obese  [de-identified] : (+)JVD [de-identified] : (+)murmur

## 2022-05-01 NOTE — HISTORY OF PRESENT ILLNESS
[Asthma] : Asthma [Congestive Heart Failure] : Congestive Heart Failure [Diabetes Mellitus] : Diabetes Mellitus [Hyperlipidemia] : Hyperlipidemia [Hypertension] : Hypertension [Obesity] : Obesity [Patient was last seen on ___] : Patient was last seen on [unfilled] [No episodes] : No hypoglycemic episodes since the last visit. [Understanding of foot care] : Patient expressed understanding of foot care [Most Recent A1C: ___] : Most recent A1C was [unfilled] [High Intensity] : Patient is currently on high intensity statin therapy [Does not check BP] : The patient is not checking blood pressure [<130/90] : Target blood pressure is  <130/90 [Target goal met] : BP target goal met [Doing Well] : Patient is doing well [Managed with medications] : managed with  medication [High Intensity therapy] : Patient is currently on high intensity statin therapy [Combined] : combined systolic and diastolic [LVEF___%] : The patient's last LV ejection fraction was [unfilled]% [With moderate physical activity] : Shortness of breath with moderate physical activity [Does not check weight] : The patient is not checking weight [Stable] : The condition is stable [Stable] : Overall status has been stable [None] : Patient does not have any asthma related symptoms [Weight Loss ___ Pounds] : Weight loss of [unfilled] pounds [Following Diet Successfully] : Following the diet successfully [Sedentary] : Patient is sedentary [FreeTextEntry6] : 57 y/o M with PMHx of Afib (on Eliquis), HFrEF (EF: 30-35%)s/p AICD placement, non-obstructive CAD, Cardiomyopathy, HTN, HLD,  PATTIE on CPAP, Asthma, DM, Anxiety, Anemia and Hx of Positive FIT test presents to clinic for chronic disease management, labs results and medication refill. Reports compliance with medications. Patient did not bring his BP/DM logs or medications for review. Lab results discussed with the patient.  [de-identified] : Currently on Metformin, Basaglar, Jardiance and weekly Ozempic. Was evaluated by endocrinology. Reports no changes in the medications.  [de-identified] : Currently on Coreg, Lasix, Hydralazine, Isosorbide DiNitrate  [Orthopnea] : no orthopnea [Edema] : no edema [Symptoms Limit Activities] : Symptoms do not limit ~his/her~ activities [FreeTextEntry1] : #Atrial Fibrillation\par -on Coreg and Eliquis \par -requesting refill on Eliquis

## 2022-05-19 ENCOUNTER — APPOINTMENT (OUTPATIENT)
Dept: PULMONOLOGY | Facility: CLINIC | Age: 57
End: 2022-05-19
Payer: MEDICARE

## 2022-05-19 VITALS
SYSTOLIC BLOOD PRESSURE: 130 MMHG | WEIGHT: 315 LBS | RESPIRATION RATE: 16 BRPM | OXYGEN SATURATION: 95 % | HEIGHT: 73 IN | BODY MASS INDEX: 41.75 KG/M2 | TEMPERATURE: 97.8 F | HEART RATE: 96 BPM | DIASTOLIC BLOOD PRESSURE: 91 MMHG

## 2022-05-19 PROCEDURE — 99213 OFFICE O/P EST LOW 20 MIN: CPT

## 2022-05-19 NOTE — PHYSICAL EXAM
[No Acute Distress] : no acute distress [Well Nourished] : well nourished [Well Developed] : well developed [Normal Rate/Rhythm] : normal rate/rhythm [No Resp Distress] : no resp distress [Clear to Auscultation Bilaterally] : clear to auscultation bilaterally [Oriented x3] : oriented x3 [Normal Affect] : normal affect [TextBox_2] : obesity [TextBox_105] : wearing compression stockings

## 2022-05-19 NOTE — END OF VISIT
[FreeTextEntry3] : I reviewed and agree with the information elicited by the advanced care practitioner and I personally elicited a history and examined the patient and developed a plan of care\par

## 2022-05-19 NOTE — ASSESSMENT
[FreeTextEntry1] : 56M with hx of morbid obesity, HF, Afib (Eliquis), has AICD,  Asthma, severe PATTIE (CPAP 14 cm H2O) and delayed sleep phase syndrome who presents for routine follow up.\par \par #Asthma - \par - Start LABA/ICS. Rx Advair 250/50 BID.\par - Refill Albuterol for prn use. \par - PFTs prior next visit\par \par #CPAP - compliant\par - c/w CPAP therapy\par - Compliance report shows therapeutic AHI in march\par - He will call DME provider to reconnect modem to wifi. Will request recent data next visit.\par - He will call [employer] regarding replacement machine\par \par RTC in 3 months with PFTs.

## 2022-05-19 NOTE — HISTORY OF PRESENT ILLNESS
[TextBox_4] : 56M with hx of morbid obesity, HF, Afib (Eliquis), has AICD, Asthma, severe PATTIE (CPAP 14 cm H2O) and delayed sleep phase syndrome who presents for follow up. Patient was last seen in pulm clinic on 11/2/21. \par \par Asthma: more symptomatic during allergy season. using albuterol 3 x daily during the last month. Doesn’t need to use it at night. \par Flonase daily for post nasal drip with relief\par \par + chest tightness, wheezing, short of breath, cough, sputum clear-white.\par \par PATTIE: He continues cpap every night for about 8 hours. rarely naps. \par Total time in bed 8-12 hours\par Still waiting to receive replacement machine\par \par Exercise tolerance: walking 4 miles , 2 flights \par \par Denies any fevers, chills, nasal congestion\par \par covid vaccinated x 2 boosters

## 2022-06-05 NOTE — COUNSELING
[Potential consequences of obesity discussed] : Potential consequences of obesity discussed [Benefits of weight loss discussed] : Benefits of weight loss discussed [FreeTextEntry2] : low salt diet Automatic Implantable Cardioverter Defibrillator/Pacemaker

## 2022-07-28 ENCOUNTER — NON-APPOINTMENT (OUTPATIENT)
Age: 57
End: 2022-07-28

## 2022-07-28 ENCOUNTER — APPOINTMENT (OUTPATIENT)
Dept: ELECTROPHYSIOLOGY | Facility: CLINIC | Age: 57
End: 2022-07-28

## 2022-07-28 PROCEDURE — 93296 REM INTERROG EVL PM/IDS: CPT

## 2022-07-28 PROCEDURE — 93295 DEV INTERROG REMOTE 1/2/MLT: CPT

## 2022-07-30 ENCOUNTER — TELEPHONE ENCOUNTER (OUTPATIENT)
Age: 57
End: 2022-07-30

## 2022-07-31 ENCOUNTER — TELEPHONE ENCOUNTER (OUTPATIENT)
Age: 57
End: 2022-07-31

## 2022-08-15 ENCOUNTER — APPOINTMENT (OUTPATIENT)
Dept: PULMONOLOGY | Facility: CLINIC | Age: 57
End: 2022-08-15

## 2022-08-29 ENCOUNTER — APPOINTMENT (OUTPATIENT)
Dept: CARDIOLOGY | Facility: CLINIC | Age: 57
End: 2022-08-29

## 2022-08-29 ENCOUNTER — NON-APPOINTMENT (OUTPATIENT)
Age: 57
End: 2022-08-29

## 2022-08-29 VITALS — HEART RATE: 84 BPM | DIASTOLIC BLOOD PRESSURE: 120 MMHG | SYSTOLIC BLOOD PRESSURE: 160 MMHG

## 2022-08-29 VITALS
RESPIRATION RATE: 16 BRPM | HEIGHT: 73 IN | WEIGHT: 315 LBS | OXYGEN SATURATION: 98 % | BODY MASS INDEX: 41.75 KG/M2 | HEART RATE: 79 BPM

## 2022-08-29 VITALS — SYSTOLIC BLOOD PRESSURE: 146 MMHG | DIASTOLIC BLOOD PRESSURE: 106 MMHG

## 2022-08-29 PROCEDURE — 99214 OFFICE O/P EST MOD 30 MIN: CPT | Mod: 25

## 2022-08-29 PROCEDURE — 93000 ELECTROCARDIOGRAM COMPLETE: CPT

## 2022-08-29 NOTE — ASSESSMENT
[FreeTextEntry1] : Impression\par 1 cardiomyopathy currently asymptomatic \par 3. History of recurrent atrial fibrillation on Eliquis currently in sinus rhythm\par 4. Hypertension with markedly elevated readingsctoday\par 5. AICD monitored at [facility]/ \par 6. very mild cad\par \par Plan\par .:Urged patient to better comply with his diet\par 2. Return for followup in 2 weeks' time\par

## 2022-08-29 NOTE — DISCHARGE NOTE ADULT - REASON FOR ADMISSION
Detail Level: Detailed Show Aperture Variable?: Yes Consent: The patient's consent was obtained including but not limited to risks of crusting, scabbing, blistering, scarring, darker or lighter pigmentary change, recurrence, incomplete removal and infection. Number Of Freeze-Thaw Cycles: 1 freeze-thaw cycle Duration Of Freeze Thaw-Cycle (Seconds): 3 Post-Care Instructions: I reviewed with the patient in detail post-care instructions. Patient is to wear sunprotection, and avoid picking at any of the treated lesions. Pt may apply Vaseline to crusted or scabbing areas if desired. Render Note In Bullet Format When Appropriate: No Medical Necessity Clause: This procedure was medically necessary because the lesions that were treated were: Number Of Freeze-Thaw Cycles: 3 freeze-thaw cycles Spray Paint Text: The liquid nitrogen was applied to the skin utilizing a spray paint frosting technique. Post-Care Instructions: I reviewed with the patient in detail post-care instructions. Patient is to wear sunprotection, and avoid picking at any of the treated lesions. Pt may apply Vaseline to crusted or scabbing areas. Consent: The patient's consent was obtained including but not limited to risks of crusting, scabbing, blistering, scarring, disfiguring scarring, nerve damage, darker or lighter pigmentary change, recurrence, incomplete removal and infection. SOB Medical Necessity Information: It is in your best interest to select a reason for this procedure from the list below. All of these items fulfill various CMS LCD requirements except the new and changing color options.

## 2022-08-29 NOTE — REASON FOR VISIT
[Arrhythmia/ECG Abnorrmalities] : arrhythmia/ECG abnormalities [Structural Heart and Valve Disease] : structural heart and valve disease [Hypertension] : hypertension [FreeTextEntry1] : Patient returns for followup. Feeling well. Offers no complaints of chest discomfort shortness of breath palpitations dizziness or syncope.He has gained weight and admits his diet has been poor.\par

## 2022-09-19 ENCOUNTER — NON-APPOINTMENT (OUTPATIENT)
Age: 57
End: 2022-09-19

## 2022-09-19 ENCOUNTER — APPOINTMENT (OUTPATIENT)
Dept: CARDIOLOGY | Facility: CLINIC | Age: 57
End: 2022-09-19

## 2022-09-19 VITALS
TEMPERATURE: 97.4 F | HEART RATE: 101 BPM | WEIGHT: 315 LBS | OXYGEN SATURATION: 96 % | BODY MASS INDEX: 41.75 KG/M2 | HEIGHT: 73 IN | RESPIRATION RATE: 16 BRPM

## 2022-09-19 VITALS — SYSTOLIC BLOOD PRESSURE: 90 MMHG | DIASTOLIC BLOOD PRESSURE: 64 MMHG

## 2022-09-19 DIAGNOSIS — Z00.00 ENCOUNTER FOR GENERAL ADULT MEDICAL EXAMINATION W/OUT ABNORMAL FINDINGS: ICD-10-CM

## 2022-09-19 PROCEDURE — 93000 ELECTROCARDIOGRAM COMPLETE: CPT

## 2022-09-19 PROCEDURE — 99213 OFFICE O/P EST LOW 20 MIN: CPT | Mod: 25

## 2022-09-19 NOTE — REASON FOR VISIT
[Hypertension] : hypertension [FreeTextEntry1] : Patient returns for followup. Feeling well. Offers no complaints of chest discomfort shortness of breath palpitations dizziness or syncope.  In retrospect he believes that he was not taking his medication when I last saw him.\par

## 2022-09-19 NOTE — ASSESSMENT
[FreeTextEntry1] : Impression\par 1 cardiomyopathy currently asymptomatic \par 3. History of recurrent atrial fibrillation on Eliquis currently in sinus rhythm\par 4. Hypertension now relatively hypotensive which is not unusual for this patient\par 5. AICD monitored at [place]/ \par 6. very mild cad\par \par Plan\par 1.  Continue current medical regimen\par

## 2022-09-20 LAB
ALBUMIN SERPL ELPH-MCNC: 4.7 G/DL
ALP BLD-CCNC: 59 U/L
ALT SERPL-CCNC: 29 U/L
ANION GAP SERPL CALC-SCNC: 12 MMOL/L
AST SERPL-CCNC: 27 U/L
BASOPHILS # BLD AUTO: 0.03 K/UL
BASOPHILS NFR BLD AUTO: 0.4 %
BILIRUB SERPL-MCNC: 0.3 MG/DL
BUN SERPL-MCNC: 27 MG/DL
CALCIUM SERPL-MCNC: 9.9 MG/DL
CHLORIDE SERPL-SCNC: 105 MMOL/L
CHOLEST SERPL-MCNC: 203 MG/DL
CO2 SERPL-SCNC: 25 MMOL/L
CREAT SERPL-MCNC: 1.38 MG/DL
EGFR: 60 ML/MIN/1.73M2
EOSINOPHIL # BLD AUTO: 0.37 K/UL
EOSINOPHIL NFR BLD AUTO: 4.9 %
GLUCOSE SERPL-MCNC: 120 MG/DL
HCT VFR BLD CALC: 44.2 %
HDLC SERPL-MCNC: 46 MG/DL
HGB BLD-MCNC: 14.1 G/DL
IMM GRANULOCYTES NFR BLD AUTO: 0.3 %
LDLC SERPL CALC-MCNC: 103 MG/DL
LYMPHOCYTES # BLD AUTO: 2.75 K/UL
LYMPHOCYTES NFR BLD AUTO: 36.2 %
MAN DIFF?: NORMAL
MCHC RBC-ENTMCNC: 28.8 PG
MCHC RBC-ENTMCNC: 31.9 GM/DL
MCV RBC AUTO: 90.4 FL
MONOCYTES # BLD AUTO: 0.92 K/UL
MONOCYTES NFR BLD AUTO: 12.1 %
NEUTROPHILS # BLD AUTO: 3.5 K/UL
NEUTROPHILS NFR BLD AUTO: 46.1 %
NONHDLC SERPL-MCNC: 157 MG/DL
PLATELET # BLD AUTO: 280 K/UL
POTASSIUM SERPL-SCNC: 4.9 MMOL/L
PROT SERPL-MCNC: 7.7 G/DL
RBC # BLD: 4.89 M/UL
RBC # FLD: 13.6 %
SODIUM SERPL-SCNC: 142 MMOL/L
T3 SERPL-MCNC: 104 NG/DL
T4 SERPL-MCNC: 6.2 UG/DL
TRIGL SERPL-MCNC: 273 MG/DL
TSH SERPL-ACNC: 2.24 UIU/ML
WBC # FLD AUTO: 7.59 K/UL

## 2022-09-21 LAB
ESTIMATED AVERAGE GLUCOSE: 174 MG/DL
GLUCOSE BS SERPL-MCNC: 119 MG/DL
HBA1C MFR BLD HPLC: 7.7 %

## 2022-10-24 ENCOUNTER — RX RENEWAL (OUTPATIENT)
Age: 57
End: 2022-10-24

## 2022-10-27 ENCOUNTER — NON-APPOINTMENT (OUTPATIENT)
Age: 57
End: 2022-10-27

## 2022-10-27 ENCOUNTER — APPOINTMENT (OUTPATIENT)
Dept: ELECTROPHYSIOLOGY | Facility: CLINIC | Age: 57
End: 2022-10-27

## 2022-10-27 PROCEDURE — 93295 DEV INTERROG REMOTE 1/2/MLT: CPT

## 2022-10-27 PROCEDURE — 93296 REM INTERROG EVL PM/IDS: CPT

## 2022-11-07 ENCOUNTER — RX RENEWAL (OUTPATIENT)
Age: 57
End: 2022-11-07

## 2022-12-20 ENCOUNTER — APPOINTMENT (OUTPATIENT)
Dept: CARDIOLOGY | Facility: CLINIC | Age: 57
End: 2022-12-20

## 2022-12-20 ENCOUNTER — NON-APPOINTMENT (OUTPATIENT)
Age: 57
End: 2022-12-20

## 2022-12-20 VITALS
HEART RATE: 54 BPM | WEIGHT: 315 LBS | BODY MASS INDEX: 41.75 KG/M2 | HEIGHT: 73 IN | OXYGEN SATURATION: 97 % | RESPIRATION RATE: 18 BRPM

## 2022-12-20 VITALS — DIASTOLIC BLOOD PRESSURE: 70 MMHG | HEART RATE: 70 BPM | SYSTOLIC BLOOD PRESSURE: 110 MMHG

## 2022-12-20 PROCEDURE — 99214 OFFICE O/P EST MOD 30 MIN: CPT | Mod: 25

## 2022-12-20 PROCEDURE — 93000 ELECTROCARDIOGRAM COMPLETE: CPT

## 2022-12-20 NOTE — PHYSICAL EXAM
[5th Left ICS - MCL] : palpated at the 5th LICS in the midclavicular line [Normal] : normal [Normal Rate] : normal [Premature Beats] : regular with premature beats

## 2022-12-20 NOTE — CARDIOLOGY SUMMARY
[de-identified] : 12/20/2022\par sinus rhythm frequent pvc's [Dear  ___] : Dear  [unfilled], [Courtesy Letter:] : I had the pleasure of seeing your patient, [unfilled], in my office today. [Please see my note below.] : Please see my note below. [Consult Closing:] : Thank you very much for allowing me to participate in the care of this patient.  If you have any questions, please do not hesitate to contact me. [Sincerely,] : Sincerely, [FreeTextEntry2] : Gwen Mistry MD\par [address] [facility] [address]\par [place], [place]\par [address] [FreeTextEntry3] : Tacho Julian MD\par [facility] [place]\par [address] [place] [address], [address]\par [place], [place]\par [address]\par p: [phone]\par f: [phone]

## 2022-12-20 NOTE — ASSESSMENT
[FreeTextEntry1] : Impression\par 1 cardiomyopathy currently asymptomatic \par 3. History of recurrent atrial fibrillation on Eliquis currently in sinus rhythm\par 4. Hypertension well controlled today\par 5. AICD monitored at [facility] and 1/2 years of generator life remaining as of last interrogation\par 6. very mild cad\par \par Plan\par 1.  Continue current medical regimen\par

## 2023-01-03 ENCOUNTER — EMERGENCY (EMERGENCY)
Facility: HOSPITAL | Age: 58
LOS: 1 days | Discharge: HOME | End: 2023-01-03
Attending: EMERGENCY MEDICINE | Admitting: EMERGENCY MEDICINE
Payer: MEDICARE

## 2023-01-03 VITALS
HEART RATE: 50 BPM | OXYGEN SATURATION: 96 % | DIASTOLIC BLOOD PRESSURE: 92 MMHG | HEIGHT: 73 IN | SYSTOLIC BLOOD PRESSURE: 137 MMHG | TEMPERATURE: 98 F | RESPIRATION RATE: 18 BRPM | WEIGHT: 300.05 LBS

## 2023-01-03 DIAGNOSIS — Z90.89 ACQUIRED ABSENCE OF OTHER ORGANS: Chronic | ICD-10-CM

## 2023-01-03 DIAGNOSIS — Z95.810 PRESENCE OF AUTOMATIC (IMPLANTABLE) CARDIAC DEFIBRILLATOR: Chronic | ICD-10-CM

## 2023-01-03 PROCEDURE — 99282 EMERGENCY DEPT VISIT SF MDM: CPT

## 2023-01-03 PROCEDURE — 99283 EMERGENCY DEPT VISIT LOW MDM: CPT

## 2023-01-03 NOTE — ED PROVIDER NOTE - CLINICAL SUMMARY MEDICAL DECISION MAKING FREE TEXT BOX
Patient presented with a right ear dull mild pain discomfort and hearing loss over 3 weeks.  Patient also complained of ringing sound in the ear on exam of the ear has minimal wax no signs of ear infection patient was explained that this problem cannot be solved in the emergency room he needs to be seen in the specialist ENT specialist as this patient this is a new onset tinnitus

## 2023-01-03 NOTE — ED PROVIDER NOTE - OBJECTIVE STATEMENT
57-year-old male with history of A. fib AICD anxiety asthma presents to ED complaining of right ear difficulty hearing for past 3 weeks.  Complains of high-pitched ringing noise continues sometimes going up sometimes going down for 3 years today he was not able to hear at all from the right ear that is why he came to the emergency room denies any trauma denies any recent URI type of illness

## 2023-01-03 NOTE — ED PROVIDER NOTE - PHYSICAL EXAMINATION
General:     NAD, well-nourished, well-appearing  Head:     NC/AT, EOMI, oral mucosa moist  Neck:     supple  Lungs:     CTA b/l, no w/r/r  CVS:     S1S2, RRR, no m/g/r  Abd:     +BS, s/nt/nd, no organomegaly  Ext:    2+ radial and pedal pulses, no c/c/e  Neuro: grossly intact  ear: right ear minimal ear wax, TM dull, no sign of infection

## 2023-01-03 NOTE — ED ADULT NURSE NOTE - NSICDXPASTMEDICALHX_GEN_ALL_CORE_FT
PAST MEDICAL HISTORY:  Afib     AICD (automatic cardioverter/defibrillator) present     Anxiety     Asthma     Atrial fibrillation, unspecified type     Cardiomyopathy, unspecified type     CHF (congestive heart failure)     Essential hypertension     Hyperlipidemia     PATTIE (obstructive sleep apnea)     Prediabetes     Prolonged QT interval

## 2023-01-03 NOTE — ED PROVIDER NOTE - CARE PROVIDER_API CALL
Junior Cr  OTOLARYNGOLOGY  [address] [place] [phone]  Phone: [phone]  Fax: [phone]  Follow Up Time:

## 2023-01-03 NOTE — ED PROVIDER NOTE - PATIENT PORTAL LINK FT
You can access the FollowMyHealth Patient Portal offered by [facility] by registering at the following website: [email or web address]. By joining [employer]’s FollowMyHealth portal, you will also be able to view your health information using other applications (apps) compatible with our system.

## 2023-01-03 NOTE — ED ADULT NURSE NOTE - NSICDXPASTSURGICALHX_GEN_ALL_CORE_FT
PAST SURGICAL HISTORY:  AICD (automatic cardioverter/defibrillator) present     History of tonsillectomy

## 2023-01-03 NOTE — ED PROVIDER NOTE - NSFOLLOWUPINSTRUCTIONS_ED_ALL_ED_FT
Tinnitus      Tinnitus refers to hearing a sound when there is no actual source for that sound. This is often described as ringing in the ears. However, people with this condition may hear a variety of noises, in one ear or in both ears.    The sounds of tinnitus can be soft, loud, or somewhere in between. Tinnitus can last for a few seconds or can be constant for days. It may go away without treatment and come back at various times. When tinnitus is constant or happens often, it can lead to other problems, such as trouble sleeping and trouble concentrating.    Almost everyone experiences tinnitus at some point. Tinnitus is not the same as hearing loss. Tinnitus that is long-lasting (chronic) or comes back often (recurs) may require medical attention.      What are the causes?    The cause of tinnitus is often not known. In some cases, it can result from:  •Exposure to loud noises from machinery, music, or other sources.      •An object (foreign body) stuck in the ear.      •Earwax buildup.      •Drinking alcohol or caffeine.      •Taking certain medicines.      •Age-related hearing loss.      It may also be caused by medical conditions such as:  •Ear or sinus infections.      •Heart diseases or high blood pressure.      •Allergies.      •Ménière's disease.      •Thyroid problems.      •Tumors.      •A weak, bulging blood vessel (aneurysm) near the ear.        What increases the risk?    The following factors may make you more likely to develop this condition:  •Exposure to loud noises.      •Age. Tinnitus is more likely in older individuals.      •Using alcohol or tobacco.        What are the signs or symptoms?    The main symptom of tinnitus is hearing a sound when there is no source for that sound. It may sound like:  •Buzzing.      •Sizzling.      •Ringing.      •Blowing air.      •Hissing.      •Whistling.      Other sounds may include:  •Roaring.      •Running water.      •A musical note.      •Tapping.      •Humming.      Symptoms may affect only one ear (unilateral) or both ears (bilateral).      How is this diagnosed?    Tinnitus is diagnosed based on your symptoms, your medical history, and a physical exam. Your health care provider may do a thorough hearing test (audiologic exam) if your tinnitus:  •Is unilateral.      •Causes hearing difficulties.      •Lasts 6 months or longer.      You may work with a health care provider who specializes in hearing disorders (audiologist). You may be asked questions about your symptoms and how they affect your daily life. You may have other tests done, such as:  •CT scan.      •MRI.      •An imaging test of how blood flows through your blood vessels (angiogram).        How is this treated?    Treating an underlying medical condition can sometimes make tinnitus go away. If your tinnitus continues, other treatments may include:  •Therapy and counseling to help you manage the stress of living with tinnitus.    •Sound generators to mask the tinnitus. These include:  •Tabletop sound machines that play relaxing sounds to help you fall asleep.      •Wearable devices that fit in your ear and play sounds or music.      •Acoustic neural stimulation. This involves using headphones to listen to music that contains an auditory signal. Over time, listening to this signal may change some pathways in your brain and make you less sensitive to tinnitus. This treatment is used for very severe cases when no other treatment is working.        •Using hearing aids or cochlear implants if your tinnitus is related to hearing loss. Hearing aids are worn in the outer ear. Cochlear implants are surgically placed in the inner ear.        Follow these instructions at home:      Managing symptoms       Outline of person in bed with head of bed raised.       Person wearing headphones.     •When possible, avoid being in loud places and being exposed to loud sounds.      •Wear hearing protection, such as earplugs, when you are exposed to loud noises.      •Use a white noise machine, a humidifier, or other devices to mask the sound of tinnitus.      •Practice techniques for reducing stress, such as meditation, yoga, or deep breathing. Work with your health care provider if you need help with managing stress.      •Sleep with your head slightly raised. This may reduce the impact of tinnitus.      General instructions     • Do not use stimulants, such as nicotine, alcohol, or caffeine. Talk with your health care provider about other stimulants to avoid. Stimulants are substances that can make you feel alert and attentive by increasing certain activities in the body (such as heart rate and blood pressure). These substances may make tinnitus worse.      •Take over-the-counter and prescription medicines only as told by your health care provider.      •Try to get plenty of sleep each night.      •Keep all follow-up visits. This is important.        Contact a health care provider if:    •Your tinnitus continues for 3 weeks or longer without stopping.      •You develop sudden hearing loss.      •Your symptoms get worse or do not get better with home care.      •You feel you are not able to manage the stress of living with tinnitus.        Get help right away if:    •You develop tinnitus after a head injury.    •You have tinnitus along with any of the following:  •Dizziness.      •Nausea and vomiting.      •Loss of balance.      •Sudden, severe headache.      •Vision changes.      •Facial weakness or weakness of arms or legs.        These symptoms may represent a serious problem that is an emergency. Do not wait to see if the symptoms will go away. Get medical help right away. Call your local emergency services (911 in the [place] Do not drive yourself to the hospital.       Summary    •Tinnitus refers to hearing a sound when there is no actual source for that sound. This is often described as ringing in the ears.      •Symptoms may affect only one ear (unilateral) or both ears (bilateral).      •Use a white noise machine, a humidifier, or other devices to mask the sound of tinnitus.      • Do not use stimulants, such as nicotine, alcohol, or caffeine. These substances may make tinnitus worse.      This information is not intended to replace advice given to you by your health care provider. Make sure you discuss any questions you have with your health care provider.      Document Revised: 11/22/2021 Document Reviewed: 11/22/2021    Elsevier Patient Education © 2022 Elsevier Inc.

## 2023-01-03 NOTE — ED ADULT NURSE NOTE - OBJECTIVE STATEMENT
Patient presents to ED complaining of ear pain/ inability to hear in right ear. Pt states it began 3 weeks ago but worsened today.

## 2023-01-03 NOTE — ED PROVIDER NOTE - DIFFERENTIAL DIAGNOSIS
new onset hearing loss, impacted cerumen, otitis media, otitis externa, ear effusion, Tinnitus Differential Diagnosis

## 2023-01-26 ENCOUNTER — NON-APPOINTMENT (OUTPATIENT)
Age: 58
End: 2023-01-26

## 2023-01-26 ENCOUNTER — APPOINTMENT (OUTPATIENT)
Dept: ELECTROPHYSIOLOGY | Facility: CLINIC | Age: 58
End: 2023-01-26
Payer: MEDICARE

## 2023-01-26 PROCEDURE — 93296 REM INTERROG EVL PM/IDS: CPT

## 2023-01-26 PROCEDURE — 93295 DEV INTERROG REMOTE 1/2/MLT: CPT

## 2023-01-31 ENCOUNTER — APPOINTMENT (OUTPATIENT)
Dept: CARE COORDINATION | Facility: HOME HEALTH | Age: 58
End: 2023-01-31
Payer: MEDICARE

## 2023-01-31 DIAGNOSIS — I15.2 TYPE 2 DIABETES MELLITUS WITH OTHER SPECIFIED COMPLICATION: ICD-10-CM

## 2023-01-31 DIAGNOSIS — E11.69 TYPE 2 DIABETES MELLITUS WITH OTHER SPECIFIED COMPLICATION: ICD-10-CM

## 2023-01-31 DIAGNOSIS — E66.9 TYPE 2 DIABETES MELLITUS WITH OTHER SPECIFIED COMPLICATION: ICD-10-CM

## 2023-01-31 DIAGNOSIS — E11.59 TYPE 2 DIABETES MELLITUS WITH OTHER SPECIFIED COMPLICATION: ICD-10-CM

## 2023-01-31 PROCEDURE — 99349 HOME/RES VST EST MOD MDM 40: CPT | Mod: 95

## 2023-01-31 RX ORDER — LANCETS 33 GAUGE
EACH MISCELLANEOUS
Qty: 1 | Refills: 0 | Status: ACTIVE | COMMUNITY
Start: 2021-04-30 | End: 1900-01-01

## 2023-01-31 RX ORDER — BLOOD-GLUCOSE METER
W/DEVICE KIT MISCELLANEOUS
Qty: 1 | Refills: 0 | Status: ACTIVE | COMMUNITY
Start: 2021-04-30 | End: 1900-01-01

## 2023-01-31 RX ORDER — ATORVASTATIN CALCIUM 80 MG/1
80 TABLET, FILM COATED ORAL
Qty: 90 | Refills: 3 | Status: DISCONTINUED | COMMUNITY
Start: 2017-09-18 | End: 2023-01-31

## 2023-01-31 RX ORDER — ISOPROPYL ALCOHOL 0.7 ML/ML
SWAB TOPICAL
Qty: 1 | Refills: 0 | Status: ACTIVE | COMMUNITY
Start: 2021-04-30 | End: 1900-01-01

## 2023-01-31 RX ORDER — BLOOD SUGAR DIAGNOSTIC
STRIP MISCELLANEOUS
Qty: 1 | Refills: 0 | Status: ACTIVE | COMMUNITY
Start: 2021-04-30 | End: 1900-01-01

## 2023-02-01 ENCOUNTER — RX CHANGE (OUTPATIENT)
Age: 58
End: 2023-02-01

## 2023-02-14 PROBLEM — E11.69 OBESITY, DIABETES, AND HYPERTENSION SYNDROME: Status: ACTIVE | Noted: 2023-01-31

## 2023-02-14 NOTE — PHYSICAL EXAM
[de-identified] : Telehealth precludes traditional, comprehensive physical exam. Patient appeared stable and alert.

## 2023-02-14 NOTE — HISTORY OF PRESENT ILLNESS
[Home] : at home, [unfilled] , at the time of the visit. [Other Location: e.g. Home (Enter Location, City,State)___] : at [unfilled] [Verbal consent obtained from patient] : the patient, [unfilled] [FreeTextEntry2] : [facility] quality initiative provider note: 57 year old male with history of morbid obesity, HF, Afib (Eliquis), has AICD, Asthma, severe PATTIE (CPAP 14 cm H2O) and delayed sleep phase syndrome. Patient state he is doing. Awake, alert and oriented x4, in no acute distress. Denies any chest pain, shortness of breath, palpitation or dizziness. Patient verified medications and medical diagnosis. Report taking medications as prescribed. \par \par Morbid Obesity - current BMI 42.22 kg/m2, interested working with nutritionist \par Heart Arrhythmias - Afib on Eliquis and has AICD

## 2023-02-14 NOTE — HEALTH RISK ASSESSMENT
[Former] : Former [Yes] : Yes [Monthly or less (1 pt)] : Monthly or less (1 point) [1 or 2 (0 pts)] : 1 or 2 (0 points) [Never (0 pts)] : Never (0 points) [No] : In the past 12 months have you used drugs other than those required for medical reasons? No [No falls in past year] : Patient reported no falls in the past year [Diabetic Diet] : diabetic [Low Fat Diet] : low fat [Low Salt Diet] : low salt [Poor Adherence] : poor adherence [None] : None [With Family] : lives with family [Retired] : retired [] :  [Feels Safe at Home] : Feels safe at home [Fully functional (bathing, dressing, toileting, transferring, walking, feeding)] : Fully functional (bathing, dressing, toileting, transferring, walking, feeding) [Fully functional (using the telephone, shopping, preparing meals, housekeeping, doing laundry, using] : Fully functional and needs no help or supervision to perform IADLs (using the telephone, shopping, preparing meals, housekeeping, doing laundry, using transportation, managing medications and managing finances) [Reports normal functional visual acuity (ie: able to read med bottle)] : Reports normal functional visual acuity [Smoke Detector] : smoke detector [Carbon Monoxide Detector] : carbon monoxide detector [Seat Belt] :  uses seat belt [Audit-CScore] : 1 [de-identified] : "ok" overeat at times [de-identified] : Refer to  flowsheet [Change in mental status noted] : No change in mental status noted [Reports changes in hearing] : Reports no changes in hearing [Reports changes in vision] : Reports no changes in vision [Reports changes in dental health] : Reports no changes in dental health [Guns at Home] : no guns at home [Travel to Developing Areas] : does not  travel to developing areas [TB Exposure] : is not being exposed to tuberculosis [de-identified] :

## 2023-02-28 ENCOUNTER — TRANSCRIPTION ENCOUNTER (OUTPATIENT)
Age: 58
End: 2023-02-28

## 2023-03-07 ENCOUNTER — RX RENEWAL (OUTPATIENT)
Age: 58
End: 2023-03-07

## 2023-03-20 ENCOUNTER — LABORATORY RESULT (OUTPATIENT)
Age: 58
End: 2023-03-20

## 2023-03-20 ENCOUNTER — APPOINTMENT (OUTPATIENT)
Dept: CARDIOLOGY | Facility: CLINIC | Age: 58
End: 2023-03-20
Payer: MEDICARE

## 2023-03-20 ENCOUNTER — NON-APPOINTMENT (OUTPATIENT)
Age: 58
End: 2023-03-20

## 2023-03-20 VITALS
HEART RATE: 65 BPM | OXYGEN SATURATION: 96 % | RESPIRATION RATE: 17 BRPM | BODY MASS INDEX: 36.45 KG/M2 | WEIGHT: 275 LBS | TEMPERATURE: 97.5 F | HEIGHT: 73 IN

## 2023-03-20 VITALS — DIASTOLIC BLOOD PRESSURE: 70 MMHG | SYSTOLIC BLOOD PRESSURE: 128 MMHG | HEART RATE: 60 BPM

## 2023-03-20 DIAGNOSIS — E66.01 MORBID (SEVERE) OBESITY DUE TO EXCESS CALORIES: ICD-10-CM

## 2023-03-20 PROCEDURE — 99214 OFFICE O/P EST MOD 30 MIN: CPT | Mod: 25

## 2023-03-20 PROCEDURE — 93000 ELECTROCARDIOGRAM COMPLETE: CPT

## 2023-03-20 NOTE — ASSESSMENT
[FreeTextEntry1] : Impression\par 1 cardiomyopathy currently asymptomatic \par 3. History of recurrent atrial fibrillation on Eliquis currently in sinus rhythm\par 4. Hypertension well controlled today\par 5. AICD monitored at [facility]/  \par 6. very mild cad\par \par Plan\par 1.  Continue current medical regimen\par

## 2023-03-20 NOTE — REASON FOR VISIT
[Cardiac Failure] : cardiac failure [Arrhythmia/ECG Abnorrmalities] : arrhythmia/ECG abnormalities [Structural Heart and Valve Disease] : structural heart and valve disease [FreeTextEntry1] : Patient returns for followup. Feeling well. Offers no complaints of chest discomfort shortness of breath palpitations dizziness or syncope.\par

## 2023-03-21 LAB
ALBUMIN SERPL ELPH-MCNC: 4.6 G/DL
ALP BLD-CCNC: 59 U/L
ALT SERPL-CCNC: 27 U/L
ANION GAP SERPL CALC-SCNC: 15 MMOL/L
AST SERPL-CCNC: 29 U/L
BASOPHILS # BLD AUTO: 0.04 K/UL
BASOPHILS NFR BLD AUTO: 0.6 %
BILIRUB SERPL-MCNC: 0.6 MG/DL
BUN SERPL-MCNC: 17 MG/DL
CALCIUM SERPL-MCNC: 9.2 MG/DL
CHLORIDE SERPL-SCNC: 102 MMOL/L
CHOLEST SERPL-MCNC: 207 MG/DL
CK SERPL-CCNC: 927 U/L
CO2 SERPL-SCNC: 23 MMOL/L
CREAT SERPL-MCNC: 1.29 MG/DL
EGFR: 65 ML/MIN/1.73M2
EOSINOPHIL # BLD AUTO: 0.26 K/UL
EOSINOPHIL NFR BLD AUTO: 3.6 %
GLUCOSE SERPL-MCNC: 88 MG/DL
HCT VFR BLD CALC: 45.9 %
HDLC SERPL-MCNC: 44 MG/DL
HGB BLD-MCNC: 14.6 G/DL
IMM GRANULOCYTES NFR BLD AUTO: 0.3 %
LDLC SERPL CALC-MCNC: 127 MG/DL
LYMPHOCYTES # BLD AUTO: 2.43 K/UL
LYMPHOCYTES NFR BLD AUTO: 33.6 %
MAN DIFF?: NORMAL
MCHC RBC-ENTMCNC: 28.3 PG
MCHC RBC-ENTMCNC: 31.8 GM/DL
MCV RBC AUTO: 89.1 FL
MONOCYTES # BLD AUTO: 0.81 K/UL
MONOCYTES NFR BLD AUTO: 11.2 %
NEUTROPHILS # BLD AUTO: 3.67 K/UL
NEUTROPHILS NFR BLD AUTO: 50.7 %
NONHDLC SERPL-MCNC: 163 MG/DL
PLATELET # BLD AUTO: 240 K/UL
POTASSIUM SERPL-SCNC: 4.5 MMOL/L
PROT SERPL-MCNC: 7.5 G/DL
RBC # BLD: 5.15 M/UL
RBC # FLD: 14 %
SODIUM SERPL-SCNC: 140 MMOL/L
TRIGL SERPL-MCNC: 177 MG/DL
WBC # FLD AUTO: 7.23 K/UL

## 2023-04-12 ENCOUNTER — RX RENEWAL (OUTPATIENT)
Age: 58
End: 2023-04-12

## 2023-04-17 ENCOUNTER — APPOINTMENT (OUTPATIENT)
Dept: FAMILY MEDICINE | Facility: HOSPITAL | Age: 58
End: 2023-04-17

## 2023-05-01 ENCOUNTER — APPOINTMENT (OUTPATIENT)
Dept: ELECTROPHYSIOLOGY | Facility: CLINIC | Age: 58
End: 2023-05-01
Payer: MEDICARE

## 2023-05-01 ENCOUNTER — NON-APPOINTMENT (OUTPATIENT)
Age: 58
End: 2023-05-01

## 2023-05-01 PROCEDURE — 93295 DEV INTERROG REMOTE 1/2/MLT: CPT

## 2023-05-01 PROCEDURE — 93296 REM INTERROG EVL PM/IDS: CPT

## 2023-05-08 NOTE — PROGRESS NOTE ADULT - PROBLEM SELECTOR PLAN 8
Patient informed and verbalized understanding.     Closing encounter
Pt is on eliquis.
Pt is on eliquis.

## 2023-05-19 ENCOUNTER — APPOINTMENT (OUTPATIENT)
Dept: BARIATRICS | Facility: CLINIC | Age: 58
End: 2023-05-19

## 2023-06-20 ENCOUNTER — APPOINTMENT (OUTPATIENT)
Dept: PULMONOLOGY | Facility: CLINIC | Age: 58
End: 2023-06-20

## 2023-07-12 ENCOUNTER — APPOINTMENT (OUTPATIENT)
Dept: PULMONOLOGY | Facility: CLINIC | Age: 58
End: 2023-07-12
Payer: MEDICARE

## 2023-07-12 VITALS
HEART RATE: 95 BPM | TEMPERATURE: 98.2 F | RESPIRATION RATE: 16 BRPM | HEIGHT: 73 IN | WEIGHT: 300 LBS | DIASTOLIC BLOOD PRESSURE: 80 MMHG | BODY MASS INDEX: 39.76 KG/M2 | OXYGEN SATURATION: 93 % | SYSTOLIC BLOOD PRESSURE: 138 MMHG

## 2023-07-12 PROCEDURE — 99214 OFFICE O/P EST MOD 30 MIN: CPT

## 2023-07-12 NOTE — HISTORY OF PRESENT ILLNESS
[Obstructive Sleep Apnea] : obstructive sleep apnea [Date: ___] : Date of most recent diagnostic polysomnogram: [unfilled] [AHI: ___ per hour] : Apnea-hypopnea index:  [unfilled] per hour [T90%: ___] : T90%: [unfilled]% [Abdoulaye desatuation%: ___] : Abdoulaye desaturation:  [unfilled]% [CPAP: ___ cmH2O] : CPAP: [unfilled] cmH2O [Snoring] : no snoring [Witnessed Apneas] : no witnessed sleep apnea [Frequent Nocturnal Awakening] : no nocturnal awakening [Unintentional Sleep while Active] : no unintentional sleep while active [Unintentional Sleep While Inactive] : no unintentional sleep while inactive [Awakes Unrefreshed] : does not awake unrefreshed [Awakes with Headache] : no headache upon awakening [Awakening With Dry Mouth] : no dry mouth upon awakening [Recent  Weight Gain] : no recent weight gain [FreeTextEntry1] : Mask: F&P Simplus FFM- Medium

## 2023-07-12 NOTE — ASSESSMENT
[FreeTextEntry1] : This is a 57-year-old male with significant past medical history of severe obstructive sleep apnea, asthma, atrial fibrillation, and congestive heart failure who comes in for follow-up.\par \par #Obstructive sleep apnea–patient has a history of severe obstructive sleep apnea back in 2017.  Was given a CPAP device with 12 cm H2O and was doing well.  He has gained some weight since that time.  He also has attempted to receive a new machine from [employer] since his current machine has been recalled, but thus far has not received any replacement.  It has been over 5 years since his last received his device.  He is eligible for a new machine as he is compliant with his device.  Therefore we will order him a new APAP device.  We will set his device to 5-20 cm H2O given his increase in weight.  We will also perform an overnight oximetry test once he receives his new APAP device.\par -Order new APAP device\par -Order overnight oximetry test once he receives his APAP device\par \par #Asthma–patient has a history of asthma in the past.  He is also a  and has been exposed to various toxic chemicals.  He is currently doing well on a LABA/ICS regimen in addition to montelukast.  He request refills of his medications.  Its been several years since his last pulmonary function test.\par -Refill medications\par -Schedule pulmonary function test\par \par Patient will follow-up with me in 3 to 4 months after he receives his new APAP device and will perform pulmonary function test prior to seeing me.\par

## 2023-07-20 ENCOUNTER — APPOINTMENT (OUTPATIENT)
Dept: CARDIOLOGY | Facility: CLINIC | Age: 58
End: 2023-07-20

## 2023-08-01 ENCOUNTER — NON-APPOINTMENT (OUTPATIENT)
Age: 58
End: 2023-08-01

## 2023-08-01 ENCOUNTER — APPOINTMENT (OUTPATIENT)
Dept: ELECTROPHYSIOLOGY | Facility: CLINIC | Age: 58
End: 2023-08-01
Payer: MEDICARE

## 2023-08-01 PROCEDURE — 93295 DEV INTERROG REMOTE 1/2/MLT: CPT

## 2023-08-01 PROCEDURE — 93296 REM INTERROG EVL PM/IDS: CPT

## 2023-08-18 ENCOUNTER — TRANSCRIPTION ENCOUNTER (OUTPATIENT)
Age: 58
End: 2023-08-18

## 2023-08-31 ENCOUNTER — APPOINTMENT (OUTPATIENT)
Dept: PULMONOLOGY | Facility: CLINIC | Age: 58
End: 2023-08-31

## 2023-10-31 ENCOUNTER — APPOINTMENT (OUTPATIENT)
Dept: ELECTROPHYSIOLOGY | Facility: CLINIC | Age: 58
End: 2023-10-31
Payer: MEDICARE

## 2023-10-31 ENCOUNTER — NON-APPOINTMENT (OUTPATIENT)
Age: 58
End: 2023-10-31

## 2023-10-31 PROCEDURE — 93296 REM INTERROG EVL PM/IDS: CPT

## 2023-10-31 PROCEDURE — 93295 DEV INTERROG REMOTE 1/2/MLT: CPT

## 2023-11-18 ENCOUNTER — RX RENEWAL (OUTPATIENT)
Age: 58
End: 2023-11-18

## 2023-11-22 ENCOUNTER — APPOINTMENT (OUTPATIENT)
Dept: PULMONOLOGY | Facility: CLINIC | Age: 58
End: 2023-11-22

## 2023-12-29 ENCOUNTER — NON-APPOINTMENT (OUTPATIENT)
Age: 58
End: 2023-12-29

## 2024-01-04 ENCOUNTER — APPOINTMENT (OUTPATIENT)
Dept: ELECTROPHYSIOLOGY | Facility: CLINIC | Age: 59
End: 2024-01-04

## 2024-01-10 ENCOUNTER — RX RENEWAL (OUTPATIENT)
Age: 59
End: 2024-01-10

## 2024-01-17 ENCOUNTER — NON-APPOINTMENT (OUTPATIENT)
Age: 59
End: 2024-01-17

## 2024-01-19 ENCOUNTER — RX RENEWAL (OUTPATIENT)
Age: 59
End: 2024-01-19

## 2024-01-19 RX ORDER — ALBUTEROL SULFATE 90 UG/1
108 (90 BASE) INHALANT RESPIRATORY (INHALATION)
Qty: 1 | Refills: 5 | Status: ACTIVE | COMMUNITY
Start: 2021-11-02 | End: 1900-01-01

## 2024-01-31 ENCOUNTER — NON-APPOINTMENT (OUTPATIENT)
Age: 59
End: 2024-01-31

## 2024-01-31 ENCOUNTER — APPOINTMENT (OUTPATIENT)
Dept: ELECTROPHYSIOLOGY | Facility: CLINIC | Age: 59
End: 2024-01-31
Payer: MEDICARE

## 2024-02-01 PROCEDURE — 93296 REM INTERROG EVL PM/IDS: CPT

## 2024-02-01 PROCEDURE — 93295 DEV INTERROG REMOTE 1/2/MLT: CPT

## 2024-02-01 NOTE — DISCHARGE NOTE ADULT - EFFECT OF WARFARIN/COUMADIN. NEVER TAKE ASPIRIN WITHOUT SPEAKING WITH YOUR HEALTH CARE PROVIDER.
Rahel Caruso RN  Outcome: Progressing      Statement Selected

## 2024-02-15 ENCOUNTER — APPOINTMENT (OUTPATIENT)
Dept: ELECTROPHYSIOLOGY | Facility: CLINIC | Age: 59
End: 2024-02-15

## 2024-03-05 ENCOUNTER — INPATIENT (INPATIENT)
Facility: HOSPITAL | Age: 59
LOS: 15 days | Discharge: ANOTHER HOSPITAL | DRG: 291 | End: 2024-03-21
Attending: FAMILY MEDICINE | Admitting: INTERNAL MEDICINE
Payer: MEDICARE

## 2024-03-05 VITALS
RESPIRATION RATE: 20 BRPM | HEART RATE: 52 BPM | SYSTOLIC BLOOD PRESSURE: 92 MMHG | DIASTOLIC BLOOD PRESSURE: 76 MMHG | WEIGHT: 250 LBS | HEIGHT: 70 IN | TEMPERATURE: 98 F | OXYGEN SATURATION: 97 %

## 2024-03-05 DIAGNOSIS — Z90.89 ACQUIRED ABSENCE OF OTHER ORGANS: Chronic | ICD-10-CM

## 2024-03-05 DIAGNOSIS — I48.91 UNSPECIFIED ATRIAL FIBRILLATION: ICD-10-CM

## 2024-03-05 DIAGNOSIS — Z95.810 PRESENCE OF AUTOMATIC (IMPLANTABLE) CARDIAC DEFIBRILLATOR: Chronic | ICD-10-CM

## 2024-03-05 LAB
ALBUMIN SERPL ELPH-MCNC: 3.4 G/DL — SIGNIFICANT CHANGE UP (ref 3.3–5)
ALP SERPL-CCNC: 140 U/L — HIGH (ref 40–120)
ALT FLD-CCNC: 43 U/L — SIGNIFICANT CHANGE UP (ref 10–45)
ANION GAP SERPL CALC-SCNC: 20 MMOL/L — HIGH (ref 5–17)
APTT BLD: 37.6 SEC — HIGH (ref 24.5–35.6)
AST SERPL-CCNC: 25 U/L — SIGNIFICANT CHANGE UP (ref 10–40)
BASE EXCESS BLDV CALC-SCNC: -13.2 MMOL/L — LOW (ref -2–3)
BASOPHILS # BLD AUTO: 0.03 K/UL — SIGNIFICANT CHANGE UP (ref 0–0.2)
BASOPHILS NFR BLD AUTO: 0.6 % — SIGNIFICANT CHANGE UP (ref 0–2)
BILIRUB SERPL-MCNC: 1.9 MG/DL — HIGH (ref 0.2–1.2)
BUN SERPL-MCNC: 96 MG/DL — HIGH (ref 7–23)
CALCIUM SERPL-MCNC: 8.8 MG/DL — SIGNIFICANT CHANGE UP (ref 8.4–10.5)
CHLORIDE SERPL-SCNC: 107 MMOL/L — SIGNIFICANT CHANGE UP (ref 96–108)
CO2 BLDV-SCNC: 14 MMOL/L — LOW (ref 22–26)
CO2 SERPL-SCNC: 13 MMOL/L — LOW (ref 22–31)
CREAT SERPL-MCNC: 2.84 MG/DL — HIGH (ref 0.5–1.3)
D DIMER BLD IA.RAPID-MCNC: 1830 NG/ML DDU — HIGH
EGFR: 25 ML/MIN/1.73M2 — LOW
EOSINOPHIL # BLD AUTO: 0.13 K/UL — SIGNIFICANT CHANGE UP (ref 0–0.5)
EOSINOPHIL NFR BLD AUTO: 2.6 % — SIGNIFICANT CHANGE UP (ref 0–6)
GAS PNL BLDV: SIGNIFICANT CHANGE UP
GLUCOSE BLDC GLUCOMTR-MCNC: 113 MG/DL — HIGH (ref 70–99)
GLUCOSE SERPL-MCNC: 121 MG/DL — HIGH (ref 70–99)
HCO3 BLDV-SCNC: 13 MMOL/L — LOW (ref 22–29)
HCT VFR BLD CALC: 37.5 % — LOW (ref 39–50)
HGB BLD-MCNC: 12.2 G/DL — LOW (ref 13–17)
IMM GRANULOCYTES NFR BLD AUTO: 0.4 % — SIGNIFICANT CHANGE UP (ref 0–0.9)
INR BLD: 1.5 RATIO — HIGH (ref 0.85–1.18)
LACTATE SERPL-SCNC: 2.2 MMOL/L — HIGH (ref 0.7–2)
LIDOCAIN IGE QN: 74 U/L — SIGNIFICANT CHANGE UP (ref 16–77)
LYMPHOCYTES # BLD AUTO: 1.32 K/UL — SIGNIFICANT CHANGE UP (ref 1–3.3)
LYMPHOCYTES # BLD AUTO: 26.5 % — SIGNIFICANT CHANGE UP (ref 13–44)
MCHC RBC-ENTMCNC: 26.8 PG — LOW (ref 27–34)
MCHC RBC-ENTMCNC: 32.5 GM/DL — SIGNIFICANT CHANGE UP (ref 32–36)
MCV RBC AUTO: 82.2 FL — SIGNIFICANT CHANGE UP (ref 80–100)
MONOCYTES # BLD AUTO: 0.59 K/UL — SIGNIFICANT CHANGE UP (ref 0–0.9)
MONOCYTES NFR BLD AUTO: 11.8 % — SIGNIFICANT CHANGE UP (ref 2–14)
NEUTROPHILS # BLD AUTO: 2.9 K/UL — SIGNIFICANT CHANGE UP (ref 1.8–7.4)
NEUTROPHILS NFR BLD AUTO: 58.1 % — SIGNIFICANT CHANGE UP (ref 43–77)
NRBC # BLD: 0 /100 WBCS — SIGNIFICANT CHANGE UP (ref 0–0)
NT-PROBNP SERPL-SCNC: 5661 PG/ML — HIGH (ref 0–300)
PCO2 BLDV: 25 MMHG — LOW (ref 42–55)
PH BLDV: 7.32 — SIGNIFICANT CHANGE UP (ref 7.32–7.43)
PLATELET # BLD AUTO: 136 K/UL — LOW (ref 150–400)
PO2 BLDV: 54 MMHG — HIGH (ref 25–45)
POTASSIUM SERPL-MCNC: 4.5 MMOL/L — SIGNIFICANT CHANGE UP (ref 3.5–5.3)
POTASSIUM SERPL-SCNC: 4.5 MMOL/L — SIGNIFICANT CHANGE UP (ref 3.5–5.3)
PROT SERPL-MCNC: 7.3 G/DL — SIGNIFICANT CHANGE UP (ref 6–8.3)
PROTHROM AB SERPL-ACNC: 16.9 SEC — HIGH (ref 9.5–13)
RAPID RVP RESULT: SIGNIFICANT CHANGE UP
RBC # BLD: 4.56 M/UL — SIGNIFICANT CHANGE UP (ref 4.2–5.8)
RBC # FLD: 16.4 % — HIGH (ref 10.3–14.5)
SAO2 % BLDV: 84.6 % — SIGNIFICANT CHANGE UP (ref 67–88)
SARS-COV-2 RNA SPEC QL NAA+PROBE: SIGNIFICANT CHANGE UP
SODIUM SERPL-SCNC: 140 MMOL/L — SIGNIFICANT CHANGE UP (ref 135–145)
TROPONIN I, HIGH SENSITIVITY RESULT: 34.3 NG/L — SIGNIFICANT CHANGE UP
WBC # BLD: 4.99 K/UL — SIGNIFICANT CHANGE UP (ref 3.8–10.5)
WBC # FLD AUTO: 4.99 K/UL — SIGNIFICANT CHANGE UP (ref 3.8–10.5)

## 2024-03-05 PROCEDURE — 99291 CRITICAL CARE FIRST HOUR: CPT

## 2024-03-05 PROCEDURE — 71045 X-RAY EXAM CHEST 1 VIEW: CPT | Mod: 26

## 2024-03-05 PROCEDURE — 78582 LUNG VENTILAT&PERFUS IMAGING: CPT | Mod: 26,MC

## 2024-03-05 RX ORDER — GLUCAGON INJECTION, SOLUTION 0.5 MG/.1ML
1 INJECTION, SOLUTION SUBCUTANEOUS ONCE
Refills: 0 | Status: DISCONTINUED | OUTPATIENT
Start: 2024-03-05 | End: 2024-03-21

## 2024-03-05 RX ORDER — APIXABAN 2.5 MG/1
5 TABLET, FILM COATED ORAL
Refills: 0 | Status: DISCONTINUED | OUTPATIENT
Start: 2024-03-05 | End: 2024-03-12

## 2024-03-05 RX ORDER — DILTIAZEM HCL 120 MG
10 CAPSULE, EXT RELEASE 24 HR ORAL ONCE
Refills: 0 | Status: COMPLETED | OUTPATIENT
Start: 2024-03-05 | End: 2024-03-05

## 2024-03-05 RX ORDER — DILTIAZEM HCL 120 MG
30 CAPSULE, EXT RELEASE 24 HR ORAL ONCE
Refills: 0 | Status: COMPLETED | OUTPATIENT
Start: 2024-03-05 | End: 2024-03-05

## 2024-03-05 RX ORDER — AMIODARONE HYDROCHLORIDE 400 MG/1
1 TABLET ORAL
Qty: 450 | Refills: 0 | Status: DISCONTINUED | OUTPATIENT
Start: 2024-03-05 | End: 2024-03-07

## 2024-03-05 RX ORDER — CHLORHEXIDINE GLUCONATE 213 G/1000ML
1 SOLUTION TOPICAL
Refills: 0 | Status: DISCONTINUED | OUTPATIENT
Start: 2024-03-05 | End: 2024-03-10

## 2024-03-05 RX ORDER — SODIUM CHLORIDE 9 MG/ML
250 INJECTION, SOLUTION INTRAVENOUS ONCE
Refills: 0 | Status: COMPLETED | OUTPATIENT
Start: 2024-03-05 | End: 2024-03-05

## 2024-03-05 RX ORDER — ALBUMIN HUMAN 25 %
100 VIAL (ML) INTRAVENOUS ONCE
Refills: 0 | Status: COMPLETED | OUTPATIENT
Start: 2024-03-05 | End: 2024-03-05

## 2024-03-05 RX ORDER — DIGOXIN 250 MCG
250 TABLET ORAL EVERY 6 HOURS
Refills: 0 | Status: COMPLETED | OUTPATIENT
Start: 2024-03-05 | End: 2024-03-06

## 2024-03-05 RX ORDER — ASPIRIN/CALCIUM CARB/MAGNESIUM 324 MG
81 TABLET ORAL DAILY
Refills: 0 | Status: DISCONTINUED | OUTPATIENT
Start: 2024-03-05 | End: 2024-03-16

## 2024-03-05 RX ORDER — DEXTROSE 50 % IN WATER 50 %
12.5 SYRINGE (ML) INTRAVENOUS ONCE
Refills: 0 | Status: DISCONTINUED | OUTPATIENT
Start: 2024-03-05 | End: 2024-03-21

## 2024-03-05 RX ORDER — AMIODARONE HYDROCHLORIDE 400 MG/1
150 TABLET ORAL ONCE
Refills: 0 | Status: COMPLETED | OUTPATIENT
Start: 2024-03-05 | End: 2024-03-05

## 2024-03-05 RX ORDER — ATORVASTATIN CALCIUM 80 MG/1
80 TABLET, FILM COATED ORAL AT BEDTIME
Refills: 0 | Status: DISCONTINUED | OUTPATIENT
Start: 2024-03-05 | End: 2024-03-21

## 2024-03-05 RX ORDER — DILTIAZEM HCL 120 MG
5 CAPSULE, EXT RELEASE 24 HR ORAL ONCE
Refills: 0 | Status: COMPLETED | OUTPATIENT
Start: 2024-03-05 | End: 2024-03-05

## 2024-03-05 RX ORDER — INSULIN LISPRO 100/ML
VIAL (ML) SUBCUTANEOUS
Refills: 0 | Status: DISCONTINUED | OUTPATIENT
Start: 2024-03-05 | End: 2024-03-21

## 2024-03-05 RX ORDER — INSULIN LISPRO 100/ML
VIAL (ML) SUBCUTANEOUS AT BEDTIME
Refills: 0 | Status: DISCONTINUED | OUTPATIENT
Start: 2024-03-05 | End: 2024-03-21

## 2024-03-05 RX ORDER — DEXTROSE 50 % IN WATER 50 %
25 SYRINGE (ML) INTRAVENOUS ONCE
Refills: 0 | Status: DISCONTINUED | OUTPATIENT
Start: 2024-03-05 | End: 2024-03-21

## 2024-03-05 RX ORDER — ISOSORBIDE DINITRATE 5 MG/1
5 TABLET ORAL THREE TIMES A DAY
Refills: 0 | Status: DISCONTINUED | OUTPATIENT
Start: 2024-03-05 | End: 2024-03-07

## 2024-03-05 RX ORDER — FUROSEMIDE 40 MG
40 TABLET ORAL DAILY
Refills: 0 | Status: DISCONTINUED | OUTPATIENT
Start: 2024-03-05 | End: 2024-03-06

## 2024-03-05 RX ORDER — SODIUM CHLORIDE 9 MG/ML
500 INJECTION INTRAMUSCULAR; INTRAVENOUS; SUBCUTANEOUS ONCE
Refills: 0 | Status: COMPLETED | OUTPATIENT
Start: 2024-03-05 | End: 2024-03-05

## 2024-03-05 RX ORDER — SODIUM CHLORIDE 9 MG/ML
1000 INJECTION, SOLUTION INTRAVENOUS
Refills: 0 | Status: DISCONTINUED | OUTPATIENT
Start: 2024-03-05 | End: 2024-03-21

## 2024-03-05 RX ORDER — AMIODARONE HYDROCHLORIDE 400 MG/1
0.5 TABLET ORAL
Qty: 450 | Refills: 0 | Status: DISCONTINUED | OUTPATIENT
Start: 2024-03-05 | End: 2024-03-07

## 2024-03-05 RX ORDER — DIGOXIN 250 MCG
250 TABLET ORAL EVERY 6 HOURS
Refills: 0 | Status: DISCONTINUED | OUTPATIENT
Start: 2024-03-05 | End: 2024-03-05

## 2024-03-05 RX ORDER — SACUBITRIL AND VALSARTAN 24; 26 MG/1; MG/1
1 TABLET, FILM COATED ORAL
Refills: 0 | Status: DISCONTINUED | OUTPATIENT
Start: 2024-03-05 | End: 2024-03-06

## 2024-03-05 RX ORDER — DEXTROSE 50 % IN WATER 50 %
15 SYRINGE (ML) INTRAVENOUS ONCE
Refills: 0 | Status: DISCONTINUED | OUTPATIENT
Start: 2024-03-05 | End: 2024-03-21

## 2024-03-05 RX ORDER — CARVEDILOL PHOSPHATE 80 MG/1
6.25 CAPSULE, EXTENDED RELEASE ORAL EVERY 12 HOURS
Refills: 0 | Status: DISCONTINUED | OUTPATIENT
Start: 2024-03-05 | End: 2024-03-06

## 2024-03-05 RX ADMIN — SODIUM CHLORIDE 500 MILLILITER(S): 9 INJECTION INTRAMUSCULAR; INTRAVENOUS; SUBCUTANEOUS at 12:27

## 2024-03-05 RX ADMIN — SODIUM CHLORIDE 500 MILLILITER(S): 9 INJECTION, SOLUTION INTRAVENOUS at 21:14

## 2024-03-05 RX ADMIN — ATORVASTATIN CALCIUM 80 MILLIGRAM(S): 80 TABLET, FILM COATED ORAL at 21:09

## 2024-03-05 RX ADMIN — Medication 30 MILLIGRAM(S): at 12:47

## 2024-03-05 RX ADMIN — Medication 10 MILLIGRAM(S): at 13:11

## 2024-03-05 RX ADMIN — Medication 250 MICROGRAM(S): at 20:04

## 2024-03-05 RX ADMIN — Medication 200 MILLILITER(S): at 20:34

## 2024-03-05 RX ADMIN — AMIODARONE HYDROCHLORIDE 618 MILLIGRAM(S): 400 TABLET ORAL at 17:15

## 2024-03-05 RX ADMIN — APIXABAN 5 MILLIGRAM(S): 2.5 TABLET, FILM COATED ORAL at 19:39

## 2024-03-05 RX ADMIN — Medication 5 MILLIGRAM(S): at 12:35

## 2024-03-05 NOTE — PATIENT PROFILE ADULT - FALL HARM RISK - RISK INTERVENTIONS
Assistance OOB with selected safe patient handling equipment/Assistance with ambulation/Communicate Fall Risk and Risk Factors to all staff, patient, and family/Reinforce activity limits and safety measures with patient and family/Sit up slowly, dangle for a short time, stand at bedside before walking/Visual Cue: Yellow wristband/Bed in lowest position, wheels locked, appropriate side rails in place/Call bell, personal items and telephone in reach/Instruct patient to call for assistance before getting out of bed or chair/Non-slip footwear when patient is out of bed/[place] to call system/Physically safe environment - no spills, clutter or unnecessary equipment/Purposeful Proactive Rounding/Room/bathroom lighting operational, light cord in reach

## 2024-03-05 NOTE — ED PROVIDER NOTE - CLINICAL SUMMARY MEDICAL DECISION MAKING FREE TEXT BOX
58 year old male with CP with SOB, arrived with a fib and rvr, BP low- received 500cc bolus and total of cardizem 15mg IV and 30mg PO, patient HR improved, elevated renal fxn and BNP noted, + dimer, VQ scan- low probability for PE, admitted to ICU for further management of A fib with RVR

## 2024-03-05 NOTE — ED ADULT TRIAGE NOTE - WEIGHT IN KG
Dr. Corey is evaluating the patient at bedside.
IV PLACED, LABS DRAWN AND SENT TO LAB
ORDERED ELIQUIS NOT AVAILABLE IN [facility]. HOUSE SUPERVISOR CATALINO SPOKE WITH 
PHARMACY DIRECTOR WILL, WAS TOLD THAT ELIQUIS IS NOT AN EMERGENT MEDICATION 
FOR ON-CALL PHARMACIST TO COME TO THE HOSPITAL AND CAN BE GIVEN IN THE MORNING. 
HOUSE SUPERVISOR SPOKE WITH DR HOPPER WHO AGREED TO HOLD ORDER OF ELIQUIS AT THIS 
TIME.
Ordered Eliquis not administered. Med not available . Notified House 
Supervisor. Supervisor spoke with the pharmacist and was told that the need for 
the medication at this time is not urgent. Dr Corey made aware
PT BIB FAMILY C/O SOB X2 WEEKS. PT DENIES PAIN, COUGH, N/V/D, OR FEVER. RR 33, 
DEEP, LABORED, WHEEZES THROUGHOUT. PT COLOR WNL, CAP REFIL <3 SEC, CLUBBING 
PRESENT ON FINGERNAILS. ER MD NOTIFIED OF PT CONDITION.



MEDHX:A-FIB, CHF

RX:DENIES
Patient ambulated to bed 1 with family. RN evaluating patient at bedside.
Patient to be transferred to [facility].  Is being transferred due to A.FIB, 
NONSTEMI, INSURANCE REQUEST.  Receiving facility has accepting physician and 
available space. ER physician has signed transfer form.  Patient or responsible 
party has agreed to transfer and signed form.  Patient belongings inventoried 
and will be sent with patient.  Copy of nursing notes, lab reports, EKG, 
Physicians Orders and X-rays to be sent with patient.  Report was called by 
Joseph WALTERS to Caryn WALTERS at receiving facility. [facility] critical care transport at 
bedside to take pt at this time.
Pt report given to ROMEO BHAKTA. Transfer of care at this time.
SPOKE TO HERBERTH FROM [facility] AND GAVE PATIENT REPORT
SPOKE WITH ROMULO FROM [facility] AND WAS TOLD THAT THEY ARE WORKING TO 
TRANSFER PATIENT TO [facility]. PER ROMULO, DR COE HAS BEEN MADE AWARE OF 
THE INCREASED IN TROPONIN FROM 0.073-0.079
113.4

## 2024-03-05 NOTE — ED PROVIDER NOTE - OBJECTIVE STATEMENT
58 year old male with CP, sob, palpitation. history of A fib s/p AICD, states that he is compliant with his meds. on eliquis. Denies fever, NVD, dysuria, back/neck pain, HA, focal weakness/numbness. Denies any other symptoms.

## 2024-03-05 NOTE — H&P ADULT - HISTORY OF PRESENT ILLNESS
58 y.o male with pmh a fib on eliquis, HTN, CHF, HLD, AICD , anxiety, asthma presented to ED for c/o chest pain and palps found to be in rapid a fib.

## 2024-03-05 NOTE — H&P ADULT - ASSESSMENT
59 y/o M with PMH asthma, a fib on eliquis, HTN, AICD presented to ED with c/o CP, palps and SOB found to be in a fib with RVR did not respond to Cardizem Became hypotensive. ICU consulted for further management of:      ## atrial fibrillation with RVR  ##elevated troponin   ## eleavtaed d-dimer      PLAN:  hypotensive after cardizem  give amiodarone bolus and start gtt  consider digoxin if hypotensive  follow up echo  continue home dose eliquis  continue home meds  cardiology consult  trend troponin to peak  CPAP at night  Tight glycemic control, ISS      Patient seen and evaluted with Dr Mcpherson who agrees with above stated plan of care

## 2024-03-05 NOTE — ED ADULT NURSE NOTE - OBJECTIVE STATEMENT
PT BIBA from home c/o difficulty breathing x 3 weeks worsening today. hx of afib, chf. pt on arrival with HR sustaining in the 160's and sating 98% on 4L NC. pt reports chest pain earlier today but now resolved. denies any fevers, chills, n/v/d. pt reports his HR has been running this high for the past 8 weeks.

## 2024-03-05 NOTE — PROGRESS NOTE ADULT - ASSESSMENT
58y Male admitted with afib with RVR    -currently on amio drip  -BP borderline  -currently NOT rate controlled           PLAN:    -will give IVP digoxin now for goal of rate control  -give 25% albumin with 250 mL LR bolus now  -maitnain Po beta blocker \  -on elqiuis  -follow lytes, keep K+ 4 - 4.5          TIME SPENT:  55 minutes of  time spent providing medical care for patient's acute illness/conditions that impairs at least one vital organ system and/or poses a high risk of imminent or life threatening deterioration in the patient's condition. It includes time spent evaluating and treating the patient's acute illness as well as time spent reviewing labs, radiology, discussing goals of care with patient and/or patient's family, and discussing the case with a multidisciplinary team, including the eICU, in an effort to prevent further life threatening deterioration or end organ damage. This time is independent of any procedures performed.    DATE OF ENTRY OF THIS NOTE IS EQUAL TO THE DATE OF SERVICES RENDERED

## 2024-03-05 NOTE — PROGRESS NOTE ADULT - SUBJECTIVE AND OBJECTIVE BOX
F/U Note:    58y Male admitted with afib with RVR    -currently on amio drip  -BP borderline  -currently NOT rate controlled     Vital Signs Last 24 Hrs  T(C): 36.7 (05 Mar 2024 19:00), Max: 36.8 (05 Mar 2024 11:39)  T(F): 98 (05 Mar 2024 19:00), Max: 98.3 (05 Mar 2024 11:39)  HR: 118 (05 Mar 2024 19:30) (52 - 160)  BP: 84/65 (05 Mar 2024 19:30) (83/70 - 109/51)  BP(mean): 73 (05 Mar 2024 19:30) (62 - 95)  RR: 14 (05 Mar 2024 19:30) (12 - 24)  SpO2: 97% (05 Mar 2024 19:30) (95% - 100%)    Parameters below as of 05 Mar 2024 19:00  Patient On (Oxygen Delivery Method): nasal cannula  O2 Flow (L/min): 4                              12.2   4.99  )-----------( 136      ( 05 Mar 2024 12:10 )             37.5         03-05    140  |  107  |  96<H>  ----------------------------<  121<H>  4.5   |  13<L>  |  2.84<H>    Ca    8.8      05 Mar 2024 12:10    TPro  7.3  /  Alb  3.4  /  TBili  1.9<H>  /  DBili  x   /  AST  25  /  ALT  43  /  AlkPhos  140<H>  03-05        NEURO: no headaches, blurry vision, tremors, depression, anxity  CV: no chest pain, palpitations, murmurs, orthopnea  Resp: no shortness of breath, cough, wheeze, sputum production  GI: no stomach pain,nausea, vomitting, flatulence, hematemesis, hematochezia  PV: no swelling of extremities, no hair loss, no coolness to extremities  ENDO: no polydypsia, polyphagia, polyuria, weight loss, night sweats          NEURO: awake and alert  CV: (+) S1/S2, irregularly irregular, no MRG   RESP: CTA b/l  GI: soft, non tender

## 2024-03-05 NOTE — H&P ADULT - NS ATTEND AMEND GEN_ALL_CORE FT
IMP: Elba patterson is a  58 yr old man  with  asthma, a fib on eliquis, HTN, AICD  morbid obesity presented to ED with c/o CP, palps and SOB found to be in a fib with RVR did not respond to Cardizem Became hypotensive.  Pat is sob due to acute hypoxic resp failure due to combination of pul edema/ fluid over load  ( ex of HF ) and afib with rvr. ICU consulted for further management            ASSESSMENT     - Acute hypoxic resp failure   - Afib with RVR   - Pulmonary edema / fluid over load   - HFrEF exa  - HTN HLD   - PATTIE  - Obesity   - MAYTE   - Severe metabolic acidosis       PLAN:  - Admit to icu   - O2 supp as needed may require BiPaP  - No sedation   - NPO for now   - Hemodynamic monitoring   - Start bolus amidarone follow by gtt  - Became hypotensive with cardiazem gtt  - TSH   - Cards eval   - 2 DECHO   - Trend troponin and EKG   - Continue Eliquis   - Jacome'cath   - Urine lytes   - Renal sono   - Neph eval   - PSA   - Monitor I/O , keep neg fluid balance ~1000 ml   - Monitor blood glucose with coverage   - No anitbx   - PPI   - Skin care       Pat seen and examined in ER . Discussed with ED attend

## 2024-03-05 NOTE — H&P ADULT - NSHPLABSRESULTS_GEN_ALL_CORE
< from: NM Pulmonary Ventilation/Perfusion Scan (03.05.24 @ 14:59) >      ACC: [id number] EXAM:  NM PULM VENTILATION PERFUS IMG   ORDERED BY:    CANDYKEVINJSLUZ RAFA SY     PROCEDURE DATE:  03/05/2024          INTERPRETATION:  RADIOPHARMACEUTICAL: 1.0 mCi Tc-99m-DTPA via inhalation;   6.1 mCi Tc-99m-MAA, I.V.    CLINICAL INFORMATION: 58 year old male with shortness of breath; referred   to evaluate for pulmonary embolism.    TECHNIQUE:  Ventilation and perfusion images of the lungs were obtained   following administration of Tc-99m-DTPA and Tc-99m-MAA. Images were   obtained in the anterior, posterior, both lateral, and all 4 oblique   projections. The study was interpreted in conjunction with a chest   radiograph of 3/5/2024.    COMPARISON: No previous lung scans were available for comparison.    FINDINGS: There is heterogeneous distribution of radiopharmaceutical in   both lungs on the ventilation and perfusion images. There are no   segmental perfusion defects in either lung.    IMPRESSION: Very low probability of pulmonary embolus.    --- End of Report ---            CECILIA CERRATO MD; Attending Nuclear Medicine  This document has been electronically signed. Mar  5 2024  3:08PM    < end of copied text >

## 2024-03-05 NOTE — H&P ADULT - NSHPPHYSICALEXAM_GEN_ALL_CORE
PE:   GEN: Awake, alert, interactive, NAD, non-toxic appearing.   HEAD: Atraumatic  EYES: Sclera white, conjunctiva pink, PERRLA  MOUTH/THROAT: Patent, uvula midline, no tonsillar edema or erythema, no acute dental findings, no oral lesions or ulcerations, no Hoarse voice + dry mucus membranes   CARDIAC: tachycardia, irregular , S1,S2, no murmur/rub/gallop. Strong central and peripheral pulses, Brisk cap refill, trace pretibial and  pedal edema.   RESP: No distress noted. L/S clear = Bilat without accessory muscle use, wheeze, rhonchi, rales.   ABD: soft, supple, non-tender, no guarding. BS x 4, normoactive.   NEURO: AOx3, CN II-XII grossly intact without focal deficit.   MSK: Moving all extremities with no apparent deformities.   SKIN: Warm, dry, normal color, without apparent rashes.

## 2024-03-06 ENCOUNTER — RESULT REVIEW (OUTPATIENT)
Age: 59
End: 2024-03-06

## 2024-03-06 LAB
A1C WITH ESTIMATED AVERAGE GLUCOSE RESULT: 7.2 % — HIGH (ref 4–5.6)
ANION GAP SERPL CALC-SCNC: 15 MMOL/L — SIGNIFICANT CHANGE UP (ref 5–17)
ANION GAP SERPL CALC-SCNC: 16 MMOL/L — SIGNIFICANT CHANGE UP (ref 5–17)
BUN SERPL-MCNC: 88 MG/DL — HIGH (ref 7–23)
BUN SERPL-MCNC: 95 MG/DL — HIGH (ref 7–23)
CALCIUM SERPL-MCNC: 8.7 MG/DL — SIGNIFICANT CHANGE UP (ref 8.4–10.5)
CALCIUM SERPL-MCNC: 8.8 MG/DL — SIGNIFICANT CHANGE UP (ref 8.4–10.5)
CHLORIDE SERPL-SCNC: 106 MMOL/L — SIGNIFICANT CHANGE UP (ref 96–108)
CHLORIDE SERPL-SCNC: 110 MMOL/L — HIGH (ref 96–108)
CO2 SERPL-SCNC: 15 MMOL/L — LOW (ref 22–31)
CO2 SERPL-SCNC: 17 MMOL/L — LOW (ref 22–31)
CREAT SERPL-MCNC: 2.9 MG/DL — HIGH (ref 0.5–1.3)
CREAT SERPL-MCNC: 2.9 MG/DL — HIGH (ref 0.5–1.3)
EGFR: 24 ML/MIN/1.73M2 — LOW
EGFR: 24 ML/MIN/1.73M2 — LOW
ESTIMATED AVERAGE GLUCOSE: 160 MG/DL — HIGH (ref 68–114)
GLUCOSE BLDC GLUCOMTR-MCNC: 88 MG/DL — SIGNIFICANT CHANGE UP (ref 70–99)
GLUCOSE BLDC GLUCOMTR-MCNC: 89 MG/DL — SIGNIFICANT CHANGE UP (ref 70–99)
GLUCOSE BLDC GLUCOMTR-MCNC: 91 MG/DL — SIGNIFICANT CHANGE UP (ref 70–99)
GLUCOSE BLDC GLUCOMTR-MCNC: 92 MG/DL — SIGNIFICANT CHANGE UP (ref 70–99)
GLUCOSE SERPL-MCNC: 96 MG/DL — SIGNIFICANT CHANGE UP (ref 70–99)
GLUCOSE SERPL-MCNC: 97 MG/DL — SIGNIFICANT CHANGE UP (ref 70–99)
HCT VFR BLD CALC: 37.4 % — LOW (ref 39–50)
HGB BLD-MCNC: 11.5 G/DL — LOW (ref 13–17)
LACTATE SERPL-SCNC: 2.2 MMOL/L — HIGH (ref 0.7–2)
MAGNESIUM SERPL-MCNC: 2.6 MG/DL — SIGNIFICANT CHANGE UP (ref 1.6–2.6)
MAGNESIUM SERPL-MCNC: 2.7 MG/DL — HIGH (ref 1.6–2.6)
MCHC RBC-ENTMCNC: 26.4 PG — LOW (ref 27–34)
MCHC RBC-ENTMCNC: 30.7 GM/DL — LOW (ref 32–36)
MCV RBC AUTO: 85.8 FL — SIGNIFICANT CHANGE UP (ref 80–100)
NRBC # BLD: 0 /100 WBCS — SIGNIFICANT CHANGE UP (ref 0–0)
NT-PROBNP SERPL-SCNC: 5387 PG/ML — HIGH (ref 0–300)
PHOSPHATE SERPL-MCNC: 5.5 MG/DL — HIGH (ref 2.5–4.5)
PLATELET # BLD AUTO: 119 K/UL — LOW (ref 150–400)
POTASSIUM SERPL-MCNC: 4.2 MMOL/L — SIGNIFICANT CHANGE UP (ref 3.5–5.3)
POTASSIUM SERPL-MCNC: 4.6 MMOL/L — SIGNIFICANT CHANGE UP (ref 3.5–5.3)
POTASSIUM SERPL-SCNC: 4.2 MMOL/L — SIGNIFICANT CHANGE UP (ref 3.5–5.3)
POTASSIUM SERPL-SCNC: 4.6 MMOL/L — SIGNIFICANT CHANGE UP (ref 3.5–5.3)
RBC # BLD: 4.36 M/UL — SIGNIFICANT CHANGE UP (ref 4.2–5.8)
RBC # FLD: 16.9 % — HIGH (ref 10.3–14.5)
SODIUM SERPL-SCNC: 139 MMOL/L — SIGNIFICANT CHANGE UP (ref 135–145)
SODIUM SERPL-SCNC: 140 MMOL/L — SIGNIFICANT CHANGE UP (ref 135–145)
TROPONIN I, HIGH SENSITIVITY RESULT: 44.6 NG/L — SIGNIFICANT CHANGE UP
WBC # BLD: 5.62 K/UL — SIGNIFICANT CHANGE UP (ref 3.8–10.5)
WBC # FLD AUTO: 5.62 K/UL — SIGNIFICANT CHANGE UP (ref 3.8–10.5)

## 2024-03-06 PROCEDURE — 93288 INTERROG EVL PM/LDLS PM IP: CPT | Mod: 26

## 2024-03-06 PROCEDURE — 93970 EXTREMITY STUDY: CPT | Mod: 26

## 2024-03-06 PROCEDURE — 93306 TTE W/DOPPLER COMPLETE: CPT | Mod: 26

## 2024-03-06 PROCEDURE — 99222 1ST HOSP IP/OBS MODERATE 55: CPT

## 2024-03-06 RX ORDER — FUROSEMIDE 40 MG
40 TABLET ORAL
Refills: 0 | Status: DISCONTINUED | OUTPATIENT
Start: 2024-03-07 | End: 2024-03-08

## 2024-03-06 RX ORDER — METOPROLOL TARTRATE 50 MG
25 TABLET ORAL
Refills: 0 | Status: DISCONTINUED | OUTPATIENT
Start: 2024-03-06 | End: 2024-03-06

## 2024-03-06 RX ORDER — ATORVASTATIN CALCIUM 80 MG/1
1 TABLET, FILM COATED ORAL
Qty: 0 | Refills: 0 | DISCHARGE

## 2024-03-06 RX ORDER — EMPAGLIFLOZIN 10 MG/1
1 TABLET, FILM COATED ORAL
Qty: 0 | Refills: 0 | DISCHARGE

## 2024-03-06 RX ORDER — METOPROLOL TARTRATE 50 MG
12.5 TABLET ORAL EVERY 12 HOURS
Refills: 0 | Status: DISCONTINUED | OUTPATIENT
Start: 2024-03-06 | End: 2024-03-07

## 2024-03-06 RX ORDER — FUROSEMIDE 40 MG
40 TABLET ORAL ONCE
Refills: 0 | Status: COMPLETED | OUTPATIENT
Start: 2024-03-06 | End: 2024-03-06

## 2024-03-06 RX ORDER — ALBUTEROL 90 UG/1
1 AEROSOL, METERED ORAL EVERY 4 HOURS
Refills: 0 | Status: DISCONTINUED | OUTPATIENT
Start: 2024-03-06 | End: 2024-03-21

## 2024-03-06 RX ORDER — SEMAGLUTIDE 0.68 MG/ML
2.5 INJECTION, SOLUTION SUBCUTANEOUS
Qty: 0 | Refills: 0 | DISCHARGE

## 2024-03-06 RX ORDER — CARVEDILOL PHOSPHATE 80 MG/1
1 CAPSULE, EXTENDED RELEASE ORAL
Qty: 0 | Refills: 0 | DISCHARGE

## 2024-03-06 RX ADMIN — ATORVASTATIN CALCIUM 80 MILLIGRAM(S): 80 TABLET, FILM COATED ORAL at 21:29

## 2024-03-06 RX ADMIN — Medication 250 MICROGRAM(S): at 08:38

## 2024-03-06 RX ADMIN — Medication 40 MILLIGRAM(S): at 05:51

## 2024-03-06 RX ADMIN — Medication 12.5 MILLIGRAM(S): at 17:19

## 2024-03-06 RX ADMIN — CHLORHEXIDINE GLUCONATE 1 APPLICATION(S): 213 SOLUTION TOPICAL at 05:56

## 2024-03-06 RX ADMIN — AMIODARONE HYDROCHLORIDE 16.7 MG/MIN: 400 TABLET ORAL at 14:24

## 2024-03-06 RX ADMIN — ISOSORBIDE DINITRATE 5 MILLIGRAM(S): 5 TABLET ORAL at 05:51

## 2024-03-06 RX ADMIN — APIXABAN 5 MILLIGRAM(S): 2.5 TABLET, FILM COATED ORAL at 17:18

## 2024-03-06 RX ADMIN — ISOSORBIDE DINITRATE 5 MILLIGRAM(S): 5 TABLET ORAL at 16:34

## 2024-03-06 RX ADMIN — AMIODARONE HYDROCHLORIDE 16.7 MG/MIN: 400 TABLET ORAL at 00:00

## 2024-03-06 RX ADMIN — Medication 40 MILLIGRAM(S): at 14:24

## 2024-03-06 RX ADMIN — APIXABAN 5 MILLIGRAM(S): 2.5 TABLET, FILM COATED ORAL at 05:51

## 2024-03-06 RX ADMIN — CARVEDILOL PHOSPHATE 6.25 MILLIGRAM(S): 80 CAPSULE, EXTENDED RELEASE ORAL at 05:51

## 2024-03-06 RX ADMIN — Medication 1 DROP(S): at 22:25

## 2024-03-06 RX ADMIN — Medication 250 MICROGRAM(S): at 02:00

## 2024-03-06 RX ADMIN — SACUBITRIL AND VALSARTAN 1 TABLET(S): 24; 26 TABLET, FILM COATED ORAL at 05:50

## 2024-03-06 RX ADMIN — ISOSORBIDE DINITRATE 5 MILLIGRAM(S): 5 TABLET ORAL at 12:28

## 2024-03-06 RX ADMIN — Medication 81 MILLIGRAM(S): at 12:28

## 2024-03-06 NOTE — PROGRESS NOTE ADULT - SUBJECTIVE AND OBJECTIVE BOX
HPI:  58 y.o male with pmh a fib on eliquis, HTN, CHF, HLD, AICD , anxiety, asthma presented to ED for c/o chest pain and palps found to be in rapid a fib.  (05 Mar 2024 17:27)      24 hr events:   No acute event overnight, HR controlled at 100s.     ## ROS: negative     ## Labs:  CBC:                        11.5   5.62  )-----------( 119      ( 06 Mar 2024 05:30 )             37.4     Chem:  03-06    139  |  106  |  95<H>  ----------------------------<  97  4.6   |  17<L>  |  2.90<H>    Ca    8.8      06 Mar 2024 05:30  Phos  5.5     03-06  Mg     2.7     03-06    TPro  7.3  /  Alb  3.4  /  TBili  1.9<H>  /  DBili  x   /  AST  25  /  ALT  43  /  AlkPhos  140<H>  03-05    Coags:  PT/INR - ( 05 Mar 2024 12:10 )   PT: 16.9 sec;   INR: 1.50 ratio         PTT - ( 05 Mar 2024 12:10 )  PTT:37.6 sec        ## Imaging:    ## Medications:    aMIOdarone Infusion 0.5 mG/Min IV Continuous <Continuous>  aMIOdarone Infusion 1 mG/Min IV Continuous <Continuous>  carvedilol 6.25 milliGRAM(s) Oral every 12 hours  furosemide    Tablet 40 milliGRAM(s) Oral daily  isosorbide   dinitrate Tablet (ISORDIL) 5 milliGRAM(s) Oral three times a day  sacubitril 97 mG/valsartan 103 mG 1 Tablet(s) Oral two times a day      atorvastatin 80 milliGRAM(s) Oral at bedtime  dextrose 50% Injectable 25 Gram(s) IV Push once  dextrose 50% Injectable 12.5 Gram(s) IV Push once  dextrose 50% Injectable 25 Gram(s) IV Push once  dextrose Oral Gel 15 Gram(s) Oral once PRN  glucagon  Injectable 1 milliGRAM(s) IntraMuscular once  insulin lispro (ADMELOG) corrective regimen sliding scale   SubCutaneous at bedtime  insulin lispro (ADMELOG) corrective regimen sliding scale   SubCutaneous three times a day before meals    apixaban 5 milliGRAM(s) Oral two times a day  aspirin enteric coated 81 milliGRAM(s) Oral daily          ## Vitals:  T(C): 36.4 (24 @ 07:00), Max: 36.8 (24 @ 11:39)  HR: 97 (24 @ 09:00) (52 - 160)  BP: 102/84 (24 @ 08:00) (83/70 - 119/95)  BP(mean): 91 (24 @ 08:00) (62 - 104)  RR: 17 (24 @ 09:00) (12 - 24)  SpO2: 94% (24 @ 09:00) (91% - 100%)  Wt(kg): --  Vent:   AB-05 @ 07:01  -   @ 07:00  --------------------------------------------------------  IN: 716.7 mL / OUT: 650 mL / NET: 66.7 mL     @ 07:01  -   @ 10:05  --------------------------------------------------------  IN: 33.4 mL / OUT: 0 mL / NET: 33.4 mL          ## P/E:  Gen: lying comfortably in bed in no apparent distress  HEENT: PERRL, EOMI  Resp: CTA B/L no c/r/w  CVS: S1S2 no m/r/g  Abd: soft NT/ND +BS  Ext: no c/c/e  Neuro: A&Ox3    FULL code HPI:  58 y.o male with pmh a fib on eliquis, HTN, CHF, HLD, AICD , anxiety, asthma presented to ED for c/o chest pain and palps found to be in rapid a fib.  (05 Mar 2024 17:27)      24 hr events:   No acute event overnight, HR controlled at 100s. At the time of eval, says has been feeling discomfort for 3 months, currently seeing cardiologist Dr. Garcia for cardiac failure, arrhythmia/ECG abnormalities and structural heart and valve disease. and pulmonologist Dr. Otero for asthma, severe PATTIE (CPAP 14 cm H2O) and delayed sleep phase syndrome. Uses Cpap during the night and rescue inhaler for asthma     ## ROS: negative     ## Labs:  CBC:                        11.5   5.62  )-----------( 119      ( 06 Mar 2024 05:30 )             37.4     Chem:  03-06    139  |  106  |  95<H>  ----------------------------<  97  4.6   |  17<L>  |  2.90<H>    Ca    8.8      06 Mar 2024 05:30  Phos  5.5     03-06  Mg     2.7     03-06    TPro  7.3  /  Alb  3.4  /  TBili  1.9<H>  /  DBili  x   /  AST  25  /  ALT  43  /  AlkPhos  140<H>  03-05    Coags:  PT/INR - ( 05 Mar 2024 12:10 )   PT: 16.9 sec;   INR: 1.50 ratio         PTT - ( 05 Mar 2024 12:10 )  PTT:37.6 sec        ## Imaging:    ## Medications:    aMIOdarone Infusion 0.5 mG/Min IV Continuous <Continuous>  aMIOdarone Infusion 1 mG/Min IV Continuous <Continuous>  carvedilol 6.25 milliGRAM(s) Oral every 12 hours  furosemide    Tablet 40 milliGRAM(s) Oral daily  isosorbide   dinitrate Tablet (ISORDIL) 5 milliGRAM(s) Oral three times a day  sacubitril 97 mG/valsartan 103 mG 1 Tablet(s) Oral two times a day      atorvastatin 80 milliGRAM(s) Oral at bedtime  dextrose 50% Injectable 25 Gram(s) IV Push once  dextrose 50% Injectable 12.5 Gram(s) IV Push once  dextrose 50% Injectable 25 Gram(s) IV Push once  dextrose Oral Gel 15 Gram(s) Oral once PRN  glucagon  Injectable 1 milliGRAM(s) IntraMuscular once  insulin lispro (ADMELOG) corrective regimen sliding scale   SubCutaneous at bedtime  insulin lispro (ADMELOG) corrective regimen sliding scale   SubCutaneous three times a day before meals    apixaban 5 milliGRAM(s) Oral two times a day  aspirin enteric coated 81 milliGRAM(s) Oral daily          ## Vitals:  T(C): 36.4 (24 @ 07:00), Max: 36.8 (24 @ 11:39)  HR: 97 (24 @ 09:00) (52 - 160)  BP: 102/84 (24 @ 08:00) (83/70 - 119/95)  BP(mean): 91 (24 @ 08:00) (62 - 104)  RR: 17 (24 @ 09:00) (12 - 24)  SpO2: 94% (24 @ 09:00) (91% - 100%)  Wt(kg): --  Vent:   AB-05 @ 07:01  -   @ 07:00  --------------------------------------------------------  IN: 716.7 mL / OUT: 650 mL / NET: 66.7 mL     @ 07:01  -   @ 10:05  --------------------------------------------------------  IN: 33.4 mL / OUT: 0 mL / NET: 33.4 mL          ## P/E:  Gen: lying comfortably in bed in no apparent distress  HEENT: PERRL, EOMI  Resp: CTA B/L no c/r/w  CVS: S1S2 no m/r/g  Abd: soft NT/ND +BS  Ext: no c/c/e  Neuro: A&Ox3    FULL code HPI:  58 y.o male with pmh a fib on eliquis, HTN, CHF, HLD, AICD , anxiety, asthma presented to ED for c/o chest pain and palps found to be in rapid a fib.  (05 Mar 2024 17:27)      24 hr events:   No acute event overnight, HR controlled at 100s. At the time of eval, says has been feeling discomfort for 3 months, currently seeing cardiologist Dr. Garcia for cardiac failure, arrhythmia/ECG abnormalities and structural heart and valve disease. and pulmonologist Dr. Otero for asthma, severe PATTIE (CPAP 14 cm H2O) and delayed sleep phase syndrome. Uses Cpap during the night and rescue inhaler for asthma     ## ROS: negative     ## Labs:  CBC:                        11.5   5.62  )-----------( 119      ( 06 Mar 2024 05:30 )             37.4     Chem:  03-06    139  |  106  |  95<H>  ----------------------------<  97  4.6   |  17<L>  |  2.90<H>    Ca    8.8      06 Mar 2024 05:30  Phos  5.5     03-06  Mg     2.7     03-06    TPro  7.3  /  Alb  3.4  /  TBili  1.9<H>  /  DBili  x   /  AST  25  /  ALT  43  /  AlkPhos  140<H>  03-05    Coags:  PT/INR - ( 05 Mar 2024 12:10 )   PT: 16.9 sec;   INR: 1.50 ratio         PTT - ( 05 Mar 2024 12:10 )  PTT:37.6 sec        ## Imaging:    ## Medications:    aMIOdarone Infusion 0.5 mG/Min IV Continuous <Continuous>  aMIOdarone Infusion 1 mG/Min IV Continuous <Continuous>  carvedilol 6.25 milliGRAM(s) Oral every 12 hours  furosemide    Tablet 40 milliGRAM(s) Oral daily  isosorbide   dinitrate Tablet (ISORDIL) 5 milliGRAM(s) Oral three times a day  sacubitril 97 mG/valsartan 103 mG 1 Tablet(s) Oral two times a day      atorvastatin 80 milliGRAM(s) Oral at bedtime  dextrose 50% Injectable 25 Gram(s) IV Push once  dextrose 50% Injectable 12.5 Gram(s) IV Push once  dextrose 50% Injectable 25 Gram(s) IV Push once  dextrose Oral Gel 15 Gram(s) Oral once PRN  glucagon  Injectable 1 milliGRAM(s) IntraMuscular once  insulin lispro (ADMELOG) corrective regimen sliding scale   SubCutaneous at bedtime  insulin lispro (ADMELOG) corrective regimen sliding scale   SubCutaneous three times a day before meals    apixaban 5 milliGRAM(s) Oral two times a day  aspirin enteric coated 81 milliGRAM(s) Oral daily          ## Vitals:  T(C): 36.4 (24 @ 07:00), Max: 36.8 (24 @ 11:39)  HR: 97 (24 @ 09:00) (52 - 160)  BP: 102/84 (24 @ 08:00) (83/70 - 119/95)  BP(mean): 91 (24 @ 08:00) (62 - 104)  RR: 17 (24 @ 09:00) (12 - 24)  SpO2: 94% (24 @ 09:00) (91% - 100%)  Wt(kg): --  Vent:   AB-05 @ 07:01  -   @ 07:00  --------------------------------------------------------  IN: 716.7 mL / OUT: 650 mL / NET: 66.7 mL     @ 07:01  -   @ 10:05  --------------------------------------------------------  IN: 33.4 mL / OUT: 0 mL / NET: 33.4 mL          ## P/E:  Gen: lying comfortably in bed in no apparent distress  HEENT: enlarged neck circumfrence  Resp: b/l air entry + Rales  CVS: S1S2 no m/r/g  Abd: soft NT/ND +BS  Ext: no c/c + LE Edema  Neuro: A&Ox3    FULL code

## 2024-03-06 NOTE — CONSULT NOTE ADULT - SUBJECTIVE AND OBJECTIVE BOX
NEPHROLOGY CONSULTATION    CHIEF COMPLAINT: SOB, CP    HPI:  Pt is 57 yo m with pmh a fib on eliquis, HTN, CHF, HLD, AICD, anxiety, asthma presented to ED 3/5 for c/o chest pain and palps found to be in rapid a fib. Asked to eval for MAYTE. Seen in ICU, on RA, today no CP, chronic palma, no N/V/F/C. Was on Lasix and Entresto as op.     ROS:  as above    Allergies:  No Known Allergies    PAST MEDICAL & SURGICAL HISTORY:  Asthma  Anxiety  Atrial fibrillation, unspecified type  Essential hypertension  Prediabetes  Prolonged QT interval  Cardiomyopathy, unspecified type  AICD (automatic cardioverter/defibrillator) present  Hyperlipidemia  CHF (congestive heart failure)  Afib  PATTIE (obstructive sleep apnea)  History of tonsillectomy  AICD (automatic cardioverter/defibrillator) present    SOCIAL HISTORY:  negative    FAMILY HISTORY:  NC    MEDICATIONS  (STANDING):  aMIOdarone Infusion 0.5 mG/Min (16.7 mL/Hr) IV Continuous <Continuous>  aMIOdarone Infusion 1 mG/Min (33.3 mL/Hr) IV Continuous <Continuous>  apixaban 5 milliGRAM(s) Oral two times a day  aspirin enteric coated 81 milliGRAM(s) Oral daily  atorvastatin 80 milliGRAM(s) Oral at bedtime  chlorhexidine 2% Cloths 1 Application(s) Topical <User Schedule>  dextrose 5%. 1000 milliLiter(s) (50 mL/Hr) IV Continuous <Continuous>  dextrose 5%. 1000 milliLiter(s) (100 mL/Hr) IV Continuous <Continuous>  dextrose 50% Injectable 12.5 Gram(s) IV Push once  dextrose 50% Injectable 25 Gram(s) IV Push once  dextrose 50% Injectable 25 Gram(s) IV Push once  furosemide   Injectable 40 milliGRAM(s) IV Push once  glucagon  Injectable 1 milliGRAM(s) IntraMuscular once  insulin lispro (ADMELOG) corrective regimen sliding scale   SubCutaneous three times a day before meals  insulin lispro (ADMELOG) corrective regimen sliding scale   SubCutaneous at bedtime  isosorbide   dinitrate Tablet (ISORDIL) 5 milliGRAM(s) Oral three times a day    Home Medications:  Albuterol (Eqv-ProAir HFA) 90 mcg/inh inhalation aerosol: 2 puff(s) inhaled every 4 hours as needed for  bronchospasm (06 Mar 2024 10:26)  aspirin 81 mg oral tablet: 1 tab(s) orally once a day (06 Mar 2024 10:32)  atorvastatin 20 mg oral tablet: 1 tab(s) orally once a day (06 Mar 2024 10:26)  carvedilol 25 mg oral tablet: 1 tab(s) orally 2 times a day (06 Mar 2024 10:26)  Eliquis 5 mg oral tablet: 1 tab(s) orally 2 times a day  RESTART 7/10 (06 Mar 2024 10:32)  Entresto 97 mg-103 mg oral tablet: 1 tab(s) orally 2 times a day (06 Mar 2024 10:32)  furosemide 40 mg oral tablet: 1 tab(s) orally once a day (06 Mar 2024 10:32)    Vital Signs Last 24 Hrs  T(C): 36.6 (03-06-24 @ 11:00), Max: 36.7 (03-05-24 @ 18:00)  T(F): 97.8 (03-06-24 @ 11:00), Max: 98.1 (03-05-24 @ 18:00)  HR: 98 (03-06-24 @ 12:00) (96 - 146)  BP: 104/86 (03-06-24 @ 12:00) (83/70 - 119/95)  BP(mean): 93 (03-06-24 @ 12:00) (73 - 104)  RR: 15 (03-06-24 @ 12:00) (12 - 24)  SpO2: 96% (03-06-24 @ 12:00) (91% - 100%)    I&O's Detail    05 Mar 2024 07:01  -  06 Mar 2024 07:00  --------------------------------------------------------  IN:    Amiodarone: 233.1 mL    Amiodarone: 133.6 mL    IV PiggyBack: 100 mL    Lactated Ringers Bolus: 250 mL  Total IN: 716.7 mL    OUT:    Voided (mL): 650 mL  Total OUT: 650 mL    06 Mar 2024 07:01  -  06 Mar 2024 13:20  --------------------------------------------------------  IN:    Amiodarone: 83.5 mL    Oral Fluid: 240 mL  Total IN: 323.5 mL    OUT:    Voided (mL): 300 mL  Total OUT: 300 mL    LABS:                        11.5   5.62  )-----------( 119      ( 06 Mar 2024 05:30 )             37.4     03-06    139  |  106  |  95<H>  ----------------------------<  97  4.6   |  17<L>  |  2.90<H>    Ca    8.8      06 Mar 2024 05:30  Phos  5.5     03-06  Mg     2.7     03-06    TPro  7.3  /  Alb  3.4  /  TBili  1.9<H>  /  DBili  x   /  AST  25  /  ALT  43  /  AlkPhos  140<H>  03-05    LIVER FUNCTIONS - ( 05 Mar 2024 12:10 )  Alb: 3.4 g/dL / Pro: 7.3 g/dL / ALK PHOS: 140 U/L / ALT: 43 U/L / AST: 25 U/L / GGT: x           PT/INR - ( 05 Mar 2024 12:10 )   PT: 16.9 sec;   INR: 1.50 ratio      A/P:    full consult to follow    [phone]       NEPHROLOGY CONSULTATION    CHIEF COMPLAINT: SOB, CP    HPI:  Pt is 59 yo m with pmh a fib on eliquis, HTN, CHF, HLD, AICD, anxiety, asthma presented to ED 3/5 for c/o chest pain and palps found to be in rapid a fib. Asked to eval for MAYTE. Seen in ICU, on RA, today no CP, c/o chronic palma, no N/V/F/C. Was on Lasix and Entresto as op.     ROS:  as above    Allergies:  No Known Allergies    PAST MEDICAL & SURGICAL HISTORY:  Asthma  Anxiety  Atrial fibrillation, unspecified type  Essential hypertension  Prediabetes  Prolonged QT interval  Cardiomyopathy, unspecified type  AICD (automatic cardioverter/defibrillator) present  Hyperlipidemia  CHF (congestive heart failure)  Afib  PATTIE (obstructive sleep apnea)  History of tonsillectomy  AICD (automatic cardioverter/defibrillator) present    SOCIAL HISTORY:  negative    FAMILY HISTORY:  NC    MEDICATIONS  (STANDING):  aMIOdarone Infusion 0.5 mG/Min (16.7 mL/Hr) IV Continuous <Continuous>  aMIOdarone Infusion 1 mG/Min (33.3 mL/Hr) IV Continuous <Continuous>  apixaban 5 milliGRAM(s) Oral two times a day  aspirin enteric coated 81 milliGRAM(s) Oral daily  atorvastatin 80 milliGRAM(s) Oral at bedtime  chlorhexidine 2% Cloths 1 Application(s) Topical <User Schedule>  dextrose 5%. 1000 milliLiter(s) (50 mL/Hr) IV Continuous <Continuous>  dextrose 5%. 1000 milliLiter(s) (100 mL/Hr) IV Continuous <Continuous>  dextrose 50% Injectable 12.5 Gram(s) IV Push once  dextrose 50% Injectable 25 Gram(s) IV Push once  dextrose 50% Injectable 25 Gram(s) IV Push once  furosemide   Injectable 40 milliGRAM(s) IV Push once  glucagon  Injectable 1 milliGRAM(s) IntraMuscular once  insulin lispro (ADMELOG) corrective regimen sliding scale   SubCutaneous three times a day before meals  insulin lispro (ADMELOG) corrective regimen sliding scale   SubCutaneous at bedtime  isosorbide   dinitrate Tablet (ISORDIL) 5 milliGRAM(s) Oral three times a day    Home Medications:  Albuterol (Eqv-ProAir HFA) 90 mcg/inh inhalation aerosol: 2 puff(s) inhaled every 4 hours as needed for  bronchospasm (06 Mar 2024 10:26)  aspirin 81 mg oral tablet: 1 tab(s) orally once a day (06 Mar 2024 10:32)  atorvastatin 20 mg oral tablet: 1 tab(s) orally once a day (06 Mar 2024 10:26)  carvedilol 25 mg oral tablet: 1 tab(s) orally 2 times a day (06 Mar 2024 10:26)  Eliquis 5 mg oral tablet: 1 tab(s) orally 2 times a day  RESTART 7/10 (06 Mar 2024 10:32)  Entresto 97 mg-103 mg oral tablet: 1 tab(s) orally 2 times a day (06 Mar 2024 10:32)  furosemide 40 mg oral tablet: 1 tab(s) orally once a day (06 Mar 2024 10:32)    Vital Signs Last 24 Hrs  T(C): 36.6 (03-06-24 @ 11:00), Max: 36.7 (03-05-24 @ 18:00)  T(F): 97.8 (03-06-24 @ 11:00), Max: 98.1 (03-05-24 @ 18:00)  HR: 98 (03-06-24 @ 12:00) (96 - 146)  BP: 104/86 (03-06-24 @ 12:00) (83/70 - 119/95)  BP(mean): 93 (03-06-24 @ 12:00) (73 - 104)  RR: 15 (03-06-24 @ 12:00) (12 - 24)  SpO2: 96% (03-06-24 @ 12:00) (91% - 100%)    I&O's Detail    05 Mar 2024 07:01  -  06 Mar 2024 07:00  --------------------------------------------------------  IN:    Amiodarone: 233.1 mL    Amiodarone: 133.6 mL    IV PiggyBack: 100 mL    Lactated Ringers Bolus: 250 mL  Total IN: 716.7 mL    OUT:    Voided (mL): 650 mL  Total OUT: 650 mL    06 Mar 2024 07:01  -  06 Mar 2024 13:20  --------------------------------------------------------  IN:    Amiodarone: 83.5 mL    Oral Fluid: 240 mL  Total IN: 323.5 mL    OUT:    Voided (mL): 300 mL  Total OUT: 300 mL    s1s2  b/l air entry  soft, ND  + edema    LABS:                        11.5   5.62  )-----------( 119      ( 06 Mar 2024 05:30 )             37.4     03-06    139  |  106  |  95<H>  ----------------------------<  97  4.6   |  17<L>  |  2.90<H>    Ca    8.8      06 Mar 2024 05:30  Phos  5.5     03-06  Mg     2.7     03-06    TPro  7.3  /  Alb  3.4  /  TBili  1.9<H>  /  DBili  x   /  AST  25  /  ALT  43  /  AlkPhos  140<H>  03-05    LIVER FUNCTIONS - ( 05 Mar 2024 12:10 )  Alb: 3.4 g/dL / Pro: 7.3 g/dL / ALK PHOS: 140 U/L / ALT: 43 U/L / AST: 25 U/L / GGT: x           PT/INR - ( 05 Mar 2024 12:10 )   PT: 16.9 sec;   INR: 1.50 ratio      A/P:    CM, EF 20 - 25%, mod MR, CHF  Hemodynamic, cardio-renal MAYTE (Cr 1.04 - 10/9/23)  Agree w/diuresis  Avoid ACE/ARB/Entresto  Will check UA, renal SONO  Avoid nephrotoxins  F/u BMP, UO  Will check SPEP, serologies     [phone]

## 2024-03-06 NOTE — CONSULT NOTE ADULT - ASSESSMENT
Assessment: 58-year-old male with history of A-fib on Eliquis, hypertension, CHF, and hyperlipidemia admitted for A-fib RVR.  Patient reports worsening shortness of breath over the last 2 weeks with cough and chest pain    Recommendations:  – EKG with A-fib RVR, rates improved today on telemetry 100-110s, patient currently on amio drip   –Troponin negative, proBNP elevated  – Check TTE to assess EF  – Patient appears fluid overloaded.  Consider switching to IV diuresis.  – Continue GDMT Entresto and Coreg   - continue eliquis for AF   Assessment: 58-year-old male with history of A-fib on Eliquis, hypertension, CHF, and hyperlipidemia admitted for A-fib RVR.  Patient reports worsening shortness of breath over the last 2 weeks, also with CHF.    Recommendations:  – EKG with A-fib RVR, rates improved today on telemetry 100-110s, patient currently on amiodarone drip   –Troponins negative, proBNP elevated  – Check TTE to assess EF  – Patient appears fluid overloaded.  Consider switching to IV diuresis.  - continue eliquis for AF

## 2024-03-06 NOTE — DIETITIAN INITIAL EVALUATION ADULT - PERTINENT MEDS FT
MEDICATIONS  (STANDING):  aMIOdarone Infusion 1 mG/Min (33.3 mL/Hr) IV Continuous <Continuous>  aMIOdarone Infusion 0.5 mG/Min (16.7 mL/Hr) IV Continuous <Continuous>  apixaban 5 milliGRAM(s) Oral two times a day  aspirin enteric coated 81 milliGRAM(s) Oral daily  atorvastatin 80 milliGRAM(s) Oral at bedtime  carvedilol 6.25 milliGRAM(s) Oral every 12 hours  chlorhexidine 2% Cloths 1 Application(s) Topical <User Schedule>  dextrose 5%. 1000 milliLiter(s) (50 mL/Hr) IV Continuous <Continuous>  dextrose 5%. 1000 milliLiter(s) (100 mL/Hr) IV Continuous <Continuous>  dextrose 50% Injectable 12.5 Gram(s) IV Push once  dextrose 50% Injectable 25 Gram(s) IV Push once  dextrose 50% Injectable 25 Gram(s) IV Push once  furosemide    Tablet 40 milliGRAM(s) Oral daily  glucagon  Injectable 1 milliGRAM(s) IntraMuscular once  insulin lispro (ADMELOG) corrective regimen sliding scale   SubCutaneous at bedtime  insulin lispro (ADMELOG) corrective regimen sliding scale   SubCutaneous three times a day before meals  isosorbide   dinitrate Tablet (ISORDIL) 5 milliGRAM(s) Oral three times a day  sacubitril 97 mG/valsartan 103 mG 1 Tablet(s) Oral two times a day    MEDICATIONS  (PRN):  dextrose Oral Gel 15 Gram(s) Oral once PRN Blood Glucose LESS THAN 70 milliGRAM(s)/deciliter

## 2024-03-06 NOTE — PROGRESS NOTE ADULT - ASSESSMENT
58M PMHx Asthma, Afib (on Eliquis), HTN, HFrEF s/p AICD admitted to MICU with Afib with RVR, Acute HFrEF, MAYTE, Tropininemia     Plan:  NEURO: Monitor mental status closely, avoid neurosuppresants. Serial neurologic assessments.   CV: Maintain goal MAP >65. Keep K~4 and Mg>2 for optimal arrhythmia suppression. S/p Amiodarone Infusion for Afib, Lopressor BID to be started. ASA/Lipitor, Isordil QD. HOLD GDMT Entresto due to MAYTE. Lasix BID for diuresis. TTE with LVEF 20-25%. Elevated Troponin likely demand ischemia iso renal failure. Serial EKG/Marlon. Cards following, recs appreciated.   PULM: Satting well on NC, will utilize supplemental O2 PRN to maintain goal SpO2 >88%. Incentive spirometry, Chest PT/Pulmonary toilet, HOB >30'.   GI: CC Diet.   RENAL: Renal function currently with MAYTE. Trend lytes/Scr daily with BMP, Strict I's and O's, goal UOP 0.5 cc/kg/hr, renal dose meds and avoid nephrotoxins.  ENDO: Aggressive glycemic control to limit FS glucose to <180mg/dl. ISS.   ID: Afebrile. Monitor off abx.   HEME: Eliquis BID.     GOC: Full Code.  58M PMHx Asthma, Afib (on Eliquis), HTN, HFrEF s/p AICD admitted to MICU with Afib with RVR, Acute HFrEF, MAYTE, Tropininemia     Plan:  NEURO: Monitor mental status closely, avoid neurosuppresants. Serial neurologic assessments.   CV: Maintain goal MAP >65. Keep K~4 and Mg>2 for optimal arrhythmia suppression. S/p Amiodarone Infusion for Afib, Lopressor BID to be started. ASA/Lipitor, Isordil QD. HOLD GDMT Entresto due to MAYTE. Lasix BID for diuresis. TTE with LVEF 20-25%. Cards following, recs appreciated.   PULM: Satting well on NC, will utilize supplemental O2 PRN to maintain goal SpO2 >88%. Incentive spirometry, Chest PT/Pulmonary toilet, HOB >30'.   GI: CC Diet.   RENAL: Renal function currently with MAYTE. Trend lytes/Scr daily with BMP, Strict I's and O's, goal UOP 0.5 cc/kg/hr, renal dose meds and avoid nephrotoxins.  ENDO: Aggressive glycemic control to limit FS glucose to <180mg/dl. ISS.   ID: Afebrile. Monitor off abx.   HEME: Eliquis BID.     GOC: Full Code.  58M PMHx Asthma, Afib (on Eliquis), HTN, HFrEF s/p AICD admitted to MICU with Afib with RVR, Acute HFrEF, MAYTE.    Plan:  NEURO: Monitor mental status closely, avoid neurosuppresants. Serial neurologic assessments.   CV: Maintain goal MAP >65. Keep K~4 and Mg>2 for optimal arrhythmia suppression. S/p Amiodarone Infusion for Afib, Lopressor BID to be started. ASA/Lipitor, Isordil QD. HOLD GDMT Entresto due to MAYTE. Lasix BID for diuresis. TTE with LVEF 20-25%. Cards following, recs appreciated.   PULM: Satting well on NC, will utilize supplemental O2 PRN to maintain goal SpO2 >88%. Incentive spirometry, Chest PT/Pulmonary toilet, HOB >30'.   GI: CC Diet.   RENAL: Renal function currently with MAYTE. Trend lytes/Scr daily with BMP, Strict I's and O's, goal UOP 0.5 cc/kg/hr, renal dose meds and avoid nephrotoxins.  ENDO: Aggressive glycemic control to limit FS glucose to <180mg/dl. ISS.   ID: Afebrile. Monitor off abx.   HEME: Eliquis BID.     GOC: Full Code.

## 2024-03-06 NOTE — DIETITIAN INITIAL EVALUATION ADULT - NSICDXPASTMEDICALHX_GEN_ALL_CORE_FT
Trial chair risers: Kaboost for dining room chairs, cone risers for other chairs  Recommend clutter free environment with barriers (vail, etc) and supervision indoors  Recommend hand held assist when outside due to balance deficits (especially on uneven surfaces and in the winter)    Hue Drake PT, DPT  Physical Therapist  [facility] [address]  [address] [place] [address]  [email or web address]  Appointments: [phone]      PAST MEDICAL HISTORY:  Afib     AICD (automatic cardioverter/defibrillator) present     Anxiety     Asthma     Atrial fibrillation, unspecified type     Cardiomyopathy, unspecified type     CHF (congestive heart failure)     Essential hypertension     Hyperlipidemia     PATTIE (obstructive sleep apnea)     Prediabetes     Prolonged QT interval

## 2024-03-06 NOTE — DIETITIAN INITIAL EVALUATION ADULT - OTHER INFO
58 y.o male with pmh a fib on eliquis, HTN, CHF, HLD, AICD , anxiety, asthma presented to ED for c/o chest pain and palps found to be in rapid a fib. Patient reports a fair appetite PTA , patient denies no hx of DM , no A1c noted requested from medicine, (+) BM (3/6) , no changes in weight status reported, ? new dx of DM , will await A1c & provide diet education as needed .

## 2024-03-06 NOTE — PROGRESS NOTE ADULT - ATTENDING COMMENTS
Patient seen and examined  Hemodynamics status improved  appreciate cardio eval   d/w pharmacy patient has not had any meds refilled since Aug  will try to restart home meds see if rate control would be obtained. Patient seen and examined  Hemodynamics status improved  appreciate cardio eval   d/w pharmacy patient has not had any meds refilled since Aug  will try to restart home meds see if rate control would be obtained.  noted [facility] renal consult called  diurse bid  hold entresto  will give bb  patient currently on amio drip will stop once done.

## 2024-03-06 NOTE — CONSULT NOTE ADULT - NS ATTEND AMEND GEN_ALL_CORE FT
I have seen and examined the patient. I have discussed case with AMAN Weems.    Allen York is a 58 year old man with past medical history of Non ischemic cardiomyopathy (LVEF 34% in 2018), history of Ventricular tachycardia, s/p AICD, Atrial fibrillation (on Eliquis), Hypertension, Type II Diabetes mellitus, Obstructive sleep apnea and Morbid obesity who was brought in by EMS due to respiratory distress, found to have atrial fibrillation with rapid ventricular response and acute on chronic systolic heart failure exacerbation and acute renal injury.     ECG on admission consistent with atrial fibrillation with RVR, PVCs, left axis deviation. Troponins are not elevated, does not appear to be an acute coronary syndrome. Pro BNP elevated, CXR suggestive of congestion. D-dimer elevated, VQ scan with very low probability of pulmonary embolism. Overall, would recommend IV diuresis with close monitoring of renal function and urine output in consultation with Nephrology. Would hold home GDMT for CHF at this time such as Entresto due to MAYTE (recommend Hydralazine/Imdur in meantime). Avoid home Coreg at this time. Diuresis should help improve AF rates, would continue Amiodarone drip for now (monitor LFTs, check TSH) and continue anticoagulation for stroke prophylaxis. Check echo to re-evaluate LVEF. Recommend Medtronic ICD interrogation.     Will sign out this case to cardiologist to follow along tomorrow.    Yoni Chester MD  Cardiology

## 2024-03-06 NOTE — DIETITIAN INITIAL EVALUATION ADULT - PERTINENT LABORATORY DATA
03-06    139  |  106  |  95<H>  ----------------------------<  97  4.6   |  17<L>  |  2.90<H>    Ca    8.8      06 Mar 2024 05:30  Phos  5.5     03-06  Mg     2.7     03-06    TPro  7.3  /  Alb  3.4  /  TBili  1.9<H>  /  DBili  x   /  AST  25  /  ALT  43  /  AlkPhos  140<H>  03-05  POCT Blood Glucose.: 88 mg/dL (03-06-24 @ 07:54)

## 2024-03-06 NOTE — CONSULT NOTE ADULT - SUBJECTIVE AND OBJECTIVE BOX
OFE KHALIL  [id number]    HPI:  58 y.o male with pmh a fib on eliquis, HTN, CHF, HLD, medtronic AICD , anxiety, asthma presented to ED for c/o chest pain and palps found to be in rapid a fib. pt reported sob worsening over 2 weeks with cough and chest pain. reports compliance with his medications and diet.       ALLERGIES:  No Known Allergies      PAST MEDICAL & SURGICAL HISTORY:  Asthma      Anxiety      Atrial fibrillation, unspecified type      Essential hypertension      Prediabetes      Prolonged QT interval      Cardiomyopathy, unspecified type      AICD (automatic cardioverter/defibrillator) present      Hyperlipidemia      CHF (congestive heart failure)      Afib      PATTIE (obstructive sleep apnea)      History of tonsillectomy      AICD (automatic cardioverter/defibrillator) present            CURRENT MEDICATIONS:  MEDICATIONS  (STANDING):  aMIOdarone Infusion 0.5 mG/Min (16.7 mL/Hr) IV Continuous <Continuous>  aMIOdarone Infusion 1 mG/Min (33.3 mL/Hr) IV Continuous <Continuous>  apixaban 5 milliGRAM(s) Oral two times a day  aspirin enteric coated 81 milliGRAM(s) Oral daily  atorvastatin 80 milliGRAM(s) Oral at bedtime  carvedilol 6.25 milliGRAM(s) Oral every 12 hours  chlorhexidine 2% Cloths 1 Application(s) Topical <User Schedule>  dextrose 5%. 1000 milliLiter(s) (50 mL/Hr) IV Continuous <Continuous>  dextrose 5%. 1000 milliLiter(s) (100 mL/Hr) IV Continuous <Continuous>  dextrose 50% Injectable 12.5 Gram(s) IV Push once  dextrose 50% Injectable 25 Gram(s) IV Push once  dextrose 50% Injectable 25 Gram(s) IV Push once  furosemide    Tablet 40 milliGRAM(s) Oral daily  glucagon  Injectable 1 milliGRAM(s) IntraMuscular once  insulin lispro (ADMELOG) corrective regimen sliding scale   SubCutaneous at bedtime  insulin lispro (ADMELOG) corrective regimen sliding scale   SubCutaneous three times a day before meals  isosorbide   dinitrate Tablet (ISORDIL) 5 milliGRAM(s) Oral three times a day  sacubitril 97 mG/valsartan 103 mG 1 Tablet(s) Oral two times a day    MEDICATIONS  (PRN):  dextrose Oral Gel 15 Gram(s) Oral once PRN Blood Glucose LESS THAN 70 milliGRAM(s)/deciliter      SOCIAL HISTORY:  denies tobacco use      ROS:  All 10 systems reviewed and positives noted in HPI    OBJECTIVE:    VITAL SIGNS:  Vital Signs Last 24 Hrs  T(C): 36.4 (06 Mar 2024 07:00), Max: 36.8 (05 Mar 2024 11:39)  T(F): 97.6 (06 Mar 2024 07:00), Max: 98.3 (05 Mar 2024 11:39)  HR: 97 (06 Mar 2024 09:00) (52 - 160)  BP: 102/84 (06 Mar 2024 08:00) (83/70 - 119/95)  BP(mean): 91 (06 Mar 2024 08:00) (62 - 104)  RR: 17 (06 Mar 2024 09:00) (12 - 24)  SpO2: 94% (06 Mar 2024 09:00) (91% - 100%)    Parameters below as of 06 Mar 2024 09:00  Patient On (Oxygen Delivery Method): nasal cannula  O2 Flow (L/min): 4      PHYSICAL EXAM:  General: well appearing, no distress  HEENT: sclera anicteric  Neck: supple, no carotid bruits b/l  CVS: JVP ~ 7 cm H20, irregular  Chest: unlabored respirations, clear to auscultation b/l  Abdomen: non-distended  Extremities: + 2 b/l l/e edema  Neuro: awake, alert & oriented x 3  Psych: normal affect      LABS:                        11.5   5.62  )-----------( 119      ( 06 Mar 2024 05:30 )             37.4     03-06    139  |  106  |  95<H>  ----------------------------<  97  4.6   |  17<L>  |  2.90<H>    Ca    8.8      06 Mar 2024 05:30  Phos  5.5     03-06  Mg     2.7     03-06    TPro  7.3  /  Alb  3.4  /  TBili  1.9<H>  /  DBili  x   /  AST  25  /  ALT  43  /  AlkPhos  140<H>  03-05      PT/INR - ( 05 Mar 2024 12:10 )   PT: 16.9 sec;   INR: 1.50 ratio         PTT - ( 05 Mar 2024 12:10 )  PTT:37.6 sec      ECG: AF RVR @ 168 bpm     OFE KHALIL  [id number]    HPI:  58 y.o male with pmh a fib on eliquis, HTN, CHF, HLD, medtronic AICD , anxiety, asthma presented to ED for c/o shortness of breath found to be in rapid a fib. Patient reported shortness of breath worsening over 2 weeks. Reports compliance with his medications and diet.       ALLERGIES:  No Known Allergies      CURRENT MEDICATIONS:  MEDICATIONS  (STANDING):  aMIOdarone Infusion 0.5 mG/Min (16.7 mL/Hr) IV Continuous <Continuous>  aMIOdarone Infusion 1 mG/Min (33.3 mL/Hr) IV Continuous <Continuous>  apixaban 5 milliGRAM(s) Oral two times a day  aspirin enteric coated 81 milliGRAM(s) Oral daily  atorvastatin 80 milliGRAM(s) Oral at bedtime  carvedilol 6.25 milliGRAM(s) Oral every 12 hours  chlorhexidine 2% Cloths 1 Application(s) Topical <User Schedule>  dextrose 5%. 1000 milliLiter(s) (50 mL/Hr) IV Continuous <Continuous>  dextrose 5%. 1000 milliLiter(s) (100 mL/Hr) IV Continuous <Continuous>  dextrose 50% Injectable 12.5 Gram(s) IV Push once  dextrose 50% Injectable 25 Gram(s) IV Push once  dextrose 50% Injectable 25 Gram(s) IV Push once  furosemide    Tablet 40 milliGRAM(s) Oral daily  glucagon  Injectable 1 milliGRAM(s) IntraMuscular once  insulin lispro (ADMELOG) corrective regimen sliding scale   SubCutaneous at bedtime  insulin lispro (ADMELOG) corrective regimen sliding scale   SubCutaneous three times a day before meals  isosorbide   dinitrate Tablet (ISORDIL) 5 milliGRAM(s) Oral three times a day  sacubitril 97 mG/valsartan 103 mG 1 Tablet(s) Oral two times a day        ROS:  All 10 systems reviewed and positives noted in HPI    OBJECTIVE:    VITAL SIGNS:  Vital Signs Last 24 Hrs  T(C): 36.4 (06 Mar 2024 07:00), Max: 36.8 (05 Mar 2024 11:39)  T(F): 97.6 (06 Mar 2024 07:00), Max: 98.3 (05 Mar 2024 11:39)  HR: 97 (06 Mar 2024 09:00) (52 - 160)  BP: 102/84 (06 Mar 2024 08:00) (83/70 - 119/95)  BP(mean): 91 (06 Mar 2024 08:00) (62 - 104)  RR: 17 (06 Mar 2024 09:00) (12 - 24)  SpO2: 94% (06 Mar 2024 09:00) (91% - 100%)    Parameters below as of 06 Mar 2024 09:00  Patient On (Oxygen Delivery Method): nasal cannula  O2 Flow (L/min): 4      PHYSICAL EXAM:  General: morbidly obese, no acute distress  HEENT: sclera anicteric  Neck: supple  CVS: JVP ~ 12 cm H20, irregular  Chest: decreased breath sounds  Abdomen: obese  Extremities: + 2 b/l l/e edema  Neuro: awake, alert & oriented  Psych: normal affect      LABS:                        11.5   5.62  )-----------( 119      ( 06 Mar 2024 05:30 )             37.4     03-06    139  |  106  |  95<H>  ----------------------------<  97  4.6   |  17<L>  |  2.90<H>    Ca    8.8      06 Mar 2024 05:30  Phos  5.5     03-06  Mg     2.7     03-06    TPro  7.3  /  Alb  3.4  /  TBili  1.9<H>  /  DBili  x   /  AST  25  /  ALT  43  /  AlkPhos  140<H>  03-05      PT/INR - ( 05 Mar 2024 12:10 )   PT: 16.9 sec;   INR: 1.50 ratio         PTT - ( 05 Mar 2024 12:10 )  PTT:37.6 sec      ECG: AF RVR

## 2024-03-06 NOTE — PROGRESS NOTE ADULT - ASSESSMENT
59 y/o M with PMH asthma, a fib on eliquis, HTN, AICD presented to ED with c/o CP, palps and SOB found to be in a fib with RVR did not respond to Cardizem Became hypotensive. ICU consulted for further management of:      ## atrial fibrillation with RVR  ##elevated troponin   ## eleavtaed d-dimer      PLAN:  hypotensive after cardizem  gave amiodarone bolus and gtt started   digoxin given overnight for better rate control  follow up echo : pending report   continue home dose eliquis  continue home meds  cardiology following  trend troponin to peak: 34.3 >44.6  CPAP at night  Tight glycemic control, ISS  A1c 7.2   VQ scan shows low probability for PE   CXR suggests volume overloaded, now on lasix po 40 daily, Pro BNP 5000s  check TSH    59 y/o M with PMH asthma, a fib on eliquis, HTN, AICD presented to ED with c/o CP, palps and SOB found to be in a fib with RVR did not respond to Cardizem Became hypotensive. ICU consulted for further management of:      ## atrial fibrillation with RVR  ##elevated troponin   ## eleavated d-dimer  ## CHF exacerbation      PLAN:  hypotensive after cardizem  gave amiodarone bolus and gtt started   digoxin given overnight for better rate control  follow up echo : pending report   continue home dose eliquis  continue home meds  cardiology following  trend troponin to peak: 34.3 >44.6  VQ scan shows low probability for PE   CXR suggests volume overloaded,  Pro BNP 5000s, was on lasix po 40 daily, change to lasix IV 40 BID   check TSH   per cardio: hold BB and entresto  CPAP at night  Tight glycemic control, ISS  A1c 7.2    59 y/o M with PMH asthma, a fib on eliquis, HTN, AICD presented to ED with c/o CP, palps and SOB found to be in a fib with RVR did not respond to Cardizem Became hypotensive. ICU consulted for further management of:      ## atrial fibrillation with RVR  ##elevated troponin   ## elevated d-dimer wtth negative VQ scan and   ## CHF exacerbation      PLAN:  hypotensive after Cardizem now on Amio drip  gave amiodarone bolus and gtt started   digoxin given overnight for better rate control  follow up echo : pending report   continue home dose eliquis  continue home meds  cardiology following  trend troponin to peak: 34.3 >44.6  VQ scan shows low probability for PE   CXR suggests volume overloaded,  Pro BNP 5000s, was on lasix po 40 daily, change to lasix IV 40 BID   check TSH   per cardio: hold BB and entresto  CPAP at night  Tight glycemic control, ISS  A1c 7.2

## 2024-03-06 NOTE — PROGRESS NOTE ADULT - SUBJECTIVE AND OBJECTIVE BOX
Patient is a 58y old  Male who presents with a chief complaint of a fib with RVR (06 Mar 2024 13:17)    BRIEF HOSPITAL COURSE:   58M PMHx Asthma, Afib (on Eliquis), HTN, HFrEF s/p AICD admitted to MICU with Afib with RVR, Acute HFrEF, MAYTE, Tropininemia     Events last 24 hours:   -Finishing Amiodarone Infusion this evening. Afib continues to be uncontrolled, HR 110s.   -Satting well on NC. Afebrile.     PAST MEDICAL & SURGICAL HISTORY:  Asthma  Anxiety  Atrial fibrillation, unspecified type  Essential hypertension  Prediabetes  Prolonged QT interval  Cardiomyopathy, unspecified type  AICD (automatic cardioverter/defibrillator) present  Hyperlipidemia  CHF (congestive heart failure)  Afib  PATTIE (obstructive sleep apnea)  History of tonsillectomy  AICD (automatic cardioverter/defibrillator) present    Review of Systems:  CONSTITUTIONAL: No fever, chills, or fatigue  EYES: No eye pain, visual disturbances, or discharge  ENMT:  No difficulty hearing, tinnitus, vertigo; No sinus or throat pain  NECK: No pain or stiffness  RESPIRATORY: No cough, wheezing, chills or hemoptysis; No shortness of breath  CARDIOVASCULAR: No chest pain, palpitations, dizziness, or leg swelling  GASTROINTESTINAL: No abdominal or epigastric pain. No nausea, vomiting, or hematemesis; No diarrhea or constipation. No melena or hematochezia.  GENITOURINARY: No dysuria, frequency, hematuria, or incontinence  NEUROLOGICAL: No headaches, memory loss, loss of strength, numbness, or tremors  SKIN: No itching, burning, rashes, or lesions   MUSCULOSKELETAL: No joint pain or swelling; No muscle, back, or extremity pain  PSYCHIATRIC: No depression, anxiety, mood swings, or difficulty sleeping    Medications:  aMIOdarone Infusion 1 mG/Min IV Continuous <Continuous>  aMIOdarone Infusion 0.5 mG/Min IV Continuous <Continuous>  isosorbide   dinitrate Tablet (ISORDIL) 5 milliGRAM(s) Oral three times a day  metoprolol tartrate 12.5 milliGRAM(s) Oral every 12 hours  albuterol    90 MICROgram(s) HFA Inhaler 1 Puff(s) Inhalation every 4 hours PRN  apixaban 5 milliGRAM(s) Oral two times a day  aspirin enteric coated 81 milliGRAM(s) Oral daily  atorvastatin 80 milliGRAM(s) Oral at bedtime  dextrose 50% Injectable 12.5 Gram(s) IV Push once  dextrose 50% Injectable 25 Gram(s) IV Push once  dextrose 50% Injectable 25 Gram(s) IV Push once  dextrose Oral Gel 15 Gram(s) Oral once PRN  glucagon  Injectable 1 milliGRAM(s) IntraMuscular once  insulin lispro (ADMELOG) corrective regimen sliding scale   SubCutaneous three times a day before meals  insulin lispro (ADMELOG) corrective regimen sliding scale   SubCutaneous at bedtime  dextrose 5%. 1000 milliLiter(s) IV Continuous <Continuous>  dextrose 5%. 1000 milliLiter(s) IV Continuous <Continuous>  chlorhexidine 2% Cloths 1 Application(s) Topical <User Schedule>    ICU Vital Signs Last 24 Hrs  T(C): 36.7 (06 Mar 2024 15:00), Max: 36.7 (06 Mar 2024 15:00)  T(F): 98 (06 Mar 2024 15:00), Max: 98 (06 Mar 2024 15:00)  HR: 118 (06 Mar 2024 18:00) (96 - 122)  BP: 111/88 (06 Mar 2024 18:00) (92/80 - 138/122)  BP(mean): 97 (06 Mar 2024 18:00) (80 - 130)  ABP: --  ABP(mean): --  RR: 18 (06 Mar 2024 18:00) (12 - 22)  SpO2: 97% (06 Mar 2024 18:00) (91% - 99%)  O2 Parameters below as of 06 Mar 2024 16:00  Patient On (Oxygen Delivery Method): nasal cannula  O2 Flow (L/min): 2    I&O's Detail  05 Mar 2024 07:01  -  06 Mar 2024 07:00  --------------------------------------------------------  IN:    Amiodarone: 233.1 mL    Amiodarone: 133.6 mL    IV PiggyBack: 100 mL    Lactated Ringers Bolus: 250 mL  Total IN: 716.7 mL  OUT:    Voided (mL): 650 mL  Total OUT: 650 mL  Total NET: 66.7 mL    06 Mar 2024 07:01  -  06 Mar 2024 20:31  --------------------------------------------------------  IN:    Amiodarone: 167 mL    Oral Fluid: 480 mL  Total IN: 647 mL  OUT:    Voided (mL): 1100 mL  Total OUT: 1100 mL  Total NET: -453 mL    LABS:                      11.5   5.62  )-----------( 119      ( 06 Mar 2024 05:30 )             37.4     03-06  140  |  110<H>  |  88<H>  ----------------------------<  96  4.2   |  15<L>  |  2.90<H>  Ca    8.7      06 Mar 2024 14:00  Phos  5.5     03-06  Mg     2.6     03-06  TPro  7.3  /  Alb  3.4  /  TBili  1.9<H>  /  DBili  x   /  AST  25  /  ALT  43  /  AlkPhos  140<H>  03-05    CAPILLARY BLOOD GLUCOSE  POCT Blood Glucose.: 91 mg/dL (06 Mar 2024 16:31)    PT/INR - ( 05 Mar 2024 12:10 )   PT: 16.9 sec;   INR: 1.50 ratio   PTT - ( 05 Mar 2024 12:10 )  PTT:37.6 sec    Urinalysis Basic - ( 06 Mar 2024 14:00 )  Color: x / Appearance: x / SG: x / pH: x  Gluc: 96 mg/dL / Ketone: x  / Bili: x / Urobili: x   Blood: x / Protein: x / Nitrite: x   Leuk Esterase: x / RBC: x / WBC x   Sq Epi: x / Non Sq Epi: x / Bacteria: x    CULTURES:  Rapid RVP Result: NotDetec (03-05-24 @ 17:16)    Physical Examination:  General: Alert, oriented, interactive, nonfocal.  HEENT: PERRL.  NECK: Supple.   PULM: Course BS at bases bilaterally.  CVS: s1/s2.  ABD: Soft, nondistended, nontender, normoactive bowel sounds.  EXT: Bilateral LE edema, nontender.  SKIN: Warm.    RADIOLOGY:   < from: NM Pulmonary Ventilation/Perfusion Scan (03.05.24 @ 14:59) >  ACC: [id number] EXAM:  NM PULM VENTILATION PERFUS IMG   ORDERED BY:    SAUNDRA SY   PROCEDURE DATE:  03/05/2024    IMPRESSION: Very low probability of pulmonary embolus.    Time spent on this patient encounter, which includes documenting this note in the electronic medical record, was +35 minutes including assessing the presenting problems with associated risks, reviewing the medical record to prepare for the encounter, and meeting face to face with the patient to obtain additional history. I have also performed an appropriate physical exam, made interventions listed and ordered and interpreted appropriate diagnostic studies as documented. To improve communication and patient safety, I have coordinated care with the multidisciplinary team including the bedside nurse, appropriate attending of record and consultants as needed. This time is independent of any procedures performed.    Date of entry of this note is equal to the date of services rendered.  Patient is a 58y old  Male who presents with a chief complaint of a fib with RVR (06 Mar 2024 13:17)    BRIEF HOSPITAL COURSE:   58M PMHx Asthma, Afib (on Eliquis), HTN, HFrEF s/p AICD admitted to MICU with Afib with RVR, Acute HFrEF, MAYTE.    Events last 24 hours:   -Finishing Amiodarone Infusion this evening. Afib continues to be uncontrolled, HR 110s.   -Satting well on NC. Afebrile.     PAST MEDICAL & SURGICAL HISTORY:  Asthma  Anxiety  Atrial fibrillation, unspecified type  Essential hypertension  Prediabetes  Prolonged QT interval  Cardiomyopathy, unspecified type  AICD (automatic cardioverter/defibrillator) present  Hyperlipidemia  CHF (congestive heart failure)  Afib  PATTIE (obstructive sleep apnea)  History of tonsillectomy  AICD (automatic cardioverter/defibrillator) present    Review of Systems:  CONSTITUTIONAL: No fever, chills, or fatigue  EYES: No eye pain, visual disturbances, or discharge  ENMT:  No difficulty hearing, tinnitus, vertigo; No sinus or throat pain  NECK: No pain or stiffness  RESPIRATORY: No cough, wheezing, chills or hemoptysis; No shortness of breath  CARDIOVASCULAR: No chest pain, palpitations, dizziness, or leg swelling  GASTROINTESTINAL: No abdominal or epigastric pain. No nausea, vomiting, or hematemesis; No diarrhea or constipation. No melena or hematochezia.  GENITOURINARY: No dysuria, frequency, hematuria, or incontinence  NEUROLOGICAL: No headaches, memory loss, loss of strength, numbness, or tremors  SKIN: No itching, burning, rashes, or lesions   MUSCULOSKELETAL: No joint pain or swelling; No muscle, back, or extremity pain  PSYCHIATRIC: No depression, anxiety, mood swings, or difficulty sleeping    Medications:  aMIOdarone Infusion 1 mG/Min IV Continuous <Continuous>  aMIOdarone Infusion 0.5 mG/Min IV Continuous <Continuous>  isosorbide   dinitrate Tablet (ISORDIL) 5 milliGRAM(s) Oral three times a day  metoprolol tartrate 12.5 milliGRAM(s) Oral every 12 hours  albuterol    90 MICROgram(s) HFA Inhaler 1 Puff(s) Inhalation every 4 hours PRN  apixaban 5 milliGRAM(s) Oral two times a day  aspirin enteric coated 81 milliGRAM(s) Oral daily  atorvastatin 80 milliGRAM(s) Oral at bedtime  dextrose 50% Injectable 12.5 Gram(s) IV Push once  dextrose 50% Injectable 25 Gram(s) IV Push once  dextrose 50% Injectable 25 Gram(s) IV Push once  dextrose Oral Gel 15 Gram(s) Oral once PRN  glucagon  Injectable 1 milliGRAM(s) IntraMuscular once  insulin lispro (ADMELOG) corrective regimen sliding scale   SubCutaneous three times a day before meals  insulin lispro (ADMELOG) corrective regimen sliding scale   SubCutaneous at bedtime  dextrose 5%. 1000 milliLiter(s) IV Continuous <Continuous>  dextrose 5%. 1000 milliLiter(s) IV Continuous <Continuous>  chlorhexidine 2% Cloths 1 Application(s) Topical <User Schedule>    ICU Vital Signs Last 24 Hrs  T(C): 36.7 (06 Mar 2024 15:00), Max: 36.7 (06 Mar 2024 15:00)  T(F): 98 (06 Mar 2024 15:00), Max: 98 (06 Mar 2024 15:00)  HR: 118 (06 Mar 2024 18:00) (96 - 122)  BP: 111/88 (06 Mar 2024 18:00) (92/80 - 138/122)  BP(mean): 97 (06 Mar 2024 18:00) (80 - 130)  ABP: --  ABP(mean): --  RR: 18 (06 Mar 2024 18:00) (12 - 22)  SpO2: 97% (06 Mar 2024 18:00) (91% - 99%)  O2 Parameters below as of 06 Mar 2024 16:00  Patient On (Oxygen Delivery Method): nasal cannula  O2 Flow (L/min): 2    I&O's Detail  05 Mar 2024 07:01  -  06 Mar 2024 07:00  --------------------------------------------------------  IN:    Amiodarone: 233.1 mL    Amiodarone: 133.6 mL    IV PiggyBack: 100 mL    Lactated Ringers Bolus: 250 mL  Total IN: 716.7 mL  OUT:    Voided (mL): 650 mL  Total OUT: 650 mL  Total NET: 66.7 mL    06 Mar 2024 07:01  -  06 Mar 2024 20:31  --------------------------------------------------------  IN:    Amiodarone: 167 mL    Oral Fluid: 480 mL  Total IN: 647 mL  OUT:    Voided (mL): 1100 mL  Total OUT: 1100 mL  Total NET: -453 mL    LABS:                      11.5   5.62  )-----------( 119      ( 06 Mar 2024 05:30 )             37.4     03-06  140  |  110<H>  |  88<H>  ----------------------------<  96  4.2   |  15<L>  |  2.90<H>  Ca    8.7      06 Mar 2024 14:00  Phos  5.5     03-06  Mg     2.6     03-06  TPro  7.3  /  Alb  3.4  /  TBili  1.9<H>  /  DBili  x   /  AST  25  /  ALT  43  /  AlkPhos  140<H>  03-05    CAPILLARY BLOOD GLUCOSE  POCT Blood Glucose.: 91 mg/dL (06 Mar 2024 16:31)    PT/INR - ( 05 Mar 2024 12:10 )   PT: 16.9 sec;   INR: 1.50 ratio   PTT - ( 05 Mar 2024 12:10 )  PTT:37.6 sec    Urinalysis Basic - ( 06 Mar 2024 14:00 )  Color: x / Appearance: x / SG: x / pH: x  Gluc: 96 mg/dL / Ketone: x  / Bili: x / Urobili: x   Blood: x / Protein: x / Nitrite: x   Leuk Esterase: x / RBC: x / WBC x   Sq Epi: x / Non Sq Epi: x / Bacteria: x    CULTURES:  Rapid RVP Result: NotDetec (03-05-24 @ 17:16)    Physical Examination:  General: Alert, oriented, interactive, nonfocal.  HEENT: PERRL.  NECK: Supple.   PULM: Course BS at bases bilaterally.  CVS: s1/s2.  ABD: Soft, nondistended, nontender, normoactive bowel sounds.  EXT: Bilateral LE edema, nontender.  SKIN: Warm.    RADIOLOGY:   < from: NM Pulmonary Ventilation/Perfusion Scan (03.05.24 @ 14:59) >  ACC: [id number] EXAM:  NM PULM VENTILATION PERFUS IMG   ORDERED BY:    SAUNDRA SY   PROCEDURE DATE:  03/05/2024    IMPRESSION: Very low probability of pulmonary embolus.    Time spent on this patient encounter, which includes documenting this note in the electronic medical record, was +35 minutes including assessing the presenting problems with associated risks, reviewing the medical record to prepare for the encounter, and meeting face to face with the patient to obtain additional history. I have also performed an appropriate physical exam, made interventions listed and ordered and interpreted appropriate diagnostic studies as documented. To improve communication and patient safety, I have coordinated care with the multidisciplinary team including the bedside nurse, appropriate attending of record and consultants as needed. This time is independent of any procedures performed.    Date of entry of this note is equal to the date of services rendered.

## 2024-03-07 LAB
ALBUMIN SERPL ELPH-MCNC: 3.1 G/DL — LOW (ref 3.3–5)
ALP SERPL-CCNC: 120 U/L — SIGNIFICANT CHANGE UP (ref 40–120)
ALT FLD-CCNC: 44 U/L — SIGNIFICANT CHANGE UP (ref 10–45)
ANION GAP SERPL CALC-SCNC: 16 MMOL/L — SIGNIFICANT CHANGE UP (ref 5–17)
APPEARANCE UR: ABNORMAL
AST SERPL-CCNC: 43 U/L — HIGH (ref 10–40)
BACTERIA # UR AUTO: SIGNIFICANT CHANGE UP /HPF
BILIRUB SERPL-MCNC: 2.1 MG/DL — HIGH (ref 0.2–1.2)
BILIRUB UR-MCNC: NEGATIVE — SIGNIFICANT CHANGE UP
BUN SERPL-MCNC: 85 MG/DL — HIGH (ref 7–23)
C3 SERPL-MCNC: 96 MG/DL — SIGNIFICANT CHANGE UP (ref 81–157)
C4 SERPL-MCNC: 20 MG/DL — SIGNIFICANT CHANGE UP (ref 13–39)
CALCIUM SERPL-MCNC: 8.3 MG/DL — LOW (ref 8.4–10.5)
CHLORIDE SERPL-SCNC: 109 MMOL/L — HIGH (ref 96–108)
CO2 SERPL-SCNC: 17 MMOL/L — LOW (ref 22–31)
COLOR SPEC: YELLOW — SIGNIFICANT CHANGE UP
COMMENT - URINE: SIGNIFICANT CHANGE UP
CREAT SERPL-MCNC: 2.72 MG/DL — HIGH (ref 0.5–1.3)
DIFF PNL FLD: ABNORMAL
DIGOXIN SERPL-MCNC: 0.9 NG/ML — SIGNIFICANT CHANGE UP (ref 0.8–2)
EGFR: 26 ML/MIN/1.73M2 — LOW
EPI CELLS # UR: 10 — SIGNIFICANT CHANGE UP
GLUCOSE BLDC GLUCOMTR-MCNC: 101 MG/DL — HIGH (ref 70–99)
GLUCOSE BLDC GLUCOMTR-MCNC: 102 MG/DL — HIGH (ref 70–99)
GLUCOSE BLDC GLUCOMTR-MCNC: 75 MG/DL — SIGNIFICANT CHANGE UP (ref 70–99)
GLUCOSE BLDC GLUCOMTR-MCNC: 95 MG/DL — SIGNIFICANT CHANGE UP (ref 70–99)
GLUCOSE SERPL-MCNC: 71 MG/DL — SIGNIFICANT CHANGE UP (ref 70–99)
GLUCOSE UR QL: NEGATIVE MG/DL — SIGNIFICANT CHANGE UP
HCT VFR BLD CALC: 35.7 % — LOW (ref 39–50)
HGB BLD-MCNC: 11.2 G/DL — LOW (ref 13–17)
HYALINE CASTS # UR AUTO: SIGNIFICANT CHANGE UP
KETONES UR-MCNC: NEGATIVE MG/DL — SIGNIFICANT CHANGE UP
LEUKOCYTE ESTERASE UR-ACNC: ABNORMAL
MAGNESIUM SERPL-MCNC: 2.4 MG/DL — SIGNIFICANT CHANGE UP (ref 1.6–2.6)
MCHC RBC-ENTMCNC: 26.5 PG — LOW (ref 27–34)
MCHC RBC-ENTMCNC: 31.4 GM/DL — LOW (ref 32–36)
MCV RBC AUTO: 84.6 FL — SIGNIFICANT CHANGE UP (ref 80–100)
NITRITE UR-MCNC: NEGATIVE — SIGNIFICANT CHANGE UP
NRBC # BLD: 0 /100 WBCS — SIGNIFICANT CHANGE UP (ref 0–0)
PH UR: 5 — SIGNIFICANT CHANGE UP (ref 5–8)
PHOSPHATE SERPL-MCNC: 4.2 MG/DL — SIGNIFICANT CHANGE UP (ref 2.5–4.5)
PLATELET # BLD AUTO: 143 K/UL — LOW (ref 150–400)
POTASSIUM SERPL-MCNC: 4.3 MMOL/L — SIGNIFICANT CHANGE UP (ref 3.5–5.3)
POTASSIUM SERPL-SCNC: 4.3 MMOL/L — SIGNIFICANT CHANGE UP (ref 3.5–5.3)
PROT SERPL-MCNC: 6.5 G/DL — SIGNIFICANT CHANGE UP (ref 6–8.3)
PROT SERPL-MCNC: 6.8 G/DL — SIGNIFICANT CHANGE UP (ref 6–8.3)
PROT UR-MCNC: 30 MG/DL
RBC # BLD: 4.22 M/UL — SIGNIFICANT CHANGE UP (ref 4.2–5.8)
RBC # FLD: 17 % — HIGH (ref 10.3–14.5)
RBC CASTS # UR COMP ASSIST: 4 /HPF — SIGNIFICANT CHANGE UP (ref 0–4)
SODIUM SERPL-SCNC: 142 MMOL/L — SIGNIFICANT CHANGE UP (ref 135–145)
SP GR SPEC: 1.01 — SIGNIFICANT CHANGE UP (ref 1–1.03)
TSH SERPL-MCNC: 1.95 UIU/ML — SIGNIFICANT CHANGE UP (ref 0.36–3.74)
UROBILINOGEN FLD QL: 0.2 MG/DL — SIGNIFICANT CHANGE UP (ref 0.2–1)
WBC # BLD: 4.63 K/UL — SIGNIFICANT CHANGE UP (ref 3.8–10.5)
WBC # FLD AUTO: 4.63 K/UL — SIGNIFICANT CHANGE UP (ref 3.8–10.5)
WBC UR QL: 20 /HPF — HIGH (ref 0–5)

## 2024-03-07 PROCEDURE — 76775 US EXAM ABDO BACK WALL LIM: CPT | Mod: 26

## 2024-03-07 PROCEDURE — 99233 SBSQ HOSP IP/OBS HIGH 50: CPT | Mod: FS

## 2024-03-07 RX ORDER — METOPROLOL TARTRATE 50 MG
5 TABLET ORAL ONCE
Refills: 0 | Status: COMPLETED | OUTPATIENT
Start: 2024-03-07 | End: 2024-03-07

## 2024-03-07 RX ORDER — METOPROLOL TARTRATE 50 MG
12.5 TABLET ORAL EVERY 6 HOURS
Refills: 0 | Status: DISCONTINUED | OUTPATIENT
Start: 2024-03-07 | End: 2024-03-07

## 2024-03-07 RX ORDER — METOPROLOL TARTRATE 50 MG
25 TABLET ORAL EVERY 6 HOURS
Refills: 0 | Status: DISCONTINUED | OUTPATIENT
Start: 2024-03-07 | End: 2024-03-08

## 2024-03-07 RX ORDER — ISOSORBIDE DINITRATE 5 MG/1
5 TABLET ORAL THREE TIMES A DAY
Refills: 0 | Status: DISCONTINUED | OUTPATIENT
Start: 2024-03-07 | End: 2024-03-15

## 2024-03-07 RX ADMIN — CHLORHEXIDINE GLUCONATE 1 APPLICATION(S): 213 SOLUTION TOPICAL at 05:25

## 2024-03-07 RX ADMIN — Medication 40 MILLIGRAM(S): at 05:24

## 2024-03-07 RX ADMIN — APIXABAN 5 MILLIGRAM(S): 2.5 TABLET, FILM COATED ORAL at 17:15

## 2024-03-07 RX ADMIN — Medication 40 MILLIGRAM(S): at 14:18

## 2024-03-07 RX ADMIN — Medication 12.5 MILLIGRAM(S): at 09:06

## 2024-03-07 RX ADMIN — Medication 81 MILLIGRAM(S): at 11:16

## 2024-03-07 RX ADMIN — Medication 25 MILLIGRAM(S): at 22:23

## 2024-03-07 RX ADMIN — ISOSORBIDE DINITRATE 5 MILLIGRAM(S): 5 TABLET ORAL at 11:16

## 2024-03-07 RX ADMIN — Medication 5 MILLIGRAM(S): at 22:38

## 2024-03-07 RX ADMIN — Medication 12.5 MILLIGRAM(S): at 14:17

## 2024-03-07 RX ADMIN — Medication 5 MILLIGRAM(S): at 12:19

## 2024-03-07 RX ADMIN — Medication 5 MILLIGRAM(S): at 15:09

## 2024-03-07 RX ADMIN — Medication 25 MILLIGRAM(S): at 16:18

## 2024-03-07 RX ADMIN — APIXABAN 5 MILLIGRAM(S): 2.5 TABLET, FILM COATED ORAL at 05:25

## 2024-03-07 RX ADMIN — Medication 12.5 MILLIGRAM(S): at 05:25

## 2024-03-07 RX ADMIN — ISOSORBIDE DINITRATE 5 MILLIGRAM(S): 5 TABLET ORAL at 05:25

## 2024-03-07 RX ADMIN — ATORVASTATIN CALCIUM 80 MILLIGRAM(S): 80 TABLET, FILM COATED ORAL at 21:15

## 2024-03-07 NOTE — PROGRESS NOTE ADULT - SUBJECTIVE AND OBJECTIVE BOX
[facility]  [id number]    Chief Complaint: SOB, AF RVR, HFrEF  Interval events: pt seen and examined, labs and chart reviewed. endorses chest discomfort but improvement in breathing. -130s on tele    ALLERGIES:  No Known Allergies      CURRENT MEDICATIONS:  MEDICATIONS  (STANDING):  aMIOdarone Infusion 0.5 mG/Min (16.7 mL/Hr) IV Continuous <Continuous>  aMIOdarone Infusion 1 mG/Min (33.3 mL/Hr) IV Continuous <Continuous>  apixaban 5 milliGRAM(s) Oral two times a day  aspirin enteric coated 81 milliGRAM(s) Oral daily  atorvastatin 80 milliGRAM(s) Oral at bedtime  carvedilol 6.25 milliGRAM(s) Oral every 12 hours  chlorhexidine 2% Cloths 1 Application(s) Topical <User Schedule>  dextrose 5%. 1000 milliLiter(s) (50 mL/Hr) IV Continuous <Continuous>  dextrose 5%. 1000 milliLiter(s) (100 mL/Hr) IV Continuous <Continuous>  dextrose 50% Injectable 12.5 Gram(s) IV Push once  dextrose 50% Injectable 25 Gram(s) IV Push once  dextrose 50% Injectable 25 Gram(s) IV Push once  furosemide    Tablet 40 milliGRAM(s) Oral daily  glucagon  Injectable 1 milliGRAM(s) IntraMuscular once  insulin lispro (ADMELOG) corrective regimen sliding scale   SubCutaneous at bedtime  insulin lispro (ADMELOG) corrective regimen sliding scale   SubCutaneous three times a day before meals  isosorbide   dinitrate Tablet (ISORDIL) 5 milliGRAM(s) Oral three times a day  sacubitril 97 mG/valsartan 103 mG 1 Tablet(s) Oral two times a day        ROS:  All 10 systems reviewed and positives noted in HPI    OBJECTIVE:    VITAL SIGNS:  Vital Signs Last 24 Hrs  T(C): 36.4 (06 Mar 2024 07:00), Max: 36.8 (05 Mar 2024 11:39)  T(F): 97.6 (06 Mar 2024 07:00), Max: 98.3 (05 Mar 2024 11:39)  HR: 97 (06 Mar 2024 09:00) (52 - 160)  BP: 102/84 (06 Mar 2024 08:00) (83/70 - 119/95)  BP(mean): 91 (06 Mar 2024 08:00) (62 - 104)  RR: 17 (06 Mar 2024 09:00) (12 - 24)  SpO2: 94% (06 Mar 2024 09:00) (91% - 100%)    Parameters below as of 06 Mar 2024 09:00  Patient On (Oxygen Delivery Method): nasal cannula  O2 Flow (L/min): 4      PHYSICAL EXAM:  General: morbidly obese, no acute distress  HEENT: sclera anicteric  Neck: supple  CVS: JVP ~ 12 cm H20, irregular  Chest: decreased breath sounds  Abdomen: obese  Extremities: + 2 b/l l/e edema  Neuro: awake, alert & oriented  Psych: normal affect      LABS:                        11.5   5.62  )-----------( 119      ( 06 Mar 2024 05:30 )             37.4     03-06    139  |  106  |  95<H>  ----------------------------<  97  4.6   |  17<L>  |  2.90<H>    Ca    8.8      06 Mar 2024 05:30  Phos  5.5     03-06  Mg     2.7     03-06    TPro  7.3  /  Alb  3.4  /  TBili  1.9<H>  /  DBili  x   /  AST  25  /  ALT  43  /  AlkPhos  140<H>  03-05      PT/INR - ( 05 Mar 2024 12:10 )   PT: 16.9 sec;   INR: 1.50 ratio         PTT - ( 05 Mar 2024 12:10 )  PTT:37.6 sec      ECG: AF RVR     < from: TTE Echo Complete w/o Contrast w/ Doppler (03.06.24 @ 07:53) >   1. Severely decreased global left ventricular systolic function.   2. Left ventricular ejection fraction, by visual estimation, is 20 to   25%.   3. The left ventricle endocardium is not well visualized, consider use   of IV ultrasonic enhancing agent to better evaluate endocardium, if   clinically indicated. Calculated LVEF by Simpsons Biplane Method 20%.   Severe global left ventricle hypokinesis.   4. Severely increased left ventricular internal cavity size.   5. Dilated cardiomyopathy.   6. The mitral in-flow pattern reveals no discernable A-wave, therefore   no comment on diastolic function can be made.   7. Mildly enlarged right ventricle with low normal right ventricle   systolic function.   8. Severely enlarged left atrium.   9. Right atrial enlargement.  10. Mild thickening of the anterior and posterior mitral valve leaflets.  11. Moderate mitral valve regurgitation.  12. Mild-moderate tricuspid regurgitation.  13. Mild aortic valve leaflet calcification. No aortic valve stenosis.  14. Mild aortic regurgitation.  15. Mild to moderate pulmonic valve regurgitation.  16. Top normal sized Aorta at the Sinuses of Valsalva.  17. Estimated pulmonary artery systolic pressure is 42.5 mmHg assuming a   right atrial pressure of 15 mmHg, which is consistent with mild pulmonary   hypertension.  18. There is no evidence of pericardial effusion.

## 2024-03-07 NOTE — PROGRESS NOTE ADULT - NS PANP COMMENT GEN_ALL_CORE FT
I have seen interviewed and examined the patient independently. I have reviewed the outpatient chart has well    patient has been in af with rvr since approx 12/21/2023.    he most probably has a tachycardia induced cmp since his prior ef"s were in the low normal to normal range    suggest    discontinue isosorbid to decrease hypotension    titrate metoprolol tartrate as bp allows    if still not adequately controlled would start digoxin

## 2024-03-07 NOTE — PROGRESS NOTE ADULT - SUBJECTIVE AND OBJECTIVE BOX
No distress    Vital Signs Last 24 Hrs  T(C): 36.7 (24 @ 15:00), Max: 36.7 (24 @ 07:00)  T(F): 98.1 (24 @ 15:00), Max: 98.1 (24 @ 07:00)  HR: 131 (24 @ 18:00) (96 - 147)  BP: 114/85 (24 @ 18:00) (98/79 - 126/98)  BP(mean): 94 (24 @ 18:00) (84 - 112)  RR: 17 (24 @ 18:00) (13 - 30)  SpO2: 97% (24 @ 18:00) (94% - 99%)    I&O's Detail    06 Mar 2024 07:01  -  07 Mar 2024 07:00  --------------------------------------------------------  IN:    Amiodarone: 167 mL    Oral Fluid: 480 mL  Total IN: 647 mL    OUT:    Voided (mL): 1800 mL  Total OUT: 1800 mL    07 Mar 2024 07:01  -  07 Mar 2024 19:56  --------------------------------------------------------  IN:    Oral Fluid: 900 mL  Total IN: 900 mL    OUT:    Voided (mL): 1450 mL  Total OUT: 1450 mL    s1s2  b/l air entry  soft, ND  + edema                        11.2   4.63  )-----------( 143      ( 07 Mar 2024 05:00 )             35.7     07 Mar 2024 05:00    142    |  109    |  85     ----------------------------<  71     4.3     |  17     |  2.72     Ca    8.3        07 Mar 2024 05:00  Phos  4.2       07 Mar 2024 05:00  Mg     2.4       07 Mar 2024 05:00    TPro  6.8    /  Alb  3.1    /  TBili  2.1    /  DBili  x      /  AST  43     /  ALT  44     /  AlkPhos  120    07 Mar 2024 05:00    LIVER FUNCTIONS - ( 07 Mar 2024 05:00 )  Alb: 3.1 g/dL / Pro: 6.8 g/dL / ALK PHOS: 120 U/L / ALT: 44 U/L / AST: 43 U/L / GGT: x           Urinalysis Basic - ( 07 Mar 2024 05:00 )    Color: Yellow / Appearance: Cloudy / S.013 / pH: x  Gluc: 71 mg/dL / Ketone: Negative mg/dL  / Bili: Negative / Urobili: 0.2 mg/dL   Blood: x / Protein: 30 mg/dL / Nitrite: Negative   Leuk Esterase: Large / RBC: 4 /HPF / WBC 20 /HPF   Sq Epi: x / Non Sq Epi: x / Bacteria: Rare /HPF    albuterol    90 MICROgram(s) HFA Inhaler 1 Puff(s) Inhalation every 4 hours PRN  apixaban 5 milliGRAM(s) Oral two times a day  artificial  tears Solution 1 Drop(s) Both EYES three times a day PRN  aspirin enteric coated 81 milliGRAM(s) Oral daily  atorvastatin 80 milliGRAM(s) Oral at bedtime  chlorhexidine 2% Cloths 1 Application(s) Topical <User Schedule>  dextrose 5%. 1000 milliLiter(s) IV Continuous <Continuous>  dextrose 5%. 1000 milliLiter(s) IV Continuous <Continuous>  dextrose 50% Injectable 25 Gram(s) IV Push once  dextrose 50% Injectable 12.5 Gram(s) IV Push once  dextrose 50% Injectable 25 Gram(s) IV Push once  dextrose Oral Gel 15 Gram(s) Oral once PRN  furosemide   Injectable 40 milliGRAM(s) IV Push two times a day  glucagon  Injectable 1 milliGRAM(s) IntraMuscular once  insulin lispro (ADMELOG) corrective regimen sliding scale   SubCutaneous three times a day before meals  insulin lispro (ADMELOG) corrective regimen sliding scale   SubCutaneous at bedtime  isosorbide   dinitrate Tablet (ISORDIL) 5 milliGRAM(s) Oral three times a day  metoprolol tartrate 25 milliGRAM(s) Oral every 6 hours    A/P:    CM, EF 20 - 25%, mod MR, CHF, RAfib  Hemodynamic, cardio-renal MAYET (Cr 1.04 - 10/9/23)  Agree w/diuresis  HR control   Avoid ACE/ARB/Entresto  Will check renal SONO  Avoid nephrotoxins  F/u BMP, UO, SPEP, serologies   Prognosis is guarded overall    [phone]

## 2024-03-07 NOTE — PROGRESS NOTE ADULT - NS ATTEND AMEND GEN_ALL_CORE FT
patient seen and examined  comfortable  hr no longer controlled, will increase BB and PO and IV  Cardio f/u  supportive care.

## 2024-03-07 NOTE — PROGRESS NOTE ADULT - ASSESSMENT
59 y/o M with PMH asthma, a fib on eliquis, HTN, AICD presented to ED with c/o CP, palps and SOB found to be in a fib with RVR did not respond to Cardizem Became hypotensive. ICU consulted for further management of:      ## atrial fibrillation with RVR  ##elevated troponin   ## elevated d-dimer wtth negative VQ scan and   ## CHF exacerbation      PLAN:  hypotensive after Cardizem now on Amio drip  gave amiodarone bolus and gtt started   digoxin given overnight for better rate control  follow up echo : pending report   continue home dose eliquis  continue home meds  cardiology following  trend troponin to peak: 34.3 >44.6  VQ scan shows low probability for PE   CXR suggests volume overloaded,  Pro BNP 5000s, was on lasix po 40 daily, change to lasix IV 40 BID   check TSH   per cardio: hold BB and entresto  CPAP at night  Tight glycemic control, ISS  A1c 7.2    57 y/o M with PMH asthma, a fib on eliquis, HTN, AICD presented to ED with c/o CP, palps and SOB found to be in a fib with RVR did not respond to Cardizem Became hypotensive. ICU consulted for further management of:      ## atrial fibrillation with RVR  ##elevated troponin   ## elevated d-dimer wtth negative VQ scan and   ## CHF exacerbation      PLAN:  hypotensive after Cardizem  s/p amiodarone drip, will increase metprolol 12.5 from BID to TID for better rate control   TTE: EF 20 to 25%, moderate mitral regurgitation, mild pulmonary hypertension   continue home dose eliquis  cardiology following   troponin: 34.3 >44.6  VQ scan shows low probability for PE   CXR suggests volume overloaded,  Pro BNP 5000s, was on lasix po 40 daily, change to lasix IV 40 BID   TSH: wnl  hold entresto per cardio  CPAP at night  Tight glycemic control, ISS  A1c 7.2   Doppler negative for DVT  59 y/o M with PMH asthma, a fib on eliquis, HTN, AICD presented to ED with c/o CP, palps and SOB found to be in a fib with RVR did not respond to Cardizem Became hypotensive. ICU consulted for further management of:      ## atrial fibrillation with RVR  ##elevated troponin   ## elevated d-dimer wtth negative VQ scan and LE Duplex  ## CHF exacerbation      PLAN:  hypotensive after Cardizem  s/p amiodarone drip, will increase metprolol 12.5 from BID to TID for better rate control   TTE: EF 20 to 25%, moderate mitral regurgitation, mild pulmonary hypertension   continue home dose eliquis  cardiology following   troponin: 34.3 >44.6  VQ scan shows low probability for PE   CXR suggests volume overloaded,  Pro BNP 5000s, was on lasix po 40 daily, change to lasix IV 40 BID   TSH: wnl  hold entresto per cardio  CPAP at night  Tight glycemic control, ISS  A1c 7.2   Doppler negative for DVT

## 2024-03-07 NOTE — PROGRESS NOTE ADULT - SUBJECTIVE AND OBJECTIVE BOX
HPI:  58 y.o male with pmh a fib on eliquis, HTN, CHF, HLD, AICD , anxiety, asthma presented to ED for c/o chest pain and palps found to be in rapid a fib.  (05 Mar 2024 17:27)      24 hr events:  No acute event overnight, pt sustaining A fib  at 120s overnight     ## ROS:  negative     ## Labs:  CBC:                        11.2   4.63  )-----------( 143      ( 07 Mar 2024 05:00 )             35.7     Chem:  03-07    142  |  109<H>  |  85<H>  ----------------------------<  71  4.3   |  17<L>  |  2.72<H>    Ca    8.3<L>      07 Mar 2024 05:00  Phos  4.2     03-07  Mg     2.4     03-07    TPro  6.8  /  Alb  3.1<L>  /  TBili  2.1<H>  /  DBili  x   /  AST  43<H>  /  ALT  44  /  AlkPhos  120  03-07    Coags:  PT/INR - ( 05 Mar 2024 12:10 )   PT: 16.9 sec;   INR: 1.50 ratio         PTT - ( 05 Mar 2024 12:10 )  PTT:37.6 sec      ## Medications:    furosemide   Injectable 40 milliGRAM(s) IV Push two times a day  isosorbide   dinitrate Tablet (ISORDIL) 5 milliGRAM(s) Oral three times a day  metoprolol tartrate 12.5 milliGRAM(s) Oral every 6 hours    albuterol    90 MICROgram(s) HFA Inhaler 1 Puff(s) Inhalation every 4 hours PRN    atorvastatin 80 milliGRAM(s) Oral at bedtime  dextrose 50% Injectable 25 Gram(s) IV Push once  dextrose 50% Injectable 12.5 Gram(s) IV Push once  dextrose 50% Injectable 25 Gram(s) IV Push once  dextrose Oral Gel 15 Gram(s) Oral once PRN  glucagon  Injectable 1 milliGRAM(s) IntraMuscular once  insulin lispro (ADMELOG) corrective regimen sliding scale   SubCutaneous three times a day before meals  insulin lispro (ADMELOG) corrective regimen sliding scale   SubCutaneous at bedtime    apixaban 5 milliGRAM(s) Oral two times a day  aspirin enteric coated 81 milliGRAM(s) Oral daily          ## Vitals:  T(C): 36.7 (24 @ 07:00), Max: 36.7 (24 @ 15:00)  HR: 120 (24 @ 08:00) (96 - 131)  BP: 108/95 (24 @ 08:00) (93/72 - 138/122)  BP(mean): 101 (24 @ 08:00) (80 - 130)  RR: 19 (24 @ 08:00) (13 - 22)  SpO2: 96% (24 @ 08:00) (94% - 99%)  Wt(kg): --  Vent:   AB-06 @ 07:01  -   @ 07:00  --------------------------------------------------------  IN: 647 mL / OUT: 1800 mL / NET: -1153 mL     @ 07:01  -   @ 08:57  --------------------------------------------------------  IN: 0 mL / OUT: 575 mL / NET: -575 mL        ## P/E:  Gen: lying comfortably in bed in no apparent distress  HEENT: enlarged neck circumfrence  Resp: b/l air entry + Rales  CVS: S1S2 no m/r/g  Abd: soft NT/ND +BS  Ext: no c/c + LE Edema  Neuro: A&Ox3    full code  HPI:  58 y.o male with pmh a fib on eliquis, HTN, CHF, HLD, AICD , anxiety, asthma presented to ED for c/o chest pain and palps found to be in rapid a fib.  (05 Mar 2024 17:27)      24 hr events:  No acute event overnight, pt sustaining A fib  at 120s overnight, finished amiodarone and started metoprolol 12.5 BID.     ## ROS:  negative     ## Labs:  CBC:                        11.2   4.63  )-----------( 143      ( 07 Mar 2024 05:00 )             35.7     Chem:  03-07    142  |  109<H>  |  85<H>  ----------------------------<  71  4.3   |  17<L>  |  2.72<H>    Ca    8.3<L>      07 Mar 2024 05:00  Phos  4.2     03-07  Mg     2.4     03-07    TPro  6.8  /  Alb  3.1<L>  /  TBili  2.1<H>  /  DBili  x   /  AST  43<H>  /  ALT  44  /  AlkPhos  120  03-07    Coags:  PT/INR - ( 05 Mar 2024 12:10 )   PT: 16.9 sec;   INR: 1.50 ratio         PTT - ( 05 Mar 2024 12:10 )  PTT:37.6 sec      ## Medications:    furosemide   Injectable 40 milliGRAM(s) IV Push two times a day  isosorbide   dinitrate Tablet (ISORDIL) 5 milliGRAM(s) Oral three times a day  metoprolol tartrate 12.5 milliGRAM(s) Oral every 6 hours    albuterol    90 MICROgram(s) HFA Inhaler 1 Puff(s) Inhalation every 4 hours PRN    atorvastatin 80 milliGRAM(s) Oral at bedtime  dextrose 50% Injectable 25 Gram(s) IV Push once  dextrose 50% Injectable 12.5 Gram(s) IV Push once  dextrose 50% Injectable 25 Gram(s) IV Push once  dextrose Oral Gel 15 Gram(s) Oral once PRN  glucagon  Injectable 1 milliGRAM(s) IntraMuscular once  insulin lispro (ADMELOG) corrective regimen sliding scale   SubCutaneous three times a day before meals  insulin lispro (ADMELOG) corrective regimen sliding scale   SubCutaneous at bedtime    apixaban 5 milliGRAM(s) Oral two times a day  aspirin enteric coated 81 milliGRAM(s) Oral daily          ## Vitals:  T(C): 36.7 (24 @ 07:00), Max: 36.7 (24 @ 15:00)  HR: 120 (24 @ 08:00) (96 - 131)  BP: 108/95 (24 @ 08:00) (93/72 - 138/122)  BP(mean): 101 (24 @ 08:00) (80 - 130)  RR: 19 (24 @ 08:00) (13 - 22)  SpO2: 96% (24 @ 08:00) (94% - 99%)  Wt(kg): --  Vent:   AB-06 @ 07:01  -   @ 07:00  --------------------------------------------------------  IN: 647 mL / OUT: 1800 mL / NET: -1153 mL     @ 07:01  -   @ 08:57  --------------------------------------------------------  IN: 0 mL / OUT: 575 mL / NET: -575 mL      ## Image :   doppler negative for DVT     ## P/E:  Gen: lying comfortably in bed in no apparent distress  HEENT: enlarged neck circumfrence  Resp: b/l air entry + Rales  CVS: S1S2 no m/r/g  Abd: soft NT/ND +BS  Ext: no c/c + LE Edema  Neuro: A&Ox3    full code  HPI:  58 y.o male with pmh a fib on eliquis, HTN, CHF, HLD, AICD , anxiety, asthma presented to ED for c/o chest pain and palps found to be in rapid a fib.  (05 Mar 2024 17:27)      24 hr events:  No acute event overnight, pt sustaining A fib  at 120s overnight, finished amiodarone and started metoprolol 12.5 BID.   has been off amio     he feels well no cp,s ob, palp, n/v  states feels slightly better    ## ROS:  negative     ## Labs:  CBC:                        11.2   4.63  )-----------( 143      ( 07 Mar 2024 05:00 )             35.7     Chem:  03-07    142  |  109<H>  |  85<H>  ----------------------------<  71  4.3   |  17<L>  |  2.72<H>    Ca    8.3<L>      07 Mar 2024 05:00  Phos  4.2     03-07  Mg     2.4     03-07    TPro  6.8  /  Alb  3.1<L>  /  TBili  2.1<H>  /  DBili  x   /  AST  43<H>  /  ALT  44  /  AlkPhos  120  03-07    Coags:  PT/INR - ( 05 Mar 2024 12:10 )   PT: 16.9 sec;   INR: 1.50 ratio         PTT - ( 05 Mar 2024 12:10 )  PTT:37.6 sec      ## Medications:    furosemide   Injectable 40 milliGRAM(s) IV Push two times a day  isosorbide   dinitrate Tablet (ISORDIL) 5 milliGRAM(s) Oral three times a day  metoprolol tartrate 12.5 milliGRAM(s) Oral every 6 hours    albuterol    90 MICROgram(s) HFA Inhaler 1 Puff(s) Inhalation every 4 hours PRN    atorvastatin 80 milliGRAM(s) Oral at bedtime  dextrose 50% Injectable 25 Gram(s) IV Push once  dextrose 50% Injectable 12.5 Gram(s) IV Push once  dextrose 50% Injectable 25 Gram(s) IV Push once  dextrose Oral Gel 15 Gram(s) Oral once PRN  glucagon  Injectable 1 milliGRAM(s) IntraMuscular once  insulin lispro (ADMELOG) corrective regimen sliding scale   SubCutaneous three times a day before meals  insulin lispro (ADMELOG) corrective regimen sliding scale   SubCutaneous at bedtime    apixaban 5 milliGRAM(s) Oral two times a day  aspirin enteric coated 81 milliGRAM(s) Oral daily          ## Vitals:  T(C): 36.7 (24 @ 07:00), Max: 36.7 (24 @ 15:00)  HR: 120 (24 @ 08:00) (96 - 131)  BP: 108/95 (24 @ 08:00) (93/72 - 138/122)  BP(mean): 101 (24 @ 08:00) (80 - 130)  RR: 19 (24 @ 08:00) (13 - 22)  SpO2: 96% (24 @ 08:00) (94% - 99%)  Wt(kg): --  Vent:   AB-06 @ 07:01  -   @ 07:00  --------------------------------------------------------  IN: 647 mL / OUT: 1800 mL / NET: -1153 mL     @ 07:01  -   @ 08:57  --------------------------------------------------------  IN: 0 mL / OUT: 575 mL / NET: -575 mL      ## Image :   doppler negative for DVT     ## P/E:  Gen: lying comfortably in bed in no apparent distress  HEENT: enlarged neck circumfrence  Resp: b/l air entry + Rales  CVS: S1S2 no m/r/g  Abd: soft NT/ND +BS  Ext: no c/c + LE Edema  Neuro: A&Ox3    full code

## 2024-03-07 NOTE — PROGRESS NOTE ADULT - ASSESSMENT
Assessment: 58-year-old male with history of A-fib on Eliquis, hypertension, CHF, and hyperlipidemia admitted for A-fib RVR.  Patient reports worsening shortness of breath over the last 2 weeks, also with CHF.    Recommendations:  – EKG with A-fib RVR, pt s/p amio drip. rates still elevated. metoprolol increased as per primary team  –Troponins negative, proBNP elevated  – TTE with Severely reduced LV function, EF 20 to 25%.  IVC dilated, Continue IV Lasix  – Pacemaker interrogated with no events  - continue eliquis for AF

## 2024-03-08 LAB
ALBUMIN SERPL ELPH-MCNC: 3.2 G/DL — LOW (ref 3.3–5)
ALP SERPL-CCNC: 122 U/L — HIGH (ref 40–120)
ALT FLD-CCNC: 42 U/L — SIGNIFICANT CHANGE UP (ref 10–45)
ANION GAP SERPL CALC-SCNC: 16 MMOL/L — SIGNIFICANT CHANGE UP (ref 5–17)
AST SERPL-CCNC: 23 U/L — SIGNIFICANT CHANGE UP (ref 10–40)
BILIRUB SERPL-MCNC: 1.9 MG/DL — HIGH (ref 0.2–1.2)
BUN SERPL-MCNC: 79 MG/DL — HIGH (ref 7–23)
CALCIUM SERPL-MCNC: 8.5 MG/DL — SIGNIFICANT CHANGE UP (ref 8.4–10.5)
CHLORIDE SERPL-SCNC: 109 MMOL/L — HIGH (ref 96–108)
CO2 SERPL-SCNC: 19 MMOL/L — LOW (ref 22–31)
CREAT SERPL-MCNC: 2.54 MG/DL — HIGH (ref 0.5–1.3)
EGFR: 29 ML/MIN/1.73M2 — LOW
GBM IGG SER-ACNC: <0.2 — SIGNIFICANT CHANGE UP (ref 0–0.9)
GLUCOSE BLDC GLUCOMTR-MCNC: 108 MG/DL — HIGH (ref 70–99)
GLUCOSE BLDC GLUCOMTR-MCNC: 85 MG/DL — SIGNIFICANT CHANGE UP (ref 70–99)
GLUCOSE BLDC GLUCOMTR-MCNC: 89 MG/DL — SIGNIFICANT CHANGE UP (ref 70–99)
GLUCOSE BLDC GLUCOMTR-MCNC: 99 MG/DL — SIGNIFICANT CHANGE UP (ref 70–99)
GLUCOSE SERPL-MCNC: 93 MG/DL — SIGNIFICANT CHANGE UP (ref 70–99)
HCT VFR BLD CALC: 34.6 % — LOW (ref 39–50)
HGB BLD-MCNC: 10.9 G/DL — LOW (ref 13–17)
MAGNESIUM SERPL-MCNC: 2 MG/DL — SIGNIFICANT CHANGE UP (ref 1.6–2.6)
MCHC RBC-ENTMCNC: 26.3 PG — LOW (ref 27–34)
MCHC RBC-ENTMCNC: 31.5 GM/DL — LOW (ref 32–36)
MCV RBC AUTO: 83.4 FL — SIGNIFICANT CHANGE UP (ref 80–100)
NRBC # BLD: 0 /100 WBCS — SIGNIFICANT CHANGE UP (ref 0–0)
PHOSPHATE SERPL-MCNC: 4.1 MG/DL — SIGNIFICANT CHANGE UP (ref 2.5–4.5)
PLATELET # BLD AUTO: 132 K/UL — LOW (ref 150–400)
POTASSIUM SERPL-MCNC: 3.5 MMOL/L — SIGNIFICANT CHANGE UP (ref 3.5–5.3)
POTASSIUM SERPL-SCNC: 3.5 MMOL/L — SIGNIFICANT CHANGE UP (ref 3.5–5.3)
PROT SERPL-MCNC: 6.8 G/DL — SIGNIFICANT CHANGE UP (ref 6–8.3)
RBC # BLD: 4.15 M/UL — LOW (ref 4.2–5.8)
RBC # FLD: 16.5 % — HIGH (ref 10.3–14.5)
SODIUM SERPL-SCNC: 144 MMOL/L — SIGNIFICANT CHANGE UP (ref 135–145)
WBC # BLD: 4.9 K/UL — SIGNIFICANT CHANGE UP (ref 3.8–10.5)
WBC # FLD AUTO: 4.9 K/UL — SIGNIFICANT CHANGE UP (ref 3.8–10.5)

## 2024-03-08 PROCEDURE — 99223 1ST HOSP IP/OBS HIGH 75: CPT

## 2024-03-08 PROCEDURE — 99497 ADVNCD CARE PLAN 30 MIN: CPT | Mod: 25

## 2024-03-08 PROCEDURE — 99233 SBSQ HOSP IP/OBS HIGH 50: CPT | Mod: FS

## 2024-03-08 RX ORDER — FUROSEMIDE 40 MG
40 TABLET ORAL EVERY 12 HOURS
Refills: 0 | Status: DISCONTINUED | OUTPATIENT
Start: 2024-03-08 | End: 2024-03-12

## 2024-03-08 RX ORDER — METOPROLOL TARTRATE 50 MG
50 TABLET ORAL EVERY 6 HOURS
Refills: 0 | Status: DISCONTINUED | OUTPATIENT
Start: 2024-03-08 | End: 2024-03-10

## 2024-03-08 RX ORDER — TAMSULOSIN HYDROCHLORIDE 0.4 MG/1
0.4 CAPSULE ORAL AT BEDTIME
Refills: 0 | Status: DISCONTINUED | OUTPATIENT
Start: 2024-03-08 | End: 2024-03-21

## 2024-03-08 RX ORDER — DIGOXIN 250 MCG
250 TABLET ORAL ONCE
Refills: 0 | Status: COMPLETED | OUTPATIENT
Start: 2024-03-08 | End: 2024-03-08

## 2024-03-08 RX ORDER — POTASSIUM CHLORIDE 20 MEQ
40 PACKET (EA) ORAL EVERY 4 HOURS
Refills: 0 | Status: COMPLETED | OUTPATIENT
Start: 2024-03-08 | End: 2024-03-08

## 2024-03-08 RX ORDER — DIGOXIN 250 MCG
125 TABLET ORAL DAILY
Refills: 0 | Status: DISCONTINUED | OUTPATIENT
Start: 2024-03-09 | End: 2024-03-21

## 2024-03-08 RX ORDER — DIGOXIN 250 MCG
125 TABLET ORAL EVERY 6 HOURS
Refills: 0 | Status: COMPLETED | OUTPATIENT
Start: 2024-03-08 | End: 2024-03-08

## 2024-03-08 RX ORDER — DIGOXIN 250 MCG
250 TABLET ORAL ONCE
Refills: 0 | Status: DISCONTINUED | OUTPATIENT
Start: 2024-03-08 | End: 2024-03-08

## 2024-03-08 RX ADMIN — CHLORHEXIDINE GLUCONATE 1 APPLICATION(S): 213 SOLUTION TOPICAL at 05:35

## 2024-03-08 RX ADMIN — ATORVASTATIN CALCIUM 80 MILLIGRAM(S): 80 TABLET, FILM COATED ORAL at 20:55

## 2024-03-08 RX ADMIN — Medication 40 MILLIGRAM(S): at 05:37

## 2024-03-08 RX ADMIN — ISOSORBIDE DINITRATE 5 MILLIGRAM(S): 5 TABLET ORAL at 11:28

## 2024-03-08 RX ADMIN — TAMSULOSIN HYDROCHLORIDE 0.4 MILLIGRAM(S): 0.4 CAPSULE ORAL at 23:13

## 2024-03-08 RX ADMIN — Medication 125 MICROGRAM(S): at 20:50

## 2024-03-08 RX ADMIN — Medication 40 MILLIGRAM(S): at 18:00

## 2024-03-08 RX ADMIN — Medication 50 MILLIGRAM(S): at 10:50

## 2024-03-08 RX ADMIN — Medication 81 MILLIGRAM(S): at 17:28

## 2024-03-08 RX ADMIN — Medication 25 MILLIGRAM(S): at 05:33

## 2024-03-08 RX ADMIN — Medication 40 MILLIEQUIVALENT(S): at 10:07

## 2024-03-08 RX ADMIN — Medication 40 MILLIEQUIVALENT(S): at 14:12

## 2024-03-08 RX ADMIN — ISOSORBIDE DINITRATE 5 MILLIGRAM(S): 5 TABLET ORAL at 05:32

## 2024-03-08 RX ADMIN — APIXABAN 5 MILLIGRAM(S): 2.5 TABLET, FILM COATED ORAL at 17:27

## 2024-03-08 RX ADMIN — Medication 250 MICROGRAM(S): at 07:53

## 2024-03-08 RX ADMIN — APIXABAN 5 MILLIGRAM(S): 2.5 TABLET, FILM COATED ORAL at 05:31

## 2024-03-08 RX ADMIN — Medication 50 MILLIGRAM(S): at 17:28

## 2024-03-08 RX ADMIN — Medication 50 MILLIGRAM(S): at 23:13

## 2024-03-08 RX ADMIN — Medication 125 MICROGRAM(S): at 14:12

## 2024-03-08 NOTE — PROGRESS NOTE ADULT - NS ATTEND AMEND GEN_ALL_CORE FT
Patient feeling well    digoxin started    await response to digoxin and increase bblocker    continue to follow renal function    patient may ultimately need cardioversion depending on clinical course

## 2024-03-08 NOTE — CONSULT NOTE ADULT - ASSESSMENT
A/P 58 y.o male with pmh a fib on eliquis, HTN, CHF, HLD, AICD , anxiety, asthma presented to ED for c/o chest pain and palps found to be in rapid a fib.  Admitted to ccu     HR sustaining at 120s, pt c/o SOB, no change since admission   no cp, sob, palp, n/v          Assessment/plans :  per ccu      atrial fibrillation with RVR   elevated troponin    elevated d-dimer wtth negative VQ scan and LE Duplex   CHF exacerbation    PLAN:   low EF avoid cardizem  s/p amiodarone drip, Increase metprolol to 50 Q6,  add digoxin   TTE: EF 20 to 25%, moderate mitral regurgitation, mild pulmonary hypertension   continue home dose eliquis  cardiology following- recs noted   hold entresto per cardio  increase BB and add dig   CXR suggests volume overload    Tight glycemic control, ISS   - needs Diabetes education       Palliative :  for goc/advanced directives/ hcp discussion.  Pt d/w ccu this AM, I reviewed chart for hx,  saw pt bedside , oob in chair, using 3 L n/c.   Pt awake, alert, oriented, converses, has simple insight into his mult chronic healthcare issues.  has support of family, parents, and siblings.    Lives w/ parents, no spouse, no children.  Discussed advanced directives /hcp, pt appointed first Hcp - sister Sarah and Alt Hcp - Aunt Eva   Will benefit from diabetes education services.  Will need out pt f/u w/ specialists   SW assist - pt inquiring about medicaid   Pt  wishes for full code status  at this  time. HCP form  completed, on chart .

## 2024-03-08 NOTE — PROGRESS NOTE ADULT - ASSESSMENT
59 y/o M with PMH asthma, a fib on eliquis, HTN, AICD presented to ED with c/o CP, palps and SOB found to be in a fib with RVR did not respond to Cardizem Became hypotensive. ICU consulted for further management of:    ## atrial fibrillation with RVR  ##elevated troponin   ## elevated d-dimer wtth negative VQ scan and LE Duplex  ## CHF exacerbation      PLAN:  hypotensive after Cardizem  s/p amiodarone drip, metprolol 25 Q6, will add digoxin if needed per cardio recomendation  TTE: EF 20 to 25%, moderate mitral regurgitation, mild pulmonary hypertension   continue home dose eliquis  cardiology following   troponin: 34.3 >44.6  VQ scan shows low probability for PE   CXR suggests volume overloaded,  Pro BNP 5000s, was on lasix po 40 daily, change to lasix IV 40 BID   TSH: wnl  hold entresto per cardio  CPAP at night  Tight glycemic control, ISS  A1c 7.2   Doppler negative for DVT    57 y/o M with PMH asthma, a fib on eliquis, HTN, AICD presented to ED with c/o CP, palps and SOB found to be in a fib with RVR did not respond to Cardizem Became hypotensive. ICU consulted for further management of:    ## atrial fibrillation with RVR  ##elevated troponin   ## elevated d-dimer wtth negative VQ scan and LE Duplex  ## CHF exacerbation      PLAN:  as low EF avoid cardizem  s/p amiodarone drip, Increase metprolol to 50 Q6, will add digoxin , plan to load 500mcg today and start 125 trent.   TTE: EF 20 to 25%, moderate mitral regurgitation, mild pulmonary hypertension   continue home dose eliquis  cardiology following   troponin: 34.3 >44.6  VQ scan shows low probability for PE   CXR suggests volume overloaded,  Pro BNP 5000s, was on lasix po 40 daily, change to lasix IV 40 BID   TSH: wnl  hold entresto per cardio  CPAP 5 at night  Tight glycemic control, ISS  A1c 7.2   Doppler negative for DVT

## 2024-03-08 NOTE — PROGRESS NOTE ADULT - ATTENDING COMMENTS
patient seen and examined  d/w cardio ACP  d/w Patient     plan to increase BB and add dig   check dig level sunday

## 2024-03-08 NOTE — PROGRESS NOTE ADULT - SUBJECTIVE AND OBJECTIVE BOX
HPI:  58 y.o male with pmh a fib on eliquis, HTN, CHF, HLD, AICD , anxiety, asthma presented to ED for c/o chest pain and palps found to be in rapid a fib.  (05 Mar 2024 17:27)      24 hr events: HR sustaining at 120s, pt c/o SOB, no change since admission       ## ROS: SOB, otherwise negative     ## Labs:  CBC:                        10.9   4.90  )-----------( 132      ( 08 Mar 2024 05:00 )             34.6     Chem:  03-    144  |  109<H>  |  79<H>  ----------------------------<  93  3.5   |  19<L>  |  2.54<H>    Ca    8.5      08 Mar 2024 05:00  Phos  4.1     -  Mg     2.0         TPro  6.8  /  Alb  3.2<L>  /  TBili  1.9<H>  /  DBili  x   /  AST  23  /  ALT  42  /  AlkPhos  122<H>      Coags:          ## Imaging:    ## Medications:    furosemide   Injectable 40 milliGRAM(s) IV Push two times a day  isosorbide   dinitrate Tablet (ISORDIL) 5 milliGRAM(s) Oral three times a day  metoprolol tartrate 25 milliGRAM(s) Oral every 6 hours    albuterol    90 MICROgram(s) HFA Inhaler 1 Puff(s) Inhalation every 4 hours PRN    atorvastatin 80 milliGRAM(s) Oral at bedtime  dextrose 50% Injectable 25 Gram(s) IV Push once  dextrose 50% Injectable 12.5 Gram(s) IV Push once  dextrose 50% Injectable 25 Gram(s) IV Push once  dextrose Oral Gel 15 Gram(s) Oral once PRN  glucagon  Injectable 1 milliGRAM(s) IntraMuscular once  insulin lispro (ADMELOG) corrective regimen sliding scale   SubCutaneous three times a day before meals  insulin lispro (ADMELOG) corrective regimen sliding scale   SubCutaneous at bedtime    apixaban 5 milliGRAM(s) Oral two times a day  aspirin enteric coated 81 milliGRAM(s) Oral daily          ## Vitals:  T(C): 36.7 (24 @ 06:00), Max: 36.7 (24 @ 11:00)  HR: 120 (24 @ 07:00) (119 - 147)  BP: 108/92 (24 @ 07:00) (99/81 - 126/89)  BP(mean): 100 (24 @ 07:00) (82 - 112)  RR: 17 (24 @ 07:00) (14 - 30)  SpO2: 97% (24 @ 07:00) (85% - 99%)  Wt(kg): --  Vent:   AB-07 @ 07:01  -   @ 07:00  --------------------------------------------------------  IN: 900 mL / OUT: 2190 mL / NET: -1290 mL     @ 07:01  -   @ 08:03  --------------------------------------------------------  IN: 0 mL / OUT: 175 mL / NET: -175 mL      ## P/E:  Gen: lying comfortably in bed in no apparent distress  HEENT: enlarged neck circumfrence  Resp: b/l air entry + Rales  CVS: S1S2 no m/r/g  Abd: soft NT/ND +BS  Ext: no c/c + LE Edema  Neuro: A&Ox3    FULL code HPI:  58 y.o male with pmh a fib on eliquis, HTN, CHF, HLD, AICD , anxiety, asthma presented to ED for c/o chest pain and palps found to be in rapid a fib.  (05 Mar 2024 17:27)      24 hr events: HR sustaining at 120s, pt c/o SOB, no change since admission   no cp, sob, palp, n/v  states uses cpap 5 at home    ## ROS: SOB, otherwise negative     ## Labs:  CBC:                        10.9   4.90  )-----------( 132      ( 08 Mar 2024 05:00 )             34.6     Chem:  03-08    144  |  109<H>  |  79<H>  ----------------------------<  93  3.5   |  19<L>  |  2.54<H>    Ca    8.5      08 Mar 2024 05:00  Phos  4.1     03-08  Mg     2.0     03-08    TPro  6.8  /  Alb  3.2<L>  /  TBili  1.9<H>  /  DBili  x   /  AST  23  /  ALT  42  /  AlkPhos  122<H>  03-08    Coags:          ## Imaging:    ## Medications:    furosemide   Injectable 40 milliGRAM(s) IV Push two times a day  isosorbide   dinitrate Tablet (ISORDIL) 5 milliGRAM(s) Oral three times a day  metoprolol tartrate 25 milliGRAM(s) Oral every 6 hours    albuterol    90 MICROgram(s) HFA Inhaler 1 Puff(s) Inhalation every 4 hours PRN    atorvastatin 80 milliGRAM(s) Oral at bedtime  dextrose 50% Injectable 25 Gram(s) IV Push once  dextrose 50% Injectable 12.5 Gram(s) IV Push once  dextrose 50% Injectable 25 Gram(s) IV Push once  dextrose Oral Gel 15 Gram(s) Oral once PRN  glucagon  Injectable 1 milliGRAM(s) IntraMuscular once  insulin lispro (ADMELOG) corrective regimen sliding scale   SubCutaneous three times a day before meals  insulin lispro (ADMELOG) corrective regimen sliding scale   SubCutaneous at bedtime    apixaban 5 milliGRAM(s) Oral two times a day  aspirin enteric coated 81 milliGRAM(s) Oral daily          ## Vitals:  T(C): 36.7 (24 @ 06:00), Max: 36.7 (24 @ 11:00)  HR: 120 (24 @ 07:00) (119 - 147)  BP: 108/92 (24 @ 07:00) (99/81 - 126/89)  BP(mean): 100 (24 @ 07:00) (82 - 112)  RR: 17 (24 @ 07:00) (14 - 30)  SpO2: 97% (24 @ 07:00) (85% - 99%)  Wt(kg): --  Vent:   AB-07 @ 07:01  -   @ 07:00  --------------------------------------------------------  IN: 900 mL / OUT: 2190 mL / NET: -1290 mL     @ 07:01  -   @ 08:03  --------------------------------------------------------  IN: 0 mL / OUT: 175 mL / NET: -175 mL      ## P/E:  Gen: lying comfortably in bed in no apparent distress  HEENT: enlarged neck circumfrence  Resp: b/l air entry + Rales  CVS: S1S2 no m/r/g  Abd: soft NT/ND +BS  Ext: no c/c + LE Edema  Neuro: A&Ox3    FULL code

## 2024-03-08 NOTE — PROGRESS NOTE ADULT - ASSESSMENT
59 y/o Male with PMHx Asthma, Afib (On Eliquis), HTN, HFrEF (s/p AICD) presents to  ED complaining of chest pain with:       1. Acute Hypoxic Respiratory Failure   2. Acute Decompensated Heart Failure   3. Afib w/ RVR  4. MAYTE  5. Thrombocytopenia       Neuro: Alert and oriented, mentating at baseline. Avoid delirogenic medications. Pain control PRN.   Resp: Maintaining O2>93% on NC. Nocturnal NIPPV for PATTIE. CXR with Slightly more prominent parahilar interstitial markings since previous exam. Continue with aggressive diuresis. Repeat CXR in AM. Patient endorsing slight improvement in SOB today.    CV: Persistent uncontrolled AFib, despite Amiodarone gtt. Lopressor dose increased. s/p Digoxin load with 500mg this afternoon. HR remains uncontrolled ~120s. Keep Mg>2 and K>4 for optimal arrythmia suppression. TTE with EF 20% with severe hypokinesis. Monitor clinical and laboratory end points of perfusion, maintain MAP >65.   GI: Consistent carb diet, tolerating well. GI prophylaxis with Protonix.   Renal: MAYET, suspect secondary to fluid overload from cardiorenal. Cr downtrending. US Renal with No renal mass, hydronephrosis or calculus is visualized sonographically. Avoid nephrotoxic medications. Trend labs, replete lytes as needed to maintain K>4, Mg>2 and Phos >3. Nephro following.   Endo: FSS with ISS ACHS. Goal -180 while critically ill.   Heme: H/H stable. Trend H/H, transfuse for hgb <7.0 if necessary. Thrombocytopenic, stable. Full AC with Eliquis. SCDs in place.  59 y/o Male with PMHx Asthma, Afib (On Eliquis), HTN, HFrEF (s/p AICD) presents to  ED complaining of chest pain with:       1. Acute Hypoxic Respiratory Failure   2. Acute Decompensated Heart Failure   3. Acute Pulmonary Edema  5.  Afib w/ RVR  6. MAYTE  7. Thrombocytopenia       Neuro: Alert and oriented, mentating at baseline. Avoid delirogenic medications. Pain control PRN.   Resp: Maintaining O2>93% on NC. Nocturnal NIPPV for PATTIE. CXR with Slightly more prominent parahilar interstitial markings since previous exam. Continue with aggressive diuresis. Repeat CXR in AM. Patient endorsing slight improvement in SOB today.    CV: Persistent uncontrolled AFib, despite Amiodarone gtt. Lopressor dose increased. s/p Digoxin load with 500mg this afternoon. HR remains uncontrolled ~120s. Keep Mg>2 and K>4 for optimal arrythmia suppression. TTE with EF 20% with severe hypokinesis. Monitor clinical and laboratory end points of perfusion, maintain MAP >65.   GI: Consistent carb diet, tolerating well. GI prophylaxis with Protonix.   Renal: MAYTE, suspect secondary to fluid overload from cardiorenal. Cr downtrending. US Renal with No renal mass, hydronephrosis or calculus is visualized sonographically. Avoid nephrotoxic medications. Trend labs, replete lytes as needed to maintain K>4, Mg>2 and Phos >3. Nephro following.   Endo: FSS with ISS ACHS. Goal -180 while critically ill.   Heme: H/H stable. Trend H/H, transfuse for hgb <7.0 if necessary. Thrombocytopenic, stable. Full AC with Eliquis. SCDs in place.

## 2024-03-08 NOTE — CONSULT NOTE ADULT - SUBJECTIVE AND OBJECTIVE BOX
HPI: 58 y.o male with pmh a fib on eliquis, HTN, CHF, HLD, AICD , anxiety, asthma presented to ED for c/o chest pain and palps found to be in rapid a fib.        PAST MEDICAL & SURGICAL HISTORY:  Asthma      Anxiety      Atrial fibrillation, unspecified type      Essential hypertension      Prediabetes      Prolonged QT interval      Cardiomyopathy, unspecified type      AICD (automatic cardioverter/defibrillator) present      Hyperlipidemia      CHF (congestive heart failure)      Afib      PATTIE (obstructive sleep apnea)      History of tonsillectomy      AICD (automatic cardioverter/defibrillator) present          SOCIAL HISTORY:    Admitted from:  home    Substance abuse history:              Tobacco hx:                  Alcohol hx:              Home Opioid hx:  Confucianism:                                    Preferred Language:    Surrogate/HCP/      :  sister Sarah York       Phone#:  [phone]    FAMILY HISTORY:    Baseline ADLs (prior to admission):    Allergies    No Known Allergies    Intolerances      Present Symptoms:   Dyspnea: some  Nausea/Vomiting: no  Anxiety:  Depressed   Fatigue: no  Loss of appetite: no  Pain:         no                       location:          Review of Systems: [All others negative  MEDICATIONS  (STANDING):  apixaban 5 milliGRAM(s) Oral two times a day  aspirin enteric coated 81 milliGRAM(s) Oral daily  atorvastatin 80 milliGRAM(s) Oral at bedtime  chlorhexidine 2% Cloths 1 Application(s) Topical <User Schedule>  dextrose 5%. 1000 milliLiter(s) (50 mL/Hr) IV Continuous <Continuous>  dextrose 5%. 1000 milliLiter(s) (100 mL/Hr) IV Continuous <Continuous>  dextrose 50% Injectable 25 Gram(s) IV Push once  dextrose 50% Injectable 12.5 Gram(s) IV Push once  dextrose 50% Injectable 25 Gram(s) IV Push once  digoxin  Injectable 125 MICROGram(s) IV Push every 6 hours  furosemide   Injectable 40 milliGRAM(s) IV Push every 12 hours  glucagon  Injectable 1 milliGRAM(s) IntraMuscular once  insulin lispro (ADMELOG) corrective regimen sliding scale   SubCutaneous three times a day before meals  insulin lispro (ADMELOG) corrective regimen sliding scale   SubCutaneous at bedtime  isosorbide   dinitrate Tablet (ISORDIL) 5 milliGRAM(s) Oral three times a day  metoprolol tartrate 50 milliGRAM(s) Oral every 6 hours  potassium chloride    Tablet ER 40 milliEquivalent(s) Oral every 4 hours    MEDICATIONS  (PRN):  albuterol    90 MICROgram(s) HFA Inhaler 1 Puff(s) Inhalation every 4 hours PRN for bronchospasm  artificial  tears Solution 1 Drop(s) Both EYES three times a day PRN Dry Eyes  dextrose Oral Gel 15 Gram(s) Oral once PRN Blood Glucose LESS THAN 70 milliGRAM(s)/deciliter      PHYSICAL EXAM:    Vital Signs Last 24 Hrs  T(C): 36.7 (08 Mar 2024 10:00), Max: 36.7 (07 Mar 2024 15:00)  T(F): 98.1 (08 Mar 2024 10:00), Max: 98.1 (07 Mar 2024 15:00)  HR: 136 (08 Mar 2024 11:00) (119 - 139)  BP: 121/91 (08 Mar 2024 11:00) (99/81 - 124/90)  BP(mean): 100 (08 Mar 2024 11:00) (82 - 112)  RR: 20 (08 Mar 2024 11:00) (14 - 30)  SpO2: 98% (08 Mar 2024 11:00) (85% - 99%)    Parameters below as of 08 Mar 2024 05:00  Patient On (Oxygen Delivery Method): nasal cannula  O2 Flow (L/min): 3      General: alert  oriented x 3, conversant   Karnofsky Performance Score/Palliative Performance Status Version2:  70   %  PPSV: 70%  HEENT: normal  dry mouth  , glasses  Lungs: few coarse bs, sl palma   CV: +tachycardia  GI:  abd lrg., soft, n/t    : normal    Musculoskeletal: normal w/ weakness + edema   ambulatory w/ assist   Skin: normal, dry   Neuro:  awake, alert, oriented, converses    Oral intake ability:  moderate full capability]  Diet: reg as dillon     LABS:                        10.9   4.90  )-----------( 132      ( 08 Mar 2024 05:00 )             34.6     03-08    144  |  109<H>  |  79<H>  ----------------------------<  93  3.5   |  19<L>  |  2.54<H>    Ca    8.5      08 Mar 2024 05:00  Phos  4.1     03-08  Mg     2.0     03-08    TPro  6.8  /  Alb  3.2<L>  /  TBili  1.9<H>  /  DBili  x   /  AST  23  /  ALT  42  /  AlkPhos  122<H>  03-08    Urinalysis Basic - ( 08 Mar 2024 05:00 )    Color: x / Appearance: x / SG: x / pH: x  Gluc: 93 mg/dL / Ketone: x  / Bili: x / Urobili: x   Blood: x / Protein: x / Nitrite: x   Leuk Esterase: x / RBC: x / WBC x   Sq Epi: x / Non Sq Epi: x / Bacteria: x        RADIOLOGY & ADDITIONAL STUDIES: < from: US Renal (03.07.24 @ 19:58) >    IMPRESSION:  No renal mass, hydronephrosis or calculus is visualized sonographically.  A small right pleural effusion is partially visualized.        < from: Xray Chest 1 View- PORTABLE-Urgent (03.05.24 @ 12:14) >  IMPRESSION: Slightly more prominent parahilar interstitial markings since   previous exam. Pacer as before. Cardiomegaly. I would favor congestive   changes. Regional osseous structures appropriate for age.        ADVANCE DIRECTIVES:  full code   Advanced Care Planning discussion total time spent:

## 2024-03-08 NOTE — PROGRESS NOTE ADULT - SUBJECTIVE AND OBJECTIVE BOX
Patient is a 58y old  Male who presents with a chief complaint of a fib with RVR (08 Mar 2024 20:00)      BRIEF HOSPITAL COURSE-  57 y/o Male with PMHx Asthma, Afib (On Eliquis), HTN, HFrEF (s/p AICD) presents to  ED complaining of chest pain. Admitted to ICU for acute hypoxic shortness of breath secondary to acute decompensated. Course complicated by AFib w/ RVR, difficult to control.     Events last 24 hours:   HR remains uncontrolled, s/p 500mg Digoxin load.       Review of Systems:  CONSTITUTIONAL: +Fatigue. No fever or chills.  EYES: No eye pain, visual disturbances, or discharge.  ENMT: No difficulty hearing, tinnitus, vertigo. No sinus or throat pain.  NECK: No pain or stiffness.  RESPIRATORY: +Shortness of breath. No cough, wheezing, chills or hemoptysis.   CARDIOVASCULAR: No chest pain, palpitations, dizziness, or leg swelling.  GASTROINTESTINAL: No abdominal or epigastric pain. No nausea, vomiting, or hematemesis. No diarrhea or constipation. No melena or hematochezia.  GENITOURINARY: No dysuria, frequency, hematuria, or incontinence.  NEUROLOGICAL: No headaches, memory loss, loss of strength, numbness, or tremors  SKIN: No itching, burning, rashes, or lesions.  MUSCULOSKELETAL: No joint pain or swelling. No muscle, back, or extremity pain.  PSYCHIATRIC: No depression, anxiety, mood swings, or difficulty sleeping.      PAST MEDICAL & SURGICAL HISTORY-  Asthma      Anxiety      Atrial fibrillation, unspecified type      Essential hypertension      Prediabetes      Prolonged QT interval      Cardiomyopathy, unspecified type      AICD (automatic cardioverter/defibrillator) present      Hyperlipidemia      CHF (congestive heart failure)      Afib      PATTIE (obstructive sleep apnea)      History of tonsillectomy      AICD (automatic cardioverter/defibrillator) present          Medications:    furosemide   Injectable 40 milliGRAM(s) IV Push every 12 hours  isosorbide   dinitrate Tablet (ISORDIL) 5 milliGRAM(s) Oral three times a day  metoprolol tartrate 50 milliGRAM(s) Oral every 6 hours    albuterol    90 MICROgram(s) HFA Inhaler 1 Puff(s) Inhalation every 4 hours PRN    apixaban 5 milliGRAM(s) Oral two times a day  aspirin enteric coated 81 milliGRAM(s) Oral daily    tamsulosin 0.4 milliGRAM(s) Oral at bedtime    atorvastatin 80 milliGRAM(s) Oral at bedtime  dextrose 50% Injectable 25 Gram(s) IV Push once  dextrose 50% Injectable 12.5 Gram(s) IV Push once  dextrose 50% Injectable 25 Gram(s) IV Push once  dextrose Oral Gel 15 Gram(s) Oral once PRN  glucagon  Injectable 1 milliGRAM(s) IntraMuscular once  insulin lispro (ADMELOG) corrective regimen sliding scale   SubCutaneous three times a day before meals  insulin lispro (ADMELOG) corrective regimen sliding scale   SubCutaneous at bedtime    dextrose 5%. 1000 milliLiter(s) IV Continuous <Continuous>  dextrose 5%. 1000 milliLiter(s) IV Continuous <Continuous>    artificial  tears Solution 1 Drop(s) Both EYES three times a day PRN  chlorhexidine 2% Cloths 1 Application(s) Topical <User Schedule>        ICU Vital Signs Last 24 Hrs  T(C): 36.7 (08 Mar 2024 18:00), Max: 36.7 (08 Mar 2024 06:00)  T(F): 98.1 (08 Mar 2024 18:00), Max: 98.1 (08 Mar 2024 10:00)  HR: 120 (08 Mar 2024 22:00) (111 - 139)  BP: 107/80 (08 Mar 2024 22:00) (99/81 - 130/90)  BP(mean): 87 (08 Mar 2024 22:00) (82 - 112)  ABP: --  ABP(mean): --  RR: 13 (08 Mar 2024 22:00) (13 - 26)  SpO2: 97% (08 Mar 2024 22:00) (85% - 99%)    O2 Parameters below as of 08 Mar 2024 22:00  Patient On (Oxygen Delivery Method): room air        I&O's Detail    07 Mar 2024 07:01  -  08 Mar 2024 07:00  --------------------------------------------------------  IN:    Oral Fluid: 900 mL  Total IN: 900 mL    OUT:    Voided (mL): 2190 mL  Total OUT: 2190 mL    Total NET: -1290 mL      08 Mar 2024 07:01  -  08 Mar 2024 22:41  --------------------------------------------------------  IN:    Oral Fluid: 900 mL  Total IN: 900 mL    OUT:    Voided (mL): 1450 mL  Total OUT: 1450 mL    Total NET: -550 mL        LABS:                        10.9   4.90  )-----------( 132      ( 08 Mar 2024 05:00 )             34.6     03-08    144  |  109<H>  |  79<H>  ----------------------------<  93  3.5   |  19<L>  |  2.54<H>    Ca    8.5      08 Mar 2024 05:00  Phos  4.1     03-08  Mg     2.0     03-08    TPro  6.8  /  Alb  3.2<L>  /  TBili  1.9<H>  /  DBili  x   /  AST  23  /  ALT  42  /  AlkPhos  122<H>  03-08      CAPILLARY BLOOD GLUCOSE  POCT Blood Glucose.: 108 mg/dL (08 Mar 2024 20:54)      Urinalysis Basic - ( 08 Mar 2024 05:00 )  Color: x / Appearance: x / SG: x / pH: x  Gluc: 93 mg/dL / Ketone: x  / Bili: x / Urobili: x   Blood: x / Protein: x / Nitrite: x   Leuk Esterase: x / RBC: x / WBC x   Sq Epi: x / Non Sq Epi: x / Bacteria: x      CULTURES:  Rapid RVP Result: NotDetec (03-05 @ 17:16)      Physical Examination:  General: Middle aged male, chronically ill appearing. In no acute distress.    HEENT: Pupils equal, reactive to light.  Symmetric.  PULM: Diminished crackles to auscultation B/L bases. No significant sputum production.  NECK: Supple, no lymphadenopathy, trachea midline.  CVS: Irregular rate, irregularly irregular rhythm. No murmurs, rubs, or gallops. S1, S2 intact.   ABD: Soft, nondistended, nontender, normoactive bowel sounds. No masses palpable.   EXT: No edema. Nontender.  SKIN: Warm and well perfused, no rashes noted.  NEURO: Alert, oriented, interactive. Nonfocal.  DEVICES:       RADIOLOGY-    < from: Xray Chest 1 View- PORTABLE-Urgent (03.05.24 @ 12:14) >    ACC: [id number] EXAM:  XR CHEST PORTABLE URGENT 1V   ORDERED BY:  SAUNDRA SY     PROCEDURE DATE:  03/05/2024      INTERPRETATION:  EXAM: XR CHEST URGENT    INDICATION: sob GXR    COMPARISON: August 27, 2018    IMPRESSION: Slightly more prominent parahilar interstitial markings since   previous exam. Pacer as before. Cardiomegaly. I would favor congestive   changes. Regional osseous structures appropriate for age.    --- End of Report ---      ZIA KUMAR MD; Attending Radiologist  This document has been electronically signed. Mar  5 2024 12:20PM    < end of copied text >        Time spent on this patient encounter, which includes documenting this note in the electronic medical record, was 75+ minutes including assessing the presenting problems with associated risks, reviewing the medical record to prepare for the encounter, and meeting face to face with the patient to obtain additional history.  I have also performed an appropriate physical exam, made interventions listed and ordered and interpreted appropriate diagnostic studies as documented.     To improve communication and patient safety, I have coordinated care with the multidisciplinary team including the bedside nurse, appropriate attending of record and consultants as needed.          DATE OF ENTRY OF THIS NOTE IS EQUAL TO THE DATE OF SERVICES RENDERED

## 2024-03-08 NOTE — PROGRESS NOTE ADULT - SUBJECTIVE AND OBJECTIVE BOX
No distress    Vital Signs Last 24 Hrs  T(C): 36.7 (03-08-24 @ 18:00), Max: 36.7 (03-08-24 @ 06:00)  T(F): 98.1 (03-08-24 @ 18:00), Max: 98.1 (03-08-24 @ 10:00)  HR: 130 (03-08-24 @ 18:00) (111 - 139)  BP: 107/87 (03-08-24 @ 18:00) (99/81 - 130/90)  BP(mean): 96 (03-08-24 @ 18:00) (82 - 112)  RR: 18 (03-08-24 @ 18:00) (14 - 25)  SpO2: 97% (03-08-24 @ 18:00) (85% - 99%)    I&O's Detail    07 Mar 2024 07:01  -  08 Mar 2024 07:00  --------------------------------------------------------  IN:    Oral Fluid: 900 mL  Total IN: 900 mL    OUT:    Voided (mL): 2190 mL  Total OUT: 2190 mL    08 Mar 2024 07:01  -  08 Mar 2024 20:00  --------------------------------------------------------  IN:    Oral Fluid: 900 mL  Total IN: 900 mL    OUT:    Voided (mL): 950 mL  Total OUT: 950 mL    s1s2  b/l air entry  soft, ND  + edema                                        10.9   4.90  )-----------( 132      ( 08 Mar 2024 05:00 )             34.6     08 Mar 2024 05:00    144    |  109    |  79     ----------------------------<  93     3.5     |  19     |  2.54     Ca    8.5        08 Mar 2024 05:00  Phos  4.1       08 Mar 2024 05:00  Mg     2.0       08 Mar 2024 05:00    TPro  6.8    /  Alb  3.2    /  TBili  1.9    /  DBili  x      /  AST  23     /  ALT  42     /  AlkPhos  122    08 Mar 2024 05:00    LIVER FUNCTIONS - ( 08 Mar 2024 05:00 )  Alb: 3.2 g/dL / Pro: 6.8 g/dL / ALK PHOS: 122 U/L / ALT: 42 U/L / AST: 23 U/L / GGT: x           albuterol    90 MICROgram(s) HFA Inhaler 1 Puff(s) Inhalation every 4 hours PRN  apixaban 5 milliGRAM(s) Oral two times a day  artificial  tears Solution 1 Drop(s) Both EYES three times a day PRN  aspirin enteric coated 81 milliGRAM(s) Oral daily  atorvastatin 80 milliGRAM(s) Oral at bedtime  chlorhexidine 2% Cloths 1 Application(s) Topical <User Schedule>  dextrose 5%. 1000 milliLiter(s) IV Continuous <Continuous>  dextrose 5%. 1000 milliLiter(s) IV Continuous <Continuous>  dextrose 50% Injectable 25 Gram(s) IV Push once  dextrose 50% Injectable 12.5 Gram(s) IV Push once  dextrose 50% Injectable 25 Gram(s) IV Push once  dextrose Oral Gel 15 Gram(s) Oral once PRN  digoxin  Injectable 125 MICROGram(s) IV Push every 6 hours  furosemide   Injectable 40 milliGRAM(s) IV Push every 12 hours  glucagon  Injectable 1 milliGRAM(s) IntraMuscular once  insulin lispro (ADMELOG) corrective regimen sliding scale   SubCutaneous three times a day before meals  insulin lispro (ADMELOG) corrective regimen sliding scale   SubCutaneous at bedtime  isosorbide   dinitrate Tablet (ISORDIL) 5 milliGRAM(s) Oral three times a day  metoprolol tartrate 50 milliGRAM(s) Oral every 6 hours    A/P:    CM, EF 20 - 25%, mod MR, CHF, RAfib  Hemodynamic, cardio-renal MAYTE (Cr 1.04 - 10/9/23)  Cr is slowly downtrending   Continue diuresis  HR control   Avoid ACE/ARB/Entresto  Renal SONO w/o hydro   Avoid nephrotoxins  F/u BMP, UO, SPEP, serologies   Prognosis is guarded overall    [phone]

## 2024-03-08 NOTE — PROGRESS NOTE ADULT - ASSESSMENT
Assessment: 58-year-old male with history of A-fib on Eliquis, hypertension, CHF, and hyperlipidemia admitted for A-fib RVR.  Patient reports worsening shortness of breath over the last 2 weeks, also with CHF.    Recommendations:  – EKG with A-fib RVR, pt s/p amio drip. rates still elevated. metoprolol increased as per primary team. digoxin added  –Troponins negative, proBNP elevated  – TTE with Severely reduced LV function, EF 20 to 25%.  IVC dilated, Continue IV Lasix, monitor kidney function and electrolytes, though kidney function has been improving  – Pacemaker interrogated with no events  - continue eliquis for AF    Jayda PATEL-BC

## 2024-03-08 NOTE — PROGRESS NOTE ADULT - SUBJECTIVE AND OBJECTIVE BOX
OFE KHALIL  [id number]    Chief Complaint: SOB, AF RVR, HFrEF  Interval events: pt seen and examined, labs and chart reviewed. endorses chest discomfort but improvement in breathing. AF 110s on tele    ALLERGIES:  No Known Allergies      CURRENT MEDICATIONS:  MEDICATIONS  (STANDING):  aMIOdarone Infusion 0.5 mG/Min (16.7 mL/Hr) IV Continuous <Continuous>  aMIOdarone Infusion 1 mG/Min (33.3 mL/Hr) IV Continuous <Continuous>  apixaban 5 milliGRAM(s) Oral two times a day  aspirin enteric coated 81 milliGRAM(s) Oral daily  atorvastatin 80 milliGRAM(s) Oral at bedtime  carvedilol 6.25 milliGRAM(s) Oral every 12 hours  chlorhexidine 2% Cloths 1 Application(s) Topical <User Schedule>  dextrose 5%. 1000 milliLiter(s) (50 mL/Hr) IV Continuous <Continuous>  dextrose 5%. 1000 milliLiter(s) (100 mL/Hr) IV Continuous <Continuous>  dextrose 50% Injectable 12.5 Gram(s) IV Push once  dextrose 50% Injectable 25 Gram(s) IV Push once  dextrose 50% Injectable 25 Gram(s) IV Push once  furosemide    Tablet 40 milliGRAM(s) Oral daily  glucagon  Injectable 1 milliGRAM(s) IntraMuscular once  insulin lispro (ADMELOG) corrective regimen sliding scale   SubCutaneous at bedtime  insulin lispro (ADMELOG) corrective regimen sliding scale   SubCutaneous three times a day before meals  isosorbide   dinitrate Tablet (ISORDIL) 5 milliGRAM(s) Oral three times a day  sacubitril 97 mG/valsartan 103 mG 1 Tablet(s) Oral two times a day        ROS:  All 10 systems reviewed and positives noted in HPI    OBJECTIVE:    VITAL SIGNS:  Vital Signs Last 24 Hrs  T(C): 36.4 (06 Mar 2024 07:00), Max: 36.8 (05 Mar 2024 11:39)  T(F): 97.6 (06 Mar 2024 07:00), Max: 98.3 (05 Mar 2024 11:39)  HR: 97 (06 Mar 2024 09:00) (52 - 160)  BP: 102/84 (06 Mar 2024 08:00) (83/70 - 119/95)  BP(mean): 91 (06 Mar 2024 08:00) (62 - 104)  RR: 17 (06 Mar 2024 09:00) (12 - 24)  SpO2: 94% (06 Mar 2024 09:00) (91% - 100%)    Parameters below as of 06 Mar 2024 09:00  Patient On (Oxygen Delivery Method): nasal cannula  O2 Flow (L/min): 4      PHYSICAL EXAM:  General: morbidly obese, no acute distress  HEENT: sclera anicteric  Neck: supple  CVS: JVP ~ 12 cm H20, irregular  Chest: CTA   Abdomen: obese  Extremities: + 1 b/l l/e edema  Neuro: awake, alert & oriented  Psych: normal affect      LABS:                        11.5   5.62  )-----------( 119      ( 06 Mar 2024 05:30 )             37.4     03-06    139  |  106  |  95<H>  ----------------------------<  97  4.6   |  17<L>  |  2.90<H>    Ca    8.8      06 Mar 2024 05:30  Phos  5.5     03-06  Mg     2.7     03-06    TPro  7.3  /  Alb  3.4  /  TBili  1.9<H>  /  DBili  x   /  AST  25  /  ALT  43  /  AlkPhos  140<H>  03-05      PT/INR - ( 05 Mar 2024 12:10 )   PT: 16.9 sec;   INR: 1.50 ratio         PTT - ( 05 Mar 2024 12:10 )  PTT:37.6 sec      ECG: AF RVR     < from: TTE Echo Complete w/o Contrast w/ Doppler (03.06.24 @ 07:53) >   1. Severely decreased global left ventricular systolic function.   2. Left ventricular ejection fraction, by visual estimation, is 20 to   25%.   3. The left ventricle endocardium is not well visualized, consider use   of IV ultrasonic enhancing agent to better evaluate endocardium, if   clinically indicated. Calculated LVEF by Simpsons Biplane Method 20%.   Severe global left ventricle hypokinesis.   4. Severely increased left ventricular internal cavity size.   5. Dilated cardiomyopathy.   6. The mitral in-flow pattern reveals no discernable A-wave, therefore   no comment on diastolic function can be made.   7. Mildly enlarged right ventricle with low normal right ventricle   systolic function.   8. Severely enlarged left atrium.   9. Right atrial enlargement.  10. Mild thickening of the anterior and posterior mitral valve leaflets.  11. Moderate mitral valve regurgitation.  12. Mild-moderate tricuspid regurgitation.  13. Mild aortic valve leaflet calcification. No aortic valve stenosis.  14. Mild aortic regurgitation.  15. Mild to moderate pulmonic valve regurgitation.  16. Top normal sized Aorta at the Sinuses of Valsalva.  17. Estimated pulmonary artery systolic pressure is 42.5 mmHg assuming a   right atrial pressure of 15 mmHg, which is consistent with mild pulmonary   hypertension.  18. There is no evidence of pericardial effusion.

## 2024-03-08 NOTE — CONSULT NOTE ADULT - CONVERSATION DETAILS
Met and spoke w/ pt at bedside today , explained palliative care role. Asked pt to discuss why he was here, pt says he has congestive heart failure, admits to not eating properly which may have cause this exacerbation.  Pt says he was not aware of any kidney issues, and was told his diabetes " went away"  pt said he used to be on diabetes meds, but his numbers improved so he stopped taking the meds. Discussed w/ pt the importance of medical follow up , especially for his chronic conditions. Pt stated understanding and said he plans to be better at that.   I asked him if he had any advanced directives in place, specifically HCP, as pt not  and has no children. Pt stated he lives w/ his parents who are in their 80's. I explained what a HCP is and their role, pt said it should be his sister Sarah Leone first and then his Aunt Eva as alternate , she lives in [place] but works in the medical field. We reviewed and completed a HCP form today . Pt then wanted to clarify that he is a full code , he would want attempted cpr and intubation if needed at this time.  Pts sister  Sarah arrived and it was explained to her what we filed out , and she agreed .

## 2024-03-09 LAB
% ALBUMIN: 51.3 % — SIGNIFICANT CHANGE UP
% ALPHA 1: 5.4 % — SIGNIFICANT CHANGE UP
% ALPHA 2: 10.9 % — SIGNIFICANT CHANGE UP
% BETA: 12.1 % — SIGNIFICANT CHANGE UP
% GAMMA: 20.3 % — SIGNIFICANT CHANGE UP
ALBUMIN SERPL ELPH-MCNC: 3.3 G/DL — LOW (ref 3.6–5.5)
ALBUMIN SERPL ELPH-MCNC: 3.3 G/DL — SIGNIFICANT CHANGE UP (ref 3.3–5)
ALBUMIN/GLOB SERPL ELPH: 1 RATIO — SIGNIFICANT CHANGE UP
ALP SERPL-CCNC: 131 U/L — HIGH (ref 40–120)
ALPHA1 GLOB SERPL ELPH-MCNC: 0.4 G/DL — SIGNIFICANT CHANGE UP (ref 0.1–0.4)
ALPHA2 GLOB SERPL ELPH-MCNC: 0.7 G/DL — SIGNIFICANT CHANGE UP (ref 0.5–1)
ALT FLD-CCNC: 42 U/L — SIGNIFICANT CHANGE UP (ref 10–45)
ANION GAP SERPL CALC-SCNC: 15 MMOL/L — SIGNIFICANT CHANGE UP (ref 5–17)
AST SERPL-CCNC: 31 U/L — SIGNIFICANT CHANGE UP (ref 10–40)
B-GLOBULIN SERPL ELPH-MCNC: 0.8 G/DL — SIGNIFICANT CHANGE UP (ref 0.5–1)
BASOPHILS # BLD AUTO: 0.02 K/UL — SIGNIFICANT CHANGE UP (ref 0–0.2)
BASOPHILS NFR BLD AUTO: 0.5 % — SIGNIFICANT CHANGE UP (ref 0–2)
BILIRUB SERPL-MCNC: 1.7 MG/DL — HIGH (ref 0.2–1.2)
BUN SERPL-MCNC: 67 MG/DL — HIGH (ref 7–23)
CALCIUM SERPL-MCNC: 8.5 MG/DL — SIGNIFICANT CHANGE UP (ref 8.4–10.5)
CHLORIDE SERPL-SCNC: 108 MMOL/L — SIGNIFICANT CHANGE UP (ref 96–108)
CK SERPL-CCNC: 183 U/L — SIGNIFICANT CHANGE UP (ref 30–200)
CO2 SERPL-SCNC: 21 MMOL/L — LOW (ref 22–31)
CREAT SERPL-MCNC: 2.46 MG/DL — HIGH (ref 0.5–1.3)
EGFR: 30 ML/MIN/1.73M2 — LOW
EOSINOPHIL # BLD AUTO: 0.21 K/UL — SIGNIFICANT CHANGE UP (ref 0–0.5)
EOSINOPHIL NFR BLD AUTO: 4.7 % — SIGNIFICANT CHANGE UP (ref 0–6)
GAMMA GLOBULIN: 1.3 G/DL — SIGNIFICANT CHANGE UP (ref 0.6–1.6)
GLUCOSE BLDC GLUCOMTR-MCNC: 104 MG/DL — HIGH (ref 70–99)
GLUCOSE BLDC GLUCOMTR-MCNC: 107 MG/DL — HIGH (ref 70–99)
GLUCOSE BLDC GLUCOMTR-MCNC: 69 MG/DL — LOW (ref 70–99)
GLUCOSE BLDC GLUCOMTR-MCNC: 69 MG/DL — LOW (ref 70–99)
GLUCOSE BLDC GLUCOMTR-MCNC: 80 MG/DL — SIGNIFICANT CHANGE UP (ref 70–99)
GLUCOSE BLDC GLUCOMTR-MCNC: 94 MG/DL — SIGNIFICANT CHANGE UP (ref 70–99)
GLUCOSE BLDC GLUCOMTR-MCNC: 98 MG/DL — SIGNIFICANT CHANGE UP (ref 70–99)
GLUCOSE SERPL-MCNC: 72 MG/DL — SIGNIFICANT CHANGE UP (ref 70–99)
HCT VFR BLD CALC: 37 % — LOW (ref 39–50)
HGB BLD-MCNC: 11.9 G/DL — LOW (ref 13–17)
IMM GRANULOCYTES NFR BLD AUTO: 0 % — SIGNIFICANT CHANGE UP (ref 0–0.9)
LYMPHOCYTES # BLD AUTO: 1.13 K/UL — SIGNIFICANT CHANGE UP (ref 1–3.3)
LYMPHOCYTES # BLD AUTO: 25.5 % — SIGNIFICANT CHANGE UP (ref 13–44)
MAGNESIUM SERPL-MCNC: 1.8 MG/DL — SIGNIFICANT CHANGE UP (ref 1.6–2.6)
MCHC RBC-ENTMCNC: 26.6 PG — LOW (ref 27–34)
MCHC RBC-ENTMCNC: 32.2 GM/DL — SIGNIFICANT CHANGE UP (ref 32–36)
MCV RBC AUTO: 82.6 FL — SIGNIFICANT CHANGE UP (ref 80–100)
MONOCYTES # BLD AUTO: 0.59 K/UL — SIGNIFICANT CHANGE UP (ref 0–0.9)
MONOCYTES NFR BLD AUTO: 13.3 % — SIGNIFICANT CHANGE UP (ref 2–14)
NEUTROPHILS # BLD AUTO: 2.48 K/UL — SIGNIFICANT CHANGE UP (ref 1.8–7.4)
NEUTROPHILS NFR BLD AUTO: 56 % — SIGNIFICANT CHANGE UP (ref 43–77)
NRBC # BLD: 0 /100 WBCS — SIGNIFICANT CHANGE UP (ref 0–0)
PHOSPHATE SERPL-MCNC: 3.5 MG/DL — SIGNIFICANT CHANGE UP (ref 2.5–4.5)
PLATELET # BLD AUTO: 137 K/UL — LOW (ref 150–400)
POTASSIUM SERPL-MCNC: 4 MMOL/L — SIGNIFICANT CHANGE UP (ref 3.5–5.3)
POTASSIUM SERPL-SCNC: 4 MMOL/L — SIGNIFICANT CHANGE UP (ref 3.5–5.3)
PROT PATTERN SERPL ELPH-IMP: SIGNIFICANT CHANGE UP
PROT SERPL-MCNC: 6.5 G/DL — SIGNIFICANT CHANGE UP (ref 6–8.3)
PROT SERPL-MCNC: 7.2 G/DL — SIGNIFICANT CHANGE UP (ref 6–8.3)
RBC # BLD: 4.48 M/UL — SIGNIFICANT CHANGE UP (ref 4.2–5.8)
RBC # FLD: 16.6 % — HIGH (ref 10.3–14.5)
SODIUM SERPL-SCNC: 144 MMOL/L — SIGNIFICANT CHANGE UP (ref 135–145)
URATE SERPL-MCNC: 15.3 MG/DL — HIGH (ref 3.4–8.8)
WBC # BLD: 4.43 K/UL — SIGNIFICANT CHANGE UP (ref 3.8–10.5)
WBC # FLD AUTO: 4.43 K/UL — SIGNIFICANT CHANGE UP (ref 3.8–10.5)

## 2024-03-09 PROCEDURE — 99232 SBSQ HOSP IP/OBS MODERATE 35: CPT

## 2024-03-09 RX ORDER — ALLOPURINOL 300 MG
100 TABLET ORAL DAILY
Refills: 0 | Status: DISCONTINUED | OUTPATIENT
Start: 2024-03-09 | End: 2024-03-13

## 2024-03-09 RX ORDER — MAGNESIUM SULFATE 500 MG/ML
1 VIAL (ML) INJECTION ONCE
Refills: 0 | Status: COMPLETED | OUTPATIENT
Start: 2024-03-09 | End: 2024-03-09

## 2024-03-09 RX ORDER — LANOLIN ALCOHOL/MO/W.PET/CERES
6 CREAM (GRAM) TOPICAL AT BEDTIME
Refills: 0 | Status: DISCONTINUED | OUTPATIENT
Start: 2024-03-09 | End: 2024-03-21

## 2024-03-09 RX ADMIN — CHLORHEXIDINE GLUCONATE 1 APPLICATION(S): 213 SOLUTION TOPICAL at 06:13

## 2024-03-09 RX ADMIN — ATORVASTATIN CALCIUM 80 MILLIGRAM(S): 80 TABLET, FILM COATED ORAL at 21:34

## 2024-03-09 RX ADMIN — Medication 81 MILLIGRAM(S): at 12:02

## 2024-03-09 RX ADMIN — Medication 125 MICROGRAM(S): at 06:12

## 2024-03-09 RX ADMIN — ISOSORBIDE DINITRATE 5 MILLIGRAM(S): 5 TABLET ORAL at 06:12

## 2024-03-09 RX ADMIN — Medication 40 MILLIGRAM(S): at 17:24

## 2024-03-09 RX ADMIN — Medication 30 MILLILITER(S): at 22:17

## 2024-03-09 RX ADMIN — Medication 6 MILLIGRAM(S): at 21:34

## 2024-03-09 RX ADMIN — Medication 50 MILLIGRAM(S): at 06:12

## 2024-03-09 RX ADMIN — ISOSORBIDE DINITRATE 5 MILLIGRAM(S): 5 TABLET ORAL at 17:23

## 2024-03-09 RX ADMIN — Medication 50 MILLIGRAM(S): at 12:03

## 2024-03-09 RX ADMIN — Medication 40 MILLIGRAM(S): at 06:12

## 2024-03-09 RX ADMIN — APIXABAN 5 MILLIGRAM(S): 2.5 TABLET, FILM COATED ORAL at 06:12

## 2024-03-09 RX ADMIN — Medication 50 MILLIGRAM(S): at 18:28

## 2024-03-09 RX ADMIN — APIXABAN 5 MILLIGRAM(S): 2.5 TABLET, FILM COATED ORAL at 17:24

## 2024-03-09 RX ADMIN — TAMSULOSIN HYDROCHLORIDE 0.4 MILLIGRAM(S): 0.4 CAPSULE ORAL at 21:34

## 2024-03-09 RX ADMIN — Medication 100 GRAM(S): at 14:01

## 2024-03-09 NOTE — PROGRESS NOTE ADULT - NS PANP COMMENT GEN_ALL_CORE FT
pt seen and examined  comfortable  no cp, sob, palp, n/v  feeling better  continue BB and Dig  check level in am

## 2024-03-09 NOTE — PROGRESS NOTE ADULT - SUBJECTIVE AND OBJECTIVE BOX
Follow up for  SUBJ:    ambulating slowly with assistance    PMH  Cardiomyopathy    Asthma    Anxiety    Atrial fibrillation, unspecified type    Acute combined systolic and diastolic congestive heart failure    Essential hypertension    Hyperlipidemia, unspecified hyperlipidemia type    Prediabetes    Prolonged QT interval    Cardiomyopathy, unspecified type    Chronic combined systolic and diastolic congestive heart failure    AICD (automatic cardioverter/defibrillator) present    Hypertension    Hyperlipidemia    CHF (congestive heart failure)    Afib    PATTIE (obstructive sleep apnea)        MEDICATIONS  (STANDING):  apixaban 5 milliGRAM(s) Oral two times a day  aspirin enteric coated 81 milliGRAM(s) Oral daily  atorvastatin 80 milliGRAM(s) Oral at bedtime  chlorhexidine 2% Cloths 1 Application(s) Topical <User Schedule>  dextrose 5%. 1000 milliLiter(s) (50 mL/Hr) IV Continuous <Continuous>  dextrose 5%. 1000 milliLiter(s) (100 mL/Hr) IV Continuous <Continuous>  dextrose 50% Injectable 25 Gram(s) IV Push once  dextrose 50% Injectable 12.5 Gram(s) IV Push once  dextrose 50% Injectable 25 Gram(s) IV Push once  digoxin     Tablet 125 MICROGram(s) Oral daily  furosemide   Injectable 40 milliGRAM(s) IV Push every 12 hours  glucagon  Injectable 1 milliGRAM(s) IntraMuscular once  insulin lispro (ADMELOG) corrective regimen sliding scale   SubCutaneous three times a day before meals  insulin lispro (ADMELOG) corrective regimen sliding scale   SubCutaneous at bedtime  isosorbide   dinitrate Tablet (ISORDIL) 5 milliGRAM(s) Oral three times a day  magnesium sulfate  IVPB 1 Gram(s) IV Intermittent once  metoprolol tartrate 50 milliGRAM(s) Oral every 6 hours  tamsulosin 0.4 milliGRAM(s) Oral at bedtime    MEDICATIONS  (PRN):  albuterol    90 MICROgram(s) HFA Inhaler 1 Puff(s) Inhalation every 4 hours PRN for bronchospasm  artificial  tears Solution 1 Drop(s) Both EYES three times a day PRN Dry Eyes  dextrose Oral Gel 15 Gram(s) Oral once PRN Blood Glucose LESS THAN 70 milliGRAM(s)/deciliter        PHYSICAL EXAM:  Vital Signs Last 24 Hrs  T(C): 36.6 (09 Mar 2024 08:00), Max: 37.1 (09 Mar 2024 04:00)  T(F): 97.9 (09 Mar 2024 08:00), Max: 98.8 (09 Mar 2024 04:00)  HR: 120 (09 Mar 2024 11:00) (99 - 139)  BP: 95/83 (09 Mar 2024 11:00) (95/65 - 136/92)  BP(mean): 88 (09 Mar 2024 11:00) (75 - 112)  RR: 18 (09 Mar 2024 12:00) (13 - 26)  SpO2: 96% (09 Mar 2024 12:00) (91% - 98%)    Parameters below as of 09 Mar 2024 08:00  Patient On (Oxygen Delivery Method): room air        GENERAL: NAD, well-groomed, well-developed  HEAD:  Atraumatic, Normocephalic  EYES: EOMI, PERRLA, conjunctiva and sclera clear  ENT: Moist mucous membranes,  NECK: Supple, No JVD, no bruits  CHEST/LUNG: Clear to percussion bilaterally; No rales, rhonchi, wheezing, or rubs  HEART: Regular rate and rhythm; No murmurs, rubs, or gallops PMI non displaced.  ABDOMEN: Soft, Nontender, Nondistended; Bowel sounds present  EXTREMITIES:  2+ Peripheral Pulses, No clubbing, cyanosis, or edema  SKIN: No rashes or lesions  NERVOUS SYSTEM:  Cranial Nerves II-XII intact      TELEMETRY:    ECG:    e< from: 12 Lead ECG (03.05.24 @ 11:51) >    Diagnosis Line Atrial fibrillation with rapid ventricular response with premature ventricular or aberrantly conducted complexes  Left axis deviation  Nonspecific T wave abnormality  Abnormal ECG  When compared with ECG of 21-DEC-2016 22:30,  Atrial fibrillation is now present  Confirmed by David Chester ([id number]) on 3/5/98345:03:59 PM    < end of copied text >    ECHO:    < from: TTE Echo Complete w/o Contrast w/ Doppler (03.06.24 @ 07:53) >  Summary:   1. Severely decreased global left ventricular systolic function.   2. Left ventricular ejection fraction, by visual estimation, is 20 to   25%.   3. The left ventricle endocardium is not well visualized, consider use   of IV ultrasonic enhancing agent to better evaluate endocardium, if   clinically indicated. Calculated LVEF by Simpsons Biplane Method 20%.   Severe global left ventricle hypokinesis.   4. Severely increased left ventricular internal cavity size.   5. Dilated cardiomyopathy.   6. The mitral in-flow pattern reveals no discernable A-wave, therefore   no comment on diastolic function can be made.   7. Mildly enlarged right ventricle with low normal right ventricle   systolic function.   8. Severely enlarged left atrium.   9. Right atrial enlargement.  10. Mild thickening of the anterior and posterior mitral valve leaflets.  11. Moderate mitral valve regurgitation.  12. Mild-moderate tricuspid regurgitation.  13. Mild aortic valve leaflet calcification. No aortic valve stenosis.  14. Mild aortic regurgitation.  15. Mild to moderate pulmonic valve regurgitation.  16. Top normal sized Aorta at the Sinuses of Valsalva.  17. Estimated pulmonary artery systolic pressure is 42.5 mmHg assuming a   right atrial pressure of 15 mmHg, which is consistent with mild pulmonary   hypertension.  18. There is no evidence of pericardial effusion.    [id number] Yoni Chester MD Electronically signed on   3/6/2024 at 11:20:42 AM        *** Final ***    < end of copied text >      LABS:                        11.9   4.43  )-----------( 137      ( 09 Mar 2024 06:00 )             37.0     03-09    144  |  108  |  67<H>  ----------------------------<  72  4.0   |  21<L>  |  2.46<H>    Ca    8.5      09 Mar 2024 06:00  Phos  3.5     03-09  Mg     1.8     03-09    TPro  7.2  /  Alb  3.3  /  TBili  1.7<H>  /  DBili  x   /  AST  31  /  ALT  42  /  AlkPhos  131<H>  03-09    CARDIAC MARKERS ( 09 Mar 2024 06:00 )  x     / x     / 183 U/L / x     / x          I&O's Summary    08 Mar 2024 07:01  -  09 Mar 2024 07:00  --------------------------------------------------------  IN: 900 mL / OUT: 2190 mL / NET: -1290 mL    09 Mar 2024 06:01  -  09 Mar 2024 11:55  --------------------------------------------------------  IN: 0 mL / OUT: 550 mL / NET: -550 mL      BNP    RADIOLOGY & ADDITIONAL STUDIES:    ECHO:    impression  hb 12 hct 37 dvt neg ef .2 -.25 vq low prob cath neg by report     hfref rate control probable tachycardia induced cardiomyopathy icd pacer Afib Chf     Afib  lopressor dig apixigan   rate control probable tachycardia induced cardiomyopathy icd pacer Afib probable cardioversion eventual.     Chf /hfref  ace arb eyad long acting beta blocker  suggest transition metoprolol tartrate to metoprolol succinate. renal failure precluded ACE ARB EYAD Farxiga and spironolactone

## 2024-03-09 NOTE — PROGRESS NOTE ADULT - ASSESSMENT
58 year old male with PMH of afib on eliquis, HTN, CHF, HLD, s/p AICD, anxiety, and asthma presenting to ICU for tx of:    HF exacerbation  afib with rvr  elevated troponin      Plan  Monitor vitals per policy  HR now more controlled s/p addition of dig to regimen yesterday, s/p amio gtt and increased bb; monitor dig level  On eliquis and aspirin  diuresis bid, monitor response  NIV QHS + nebs prn  home medication regimen as indicated / tolerated  po diet as tolerated  monitor blood glucose  levels   full code status

## 2024-03-09 NOTE — PROGRESS NOTE ADULT - SUBJECTIVE AND OBJECTIVE BOX
58 y.o male with pmh a fib on eliquis, HTN, CHF, HLD, AICD , anxiety, asthma presented to ED for c/o chest pain and palps found to be in rapid a fib.  (05 Mar 2024 17:27)      24 hr events: No active issues last night, HR remained less than 120 bpm overnight. HDS and without respiratory distress.       ## ROS: Negative.      ## Labs:  CBC:                        11.9   4.43  )-----------( 137      ( 09 Mar 2024 06:00 )             37.0     Chem:  03-09    144  |  108  |  67<H>  ----------------------------<  72  4.0   |  21<L>  |  2.46<H>    Ca    8.5      09 Mar 2024 06:00  Phos  3.5     03-09  Mg     1.8     03-09    TPro  7.2  /  Alb  3.3  /  TBili  1.7<H>  /  DBili  x   /  AST  31  /  ALT  42  /  AlkPhos  131<H>  03-09      ## Medications:  digoxin     Tablet 125 MICROGram(s) Oral daily  furosemide   Injectable 40 milliGRAM(s) IV Push every 12 hours  isosorbide   dinitrate Tablet (ISORDIL) 5 milliGRAM(s) Oral three times a day  metoprolol tartrate 50 milliGRAM(s) Oral every 6 hours  albuterol    90 MICROgram(s) HFA Inhaler 1 Puff(s) Inhalation every 4 hours PRN  atorvastatin 80 milliGRAM(s) Oral at bedtime  dextrose 50% Injectable 25 Gram(s) IV Push once  dextrose 50% Injectable 12.5 Gram(s) IV Push once  dextrose 50% Injectable 25 Gram(s) IV Push once  dextrose Oral Gel 15 Gram(s) Oral once PRN  glucagon  Injectable 1 milliGRAM(s) IntraMuscular once  insulin lispro (ADMELOG) corrective regimen sliding scale   SubCutaneous three times a day before meals  insulin lispro (ADMELOG) corrective regimen sliding scale   SubCutaneous at bedtime  apixaban 5 milliGRAM(s) Oral two times a day  aspirin enteric coated 81 milliGRAM(s) Oral daily        ## Vitals:  T(C): 36.6 (03-09-24 @ 08:00), Max: 37.1 (03-09-24 @ 04:00)  HR: 118 (03-09-24 @ 07:00) (99 - 139)  BP: 100/86 (03-09-24 @ 07:00) (95/65 - 136/92)  BP(mean): 92 (03-09-24 @ 07:00) (75 - 112)  RR: 16 (03-09-24 @ 06:00) (13 - 26)  SpO2: 96% (03-09-24 @ 07:00) (91% - 98%)        03-08 @ 07:01  -  03-09 @ 07:00  --------------------------------------------------------  IN: 900 mL / OUT: 2190 mL / NET: -1290 mL          ## P/E:  Gen: lying comfortably in bed in no apparent distress  HEENT: PERRL, EOMI  Resp: mild crackles b/l lower lobes, otherwise clear, normal WOB  CVS: S1S2 noted  Abd: soft NT/ND +BS  Ext: no c/c/e  Neuro: A&Ox3        CODE STATUS: [ x ] full code

## 2024-03-09 NOTE — PROVIDER CONTACT NOTE (HYPOGLYCEMIA EVENT) - NS PROVIDER CONTACT BACKGROUND-HYPO
Age: 58y    Gender: Male    POCT Blood Glucose:  107 mg/dL (03-09-24 @ 17:11)  94 mg/dL (03-09-24 @ 11:55)  98 mg/dL (03-09-24 @ 08:44)  80 mg/dL (03-09-24 @ 08:10)  69 mg/dL (03-09-24 @ 07:51)  69 mg/dL (03-09-24 @ 07:50)  108 mg/dL (03-08-24 @ 20:54)      eMAR:  atorvastatin   80 milliGRAM(s) Oral (03-08-24 @ 20:55)

## 2024-03-09 NOTE — PROGRESS NOTE ADULT - SUBJECTIVE AND OBJECTIVE BOX
Patient is a 58y old  Male who presents with a chief complaint of a fib with RVR (09 Mar 2024 20:39)      BRIEF HOSPITAL COURSE-  59 y/o Male with PMHx Asthma, Afib (On Eliquis), HTN, HFrEF (s/p AICD) presents to  ED complaining of chest pain. Admitted to ICU for acute hypoxic shortness of breath secondary to acute decompensated. Course complicated by AFib w/ RVR, difficult to control.     Events last 24 hours:   HR remains difficult to control, despite up titration of medication. Likely will need cardioversion. Patient endorses persistent chest pressure/ SOB.       Review of Systems:  CONSTITUTIONAL: +Fatigue. No fever or chills.  EYES: No eye pain, visual disturbances, or discharge.  ENMT: No difficulty hearing, tinnitus, vertigo. No sinus or throat pain.  NECK: No pain or stiffness.  RESPIRATORY: +Shortness of breath. No cough, wheezing, chills or hemoptysis.   CARDIOVASCULAR: No chest pain, palpitations, dizziness, or leg swelling.  GASTROINTESTINAL: No abdominal or epigastric pain. No nausea, vomiting, or hematemesis. No diarrhea or constipation. No melena or hematochezia.  GENITOURINARY: No dysuria, frequency, hematuria, or incontinence.  NEUROLOGICAL: No headaches, memory loss, loss of strength, numbness, or tremors  SKIN: No itching, burning, rashes, or lesions.  MUSCULOSKELETAL: No joint pain or swelling. No muscle, back, or extremity pain.  PSYCHIATRIC: No depression, anxiety, mood swings, or difficulty sleeping.        PAST MEDICAL & SURGICAL HISTORY-  Asthma      Anxiety      Atrial fibrillation, unspecified type      Essential hypertension      Prediabetes      Prolonged QT interval      Cardiomyopathy, unspecified type      AICD (automatic cardioverter/defibrillator) present      Hyperlipidemia      CHF (congestive heart failure)      Afib      PATTIE (obstructive sleep apnea)      History of tonsillectomy      AICD (automatic cardioverter/defibrillator) present          Medications:    digoxin     Tablet 125 MICROGram(s) Oral daily  furosemide   Injectable 40 milliGRAM(s) IV Push every 12 hours  isosorbide   dinitrate Tablet (ISORDIL) 5 milliGRAM(s) Oral three times a day  metoprolol tartrate 50 milliGRAM(s) Oral every 6 hours    albuterol    90 MICROgram(s) HFA Inhaler 1 Puff(s) Inhalation every 4 hours PRN    melatonin 6 milliGRAM(s) Oral at bedtime    apixaban 5 milliGRAM(s) Oral two times a day  aspirin enteric coated 81 milliGRAM(s) Oral daily    tamsulosin 0.4 milliGRAM(s) Oral at bedtime    allopurinol 100 milliGRAM(s) Oral daily  atorvastatin 80 milliGRAM(s) Oral at bedtime  dextrose 50% Injectable 25 Gram(s) IV Push once  dextrose 50% Injectable 12.5 Gram(s) IV Push once  dextrose 50% Injectable 25 Gram(s) IV Push once  dextrose Oral Gel 15 Gram(s) Oral once PRN  glucagon  Injectable 1 milliGRAM(s) IntraMuscular once  insulin lispro (ADMELOG) corrective regimen sliding scale   SubCutaneous three times a day before meals  insulin lispro (ADMELOG) corrective regimen sliding scale   SubCutaneous at bedtime    dextrose 5%. 1000 milliLiter(s) IV Continuous <Continuous>  dextrose 5%. 1000 milliLiter(s) IV Continuous <Continuous>    artificial  tears Solution 1 Drop(s) Both EYES three times a day PRN  chlorhexidine 2% Cloths 1 Application(s) Topical <User Schedule>        ICU Vital Signs Last 24 Hrs  T(C): 36.6 (09 Mar 2024 19:00), Max: 37.1 (09 Mar 2024 04:00)  T(F): 97.9 (09 Mar 2024 19:00), Max: 98.8 (09 Mar 2024 04:00)  HR: 118 (09 Mar 2024 20:00) (99 - 137)  BP: 98/75 (09 Mar 2024 20:00) (95/65 - 136/92)  BP(mean): 83 (09 Mar 2024 20:00) (75 - 108)  ABP: --  ABP(mean): --  RR: 18 (09 Mar 2024 20:00) (15 - 20)  SpO2: 96% (09 Mar 2024 20:00) (91% - 98%)    O2 Parameters below as of 09 Mar 2024 20:00  Patient On (Oxygen Delivery Method): room air      I&O's Detail    08 Mar 2024 07:01  -  09 Mar 2024 07:00  --------------------------------------------------------  IN:    Oral Fluid: 900 mL  Total IN: 900 mL    OUT:    Voided (mL): 2190 mL  Total OUT: 2190 mL    Total NET: -1290 mL      09 Mar 2024 06:01  -  09 Mar 2024 22:29  --------------------------------------------------------  IN:    IV PiggyBack: 100 mL  Total IN: 100 mL    OUT:    Voided (mL): 800 mL  Total OUT: 800 mL    Total NET: -700 mL      LABS:                        11.9   4.43  )-----------( 137      ( 09 Mar 2024 06:00 )             37.0     03-09    144  |  108  |  67<H>  ----------------------------<  72  4.0   |  21<L>  |  2.46<H>    Ca    8.5      09 Mar 2024 06:00  Phos  3.5     03-09  Mg     1.8     03-09    TPro  7.2  /  Alb  3.3  /  TBili  1.7<H>  /  DBili  x   /  AST  31  /  ALT  42  /  AlkPhos  131<H>  03-09      CARDIAC MARKERS ( 09 Mar 2024 06:00 )  x     / x     / 183 U/L / x     / x          CAPILLARY BLOOD GLUCOSE  POCT Blood Glucose.: 104 mg/dL (09 Mar 2024 21:33)      Urinalysis Basic - ( 09 Mar 2024 06:00 )  Color: x / Appearance: x / SG: x / pH: x  Gluc: 72 mg/dL / Ketone: x  / Bili: x / Urobili: x   Blood: x / Protein: x / Nitrite: x   Leuk Esterase: x / RBC: x / WBC x   Sq Epi: x / Non Sq Epi: x / Bacteria: x      CULTURES:  Rapid RVP Result: Jefry (03-05 @ 17:16)      Physical Examination:  General: Middle aged male, chronically ill appearing. In no acute distress.    HEENT: Pupils equal, reactive to light.  Symmetric.  PULM: Diminished crackles to auscultation B/L bases. No significant sputum production.  NECK: Supple, no lymphadenopathy, trachea midline.  CVS: Irregular rate, irregularly irregular rhythm. No murmurs, rubs, or gallops. S1, S2 intact.   ABD: Soft, nondistended, nontender, normoactive bowel sounds. No masses palpable.   EXT: No edema. Nontender.  SKIN: Warm and well perfused, no rashes noted.  NEURO: Alert, oriented, interactive. Nonfocal.  DEVICES:       RADIOLOGY-    < from: US Renal (03.07.24 @ 19:58) >  ACC: [id number] EXAM:  US KIDNEY(S)   ORDERED BY: SEEMA BANSAL     PROCEDURE DATE:  03/07/2024      INTERPRETATION:  CLINICAL INFORMATION: Renal failure.  Acute kidney   injury.    COMPARISON: None available.    TECHNIQUE: Sonography of the kidneys and bladder.    FINDINGS:  Right kidney: 12.4 cm. No renal mass, hydronephrosis or calculi.    Left kidney: 14.6 cm. No renal mass, hydronephrosis or calculi.    Urinary bladder: Underdistended with volume of 191 mL.    Additional findings: A small right pleural effusion is partially   visualized.    IMPRESSION:  No renal mass, hydronephrosis or calculus is visualized sonographically.  A small right pleural effusion is partially visualized.      --- End of Report ---      OSBALDO RITCHIE MD; Attending Radiologist  This document has been electronically signed. Mar  7 2024  8:16PM    < end of copied text >        Time spent on this patient encounter, which includes documenting this note in the electronic medical record, was 75+ minutes including assessing the presenting problems with associated risks, reviewing the medical record to prepare for the encounter, and meeting face to face with the patient to obtain additional history.  I have also performed an appropriate physical exam, made interventions listed and ordered and interpreted appropriate diagnostic studies as documented.     To improve communication and patient safety, I have coordinated care with the multidisciplinary team including the bedside nurse, appropriate attending of record and consultants as needed.        DATE OF ENTRY OF THIS NOTE IS EQUAL TO THE DATE OF SERVICES RENDERED

## 2024-03-09 NOTE — PROGRESS NOTE ADULT - ASSESSMENT
57 y/o Male with PMHx Asthma, Afib (On Eliquis), HTN, HFrEF (s/p AICD) presents to  ED complaining of chest pain with:       1. Acute Hypoxic Respiratory Failure   2. Acute Decompensated Heart Failure   3. Acute Pulmonary Edema  5.  Afib w/ RVR  6. MAYTE  7. Thrombocytopenia       Neuro: Alert and oriented, mentating at baseline. Avoid delirogenic medications. Pain control PRN.   Resp: Maintaining O2>93% on NC. Nocturnal NIPPV for PATTIE. CXR with Slightly more prominent parahilar interstitial markings since previous exam. Continue with aggressive diuresis. Repeat CXR in AM. Patient endorsing slight improvement in SOB today.    CV: Persistent uncontrolled AFib, s/p Amiodarone gtt, Digoxin load and Lopressor. Keep Mg>2 and K>4 for optimal arrythmia suppression. TTE with EF 20% with severe hypokinesis. Suspect tachycardia secondary to cardiomyopathy. Likely will need cardioversion in future. Monitor clinical and laboratory end points of perfusion, maintain MAP >65.   GI: Consistent carb diet, tolerating well. GI prophylaxis with Protonix.   Renal: MAYTE, suspect secondary to fluid overload from cardiorenal. Cr continues to downtrend. US Renal with No renal mass, hydronephrosis or calculus is visualized sonographically. Avoid nephrotoxic medications. Trend labs, replete lytes as needed to maintain K>4, Mg>2 and Phos >3. Nephro following.   Endo: FSS with ISS ACHS. Goal -180 while critically ill.   Heme: H/H stable. Trend H/H, transfuse for hgb <7.0 if necessary. Thrombocytopenic, stable. Full AC with Eliquis. SCDs in place.  59 y/o Male with PMHx Asthma, Afib (On Eliquis), HTN, HFrEF (s/p AICD) presents to  ED complaining of chest pain with:       1. Acute Hypoxic Respiratory Failure   2. Acute Decompensated Heart Failure   3. Acute Pulmonary Edema  5.  Afib w/ RVR  6. MAYTE  7. Thrombocytopenia       Neuro: Alert and oriented, mentating at baseline. Avoid delirogenic medications. Pain control PRN.   Resp: Maintaining O2>93% on NC. Nocturnal NIPPV for PATTIE. CXR with Slightly more prominent parahilar interstitial markings since previous exam. Continue with aggressive diuresis. Repeat CXR in AM. Patient endorsing slight improvement in SOB today.    CV: HR more controlled, s/p Amiodarone gtt, Digoxin load and Lopressor. Keep Mg>2 and K>4 for optimal arrythmia suppression. TTE with EF 20% with severe hypokinesis. Suspect tachycardia secondary to cardiomyopathy. Likely will need cardioversion in future. Monitor clinical and laboratory end points of perfusion, maintain MAP >65.   GI: Consistent carb diet, tolerating well. GI prophylaxis with Protonix.   Renal: MAYTE, suspect secondary to fluid overload from cardiorenal. Cr continues to downtrend. US Renal with No renal mass, hydronephrosis or calculus is visualized sonographically. Avoid nephrotoxic medications. Trend labs, replete lytes as needed to maintain K>4, Mg>2 and Phos >3. Nephro following.   Endo: FSS with ISS ACHS. Goal -180 while critically ill.   Heme: H/H stable. Trend H/H, transfuse for hgb <7.0 if necessary. Thrombocytopenic, stable. Full AC with Eliquis. SCDs in place.

## 2024-03-09 NOTE — PROGRESS NOTE ADULT - SUBJECTIVE AND OBJECTIVE BOX
No distress    Vital Signs Last 24 Hrs  T(C): 36.6 (03-09-24 @ 19:00), Max: 37.1 (03-09-24 @ 04:00)  T(F): 97.9 (03-09-24 @ 19:00), Max: 98.8 (03-09-24 @ 04:00)  HR: 118 (03-09-24 @ 20:00) (99 - 137)  BP: 98/75 (03-09-24 @ 20:00) (95/65 - 136/92)  BP(mean): 83 (03-09-24 @ 20:00) (75 - 108)  RR: 18 (03-09-24 @ 20:00) (13 - 20)  SpO2: 96% (03-09-24 @ 20:00) (91% - 98%)    I&O's Detail    08 Mar 2024 07:01  -  09 Mar 2024 07:00  --------------------------------------------------------  IN:    Oral Fluid: 900 mL  Total IN: 900 mL    OUT:    Voided (mL): 2190 mL  Total OUT: 2190 mL    09 Mar 2024 06:01  -  09 Mar 2024 20:40  --------------------------------------------------------  OUT:    Voided (mL): 800 mL  Total OUT: 800 mL    s1s2  b/l air entry  soft, ND  + edema                                         11.9   4.43  )-----------( 137      ( 09 Mar 2024 06:00 )             37.0     09 Mar 2024 06:00    144    |  108    |  67     ----------------------------<  72     4.0     |  21     |  2.46     Ca    8.5        09 Mar 2024 06:00  Phos  3.5       09 Mar 2024 06:00  Mg     1.8       09 Mar 2024 06:00    TPro  7.2    /  Alb  3.3    /  TBili  1.7    /  DBili  x      /  AST  31     /  ALT  42     /  AlkPhos  131    09 Mar 2024 06:00    LIVER FUNCTIONS - ( 09 Mar 2024 06:00 )  Alb: 3.3 g/dL / Pro: 7.2 g/dL / ALK PHOS: 131 U/L / ALT: 42 U/L / AST: 31 U/L / GGT: x           CARDIAC MARKERS ( 09 Mar 2024 06:00 )  x     / x     / 183 U/L / x     / x        albuterol    90 MICROgram(s) HFA Inhaler 1 Puff(s) Inhalation every 4 hours PRN  allopurinol 100 milliGRAM(s) Oral daily  apixaban 5 milliGRAM(s) Oral two times a day  artificial  tears Solution 1 Drop(s) Both EYES three times a day PRN  aspirin enteric coated 81 milliGRAM(s) Oral daily  atorvastatin 80 milliGRAM(s) Oral at bedtime  chlorhexidine 2% Cloths 1 Application(s) Topical <User Schedule>  dextrose 5%. 1000 milliLiter(s) IV Continuous <Continuous>  dextrose 5%. 1000 milliLiter(s) IV Continuous <Continuous>  dextrose 50% Injectable 25 Gram(s) IV Push once  dextrose 50% Injectable 12.5 Gram(s) IV Push once  dextrose 50% Injectable 25 Gram(s) IV Push once  dextrose Oral Gel 15 Gram(s) Oral once PRN  digoxin     Tablet 125 MICROGram(s) Oral daily  furosemide   Injectable 40 milliGRAM(s) IV Push every 12 hours  glucagon  Injectable 1 milliGRAM(s) IntraMuscular once  insulin lispro (ADMELOG) corrective regimen sliding scale   SubCutaneous three times a day before meals  insulin lispro (ADMELOG) corrective regimen sliding scale   SubCutaneous at bedtime  isosorbide   dinitrate Tablet (ISORDIL) 5 milliGRAM(s) Oral three times a day  melatonin 6 milliGRAM(s) Oral at bedtime  metoprolol tartrate 50 milliGRAM(s) Oral every 6 hours  tamsulosin 0.4 milliGRAM(s) Oral at bedtime    A/P:    CM, EF 20 - 25%, mod MR, CHF, RAfib  Hemodynamic, cardio-renal MAYTE (Cr 1.04 - 10/9/23)  Cr is slowly downtrending   Continue diuresis  HR control   Avoid ACE/ARB/Entresto  Renal SONO w/o hydro   Avoid nephrotoxins  F/u BMP, UO, SPEP, serologies   Will follow    [phone]

## 2024-03-10 LAB
ALBUMIN SERPL ELPH-MCNC: 3.5 G/DL — SIGNIFICANT CHANGE UP (ref 3.3–5)
ALP SERPL-CCNC: 131 U/L — HIGH (ref 40–120)
ALT FLD-CCNC: 46 U/L — HIGH (ref 10–45)
ANA TITR SER: NEGATIVE — SIGNIFICANT CHANGE UP
ANION GAP SERPL CALC-SCNC: 16 MMOL/L — SIGNIFICANT CHANGE UP (ref 5–17)
AST SERPL-CCNC: 34 U/L — SIGNIFICANT CHANGE UP (ref 10–40)
AUTO DIFF PNL BLD: ABNORMAL
BILIRUB SERPL-MCNC: 1.8 MG/DL — HIGH (ref 0.2–1.2)
BUN SERPL-MCNC: 59 MG/DL — HIGH (ref 7–23)
C-ANCA SER-ACNC: NEGATIVE — SIGNIFICANT CHANGE UP
CALCIUM SERPL-MCNC: 8.6 MG/DL — SIGNIFICANT CHANGE UP (ref 8.4–10.5)
CHLORIDE SERPL-SCNC: 104 MMOL/L — SIGNIFICANT CHANGE UP (ref 96–108)
CO2 SERPL-SCNC: 19 MMOL/L — LOW (ref 22–31)
CREAT SERPL-MCNC: 2.15 MG/DL — HIGH (ref 0.5–1.3)
EGFR: 35 ML/MIN/1.73M2 — LOW
GLUCOSE BLDC GLUCOMTR-MCNC: 116 MG/DL — HIGH (ref 70–99)
GLUCOSE BLDC GLUCOMTR-MCNC: 121 MG/DL — HIGH (ref 70–99)
GLUCOSE BLDC GLUCOMTR-MCNC: 85 MG/DL — SIGNIFICANT CHANGE UP (ref 70–99)
GLUCOSE BLDC GLUCOMTR-MCNC: 98 MG/DL — SIGNIFICANT CHANGE UP (ref 70–99)
GLUCOSE SERPL-MCNC: 85 MG/DL — SIGNIFICANT CHANGE UP (ref 70–99)
HCT VFR BLD CALC: 40.1 % — SIGNIFICANT CHANGE UP (ref 39–50)
HGB BLD-MCNC: 12.6 G/DL — LOW (ref 13–17)
MAGNESIUM SERPL-MCNC: 2 MG/DL — SIGNIFICANT CHANGE UP (ref 1.6–2.6)
MCHC RBC-ENTMCNC: 25.8 PG — LOW (ref 27–34)
MCHC RBC-ENTMCNC: 31.4 GM/DL — LOW (ref 32–36)
MCV RBC AUTO: 82 FL — SIGNIFICANT CHANGE UP (ref 80–100)
MPO AB + PR3 PNL SER: SIGNIFICANT CHANGE UP
NRBC # BLD: 0 /100 WBCS — SIGNIFICANT CHANGE UP (ref 0–0)
P-ANCA SER-ACNC: NEGATIVE — SIGNIFICANT CHANGE UP
PHOSPHATE SERPL-MCNC: 3.5 MG/DL — SIGNIFICANT CHANGE UP (ref 2.5–4.5)
PLATELET # BLD AUTO: 159 K/UL — SIGNIFICANT CHANGE UP (ref 150–400)
POTASSIUM SERPL-MCNC: 3.7 MMOL/L — SIGNIFICANT CHANGE UP (ref 3.5–5.3)
POTASSIUM SERPL-SCNC: 3.7 MMOL/L — SIGNIFICANT CHANGE UP (ref 3.5–5.3)
PROT SERPL-MCNC: 7.7 G/DL — SIGNIFICANT CHANGE UP (ref 6–8.3)
RBC # BLD: 4.89 M/UL — SIGNIFICANT CHANGE UP (ref 4.2–5.8)
RBC # FLD: 16.5 % — HIGH (ref 10.3–14.5)
SODIUM SERPL-SCNC: 139 MMOL/L — SIGNIFICANT CHANGE UP (ref 135–145)
WBC # BLD: 4.79 K/UL — SIGNIFICANT CHANGE UP (ref 3.8–10.5)
WBC # FLD AUTO: 4.79 K/UL — SIGNIFICANT CHANGE UP (ref 3.8–10.5)

## 2024-03-10 PROCEDURE — 99232 SBSQ HOSP IP/OBS MODERATE 35: CPT

## 2024-03-10 RX ORDER — CARVEDILOL PHOSPHATE 80 MG/1
12.5 CAPSULE, EXTENDED RELEASE ORAL EVERY 12 HOURS
Refills: 0 | Status: DISCONTINUED | OUTPATIENT
Start: 2024-03-10 | End: 2024-03-11

## 2024-03-10 RX ORDER — PETROLATUM,WHITE
1 JELLY (GRAM) TOPICAL DAILY
Refills: 0 | Status: DISCONTINUED | OUTPATIENT
Start: 2024-03-10 | End: 2024-03-21

## 2024-03-10 RX ADMIN — Medication 6 MILLIGRAM(S): at 21:16

## 2024-03-10 RX ADMIN — APIXABAN 5 MILLIGRAM(S): 2.5 TABLET, FILM COATED ORAL at 18:19

## 2024-03-10 RX ADMIN — Medication 1 DROP(S): at 06:22

## 2024-03-10 RX ADMIN — ISOSORBIDE DINITRATE 5 MILLIGRAM(S): 5 TABLET ORAL at 11:05

## 2024-03-10 RX ADMIN — Medication 50 MILLIGRAM(S): at 11:06

## 2024-03-10 RX ADMIN — Medication 50 MILLIGRAM(S): at 00:49

## 2024-03-10 RX ADMIN — ISOSORBIDE DINITRATE 5 MILLIGRAM(S): 5 TABLET ORAL at 06:16

## 2024-03-10 RX ADMIN — Medication 81 MILLIGRAM(S): at 11:04

## 2024-03-10 RX ADMIN — APIXABAN 5 MILLIGRAM(S): 2.5 TABLET, FILM COATED ORAL at 06:15

## 2024-03-10 RX ADMIN — CARVEDILOL PHOSPHATE 12.5 MILLIGRAM(S): 80 CAPSULE, EXTENDED RELEASE ORAL at 21:15

## 2024-03-10 RX ADMIN — Medication 40 MILLIGRAM(S): at 06:16

## 2024-03-10 RX ADMIN — ATORVASTATIN CALCIUM 80 MILLIGRAM(S): 80 TABLET, FILM COATED ORAL at 21:16

## 2024-03-10 RX ADMIN — CHLORHEXIDINE GLUCONATE 1 APPLICATION(S): 213 SOLUTION TOPICAL at 06:16

## 2024-03-10 RX ADMIN — Medication 125 MICROGRAM(S): at 06:15

## 2024-03-10 RX ADMIN — TAMSULOSIN HYDROCHLORIDE 0.4 MILLIGRAM(S): 0.4 CAPSULE ORAL at 21:16

## 2024-03-10 RX ADMIN — Medication 100 MILLIGRAM(S): at 11:22

## 2024-03-10 RX ADMIN — Medication 50 MILLIGRAM(S): at 06:15

## 2024-03-10 NOTE — PROGRESS NOTE ADULT - SUBJECTIVE AND OBJECTIVE BOX
Significant recent/past 24 hr events: pt unable to tolerate Bipap for the whle night, , felt pressure has too high . Still in Afib with RVR intermittently- -130s.     Subjective: 58 y.o male with pmh a fib on eliquis, HTN, CHF, HLD, AICD , anxiety, asthma presented to ED for c/o chest pain and palps found to be in rapid Afib.         PAST MEDICAL & SURGICAL HISTORY:  Asthma      Anxiety      Atrial fibrillation, unspecified type      Essential hypertension      Prediabetes      Prolonged QT interval      Cardiomyopathy, unspecified type      AICD (automatic cardioverter/defibrillator) present      Hyperlipidemia      CHF (congestive heart failure)      Afib      PATTIE (obstructive sleep apnea)      History of tonsillectomy      AICD (automatic cardioverter/defibrillator) present        FAMILY HISTORY:      Vitals   ICU Vital Signs Last 24 Hrs  T(C): 36.7 (10 Mar 2024 07:00), Max: 36.7 (09 Mar 2024 16:00)  T(F): 98.1 (10 Mar 2024 07:00), Max: 98.1 (09 Mar 2024 16:00)  HR: 126 (10 Mar 2024 07:00) (97 - 137)  BP: 113/81 (10 Mar 2024 07:00) (95/83 - 127/96)  BP(mean): 92 (10 Mar 2024 07:00) (76 - 108)  ABP: --  ABP(mean): --  RR: 20 (10 Mar 2024 06:00) (17 - 21)  SpO2: 94% (10 Mar 2024 07:00) (92% - 100%)    O2 Parameters below as of 10 Mar 2024 06:00  Patient On (Oxygen Delivery Method): room air            Physical Exam:   Constitutional: NAD, well-groomed, well-developed, appropriated affect   HEENT: normocephalic, atraumatic , conjunctiva normal B/L.  PERRLA, EOMI, no drainage or redness, no sclericterus, neck supple,  No JVD, no thyromegaly, trachea is midline, oval cavity  pink with moist mucosa  Respiratory: Breath Sounds equal & clear bilaterally to auscultation over all lung fields , no accessory muscle use noted, no wheezes, rales, rhonchi   Cardiovascular: Regular rate, regular rhythm, normal S1, S2; no rubs, clicks, gallops murmurs,  no JVD, no peripheral edema  Abdomen:  Soft, non-tender, + bowel sounds in all 4 quadrants, , no masses felt  Extremities:  1+  peripheral edema B/L LE , no cyanosis, no clubbing   Vascular: Equal and normal pulses: 2+ peripheral pulses throughout  Musculoskeletal: No joint swelling or deformity; no limitation of movement  Neurological: A+O x 3; speech clear and intact; no sensory, motor  deficits, normal reflexes  Psychiatric: calm, normal mood, normal affect  Skin: warm, dry, well perfused, no rashes    VENT SETTINGS       I&O's Detail    09 Mar 2024 06:01  -  10 Mar 2024 07:00  --------------------------------------------------------  IN:    IV PiggyBack: 100 mL  Total IN: 100 mL    OUT:    Voided (mL): 1500 mL  Total OUT: 1500 mL    Total NET: -1400 mL          LABS                        12.6   4.79  )-----------( 159      ( 10 Mar 2024 06:00 )             40.1     03-10    139  |  104  |  59<H>  ----------------------------<  85  3.7   |  19<L>  |  2.15<H>    Ca    8.6      10 Mar 2024 06:00  Phos  3.5     03-10  Mg     2.0     03-10    TPro  7.7  /  Alb  3.5  /  TBili  1.8<H>  /  DBili  x   /  AST  34  /  ALT  46<H>  /  AlkPhos  131<H>  03-10    LIVER FUNCTIONS - ( 10 Mar 2024 06:00 )  Alb: 3.5 g/dL / Pro: 7.7 g/dL / ALK PHOS: 131 U/L / ALT: 46 U/L / AST: 34 U/L / GGT: x               Urinalysis Basic - ( 10 Mar 2024 06:00 )    Color: x / Appearance: x / SG: x / pH: x  Gluc: 85 mg/dL / Ketone: x  / Bili: x / Urobili: x   Blood: x / Protein: x / Nitrite: x   Leuk Esterase: x / RBC: x / WBC x   Sq Epi: x / Non Sq Epi: x / Bacteria: x      POCT Blood Glucose.: 85 mg/dL (03-10-24 @ 08:03)  POCT Blood Glucose.: 104 mg/dL *H* (03-09-24 @ 21:33)  POCT Blood Glucose.: 107 mg/dL *H* (03-09-24 @ 17:11)  POCT Blood Glucose.: 94 mg/dL (03-09-24 @ 11:55)        MEDICATIONS  (STANDING):  allopurinol 100 milliGRAM(s) Oral daily  apixaban 5 milliGRAM(s) Oral two times a day  aspirin enteric coated 81 milliGRAM(s) Oral daily  atorvastatin 80 milliGRAM(s) Oral at bedtime  chlorhexidine 2% Cloths 1 Application(s) Topical <User Schedule>  dextrose 5%. 1000 milliLiter(s) (50 mL/Hr) IV Continuous <Continuous>  dextrose 5%. 1000 milliLiter(s) (100 mL/Hr) IV Continuous <Continuous>  dextrose 50% Injectable 25 Gram(s) IV Push once  dextrose 50% Injectable 12.5 Gram(s) IV Push once  dextrose 50% Injectable 25 Gram(s) IV Push once  digoxin     Tablet 125 MICROGram(s) Oral daily  furosemide   Injectable 40 milliGRAM(s) IV Push every 12 hours  glucagon  Injectable 1 milliGRAM(s) IntraMuscular once  insulin lispro (ADMELOG) corrective regimen sliding scale   SubCutaneous three times a day before meals  insulin lispro (ADMELOG) corrective regimen sliding scale   SubCutaneous at bedtime  isosorbide   dinitrate Tablet (ISORDIL) 5 milliGRAM(s) Oral three times a day  melatonin 6 milliGRAM(s) Oral at bedtime  metoprolol tartrate 50 milliGRAM(s) Oral every 6 hours  tamsulosin 0.4 milliGRAM(s) Oral at bedtime    MEDICATIONS  (PRN):  albuterol    90 MICROgram(s) HFA Inhaler 1 Puff(s) Inhalation every 4 hours PRN for bronchospasm  artificial  tears Solution 1 Drop(s) Both EYES three times a day PRN Dry Eyes  dextrose Oral Gel 15 Gram(s) Oral once PRN Blood Glucose LESS THAN 70 milliGRAM(s)/deciliter      Allergies:  No Known Allergies      A/P: 58 year old male with PMH of Afib on eliquis, HTN, CHF (rEF 20%), s/p AICD, HLD, s/p anxiety, and asthma admitted to ICU for management of  acute CHF exacerbation and Afib with RVR. Diuresis and NIVPP with improvement, however Afib remains refractory despite optimized meds. Plan to downgrade to [facility] floor today with possible transfer to [facility] for cardioversion.     # CV:    -CHF exacerbation s/p diureses and nocturnal NIVPP with improvement . C/W Lasix IV 40 Q 12 hrs with goal of net negative   - Afib with RVR- s/p amiodarone and now maintained on Digoxin 125 PO QD  (Dig level 0.9 on 3/9) Metoprolol 50  Q 6 hrs PO- remains poorly controlled rate  -c/w AC with Eliquis 5mg P bid  - c/w - Isosorbide dinitrate 5 Q 8 hrs  -TTE 3/6/24 demonstrating severe LV global hypokinesis reduced EF ~ 20%, , dialated  CMP,  mild pHTN, L and R atrial enlargement  - AICD interrogated with no events   - -Keep K~4 and Mg>2 for optimal arrhythmia suppression   - appreciate cardio recs- considering transfer to [facility] for cardioversion   - maintaining goal MAP of 65    #PULM:    - c/w nocturnal NIVPP- Bipap changed to CPAP PS 4 @ 40%   - Albuterol nebs Q 4-6 hrs prn SOB/ wheezing  - CXR on admission - Slightly more prominent parahilar interstitial markings since previous exam. Pacer as before. Cardiomegaly       # GI:    - DASH/carb consistent diet  - Protonix for PPI      # RENAL/:   -elevated Scr trending down, likely elevated 2/2 cardiorenal   -trend lytes/Scr daily with BMP  -c/w Lasix 40 mg IV Q 12 hrs with appropriate diuresis (~5L net negative in past 24 hrs)   - c/w Tamsulosin 0.4mg QHS  for BPH   -renal dose meds and avoid nephrotoxins        # ENDO:    - ISS Q AC and QHS   -Aggressive glycemic control to limit FS glucose to <200  -A1c 7.2%     # ID:    - no concern for active infx, afebrile, WBC 4.79  - RVP negative on admission     # HEME:    - c/w Eliquis 5mg PO bid for AC  - monitor CBC , H/H stable       # GOC:   -pt is A x )0 4, full code , able to make decisions for himself    Significant recent/past 24 hr events: pt unable to tolerate Bipap for the whle night, , felt pressure has too high . Still in Afib with RVR intermittently- -130s.   Patient should be on cpap 4, but settins on machine was on bipap    Subjective: 58 y.o male with pmh a fib on eliquis, HTN, CHF, HLD, AICD , anxiety, asthma presented to ED for c/o chest pain and palps found to be in rapid Afib.         PAST MEDICAL & SURGICAL HISTORY:  Asthma  Anxiety  Atrial fibrillation, unspecified type  Essential hypertension  Prediabetes  Prolonged QT interval  Cardiomyopathy, unspecified type  AICD (automatic cardioverter/defibrillator) present  Hyperlipidemia  CHF (congestive heart failure)  Afib  PATTIE (obstructive sleep apnea)  History of tonsillectomy  AICD (automatic cardioverter/defibrillator) present           Vitals   ICU Vital Signs Last 24 Hrs  T(C): 36.7 (10 Mar 2024 07:00), Max: 36.7 (09 Mar 2024 16:00)  T(F): 98.1 (10 Mar 2024 07:00), Max: 98.1 (09 Mar 2024 16:00)  HR: 126 (10 Mar 2024 07:00) (97 - 137)  BP: 113/81 (10 Mar 2024 07:00) (95/83 - 127/96)  BP(mean): 92 (10 Mar 2024 07:00) (76 - 108)  ABP: --  ABP(mean): --  RR: 20 (10 Mar 2024 06:00) (17 - 21)  SpO2: 94% (10 Mar 2024 07:00) (92% - 100%)    O2 Parameters below as of 10 Mar 2024 06:00  Patient On (Oxygen Delivery Method): room air            Physical Exam:   Constitutional: NAD, well-groomed, well-developed, appropriated affect   HEENT: normocephalic, atraumatic , conjunctiva normal B/L.  PERRLA, EOMI, no drainage or redness, no sclericterus, neck supple,  No JVD, no thyromegaly, trachea is midline, oval cavity  pink with moist mucosa  Respiratory: Breath Sounds equal & clear bilaterally to auscultation over all lung fields , no accessory muscle use noted, no wheezes, rales, rhonchi   Cardiovascular: Regular rate, regular rhythm, normal S1, S2; no rubs, clicks, gallops murmurs,  no JVD, no peripheral edema  Abdomen:  Soft, non-tender, + bowel sounds in all 4 quadrants, , no masses felt  Extremities:  1+  peripheral edema B/L LE , no cyanosis, no clubbing   Vascular: Equal and normal pulses: 2+ peripheral pulses throughout  Musculoskeletal: No joint swelling or deformity; no limitation of movement  Neurological: A+O x 3; speech clear and intact; no sensory, motor  deficits, normal reflexes  Psychiatric: calm, normal mood, normal affect  Skin: warm, dry, well perfused, no rashes    VENT SETTINGS       I&O's Detail    09 Mar 2024 06:01  -  10 Mar 2024 07:00  --------------------------------------------------------  IN:    IV PiggyBack: 100 mL  Total IN: 100 mL    OUT:    Voided (mL): 1500 mL  Total OUT: 1500 mL    Total NET: -1400 mL          LABS                        12.6   4.79  )-----------( 159      ( 10 Mar 2024 06:00 )             40.1     03-10    139  |  104  |  59<H>  ----------------------------<  85  3.7   |  19<L>  |  2.15<H>    Ca    8.6      10 Mar 2024 06:00  Phos  3.5     03-10  Mg     2.0     03-10    TPro  7.7  /  Alb  3.5  /  TBili  1.8<H>  /  DBili  x   /  AST  34  /  ALT  46<H>  /  AlkPhos  131<H>  03-10    LIVER FUNCTIONS - ( 10 Mar 2024 06:00 )  Alb: 3.5 g/dL / Pro: 7.7 g/dL / ALK PHOS: 131 U/L / ALT: 46 U/L / AST: 34 U/L / GGT: x               Urinalysis Basic - ( 10 Mar 2024 06:00 )    Color: x / Appearance: x / SG: x / pH: x  Gluc: 85 mg/dL / Ketone: x  / Bili: x / Urobili: x   Blood: x / Protein: x / Nitrite: x   Leuk Esterase: x / RBC: x / WBC x   Sq Epi: x / Non Sq Epi: x / Bacteria: x      POCT Blood Glucose.: 85 mg/dL (03-10-24 @ 08:03)  POCT Blood Glucose.: 104 mg/dL *H* (03-09-24 @ 21:33)  POCT Blood Glucose.: 107 mg/dL *H* (03-09-24 @ 17:11)  POCT Blood Glucose.: 94 mg/dL (03-09-24 @ 11:55)        MEDICATIONS  (STANDING):  allopurinol 100 milliGRAM(s) Oral daily  apixaban 5 milliGRAM(s) Oral two times a day  aspirin enteric coated 81 milliGRAM(s) Oral daily  atorvastatin 80 milliGRAM(s) Oral at bedtime  chlorhexidine 2% Cloths 1 Application(s) Topical <User Schedule>  dextrose 5%. 1000 milliLiter(s) (50 mL/Hr) IV Continuous <Continuous>  dextrose 5%. 1000 milliLiter(s) (100 mL/Hr) IV Continuous <Continuous>  dextrose 50% Injectable 25 Gram(s) IV Push once  dextrose 50% Injectable 12.5 Gram(s) IV Push once  dextrose 50% Injectable 25 Gram(s) IV Push once  digoxin     Tablet 125 MICROGram(s) Oral daily  furosemide   Injectable 40 milliGRAM(s) IV Push every 12 hours  glucagon  Injectable 1 milliGRAM(s) IntraMuscular once  insulin lispro (ADMELOG) corrective regimen sliding scale   SubCutaneous three times a day before meals  insulin lispro (ADMELOG) corrective regimen sliding scale   SubCutaneous at bedtime  isosorbide   dinitrate Tablet (ISORDIL) 5 milliGRAM(s) Oral three times a day  melatonin 6 milliGRAM(s) Oral at bedtime  metoprolol tartrate 50 milliGRAM(s) Oral every 6 hours  tamsulosin 0.4 milliGRAM(s) Oral at bedtime    MEDICATIONS  (PRN):  albuterol    90 MICROgram(s) HFA Inhaler 1 Puff(s) Inhalation every 4 hours PRN for bronchospasm  artificial  tears Solution 1 Drop(s) Both EYES three times a day PRN Dry Eyes  dextrose Oral Gel 15 Gram(s) Oral once PRN Blood Glucose LESS THAN 70 milliGRAM(s)/deciliter      Allergies:  No Known Allergies

## 2024-03-10 NOTE — PROGRESS NOTE ADULT - NS ATTEND AMEND GEN_ALL_CORE FT
desean seen and examined  Changed NIV settings to cpap ps 4  d/w Cardio plan for cardio version and transfer  BP ~ 100-110 , HR ranging from  depending of activity  can transfer to tele,

## 2024-03-10 NOTE — PROGRESS NOTE ADULT - SUBJECTIVE AND OBJECTIVE BOX
Follow up for  SUBJ:  no specific cardiac complaints.     PMH  Cardiomyopathy    Asthma    Anxiety    Atrial fibrillation, unspecified type    Acute combined systolic and diastolic congestive heart failure    Essential hypertension    Hyperlipidemia, unspecified hyperlipidemia type    Prediabetes    Prolonged QT interval    Cardiomyopathy, unspecified type    Chronic combined systolic and diastolic congestive heart failure    AICD (automatic cardioverter/defibrillator) present    Hypertension    Hyperlipidemia    CHF (congestive heart failure)    Afib    PATTIE (obstructive sleep apnea)        MEDICATIONS  (STANDING):  allopurinol 100 milliGRAM(s) Oral daily  apixaban 5 milliGRAM(s) Oral two times a day  aspirin enteric coated 81 milliGRAM(s) Oral daily  atorvastatin 80 milliGRAM(s) Oral at bedtime  carvedilol 12.5 milliGRAM(s) Oral every 12 hours  dextrose 5%. 1000 milliLiter(s) (50 mL/Hr) IV Continuous <Continuous>  dextrose 5%. 1000 milliLiter(s) (100 mL/Hr) IV Continuous <Continuous>  dextrose 50% Injectable 25 Gram(s) IV Push once  dextrose 50% Injectable 12.5 Gram(s) IV Push once  dextrose 50% Injectable 25 Gram(s) IV Push once  digoxin     Tablet 125 MICROGram(s) Oral daily  furosemide   Injectable 40 milliGRAM(s) IV Push every 12 hours  glucagon  Injectable 1 milliGRAM(s) IntraMuscular once  insulin lispro (ADMELOG) corrective regimen sliding scale   SubCutaneous three times a day before meals  insulin lispro (ADMELOG) corrective regimen sliding scale   SubCutaneous at bedtime  isosorbide   dinitrate Tablet (ISORDIL) 5 milliGRAM(s) Oral three times a day  melatonin 6 milliGRAM(s) Oral at bedtime  tamsulosin 0.4 milliGRAM(s) Oral at bedtime    MEDICATIONS  (PRN):  albuterol    90 MICROgram(s) HFA Inhaler 1 Puff(s) Inhalation every 4 hours PRN for bronchospasm  artificial  tears Solution 1 Drop(s) Both EYES three times a day PRN Dry Eyes  dextrose Oral Gel 15 Gram(s) Oral once PRN Blood Glucose LESS THAN 70 milliGRAM(s)/deciliter        PHYSICAL EXAM:  Vital Signs Last 24 Hrs  T(C): 36.7 (10 Mar 2024 18:00), Max: 36.8 (10 Mar 2024 11:00)  T(F): 98 (10 Mar 2024 18:00), Max: 98.2 (10 Mar 2024 11:00)  HR: 125 (10 Mar 2024 18:00) (97 - 130)  BP: 97/72 (10 Mar 2024 18:00) (97/72 - 113/81)  BP(mean): 92 (10 Mar 2024 11:00) (89 - 102)  RR: 17 (10 Mar 2024 18:00) (17 - 21)  SpO2: 96% (10 Mar 2024 18:00) (92% - 100%)    Parameters below as of 10 Mar 2024 18:00  Patient On (Oxygen Delivery Method): room air        GENERAL: NAD, well-groomed, well-developed  HEAD:  Atraumatic, Normocephalic  EYES: EOMI, PERRLA, conjunctiva and sclera clear  ENT: Moist mucous membranes,  NECK: Supple, No JVD, no bruits  CHEST/LUNG: Clear to percussion bilaterally; No rales, rhonchi, wheezing, or rubs  HEART: Regular rate and rhythm; No murmurs, rubs, or gallops PMI non displaced.  ABDOMEN: Soft, Nontender, Nondistended; Bowel sounds present  EXTREMITIES:  2+ Peripheral Pulses, No clubbing, cyanosis, or edema  SKIN: No rashes or lesions  NERVOUS SYSTEM:  Cranial Nerves II-XII intact      TELEMETRY:    ECG:  ECHO:    LABS:                        12.6   4.79  )-----------( 159      ( 10 Mar 2024 06:00 )             40.1     03-10    139  |  104  |  59<H>  ----------------------------<  85  3.7   |  19<L>  |  2.15<H>    Ca    8.6      10 Mar 2024 06:00  Phos  3.5     03-10  Mg     2.0     03-10    TPro  7.7  /  Alb  3.5  /  TBili  1.8<H>  /  DBili  x   /  AST  34  /  ALT  46<H>  /  AlkPhos  131<H>  03-10    CARDIAC MARKERS ( 09 Mar 2024 06:00 )  x     / x     / 183 U/L / x     / x        I&O's Summary    09 Mar 2024 06:01  -  10 Mar 2024 07:00  --------------------------------------------------------  IN: 100 mL / OUT: 1500 mL / NET: -1400 mL    10 Mar 2024 07:01  -  10 Mar 2024 20:10  --------------------------------------------------------  IN: 0 mL / OUT: 1300 mL / NET: -1300 mL      BNP    RADIOLOGY & ADDITIONAL STUDIES:    ECHO:    hb 12.6 ef .2 troponin 45/34    hfref s/p dual chamber ICD changed 7/8/21  gdmt as tolerated ACE ARB ARNI Farxiga spironolactone contraindicated renal failre carvedilol as tolerated    afib  it is unclear if patient is a candidate for cardioversion given severe leet atrial enlargement with jenny 74 previously recommended to increase carvedilol dosing as tolerated 6.125  bid (along with 12.5 bid dosing with paramters . a previous echo demonstrated a moderate dilated la with jenny 43 and ef .28 this represents interval worsening of left atrial and ventricular indices.     has followed with  dr gross. dayo cabrera will sign out to cardio team. will discuss with dr pérez

## 2024-03-10 NOTE — PROGRESS NOTE ADULT - SUBJECTIVE AND OBJECTIVE BOX
No distress    Vital Signs Last 24 Hrs  T(C): 36.7 (03-10-24 @ 18:00), Max: 36.8 (03-10-24 @ 11:00)  T(F): 98 (03-10-24 @ 18:00), Max: 98.2 (03-10-24 @ 11:00)  HR: 125 (03-10-24 @ 18:00) (97 - 130)  BP: 97/72 (03-10-24 @ 18:00) (97/72 - 113/81)  BP(mean): 92 (03-10-24 @ 11:00) (89 - 102)  RR: 17 (03-10-24 @ 18:00) (17 - 21)  SpO2: 96% (03-10-24 @ 18:00) (92% - 100%)    I&O's Detail    09 Mar 2024 06:01  -  10 Mar 2024 07:00  --------------------------------------------------------  OUT:    Voided (mL): 1500 mL  Total OUT: 1500 mL    10 Mar 2024 07:01  -  10 Mar 2024 20:40  --------------------------------------------------------  OUT:    Voided (mL): 1300 mL  Total OUT: 1300 mL    s1s2  b/l air entry  soft, ND  + edema                                                12.6   4.79  )-----------( 159      ( 10 Mar 2024 06:00 )             40.1     10 Mar 2024 06:00    139    |  104    |  59     ----------------------------<  85     3.7     |  19     |  2.15     Ca    8.6        10 Mar 2024 06:00  Phos  3.5       10 Mar 2024 06:00  Mg     2.0       10 Mar 2024 06:00    TPro  7.7    /  Alb  3.5    /  TBili  1.8    /  DBili  x      /  AST  34     /  ALT  46     /  AlkPhos  131    10 Mar 2024 06:00    LIVER FUNCTIONS - ( 10 Mar 2024 06:00 )  Alb: 3.5 g/dL / Pro: 7.7 g/dL / ALK PHOS: 131 U/L / ALT: 46 U/L / AST: 34 U/L / GGT: x           CARDIAC MARKERS ( 09 Mar 2024 06:00 )  x     / x     / 183 U/L / x     / x        albuterol    90 MICROgram(s) HFA Inhaler 1 Puff(s) Inhalation every 4 hours PRN  allopurinol 100 milliGRAM(s) Oral daily  apixaban 5 milliGRAM(s) Oral two times a day  artificial  tears Solution 1 Drop(s) Both EYES three times a day PRN  aspirin enteric coated 81 milliGRAM(s) Oral daily  atorvastatin 80 milliGRAM(s) Oral at bedtime  carvedilol 12.5 milliGRAM(s) Oral every 12 hours  dextrose 5%. 1000 milliLiter(s) IV Continuous <Continuous>  dextrose 5%. 1000 milliLiter(s) IV Continuous <Continuous>  dextrose 50% Injectable 25 Gram(s) IV Push once  dextrose 50% Injectable 12.5 Gram(s) IV Push once  dextrose 50% Injectable 25 Gram(s) IV Push once  dextrose Oral Gel 15 Gram(s) Oral once PRN  digoxin     Tablet 125 MICROGram(s) Oral daily  furosemide   Injectable 40 milliGRAM(s) IV Push every 12 hours  glucagon  Injectable 1 milliGRAM(s) IntraMuscular once  insulin lispro (ADMELOG) corrective regimen sliding scale   SubCutaneous three times a day before meals  insulin lispro (ADMELOG) corrective regimen sliding scale   SubCutaneous at bedtime  isosorbide   dinitrate Tablet (ISORDIL) 5 milliGRAM(s) Oral three times a day  melatonin 6 milliGRAM(s) Oral at bedtime  tamsulosin 0.4 milliGRAM(s) Oral at bedtime    A/P:    CM, EF 20 - 25%, mod MR, CHF, R Afib  Hemodynamic, cardio-renal MAYTE (Cr 1.04 - 10/9/23)  Cr is downtrending w/diuresis  HR control   Avoid ACE/ARB/Entresto  Renal SONO w/o hydro   Avoid nephrotoxins  F/u BMP, UO, SPEP, serologies   Will follow    [phone]       No distress    Vital Signs Last 24 Hrs  T(C): 36.7 (03-10-24 @ 18:00), Max: 36.8 (03-10-24 @ 11:00)  T(F): 98 (03-10-24 @ 18:00), Max: 98.2 (03-10-24 @ 11:00)  HR: 125 (03-10-24 @ 18:00) (97 - 130)  BP: 97/72 (03-10-24 @ 18:00) (97/72 - 113/81)  BP(mean): 92 (03-10-24 @ 11:00) (89 - 102)  RR: 17 (03-10-24 @ 18:00) (17 - 21)  SpO2: 96% (03-10-24 @ 18:00) (92% - 100%)    I&O's Detail    09 Mar 2024 06:01  -  10 Mar 2024 07:00  --------------------------------------------------------  OUT:    Voided (mL): 1500 mL  Total OUT: 1500 mL    10 Mar 2024 07:01  -  10 Mar 2024 20:40  --------------------------------------------------------  OUT:    Voided (mL): 1300 mL  Total OUT: 1300 mL    s1s2  b/l air entry  soft, ND  + edema                                                12.6   4.79  )-----------( 159      ( 10 Mar 2024 06:00 )             40.1     10 Mar 2024 06:00    139    |  104    |  59     ----------------------------<  85     3.7     |  19     |  2.15     Ca    8.6        10 Mar 2024 06:00  Phos  3.5       10 Mar 2024 06:00  Mg     2.0       10 Mar 2024 06:00    TPro  7.7    /  Alb  3.5    /  TBili  1.8    /  DBili  x      /  AST  34     /  ALT  46     /  AlkPhos  131    10 Mar 2024 06:00    LIVER FUNCTIONS - ( 10 Mar 2024 06:00 )  Alb: 3.5 g/dL / Pro: 7.7 g/dL / ALK PHOS: 131 U/L / ALT: 46 U/L / AST: 34 U/L / GGT: x           CARDIAC MARKERS ( 09 Mar 2024 06:00 )  x     / x     / 183 U/L / x     / x        albuterol    90 MICROgram(s) HFA Inhaler 1 Puff(s) Inhalation every 4 hours PRN  allopurinol 100 milliGRAM(s) Oral daily  apixaban 5 milliGRAM(s) Oral two times a day  artificial  tears Solution 1 Drop(s) Both EYES three times a day PRN  aspirin enteric coated 81 milliGRAM(s) Oral daily  atorvastatin 80 milliGRAM(s) Oral at bedtime  carvedilol 12.5 milliGRAM(s) Oral every 12 hours  dextrose 5%. 1000 milliLiter(s) IV Continuous <Continuous>  dextrose 5%. 1000 milliLiter(s) IV Continuous <Continuous>  dextrose 50% Injectable 25 Gram(s) IV Push once  dextrose 50% Injectable 12.5 Gram(s) IV Push once  dextrose 50% Injectable 25 Gram(s) IV Push once  dextrose Oral Gel 15 Gram(s) Oral once PRN  digoxin     Tablet 125 MICROGram(s) Oral daily  furosemide   Injectable 40 milliGRAM(s) IV Push every 12 hours  glucagon  Injectable 1 milliGRAM(s) IntraMuscular once  insulin lispro (ADMELOG) corrective regimen sliding scale   SubCutaneous three times a day before meals  insulin lispro (ADMELOG) corrective regimen sliding scale   SubCutaneous at bedtime  isosorbide   dinitrate Tablet (ISORDIL) 5 milliGRAM(s) Oral three times a day  melatonin 6 milliGRAM(s) Oral at bedtime  tamsulosin 0.4 milliGRAM(s) Oral at bedtime    A/P:    CM, EF 20 - 25%, mod MR, CHF, R Afib  Hemodynamic, cardio-renal MAYTE (Cr 1.04 - 10/9/23)  Cr is downtrending w/diuresis  HR control   Avoid ACE/ARB/Entresto  Renal SONO w/o hydro   Avoid nephrotoxins  F/u BMP, UO, SPEP  Serologies are negative  Will follow    [phone]

## 2024-03-10 NOTE — PROGRESS NOTE ADULT - ASSESSMENT
A/P: 58 year old male with PMH of Afib on eliquis, HTN, CHF (rEF 20%), s/p AICD, HLD, s/p anxiety, and asthma admitted to ICU for management of  acute CHF exacerbation and Afib with RVR. Diuresis and NIVPP with improvement, however Afib remains refractory despite optimized meds. Plan to downgrade to tele floor today with possible transfer to [facility] for cardioversion.     # CV:    -CHF exacerbation s/p diureses and nocturnal NIVPP with improvement . C/W Lasix IV 40 Q 12 hrs with goal of net negative   - Afib with RVR- s/p amiodarone drip and now maintained on Digoxin 125 PO QD  (Dig level 0.9 on 3/9) Metoprolol 50  Q 6 hrs PO- remains poorly controlled rate  -c/w AC with Eliquis 5mg P bid  - c/w - Isosorbide dinitrate 5 Q 8 hrs  -TTE 3/6/24 demonstrating severe LV global hypokinesis reduced EF ~ 20%, , dialated  CMP,  mild pHTN, L and R atrial enlargement  - AICD interrogated with no events   - -Keep K~4 and Mg>2 for optimal arrhythmia suppression   - appreciate cardio recs- considering transfer to [facility] for cardioversion ,   - maintaining goal MAP of 65    #PULM:    - c/w nocturnal NIVPP- Bipap changed to CPAP PS 4 @ 40%   - Albuterol nebs Q 4-6 hrs prn SOB/ wheezing  - CXR on admission - Slightly more prominent parahilar interstitial markings since previous exam. Pacer as before. Cardiomegaly       # GI:    - DASH/carb consistent diet  - Protonix for PPI      # RENAL/:   -elevated Scr trending down, likely elevated 2/2 cardiorenal   -trend lytes/Scr daily with BMP  -c/w Lasix 40 mg IV Q 12 hrs with appropriate diuresis (~5L net negative in past 24 hrs)   - c/w Tamsulosin 0.4mg QHS  for BPH   -renal dose meds and avoid nephrotoxins        # ENDO:    - ISS Q AC and QHS   -Aggressive glycemic control to limit FS glucose to <200  -A1c 7.2%     # ID:    - no concern for active infx, afebrile, WBC 4.79  - RVP negative on admission     # HEME:    - c/w Eliquis 5mg PO bid for AC  - monitor CBC , H/H stable       # GOC:   -pt is A x )0 4, full code , able to make decisions for himself

## 2024-03-11 LAB
ALBUMIN SERPL ELPH-MCNC: 3.2 G/DL — LOW (ref 3.3–5)
ALP SERPL-CCNC: 119 U/L — SIGNIFICANT CHANGE UP (ref 40–120)
ALT FLD-CCNC: 49 U/L — HIGH (ref 10–45)
ANION GAP SERPL CALC-SCNC: 14 MMOL/L — SIGNIFICANT CHANGE UP (ref 5–17)
AST SERPL-CCNC: 37 U/L — SIGNIFICANT CHANGE UP (ref 10–40)
BASOPHILS # BLD AUTO: 0.02 K/UL — SIGNIFICANT CHANGE UP (ref 0–0.2)
BASOPHILS NFR BLD AUTO: 0.5 % — SIGNIFICANT CHANGE UP (ref 0–2)
BILIRUB SERPL-MCNC: 1.9 MG/DL — HIGH (ref 0.2–1.2)
BUN SERPL-MCNC: 53 MG/DL — HIGH (ref 7–23)
CALCIUM SERPL-MCNC: 8.5 MG/DL — SIGNIFICANT CHANGE UP (ref 8.4–10.5)
CHLORIDE SERPL-SCNC: 103 MMOL/L — SIGNIFICANT CHANGE UP (ref 96–108)
CO2 SERPL-SCNC: 22 MMOL/L — SIGNIFICANT CHANGE UP (ref 22–31)
CREAT SERPL-MCNC: 1.78 MG/DL — HIGH (ref 0.5–1.3)
EGFR: 44 ML/MIN/1.73M2 — LOW
EOSINOPHIL # BLD AUTO: 0.34 K/UL — SIGNIFICANT CHANGE UP (ref 0–0.5)
EOSINOPHIL NFR BLD AUTO: 7.9 % — HIGH (ref 0–6)
GLUCOSE BLDC GLUCOMTR-MCNC: 103 MG/DL — HIGH (ref 70–99)
GLUCOSE BLDC GLUCOMTR-MCNC: 110 MG/DL — HIGH (ref 70–99)
GLUCOSE BLDC GLUCOMTR-MCNC: 82 MG/DL — SIGNIFICANT CHANGE UP (ref 70–99)
GLUCOSE BLDC GLUCOMTR-MCNC: 99 MG/DL — SIGNIFICANT CHANGE UP (ref 70–99)
GLUCOSE SERPL-MCNC: 86 MG/DL — SIGNIFICANT CHANGE UP (ref 70–99)
HCT VFR BLD CALC: 39.6 % — SIGNIFICANT CHANGE UP (ref 39–50)
HGB BLD-MCNC: 12.2 G/DL — LOW (ref 13–17)
IMM GRANULOCYTES NFR BLD AUTO: 0.2 % — SIGNIFICANT CHANGE UP (ref 0–0.9)
LYMPHOCYTES # BLD AUTO: 1.08 K/UL — SIGNIFICANT CHANGE UP (ref 1–3.3)
LYMPHOCYTES # BLD AUTO: 25 % — SIGNIFICANT CHANGE UP (ref 13–44)
MAGNESIUM SERPL-MCNC: 1.6 MG/DL — SIGNIFICANT CHANGE UP (ref 1.6–2.6)
MCHC RBC-ENTMCNC: 26 PG — LOW (ref 27–34)
MCHC RBC-ENTMCNC: 30.8 GM/DL — LOW (ref 32–36)
MCV RBC AUTO: 84.4 FL — SIGNIFICANT CHANGE UP (ref 80–100)
MONOCYTES # BLD AUTO: 0.53 K/UL — SIGNIFICANT CHANGE UP (ref 0–0.9)
MONOCYTES NFR BLD AUTO: 12.3 % — SIGNIFICANT CHANGE UP (ref 2–14)
NEUTROPHILS # BLD AUTO: 2.34 K/UL — SIGNIFICANT CHANGE UP (ref 1.8–7.4)
NEUTROPHILS NFR BLD AUTO: 54.1 % — SIGNIFICANT CHANGE UP (ref 43–77)
NRBC # BLD: 0 /100 WBCS — SIGNIFICANT CHANGE UP (ref 0–0)
PHOSPHATE SERPL-MCNC: 3.5 MG/DL — SIGNIFICANT CHANGE UP (ref 2.5–4.5)
PLATELET # BLD AUTO: 151 K/UL — SIGNIFICANT CHANGE UP (ref 150–400)
POTASSIUM SERPL-MCNC: 3.7 MMOL/L — SIGNIFICANT CHANGE UP (ref 3.5–5.3)
POTASSIUM SERPL-SCNC: 3.7 MMOL/L — SIGNIFICANT CHANGE UP (ref 3.5–5.3)
PROT SERPL-MCNC: 7.2 G/DL — SIGNIFICANT CHANGE UP (ref 6–8.3)
RBC # BLD: 4.69 M/UL — SIGNIFICANT CHANGE UP (ref 4.2–5.8)
RBC # FLD: 16.6 % — HIGH (ref 10.3–14.5)
SODIUM SERPL-SCNC: 139 MMOL/L — SIGNIFICANT CHANGE UP (ref 135–145)
WBC # BLD: 4.32 K/UL — SIGNIFICANT CHANGE UP (ref 3.8–10.5)
WBC # FLD AUTO: 4.32 K/UL — SIGNIFICANT CHANGE UP (ref 3.8–10.5)

## 2024-03-11 PROCEDURE — 99232 SBSQ HOSP IP/OBS MODERATE 35: CPT

## 2024-03-11 PROCEDURE — 99222 1ST HOSP IP/OBS MODERATE 55: CPT | Mod: GC

## 2024-03-11 RX ORDER — CARVEDILOL PHOSPHATE 80 MG/1
12.5 CAPSULE, EXTENDED RELEASE ORAL EVERY 12 HOURS
Refills: 0 | Status: DISCONTINUED | OUTPATIENT
Start: 2024-03-11 | End: 2024-03-12

## 2024-03-11 RX ORDER — DIGOXIN 250 MCG
125 TABLET ORAL ONCE
Refills: 0 | Status: COMPLETED | OUTPATIENT
Start: 2024-03-11 | End: 2024-03-11

## 2024-03-11 RX ORDER — ACETAMINOPHEN 500 MG
650 TABLET ORAL EVERY 6 HOURS
Refills: 0 | Status: DISCONTINUED | OUTPATIENT
Start: 2024-03-11 | End: 2024-03-21

## 2024-03-11 RX ORDER — ACETAMINOPHEN 500 MG
650 TABLET ORAL ONCE
Refills: 0 | Status: COMPLETED | OUTPATIENT
Start: 2024-03-11 | End: 2024-03-11

## 2024-03-11 RX ADMIN — Medication 125 MICROGRAM(S): at 14:48

## 2024-03-11 RX ADMIN — APIXABAN 5 MILLIGRAM(S): 2.5 TABLET, FILM COATED ORAL at 17:33

## 2024-03-11 RX ADMIN — Medication 40 MILLIGRAM(S): at 17:33

## 2024-03-11 RX ADMIN — Medication 81 MILLIGRAM(S): at 11:45

## 2024-03-11 RX ADMIN — Medication 650 MILLIGRAM(S): at 12:23

## 2024-03-11 RX ADMIN — CARVEDILOL PHOSPHATE 12.5 MILLIGRAM(S): 80 CAPSULE, EXTENDED RELEASE ORAL at 19:04

## 2024-03-11 RX ADMIN — Medication 6 MILLIGRAM(S): at 21:19

## 2024-03-11 RX ADMIN — CARVEDILOL PHOSPHATE 12.5 MILLIGRAM(S): 80 CAPSULE, EXTENDED RELEASE ORAL at 05:20

## 2024-03-11 RX ADMIN — Medication 650 MILLIGRAM(S): at 13:00

## 2024-03-11 RX ADMIN — Medication 100 MILLIGRAM(S): at 11:45

## 2024-03-11 RX ADMIN — TAMSULOSIN HYDROCHLORIDE 0.4 MILLIGRAM(S): 0.4 CAPSULE ORAL at 21:20

## 2024-03-11 RX ADMIN — ISOSORBIDE DINITRATE 5 MILLIGRAM(S): 5 TABLET ORAL at 05:20

## 2024-03-11 RX ADMIN — Medication 125 MICROGRAM(S): at 05:21

## 2024-03-11 RX ADMIN — APIXABAN 5 MILLIGRAM(S): 2.5 TABLET, FILM COATED ORAL at 05:21

## 2024-03-11 RX ADMIN — Medication 40 MILLIGRAM(S): at 05:21

## 2024-03-11 RX ADMIN — ATORVASTATIN CALCIUM 80 MILLIGRAM(S): 80 TABLET, FILM COATED ORAL at 21:20

## 2024-03-11 RX ADMIN — Medication 1 APPLICATION(S): at 11:46

## 2024-03-11 NOTE — PROVIDER CONTACT NOTE (EICU) - SITUATION
Provider Location: [facility]  Patient Location:  [facility] Repair Type: Referred to plastics for closure

## 2024-03-11 NOTE — PROGRESS NOTE ADULT - SUBJECTIVE AND OBJECTIVE BOX
No distress    Vital Signs Last 24 Hrs  T(C): 36.1 (03-11-24 @ 05:06), Max: 36.6 (03-10-24 @ 21:00)  T(F): 97 (03-11-24 @ 05:06), Max: 97.8 (03-10-24 @ 21:00)  HR: 118 (03-11-24 @ 17:28) (101 - 138)  BP: 109/86 (03-11-24 @ 17:28) (91/58 - 139/79)  RR: 17 (03-11-24 @ 05:06) (17 - 17)  SpO2: 94% (03-11-24 @ 05:06) (94% - 96%)    I&O's Detail    10 Mar 2024 07:01  -  11 Mar 2024 07:00  --------------------------------------------------------  OUT:    Voided (mL): 1300 mL  Total OUT: 1300 mL    11 Mar 2024 07:01  -  11 Mar 2024 20:12  --------------------------------------------------------  OUT:    Voided (mL): 650 mL  Total OUT: 650 mL    s1s2  b/l air entry  soft, ND  + edema                                                         12.2   4.32  )-----------( 151      ( 11 Mar 2024 06:55 )             39.6     11 Mar 2024 06:55    139    |  103    |  53     ----------------------------<  86     3.7     |  22     |  1.78     Ca    8.5        11 Mar 2024 06:55  Phos  3.5       11 Mar 2024 06:55  Mg     1.6       11 Mar 2024 06:55    TPro  7.2    /  Alb  3.2    /  TBili  1.9    /  DBili  x      /  AST  37     /  ALT  49     /  AlkPhos  119    11 Mar 2024 06:55    LIVER FUNCTIONS - ( 11 Mar 2024 06:55 )  Alb: 3.2 g/dL / Pro: 7.2 g/dL / ALK PHOS: 119 U/L / ALT: 49 U/L / AST: 37 U/L / GGT: x           acetaminophen     Tablet .. 650 milliGRAM(s) Oral every 6 hours PRN  albuterol    90 MICROgram(s) HFA Inhaler 1 Puff(s) Inhalation every 4 hours PRN  allopurinol 100 milliGRAM(s) Oral daily  apixaban 5 milliGRAM(s) Oral two times a day  AQUAPHOR (petrolatum Ointment) 1 Application(s) Topical daily  artificial  tears Solution 1 Drop(s) Both EYES three times a day PRN  aspirin enteric coated 81 milliGRAM(s) Oral daily  atorvastatin 80 milliGRAM(s) Oral at bedtime  carvedilol 12.5 milliGRAM(s) Oral every 12 hours  dextrose 5%. 1000 milliLiter(s) IV Continuous <Continuous>  dextrose 5%. 1000 milliLiter(s) IV Continuous <Continuous>  dextrose 50% Injectable 12.5 Gram(s) IV Push once  dextrose 50% Injectable 25 Gram(s) IV Push once  dextrose 50% Injectable 25 Gram(s) IV Push once  dextrose Oral Gel 15 Gram(s) Oral once PRN  digoxin     Tablet 125 MICROGram(s) Oral daily  furosemide   Injectable 40 milliGRAM(s) IV Push every 12 hours  glucagon  Injectable 1 milliGRAM(s) IntraMuscular once  insulin lispro (ADMELOG) corrective regimen sliding scale   SubCutaneous at bedtime  insulin lispro (ADMELOG) corrective regimen sliding scale   SubCutaneous three times a day before meals  isosorbide   dinitrate Tablet (ISORDIL) 5 milliGRAM(s) Oral three times a day  melatonin 6 milliGRAM(s) Oral at bedtime  tamsulosin 0.4 milliGRAM(s) Oral at bedtime    A/P:    CM, EF 20 - 25%, mod MR, CHF, R Afib  Hemodynamic, cardio-renal MAYTE (Cr 1.04 - 10/9/23)  Volume overload, diuresis   Cr is improving   HR control   Avoid ACE/ARB/Entresto  Renal SONO w/o hydro   Avoid nephrotoxins  F/u BMP, UO, SPEP  Serologies are negative  Will follow    [phone]

## 2024-03-11 NOTE — PROGRESS NOTE ADULT - SUBJECTIVE AND OBJECTIVE BOX
Patient is a 58y old  Male PMHx CHF, Afib, h/o Vtach s/p AICD, PATTIE, HLD, HTN, Anxiety and Asthma  who presents with a chief complaint of a fib with RVR (11 Mar 2024 14:04)      Subjective: Patient was seen and examined this morning at bedside. Pt reports feeling better, less SOB.   Overnight events: Pt received notification about his AICD being low battery but it was an alert for unsuccessful carelink transmission (too far from remote transmission)    REVIEW OF SYSTEMS:  CONSTITUTIONAL: No weakness, fevers or chills  EYES/ENT: No visual changes;  No vertigo or throat pain   NECK: No pain or stiffness  RESPIRATORY: No cough, wheezing, hemoptysis; No shortness of breath  CARDIOVASCULAR: No chest pain or palpitations  GASTROINTESTINAL: No abdominal or epigastric pain. No nausea, vomiting; No diarrhea or constipation.   GENITOURINARY: No dysuria, frequency or hematuria  NEUROLOGICAL: No numbness or weakness  SKIN: No itching, burning, rashes, or lesions       Vital Signs Last 24 Hrs  T(C): 36.1 (11 Mar 2024 05:06), Max: 36.7 (10 Mar 2024 18:00)  T(F): 97 (11 Mar 2024 05:06), Max: 98 (10 Mar 2024 18:00)  HR: 119 (11 Mar 2024 11:45) (101 - 138)  BP: 91/58 (11 Mar 2024 11:45) (91/58 - 139/79)  BP(mean): --  RR: 17 (11 Mar 2024 05:06) (17 - 17)  SpO2: 94% (11 Mar 2024 05:06) (94% - 96%)    Parameters below as of 11 Mar 2024 05:06  Patient On (Oxygen Delivery Method): room air        PHYSICAL EXAM  Constitutional: Pt sitting up in bed, awake and alert, NAD  HEENT: EOMI, normocephalic, moist mucous membranes  Respiratory: CTABL, No wheezing, rales or rhonchi  Cardiovascular: S1S2+, irregular, tachycardic, no M/G/R  Gastrointestinal: BS+, soft, obese abdomen, nontender, nondistended, no guarding, no rebound  Extremities: +Pitting edema on b/l lower ext, No calf pain   Vascular: Peripheral pulses present  Neurological: AAOx3, no focal deficits  Musculoskeletal: Normal muscle tone, no atrophy, no rigidity, no contractions  Skin: No significant new skin lesions or rashes    MEDICATIONS:  MEDICATIONS  (STANDING):  allopurinol 100 milliGRAM(s) Oral daily  apixaban 5 milliGRAM(s) Oral two times a day  AQUAPHOR (petrolatum Ointment) 1 Application(s) Topical daily  aspirin enteric coated 81 milliGRAM(s) Oral daily  atorvastatin 80 milliGRAM(s) Oral at bedtime  carvedilol 12.5 milliGRAM(s) Oral every 12 hours  dextrose 5%. 1000 milliLiter(s) (100 mL/Hr) IV Continuous <Continuous>  dextrose 5%. 1000 milliLiter(s) (50 mL/Hr) IV Continuous <Continuous>  dextrose 50% Injectable 25 Gram(s) IV Push once  dextrose 50% Injectable 12.5 Gram(s) IV Push once  dextrose 50% Injectable 25 Gram(s) IV Push once  digoxin     Tablet 125 MICROGram(s) Oral daily  furosemide   Injectable 40 milliGRAM(s) IV Push every 12 hours  glucagon  Injectable 1 milliGRAM(s) IntraMuscular once  insulin lispro (ADMELOG) corrective regimen sliding scale   SubCutaneous three times a day before meals  insulin lispro (ADMELOG) corrective regimen sliding scale   SubCutaneous at bedtime  isosorbide   dinitrate Tablet (ISORDIL) 5 milliGRAM(s) Oral three times a day  melatonin 6 milliGRAM(s) Oral at bedtime  tamsulosin 0.4 milliGRAM(s) Oral at bedtime    MEDICATIONS  (PRN):  acetaminophen     Tablet .. 650 milliGRAM(s) Oral every 6 hours PRN Temp greater or equal to 38C (100.4F), Mild Pain (1 - 3)  albuterol    90 MICROgram(s) HFA Inhaler 1 Puff(s) Inhalation every 4 hours PRN for bronchospasm  artificial  tears Solution 1 Drop(s) Both EYES three times a day PRN Dry Eyes  dextrose Oral Gel 15 Gram(s) Oral once PRN Blood Glucose LESS THAN 70 milliGRAM(s)/deciliter      LABS: All Labs Reviewed:                        12.2   4.32  )-----------( 151      ( 11 Mar 2024 06:55 )             39.6     03-11    139  |  103  |  53<H>  ----------------------------<  86  3.7   |  22  |  1.78<H>    Ca    8.5      11 Mar 2024 06:55  Phos  3.5     03-11  Mg     1.6     03-11    TPro  7.2  /  Alb  3.2<L>  /  TBili  1.9<H>  /  DBili  x   /  AST  37  /  ALT  49<H>  /  AlkPhos  119  03-11          Blood Culture:   I&O's Summary    10 Mar 2024 07:01  -  11 Mar 2024 07:00  --------------------------------------------------------  IN: 0 mL / OUT: 1300 mL / NET: -1300 mL      CAPILLARY BLOOD GLUCOSE      POCT Blood Glucose.: 99 mg/dL (11 Mar 2024 11:44)  POCT Blood Glucose.: 82 mg/dL (11 Mar 2024 08:15)  POCT Blood Glucose.: 116 mg/dL (10 Mar 2024 21:11)  POCT Blood Glucose.: 98 mg/dL (10 Mar 2024 17:38)        RADIOLOGY/EKG:    Xray Chest 1 View- PORTABLE-Urgent (03.05.24 @ 12:14)   IMPRESSION: Slightly more prominent parahilar interstitial markings since   previous exam. Pacer as before. Cardiomegaly. I would favor congestive   changes. Regional osseous structures appropriate for age.      NM Pulmonary Ventilation/Perfusion Scan (03.05.24 @ 14:59)   IMPRESSION: Very low probability of pulmonary embolus.      US Duplex Venous Lower Ext Complete, Bilateral (03.06.24 @ 17:32)   IMPRESSION:  No evidence of deep venous thrombosis in either lower extremity.      US Renal (03.07.24 @ 19:58)   IMPRESSION:  No renal mass, hydronephrosis or calculus is visualized sonographically.  A small right pleural effusion is partially visualized.

## 2024-03-11 NOTE — CONSULT NOTE ADULT - CONSULT REQUESTED DATE/TIME
06-Mar-2024 13:17
I reviewed the H&P, I examined the patient, and there are no changes in the patient's condition.    
06-Mar-2024 09:49
11-Mar-2024 18:40
08-Mar-2024 11:56

## 2024-03-11 NOTE — CONSULT NOTE ADULT - ASSESSMENT
58 y.o male with pmh a fib on eliquis, HTN, CHF, HLD, AICD , anxiety, asthma presented to ED for c/o chest pain and palps admitted for a fib with rvr, Was trated in ICU and downgraded to tele yesterday., now with HR in 130s      ##rapid a fib  ##heart failure     PLAN:  Upon evaluation of patient  is calm, cooperative awake, alert with no complaints of HA, dizziness, SOB, Chest pain, or plaps.  He is eating dinner and talking with family.  Manual blood pressure 108/72 taken by myself, HR 110s-130s.  Pt recieved extra dose on 125mcg digoxin today  Coreg 12.5mg was held due to SBP < 110  I advised to cive 12.5mg corwg now and reassess in 1-2 hours  I advised the aptient o call for any chets pain SOB, lightheadedness, dizziness ect  If HYR slows down, BP will likely improve as ventricular filling time will increase  Hold parameters changed to hold if SBP < 100  I discussed with Dr Hawley (ICU attending) who agrees with plan of care  Nick does not require ICU level of care at this time

## 2024-03-11 NOTE — PROGRESS NOTE ADULT - ATTENDING COMMENTS
Patient is a 58 y old male with PMH of sleep apnea, afib on eliquis, HFrEF, on GDMT, s/p AICD placement, morbid obesity, asthma, initially admitted with acute hypoxic respiratory failure and afib with RVR. Treated with supplemental oxygen and lasix. tried cardizam drip, became hypotensive , was started on amio drip.    At the time of evaluation patient denied any discomfort, stated breathing much improved from admission. Stated he is compliant with his medications and diet. uses CPAP at night, checks weight every alternate day, did not notice any significant increase.  discussed with cardiology at bedside.    T(C): 36.1 (03-11-24 @ 05:06), Max: 36.8 (03-10-24 @ 11:00)  T(F): 97 (03-11-24 @ 05:06), Max: 98.2 (03-10-24 @ 11:00)  HR: 138 (03-11-24 @ 09:07) (98 - 138)  BP: 139/79 (03-11-24 @ 05:06) (97/72 - 139/79)  ABP: --  ABP(mean): --  RR: 17 (03-11-24 @ 05:06) (17 - 17)  SpO2: 94% (03-11-24 @ 05:06) (94% - 98%)      on exam aaox3, no apparant distress, able to talk in full sentences on room air, morbidly obese, both lungs clear, rhythm irregular, abdomen- soft, b/l pedal edema 2+,, skin xerosis noted.    labs reviewed                        12.2   4.32  )-----------( 151      ( 11 Mar 2024 06:55 )             39.6   03-11    139  |  103  |  53<H>  ----------------------------<  86  3.7   |  22  |  1.78<H>    Ca    8.5      11 Mar 2024 06:55  Phos  3.5     03-11  Mg     1.6     03-11    TPro  7.2  /  Alb  3.2<L>  /  TBili  1.9<H>  /  DBili  x   /  AST  37  /  ALT  49<H>  /  AlkPhos  119  03-11    dig- 0.9  v/q scan- low prob PE  cxr on admission reviewed by me showed congestive changes.  ekg reviewed by me showed afib with rvr  echo showed LVEF 20-25%    a/p:  # Afib with rvr  # Acute hypoxemic respiratory failure - now resolved  # Acute excerbation of HFrEF- improved  # cardio- renal syndrome vs ckd  # mild transaminitis- secondary to congestive changes. continue to monitor.  - lasix, monitor pro-bnp, daily weights, urine output. will hold entresto. on metorpolol for rate control still with high rate, will go up on metorpolol dose if bp tolerates or add amiodarone oral. continue dig. CPAP at night. monitor renal function, avoid nephrotoxic agents.s/p albumin infusion in MICU.patient with no significant improvement in EF inspite of being on GDMT, will consider palliative care consult.  - PT eval  - rest as per resident note.    disch dispo- medically active 48-72 hrs.     I spent a total of 60 minutes on the date of this encounter coordinating the patient's care. This includes reviewing results/imaging and discussions with specialists, nursing, case management/social work. Further tests, medications, and procedures have been ordered as indicated. Results and the plan of care were communicated to the patient and/or their family member. Supporting documentation was completed and added to the patient's chart. Patient is a 58 y old male with PMH of sleep apnea, afib on eliquis, HFrEF, on GDMT, s/p AICD placement, morbid obesity, asthma, initially admitted with acute hypoxic respiratory failure and afib with RVR. Treated with supplemental oxygen and lasix. tried cardizam drip, became hypotensive , was started on amio drip.    At the time of evaluation patient denied any discomfort, stated breathing much improved from admission. Stated he is compliant with his medications and diet. uses CPAP at night, checks weight every alternate day, did not notice any significant increase.  discussed with cardiology at bedside.    T(C): 36.1 (03-11-24 @ 05:06), Max: 36.8 (03-10-24 @ 11:00)  T(F): 97 (03-11-24 @ 05:06), Max: 98.2 (03-10-24 @ 11:00)  HR: 138 (03-11-24 @ 09:07) (98 - 138)  BP: 139/79 (03-11-24 @ 05:06) (97/72 - 139/79)  ABP: --  ABP(mean): --  RR: 17 (03-11-24 @ 05:06) (17 - 17)  SpO2: 94% (03-11-24 @ 05:06) (94% - 98%)      on exam aaox3, no apparent distress, able to talk in full sentences on room air, morbidly obese, both lungs clear, rhythm irregular, abdomen- soft, b/l pedal edema 2+,, skin xerosis noted.    labs reviewed                        12.2   4.32  )-----------( 151      ( 11 Mar 2024 06:55 )             39.6   03-11    139  |  103  |  53<H>  ----------------------------<  86  3.7   |  22  |  1.78<H>    Ca    8.5      11 Mar 2024 06:55  Phos  3.5     03-11  Mg     1.6     03-11    TPro  7.2  /  Alb  3.2<L>  /  TBili  1.9<H>  /  DBili  x   /  AST  37  /  ALT  49<H>  /  AlkPhos  119  03-11    dig- 0.9  v/q scan- low prob PE  cxr on admission reviewed by me showed congestive changes.  ekg reviewed by me showed afib with rvr  echo showed LVEF 20-25%    a/p:  # Afib with rvr  # Acute hypoxemic respiratory failure - now resolved  # Acute excerbation of HFrEF- improved  # cardio- renal syndrome vs ckd  # mild transaminitis- secondary to congestive changes. continue to monitor.  - lasix, monitor pro-bnp, daily weights, urine output. will hold entresto. on metorpolol for rate control still with high rate, will go up on metorpolol dose if bp tolerates or add amiodarone oral. continue dig. continue apixaban, monitor for any bleeding. CPAP at night. monitor renal function, avoid nephrotoxic agents.s/p albumin infusion in MICU.patient with no significant improvement in EF inspite of being on GDMT, will consider palliative care consult.  - PT eval  - rest as per resident note.    disch dispo- medically active 48-72 hrs.     I spent a total of 60 minutes on the date of this encounter coordinating the patient's care. This includes reviewing results/imaging and discussions with specialists, nursing, case management/social work. Further tests, medications, and procedures have been ordered as indicated. Results and the plan of care were communicated to the patient and/or their family member. Supporting documentation was completed and added to the patient's chart. Patient is a 58 y old male with PMH of sleep apnea, afib on eliquis, HFrEF, on GDMT, s/p AICD placement, morbid obesity, asthma, initially admitted with acute hypoxic respiratory failure and afib with RVR. Treated with supplemental oxygen and lasix. tried cardizam drip, became hypotensive , was started on amio drip.    At the time of evaluation patient denied any discomfort, stated breathing much improved from admission. Stated he is compliant with his medications and diet. uses CPAP at night, checks weight every alternate day, did not notice any significant increase.  discussed with cardiology at bedside.    T(C): 36.1 (03-11-24 @ 05:06), Max: 36.8 (03-10-24 @ 11:00)  T(F): 97 (03-11-24 @ 05:06), Max: 98.2 (03-10-24 @ 11:00)  HR: 138 (03-11-24 @ 09:07) (98 - 138)  BP: 139/79 (03-11-24 @ 05:06) (97/72 - 139/79)  ABP: --  ABP(mean): --  RR: 17 (03-11-24 @ 05:06) (17 - 17)  SpO2: 94% (03-11-24 @ 05:06) (94% - 98%)      on exam aaox3, no apparent distress, able to talk in full sentences on room air, morbidly obese, both lungs clear, rhythm irregular, abdomen- soft, b/l pedal edema 2+,, skin xerosis noted.    labs reviewed                        12.2   4.32  )-----------( 151      ( 11 Mar 2024 06:55 )             39.6   03-11    139  |  103  |  53<H>  ----------------------------<  86  3.7   |  22  |  1.78<H>    Ca    8.5      11 Mar 2024 06:55  Phos  3.5     03-11  Mg     1.6     03-11    TPro  7.2  /  Alb  3.2<L>  /  TBili  1.9<H>  /  DBili  x   /  AST  37  /  ALT  49<H>  /  AlkPhos  119  03-11    dig- 0.9  v/q scan- low prob PE  cxr on admission reviewed by me showed congestive changes.  ekg reviewed by me showed afib with rvr  echo showed LVEF 20-25%    a/p:  # Afib with rvr  # Acute hypoxemic respiratory failure - now resolved  # Acute excerbation of HFrEF- improved  # cardio- renal syndrome vs ckd  # mild transaminitis- secondary to congestive changes. continue to monitor.  - lasix, monitor pro-bnp, daily weights, urine output. will hold entresto. on metorpolol for rate control still with high rate, will go up on metorpolol dose if bp tolerates or add amiodarone oral. continue dig. continue apixaban, monitor for any bleeding. CPAP at night. monitor renal function, avoid nephrotoxic agents.s/p albumin infusion in MICU.patient with no significant improvement in EF inspite of being on GDMT.  - PT eval  - rest as per resident note.    disch dispo- medically active 48-72 hrs.     I spent a total of 60 minutes on the date of this encounter coordinating the patient's care. This includes reviewing results/imaging and discussions with specialists, nursing, case management/social work. Further tests, medications, and procedures have been ordered as indicated. Results and the plan of care were communicated to the patient and/or their family member. Supporting documentation was completed and added to the patient's chart. Patient is a 58 y old male with PMH of sleep apnea, afib on eliquis, HFrEF, on GDMT, s/p AICD placement, morbid obesity, asthma, initially admitted with acute hypoxic respiratory failure and afib with RVR. Treated with supplemental oxygen and lasix. tried cardizam drip, became hypotensive , was started on amio drip.    At the time of evaluation patient denied any discomfort, stated breathing much improved from admission. Stated he is compliant with his medications and diet. uses CPAP at night, checks weight every alternate day, did not notice any significant increase.  discussed with cardiology at bedside.    T(C): 36.1 (03-11-24 @ 05:06), Max: 36.8 (03-10-24 @ 11:00)  T(F): 97 (03-11-24 @ 05:06), Max: 98.2 (03-10-24 @ 11:00)  HR: 138 (03-11-24 @ 09:07) (98 - 138)  BP: 139/79 (03-11-24 @ 05:06) (97/72 - 139/79)  ABP: --  ABP(mean): --  RR: 17 (03-11-24 @ 05:06) (17 - 17)  SpO2: 94% (03-11-24 @ 05:06) (94% - 98%)      on exam aaox3, no apparent distress, able to talk in full sentences on room air, morbidly obese, both lungs clear, rhythm irregular, abdomen- soft, b/l pedal edema 2+,, skin xerosis noted.    labs reviewed                        12.2   4.32  )-----------( 151      ( 11 Mar 2024 06:55 )             39.6   03-11    139  |  103  |  53<H>  ----------------------------<  86  3.7   |  22  |  1.78<H>    Ca    8.5      11 Mar 2024 06:55  Phos  3.5     03-11  Mg     1.6     03-11    TPro  7.2  /  Alb  3.2<L>  /  TBili  1.9<H>  /  DBili  x   /  AST  37  /  ALT  49<H>  /  AlkPhos  119  03-11    dig- 0.9  v/q scan- low prob PE  cxr on admission reviewed by me showed congestive changes.  ekg reviewed by me showed afib with rvr  echo showed LVEF 20-25%    a/p:  # Afib with rvr  # Acute hypoxemic respiratory failure - now resolved  # Acute excerbation of HFrEF- improved  # cardio- renal syndrome vs ckd  # mild transaminitis- secondary to congestive changes. continue to monitor.  - lasix, monitor pro-bnp, daily weights, urine output. not a candidate for enteresto/ spironolactone/ jardiance due to renal function. on metorpolol for rate control still with high rate, will go up on metorpolol dose if bp tolerates or add amiodarone oral. continue dig. continue apixaban, monitor for any bleeding. CPAP at night. monitor renal function, avoid nephrotoxic agents.s/p albumin infusion in MICU.patient with no significant improvement in EF inspite of being on GDMT.  - PT eval  - rest as per resident note.    disch dispo- medically active 48-72 hrs.     I spent a total of 60 minutes on the date of this encounter coordinating the patient's care. This includes reviewing results/imaging and discussions with specialists, nursing, case management/social work. Further tests, medications, and procedures have been ordered as indicated. Results and the plan of care were communicated to the patient and/or their family member. Supporting documentation was completed and added to the patient's chart.

## 2024-03-11 NOTE — PROGRESS NOTE ADULT - NS ATTEND AMEND GEN_ALL_CORE FT
I have seen and examined the patient. I have discussed case with AMAN Weems.     Allen York is a 58 year old man with past medical history of Non ischemic cardiomyopathy (LVEF 34% in 2018), history of Ventricular tachycardia, s/p AICD, Atrial fibrillation (on Eliquis), Hypertension, Type II Diabetes mellitus, Obstructive sleep apnea and Morbid obesity who was brought in by EMS due to respiratory distress, found to have atrial fibrillation with rapid ventricular response and acute on chronic systolic heart failure exacerbation and acute renal injury.     Echo consistent with     ECG on admission consistent with atrial fibrillation with RVR, PVCs, left axis deviation. Troponins are not elevated, does not appear to be an acute coronary syndrome. Pro BNP elevated, CXR suggestive of congestion. D-dimer elevated, VQ scan with very low probability of pulmonary embolism. Overall, is admitted with acute on chronic systolic heart failure and renal function is improving with IV diuresis, would continue today. In regards to GDMT; plan to resume Entresto when renal function stabilizes, recommend Hydralazine/Isosorbide dinitrate in meantime. Continue Coreg. In regards to atrial fibrillation, continue beta blocker and renally dosed digoxin, no need for Amiodarone at this time.  Recommend Medtronic ICD interrogation. I have seen and examined the patient. I have discussed case with AMAN Weems.     Allen York is a 58 year old man with past medical history of Non ischemic cardiomyopathy (LVEF 34% in 2018), history of Ventricular tachycardia, s/p AICD, Atrial fibrillation (on Eliquis), Hypertension, Type II Diabetes mellitus, Obstructive sleep apnea and Morbid obesity who was brought in by EMS due to respiratory distress, found to have atrial fibrillation with rapid ventricular response and acute on chronic systolic heart failure exacerbation and acute renal injury.     ECG on admission consistent with atrial fibrillation with RVR, PVCs, left axis deviation. Troponins are not elevated, does not appear to be an acute coronary syndrome. Pro BNP elevated, CXR suggestive of congestion. D-dimer elevated, VQ scan with very low probability of pulmonary embolism. Echo consistent with LVEF 20-25%, dilated LV cavity, severe left atrial enlargement, moderate valvular disease and mild pulmonary hypertension.     Overall, is admitted with acute on chronic systolic heart failure and renal function is improving with IV diuresis, would continue today. In regards to GDMT; plan to resume Entresto when renal function stabilizes, recommend Hydralazine/Isosorbide dinitrate in meantime. Continue Coreg. Not candidate for SGLT2i or MRA due to MAYTE. In regards to atrial fibrillation, continue beta blocker and renally dosed digoxin, no need for Amiodarone at this time, likely plan for outpatient EP evaluation when euvolemic and if compliant with medications. Medtronic ICD re- interrogated and functioning well with 10 year battery life and no recent events.     Discussed with primary team. We will continue to follow along.    Yoni Chester MD  Cardiology

## 2024-03-11 NOTE — PROGRESS NOTE ADULT - SUBJECTIVE AND OBJECTIVE BOX
Follow-up Pulmonary Progress Note  Chief Complaint : Atrial fibrillation      pateint seen and examined  no cp, sob palp  HR ~ 120s  no cp, sob, palp, on PE     Allergies :No Known Allergies      PAST MEDICAL & SURGICAL HISTORY:  Asthma    Anxiety    Atrial fibrillation, unspecified type    Essential hypertension    Prediabetes    Prolonged QT interval    Cardiomyopathy, unspecified type    AICD (automatic cardioverter/defibrillator) present    Hyperlipidemia    CHF (congestive heart failure)    Afib    PATTIE (obstructive sleep apnea)    History of tonsillectomy    AICD (automatic cardioverter/defibrillator) present        Medications:  MEDICATIONS  (STANDING):  allopurinol 100 milliGRAM(s) Oral daily  apixaban 5 milliGRAM(s) Oral two times a day  AQUAPHOR (petrolatum Ointment) 1 Application(s) Topical daily  aspirin enteric coated 81 milliGRAM(s) Oral daily  atorvastatin 80 milliGRAM(s) Oral at bedtime  carvedilol 12.5 milliGRAM(s) Oral every 12 hours  dextrose 5%. 1000 milliLiter(s) (50 mL/Hr) IV Continuous <Continuous>  dextrose 5%. 1000 milliLiter(s) (100 mL/Hr) IV Continuous <Continuous>  dextrose 50% Injectable 12.5 Gram(s) IV Push once  dextrose 50% Injectable 25 Gram(s) IV Push once  dextrose 50% Injectable 25 Gram(s) IV Push once  digoxin     Tablet 125 MICROGram(s) Oral daily  digoxin     Tablet 125 MICROGram(s) Oral once  furosemide   Injectable 40 milliGRAM(s) IV Push every 12 hours  glucagon  Injectable 1 milliGRAM(s) IntraMuscular once  insulin lispro (ADMELOG) corrective regimen sliding scale   SubCutaneous three times a day before meals  insulin lispro (ADMELOG) corrective regimen sliding scale   SubCutaneous at bedtime  isosorbide   dinitrate Tablet (ISORDIL) 5 milliGRAM(s) Oral three times a day  melatonin 6 milliGRAM(s) Oral at bedtime  tamsulosin 0.4 milliGRAM(s) Oral at bedtime    MEDICATIONS  (PRN):  acetaminophen     Tablet .. 650 milliGRAM(s) Oral every 6 hours PRN Temp greater or equal to 38C (100.4F), Mild Pain (1 - 3)  albuterol    90 MICROgram(s) HFA Inhaler 1 Puff(s) Inhalation every 4 hours PRN for bronchospasm  artificial  tears Solution 1 Drop(s) Both EYES three times a day PRN Dry Eyes  dextrose Oral Gel 15 Gram(s) Oral once PRN Blood Glucose LESS THAN 70 milliGRAM(s)/deciliter      Antibiotics History      Heme Medications   apixaban 5 milliGRAM(s) Oral two times a day, 03-05-24 @ 16:53  aspirin enteric coated 81 milliGRAM(s) Oral daily, 03-05-24 @ 16:50      GI Medications        LABS:                        12.2   4.32  )-----------( 151      ( 11 Mar 2024 06:55 )             39.6     03-11    139  |  103  |  53<H>  ----------------------------<  86  3.7   |  22  |  1.78<H>    Ca    8.5      11 Mar 2024 06:55  Phos  3.5     03-11  Mg     1.6     03-11    TPro  7.2  /  Alb  3.2<L>  /  TBili  1.9<H>  /  DBili  x   /  AST  37  /  ALT  49<H>  /  AlkPhos  119  03-11       COVID  03-05-24 @ 17:16  COVID -   NotDetec  03-07-22 @ 15:00  COVID -   NotDete      COVID Biomarkers    03-05-24 @ 12:10 ESR --  ---  CRP --  ---  DDimer  1830<H>   ---   LDH --   ---   Ferritin --            Trend Cardiac Enzymes  03-09-24 @ 06:00  [id number]-CPKMM/Trop I - 183 - --  - --  -  --  /  --    Trend BNP  03-06-24 @ 05:30   -  5387<H>  03-05-24 @ 12:10   -  5661<H>    Procalcitonin Trend    WBC Trend  03-11-24 @ 06:55   -  4.32  03-10-24 @ 06:00   -  4.79  03-09-24 @ 06:00   -  4.43    H/H Trend  03-11-24 @ 06:55   -   12.2<L>/ 39.6  03-10-24 @ 06:00   -   12.6<L>/ 40.1  03-09-24 @ 06:00   -   11.9<L>/ 37.0<L>  03-08-24 @ 05:00   -   10.9<L>/ 34.6<L>  03-07-24 @ 05:00   -   11.2<L>/ 35.7<L>  03-06-24 @ 05:30   -   11.5<L>/ 37.4<L>    Stool Occult Blood    Platelet Trend  03-11-24 @ 06:55   -  151  03-10-24 @ 06:00   -  159  03-09-24 @ 06:00   -  137<L>    Trend Sodium  03-11-24 @ 06:55   -  139  03-10-24 @ 06:00   -  139  03-09-24 @ 06:00   -  144    Trend Potassium  03-11-24 @ 06:55   -  3.7  03-10-24 @ 06:00   -  3.7  03-09-24 @ 06:00   -  4.0    Trend Bun/Cr  03-11-24 @ 06:55  BUN/CR -  53<H> / 1.78<H>  03-10-24 @ 06:00  BUN/CR -  59<H> / 2.15<H>  03-09-24 @ 06:00  BUN/CR -  67<H> / 2.46<H>    Lactic Acid Trend  03-06-24 @ 05:30   -   2.2<H>  03-05-24 @ 12:10   -   2.2<H>    ABG Trend    Trend AST/ALT/ALK Phos/Bili  03-11-24 @ 06:55   37/49<H>/119/1.9<H>  03-10-24 @ 06:00   34/46<H>/131<H>/1.8<H>  03-09-24 @ 06:00   31/42/131<H>/1.7<H>  03-08-24 @ 05:00   23/42/122<H>/1.9<H>  03-07-24 @ 05:00   43<H>/44/120/2.1<H>  03-05-24 @ 12:10   25/43/140<H>/1.9<H>      Ammonia Trend      Amylase / Lipase Trend  03-05-24 @ 12:10   -   -- / 74      Albumin Trend  03-11-24 @ 06:55   -   3.2<L>  03-10-24 @ 06:00   -   3.5  03-09-24 @ 06:00   -   3.3  03-08-24 @ 05:00   -   3.2<L>  03-07-24 @ 05:00   -   3.1<L>  03-05-24 @ 12:10   -   3.4      PTT - PT - INR Trend  03-05-24 @ 12:10   -   37.6<H> - 16.9<H> - 1.50<H>    Glucose Trend  03-11-24 @ 11:44   -  -- -- 99  03-11-24 @ 08:15   -  -- -- 82  03-11-24 @ 06:55   -  86 -- --  03-10-24 @ 21:11   -  -- -- 116<H>  03-10-24 @ 17:38   -  -- -- 98  03-10-24 @ 11:03   -  -- -- 121<H>  03-10-24 @ 08:03   -  -- -- 85  03-10-24 @ 06:00   -  85 -- --  03-09-24 @ 21:33   -  -- -- 104<H>  03-09-24 @ 17:11   -  -- -- 107<H>    A1C with Estimated Average Glucose Result: 7.2 % *H* [4.0 - 5.6] (03-06-24 @ 05:30)      VITALS:  T(C): 36.1 (03-11-24 @ 05:06), Max: 36.7 (03-10-24 @ 18:00)  T(F): 97 (03-11-24 @ 05:06), Max: 98 (03-10-24 @ 18:00)  HR: 119 (03-11-24 @ 11:45) (101 - 138)  BP: 91/58 (03-11-24 @ 11:45) (91/58 - 139/79)  BP(mean): --  ABP: --  ABP(mean): --  RR: 17 (03-11-24 @ 05:06) (17 - 17)  SpO2: 94% (03-11-24 @ 05:06) (94% - 96%)  CVP(mm Hg): --  CVP(cm H2O): --    Ins and Outs     03-10-24 @ 07:01  -  03-11-24 @ 07:00  --------------------------------------------------------  IN: 0 mL / OUT: 1300 mL / NET: -1300 mL                I&O's Detail    10 Mar 2024 07:01  -  11 Mar 2024 07:00  --------------------------------------------------------  IN:  Total IN: 0 mL    OUT:    Voided (mL): 1300 mL  Total OUT: 1300 mL    Total NET: -1300 mL

## 2024-03-11 NOTE — PROVIDER CONTACT NOTE (EICU) - BACKGROUND
Was coarcted by bedside ACP for evaluation of Afib with RVR.   Patient known to me from earlier in Day, patient HR grossly has not changed and remains tachycardic in afib with out Hypotension (SBP < 100)

## 2024-03-11 NOTE — PROGRESS NOTE ADULT - SUBJECTIVE AND OBJECTIVE BOX
[facility]  [id number]    Chief Complaint: SOB, AF RVR, HFrEF  Interval events: pt seen and examined, labs and chart reviewed. denies CP, SOB. AF 110s on tele    ALLERGIES:  No Known Allergies      CURRENT MEDICATIONS:  MEDICATIONS  (STANDING):  aMIOdarone Infusion 0.5 mG/Min (16.7 mL/Hr) IV Continuous <Continuous>  aMIOdarone Infusion 1 mG/Min (33.3 mL/Hr) IV Continuous <Continuous>  apixaban 5 milliGRAM(s) Oral two times a day  aspirin enteric coated 81 milliGRAM(s) Oral daily  atorvastatin 80 milliGRAM(s) Oral at bedtime  carvedilol 6.25 milliGRAM(s) Oral every 12 hours  chlorhexidine 2% Cloths 1 Application(s) Topical <User Schedule>  dextrose 5%. 1000 milliLiter(s) (50 mL/Hr) IV Continuous <Continuous>  dextrose 5%. 1000 milliLiter(s) (100 mL/Hr) IV Continuous <Continuous>  dextrose 50% Injectable 12.5 Gram(s) IV Push once  dextrose 50% Injectable 25 Gram(s) IV Push once  dextrose 50% Injectable 25 Gram(s) IV Push once  furosemide    Tablet 40 milliGRAM(s) Oral daily  glucagon  Injectable 1 milliGRAM(s) IntraMuscular once  insulin lispro (ADMELOG) corrective regimen sliding scale   SubCutaneous at bedtime  insulin lispro (ADMELOG) corrective regimen sliding scale   SubCutaneous three times a day before meals  isosorbide   dinitrate Tablet (ISORDIL) 5 milliGRAM(s) Oral three times a day  sacubitril 97 mG/valsartan 103 mG 1 Tablet(s) Oral two times a day        ROS:  All 10 systems reviewed and positives noted in HPI    OBJECTIVE:    VITAL SIGNS:  Vital Signs Last 24 Hrs  T(C): 36.4 (06 Mar 2024 07:00), Max: 36.8 (05 Mar 2024 11:39)  T(F): 97.6 (06 Mar 2024 07:00), Max: 98.3 (05 Mar 2024 11:39)  HR: 97 (06 Mar 2024 09:00) (52 - 160)  BP: 102/84 (06 Mar 2024 08:00) (83/70 - 119/95)  BP(mean): 91 (06 Mar 2024 08:00) (62 - 104)  RR: 17 (06 Mar 2024 09:00) (12 - 24)  SpO2: 94% (06 Mar 2024 09:00) (91% - 100%)    Parameters below as of 06 Mar 2024 09:00  Patient On (Oxygen Delivery Method): nasal cannula  O2 Flow (L/min): 4      PHYSICAL EXAM:  General: morbidly obese, no acute distress  HEENT: sclera anicteric  Neck: supple  CVS: JVP ~ 12 cm H20, irregular  Chest: CTA   Abdomen: obese  Extremities: + 1 b/l l/e edema  Neuro: awake, alert & oriented  Psych: normal affect      LABS:                        11.5   5.62  )-----------( 119      ( 06 Mar 2024 05:30 )             37.4     03-06    139  |  106  |  95<H>  ----------------------------<  97  4.6   |  17<L>  |  2.90<H>    Ca    8.8      06 Mar 2024 05:30  Phos  5.5     03-06  Mg     2.7     03-06    TPro  7.3  /  Alb  3.4  /  TBili  1.9<H>  /  DBili  x   /  AST  25  /  ALT  43  /  AlkPhos  140<H>  03-05      PT/INR - ( 05 Mar 2024 12:10 )   PT: 16.9 sec;   INR: 1.50 ratio         PTT - ( 05 Mar 2024 12:10 )  PTT:37.6 sec      ECG: AF RVR     < from: TTE Echo Complete w/o Contrast w/ Doppler (03.06.24 @ 07:53) >   1. Severely decreased global left ventricular systolic function.   2. Left ventricular ejection fraction, by visual estimation, is 20 to   25%.   3. The left ventricle endocardium is not well visualized, consider use   of IV ultrasonic enhancing agent to better evaluate endocardium, if   clinically indicated. Calculated LVEF by Simpsons Biplane Method 20%.   Severe global left ventricle hypokinesis.   4. Severely increased left ventricular internal cavity size.   5. Dilated cardiomyopathy.   6. The mitral in-flow pattern reveals no discernable A-wave, therefore   no comment on diastolic function can be made.   7. Mildly enlarged right ventricle with low normal right ventricle   systolic function.   8. Severely enlarged left atrium.   9. Right atrial enlargement.  10. Mild thickening of the anterior and posterior mitral valve leaflets.  11. Moderate mitral valve regurgitation.  12. Mild-moderate tricuspid regurgitation.  13. Mild aortic valve leaflet calcification. No aortic valve stenosis.  14. Mild aortic regurgitation.  15. Mild to moderate pulmonic valve regurgitation.  16. Top normal sized Aorta at the Sinuses of Valsalva.  17. Estimated pulmonary artery systolic pressure is 42.5 mmHg assuming a   right atrial pressure of 15 mmHg, which is consistent with mild pulmonary   hypertension.  18. There is no evidence of pericardial effusion.         [facility]  [id number]    Chief Complaint: HFrEF, Atrial fibrillation    Interval events: Patient seen and examined, labs and chart reviewed. Denies chest pain or shortness of breath. AF 110s on tele    ALLERGIES:  No Known Allergies      CURRENT MEDICATIONS:  MEDICATIONS  (STANDING):  aMIOdarone Infusion 0.5 mG/Min (16.7 mL/Hr) IV Continuous <Continuous>  aMIOdarone Infusion 1 mG/Min (33.3 mL/Hr) IV Continuous <Continuous>  apixaban 5 milliGRAM(s) Oral two times a day  aspirin enteric coated 81 milliGRAM(s) Oral daily  atorvastatin 80 milliGRAM(s) Oral at bedtime  carvedilol 6.25 milliGRAM(s) Oral every 12 hours  chlorhexidine 2% Cloths 1 Application(s) Topical <User Schedule>  dextrose 5%. 1000 milliLiter(s) (50 mL/Hr) IV Continuous <Continuous>  dextrose 5%. 1000 milliLiter(s) (100 mL/Hr) IV Continuous <Continuous>  dextrose 50% Injectable 12.5 Gram(s) IV Push once  dextrose 50% Injectable 25 Gram(s) IV Push once  dextrose 50% Injectable 25 Gram(s) IV Push once  furosemide    Tablet 40 milliGRAM(s) Oral daily  glucagon  Injectable 1 milliGRAM(s) IntraMuscular once  insulin lispro (ADMELOG) corrective regimen sliding scale   SubCutaneous at bedtime  insulin lispro (ADMELOG) corrective regimen sliding scale   SubCutaneous three times a day before meals  isosorbide   dinitrate Tablet (ISORDIL) 5 milliGRAM(s) Oral three times a day  sacubitril 97 mG/valsartan 103 mG 1 Tablet(s) Oral two times a day        ROS:  All 10 systems reviewed and positives noted in HPI    OBJECTIVE:    VITAL SIGNS:  Vital Signs Last 24 Hrs  T(C): 36.4 (06 Mar 2024 07:00), Max: 36.8 (05 Mar 2024 11:39)  T(F): 97.6 (06 Mar 2024 07:00), Max: 98.3 (05 Mar 2024 11:39)  HR: 97 (06 Mar 2024 09:00) (52 - 160)  BP: 102/84 (06 Mar 2024 08:00) (83/70 - 119/95)  BP(mean): 91 (06 Mar 2024 08:00) (62 - 104)  RR: 17 (06 Mar 2024 09:00) (12 - 24)  SpO2: 94% (06 Mar 2024 09:00) (91% - 100%)    Parameters below as of 06 Mar 2024 09:00  Patient On (Oxygen Delivery Method): nasal cannula  O2 Flow (L/min): 4      PHYSICAL EXAM:  General: morbidly obese, no acute distress  HEENT: sclera anicteric  Neck: supple  CVS: JVP ~ 12 cm H20, irregular  Chest: CTA   Abdomen: obese  Extremities: + 1 b/l l/e edema  Neuro: awake, alert & oriented  Psych: normal affect      LABS:                        11.5   5.62  )-----------( 119      ( 06 Mar 2024 05:30 )             37.4     03-06    139  |  106  |  95<H>  ----------------------------<  97  4.6   |  17<L>  |  2.90<H>    Ca    8.8      06 Mar 2024 05:30  Phos  5.5     03-06  Mg     2.7     03-06    TPro  7.3  /  Alb  3.4  /  TBili  1.9<H>  /  DBili  x   /  AST  25  /  ALT  43  /  AlkPhos  140<H>  03-05      PT/INR - ( 05 Mar 2024 12:10 )   PT: 16.9 sec;   INR: 1.50 ratio         PTT - ( 05 Mar 2024 12:10 )  PTT:37.6 sec      ECG: AF RVR     < from: TTE Echo Complete w/o Contrast w/ Doppler (03.06.24 @ 07:53) >   1. Severely decreased global left ventricular systolic function.   2. Left ventricular ejection fraction, by visual estimation, is 20 to 25%.   3. The left ventricle endocardium is not well visualized, consider use   of IV ultrasonic enhancing agent to better evaluate endocardium, if   clinically indicated. Calculated LVEF by Simpsons Biplane Method 20%.   Severe global left ventricle hypokinesis.   4. Severely increased left ventricular internal cavity size.   5. Dilated cardiomyopathy.   6. The mitral in-flow pattern reveals no discernable A-wave, therefore   no comment on diastolic function can be made.   7. Mildly enlarged right ventricle with low normal right ventricle   systolic function.   8. Severely enlarged left atrium.   9. Right atrial enlargement.  10. Mild thickening of the anterior and posterior mitral valve leaflets.  11. Moderate mitral valve regurgitation.  12. Mild-moderate tricuspid regurgitation.  13. Mild aortic valve leaflet calcification. No aortic valve stenosis.  14. Mild aortic regurgitation.  15. Mild to moderate pulmonic valve regurgitation.  16. Top normal sized Aorta at the Sinuses of Valsalva.  17. Estimated pulmonary artery systolic pressure is 42.5 mmHg assuming a   right atrial pressure of 15 mmHg, which is consistent with mild pulmonary   hypertension.  18. There is no evidence of pericardial effusion.         [facility]  [id number]    Chief Complaint: HFrEF, Atrial fibrillation    Interval events: Patient seen and examined, labs and chart reviewed. Denies chest pain or shortness of breath. AF 110s on tele    ALLERGIES:  No Known Allergies      CURRENT MEDICATIONS:  MEDICATIONS  (STANDING):  aMIOdarone Infusion 0.5 mG/Min (16.7 mL/Hr) IV Continuous <Continuous>  aMIOdarone Infusion 1 mG/Min (33.3 mL/Hr) IV Continuous <Continuous>  apixaban 5 milliGRAM(s) Oral two times a day  aspirin enteric coated 81 milliGRAM(s) Oral daily  atorvastatin 80 milliGRAM(s) Oral at bedtime  carvedilol 6.25 milliGRAM(s) Oral every 12 hours  chlorhexidine 2% Cloths 1 Application(s) Topical <User Schedule>  dextrose 5%. 1000 milliLiter(s) (50 mL/Hr) IV Continuous <Continuous>  dextrose 5%. 1000 milliLiter(s) (100 mL/Hr) IV Continuous <Continuous>  dextrose 50% Injectable 12.5 Gram(s) IV Push once  dextrose 50% Injectable 25 Gram(s) IV Push once  dextrose 50% Injectable 25 Gram(s) IV Push once  furosemide    Tablet 40 milliGRAM(s) Oral daily  glucagon  Injectable 1 milliGRAM(s) IntraMuscular once  insulin lispro (ADMELOG) corrective regimen sliding scale   SubCutaneous at bedtime  insulin lispro (ADMELOG) corrective regimen sliding scale   SubCutaneous three times a day before meals  isosorbide   dinitrate Tablet (ISORDIL) 5 milliGRAM(s) Oral three times a day  sacubitril 97 mG/valsartan 103 mG 1 Tablet(s) Oral two times a day      ROS:  All 10 systems reviewed and positives noted in HPI    OBJECTIVE:    VITAL SIGNS:  Vital Signs Last 24 Hrs  T(C): 36.4 (06 Mar 2024 07:00), Max: 36.8 (05 Mar 2024 11:39)  T(F): 97.6 (06 Mar 2024 07:00), Max: 98.3 (05 Mar 2024 11:39)  HR: 97 (06 Mar 2024 09:00) (52 - 160)  BP: 102/84 (06 Mar 2024 08:00) (83/70 - 119/95)  BP(mean): 91 (06 Mar 2024 08:00) (62 - 104)  RR: 17 (06 Mar 2024 09:00) (12 - 24)  SpO2: 94% (06 Mar 2024 09:00) (91% - 100%)    Parameters below as of 06 Mar 2024 09:00  Patient On (Oxygen Delivery Method): nasal cannula  O2 Flow (L/min): 4      PHYSICAL EXAM:  General: morbidly obese, no acute distress  HEENT: sclera anicteric  Neck: supple  CVS: JVP ~ 12 cm H20, irregular  Chest: CTA   Abdomen: obese  Extremities: + 1 b/l l/e edema  Neuro: awake, alert & oriented  Psych: normal affect      LABS:                        11.5   5.62  )-----------( 119      ( 06 Mar 2024 05:30 )             37.4     03-06    139  |  106  |  95<H>  ----------------------------<  97  4.6   |  17<L>  |  2.90<H>    Ca    8.8      06 Mar 2024 05:30  Phos  5.5     03-06  Mg     2.7     03-06    TPro  7.3  /  Alb  3.4  /  TBili  1.9<H>  /  DBili  x   /  AST  25  /  ALT  43  /  AlkPhos  140<H>  03-05      PT/INR - ( 05 Mar 2024 12:10 )   PT: 16.9 sec;   INR: 1.50 ratio         PTT - ( 05 Mar 2024 12:10 )  PTT:37.6 sec      ECG: AF RVR     < from: TTE Echo Complete w/o Contrast w/ Doppler (03.06.24 @ 07:53) >   1. Severely decreased global left ventricular systolic function.   2. Left ventricular ejection fraction, by visual estimation, is 20 to 25%.   3. The left ventricle endocardium is not well visualized, consider use   of IV ultrasonic enhancing agent to better evaluate endocardium, if   clinically indicated. Calculated LVEF by Simpsons Biplane Method 20%.   Severe global left ventricle hypokinesis.   4. Severely increased left ventricular internal cavity size.   5. Dilated cardiomyopathy.   6. The mitral in-flow pattern reveals no discernable A-wave, therefore   no comment on diastolic function can be made.   7. Mildly enlarged right ventricle with low normal right ventricle   systolic function.   8. Severely enlarged left atrium.   9. Right atrial enlargement.  10. Mild thickening of the anterior and posterior mitral valve leaflets.  11. Moderate mitral valve regurgitation.  12. Mild-moderate tricuspid regurgitation.  13. Mild aortic valve leaflet calcification. No aortic valve stenosis.  14. Mild aortic regurgitation.  15. Mild to moderate pulmonic valve regurgitation.  16. Top normal sized Aorta at the Sinuses of Valsalva.  17. Estimated pulmonary artery systolic pressure is 42.5 mmHg assuming a   right atrial pressure of 15 mmHg, which is consistent with mild pulmonary   hypertension.  18. There is no evidence of pericardial effusion.

## 2024-03-11 NOTE — PROGRESS NOTE ADULT - ASSESSMENT
Assessment: 58-year-old male with history of A-fib on Eliquis, hypertension, CHF, and hyperlipidemia admitted for A-fib RVR.  Patient reports worsening shortness of breath over the last 2 weeks, also with CHF.    Recommendations:  – EKG with A-fib RVR, pt s/p amio drip. rates still elevated. continue carvedilol and digoxin  –Troponins negative, proBNP elevated  – TTE with Severely reduced LV function, EF 20 to 25%.  IVC dilated, Continue IV Lasix, monitor kidney function and electrolytes, though kidney function has been improving  – Pacemaker interrogated with no events. pt reports an "alarm" at 0400 today, will re-interrogate for new events.  - continue eliquis for AF    Jayda CONTRERAS-BC Assessment: 58-year-old male with history of A-fib on Eliquis, hypertension, CHF, and hyperlipidemia admitted for A-fib RVR.  Patient reports worsening shortness of breath over the last 2 weeks, also with CHF.    Recommendations:  – EKG with A-fib RVR, pt s/p amio drip. rates still elevated. continue carvedilol and digoxin  –Troponins negative, proBNP elevated  – TTE with Severely reduced LV function, EF 20 to 25%.  IVC dilated, Continue IV Lasix, monitor kidney function and electrolytes, though kidney function has been improving  – Pacemaker interrogated with no events. pt reports an "alarm" at 0400 today, will re-interrogate for new events.  - continue eliquis for AF    Jayda Weems AGACNP-BC    Addendum: interrogation 3/11: 10.3 yrs longevity, no pace or shock terminated events. "Alert: device tone sounded due to unsuccessful carelink alert transmission" as per med[employer]onic rep, alert notifying pt he is not near his remote transmission."    fluid accumulation 12/24/23 ongoing  Atrial impedence: 323 ohms   Sensitivty: 0.30 mv  RV impedence: 418 ohms      Sensitvity: 0.30mv Assessment: 58-year-old male with history of A-fib on Eliquis, hypertension, CHF, and hyperlipidemia admitted for A-fib RVR.  Patient reports worsening shortness of breath over the last 2 weeks, also with CHF.    Recommendations:  – EKG with A-fib RVR, pt s/p amio drip. rates still elevated. continue carvedilol  –Troponins negative, proBNP elevated  – TTE with Severely reduced LV function, EF 20 to 25%.  IVC dilated, Continue IV Lasix, monitor kidney function and electrolytes, though kidney function has been improving  – Pacemaker interrogated with no events. pt reports an "alarm" at 0400 today, will re-interrogate for new events.  - continue eliquis for AF    Jayda Weems AGACNP-BC    Addendum: interrogation 3/11: 10.3 yrs longevity, no pace or shock terminated events. "Alert: device tone sounded due to unsuccessful carelink alert transmission" as per medtronic rep, alert notifying pt he is not near his remote transmission."    fluid accumulation 12/24/23 ongoing  Atrial impedence: 323 ohms   Sensitivty: 0.30 mv  RV impedence: 418 ohms      Sensitvity: 0.30mv Assessment: 58-year-old male with history of A-fib on Eliquis, hypertension, CHF, and hyperlipidemia admitted for A-fib RVR.  Patient reports worsening shortness of breath over the last 2 weeks, also with CHF.    Recommendations:  – EKG with A-fib RVR, pt s/p amio drip. rates still elevated. continue carvedilol and renally dosed digoxin   –Troponins negative, proBNP elevated  – TTE with Severely reduced LV function, EF 20 to 25%.  IVC dilated, Continue IV Lasix, monitor kidney function and electrolytes, though kidney function has been improving  – Pacemaker interrogated with no events. pt reports an "alarm" at 0400 today, will re-interrogate for new events.  - continue eliquis for AF    Jayda Njchristinaskip AGACNP-BC    Addendum: interrogation 3/11: 10.3 yrs longevity, no pace or shock terminated events. "Alert: device tone sounded due to unsuccessful carelink alert transmission" as per medtronic rep, alert notifying pt he is not near his remote transmission."    fluid accumulation 12/24/23 ongoing  Atrial impedence: 323 ohms   Sensitivty: 0.30 mv  RV impedence: 418 ohms      Sensitvity: 0.30mv

## 2024-03-11 NOTE — PROGRESS NOTE ADULT - ASSESSMENT
58y old  Male PMHx CHF, Afib, h/o Vtach s/p AICD, PTATIE, HLD, HTN, Anxiety and Asthma,  who presents with respiratory distress and found to have Afib with RVR. Pt was admitted to ICU and downgraded to medicine on 3/11.      #Afib with RVR  -uncontrolled HR 120s  -EKG shows Afib with RVR  -VQ scan shows low probability of pulmonary embolism  -s/p amiodarone drip, HR still elevated  -Pacemaker interrogated with no events  -c/w carvedilol 12.5mg bid and digoxin 125 qd (renally dosed)  -c/w aspirin  -c/w eliquis 5mg bid  -continue telemetry  -monitor digoxin level    #Acute on Chronic systolic CHF  -fluid overload improved s/p diuresis  -Trops neg, d-dimer elevated, probnp elevated  -Echo LVEF 20-25%, dilated LV cavity, severe left atrial enlargement, mild pHTN  -Cardio consult appreciated  -c/w lasix 40mg IV bid  -c/w isosorbide dinitrate 5mg tid  -c/w albuterol inhalor prn  -strict I&Os, daily weight    #Cardio renal MAYTE  -resolved  -Cr 2.84 on admission  -down trending Cr with improvement in eGFR  -US renal no hydronephrosis b/l  -avoid nephrotoxins  -continue with diuresis  -c/w allopurinol 100mg qd  -Nephro consult appreciated    #T2DM  -A1C 7.2%  -c/w mod ISS, accuchecks and hypoglycemia protocol    #Obesity with PATTIE  -c/w CPAP 4L qhs    #BPH  -c/w flomax 0.4 qhs    #HLD  -c/w atorvastatin 80mg qhs    #DVT ppx  -c/w eliquis    #FULL CODE    Patient updated regarding plan of care at bedside.    Discussed with Dr. Umana

## 2024-03-11 NOTE — PROGRESS NOTE ADULT - ASSESSMENT
Physical Examination:  GENERAL:               Alert, Oriented, No acute distress.    HEENT:                    No JVD, Moist MM  PULM:                     Bilateral air entry, Clear to auscultation bilaterally, no significant sputum production, No Rales, No Rhonchi, No Wheezing  CVS:                         S1, S2,  No Murmur  ABD:                        Soft, nondistended, nontender, normoactive bowel sounds, Obese  EXT:                         +edema, nontender, No Cyanosis or Clubbing    NEURO:                  Alert, oriented, interactive, nonfocal, follows commands  PSYC:                      Calm, + Insight.        Assessment  Acute on chronic systolic CHF  Afib with RVR  Obesity with PATTIE  MAYTE suspected normal baseline improving    Underlying Asthma, Anxiety, Essential hypertension,   AICD (automatic cardioverter/defibrillator) present    Plan  Dig and BB as per Cardio   Continue a/c  Cardio f/u  may need cardio version  Diurse as tolerated   cpap 4 QHS for pattie    at this time patient with hard to control afib and   cardio not recomending to restart amio at this time    as this time will continue care on tele floor  reconsult ICU as needed.

## 2024-03-11 NOTE — PROVIDER CONTACT NOTE (EICU) - RECOMMENDATIONS
At this time patient does not require drip, cardiology sates to hold off amio  currently on Dig and BB  at this time would recommend to obtain specific recs by cardio regarding interventions as suspect patient will need elective cardioversion as current afib has been going on for months prior to arrival  Cardio paged awaiting call back  based on cardio note will avoid amio  based on EF would also avoid cardizem.  would recommend lopressor IV to assist in rate control

## 2024-03-11 NOTE — CONSULT NOTE ADULT - SUBJECTIVE AND OBJECTIVE BOX
ICU CONSULT  Location of Patient :  3EST E332 D1 ( 3EST)  Attending requesting Consult:Lourdes Umana  Chief Complaint :     Reason For consult :      Initial HPI on admission:  HPI:  58 y.o male with pmh a fib on eliquis, HTN, CHF, HLD, AICD , anxiety, asthma presented to ED for c/o chest pain and palps found to be in rapid a fib.  (05 Mar 2024 17:27)      BRIEF HOSPITAL COURSE:        PAST MEDICAL & SURGICAL HISTORY:  Asthma  Anxiety  Atrial fibrillation, unspecified type  Essential hypertension  Prediabetes  Prolonged QT interval  Cardiomyopathy, unspecified type  AICD (automatic cardioverter/defibrillator) present  Hyperlipidemia  CHF (congestive heart failure)  Afib  PATTIE (obstructive sleep apnea)  History of tonsillectomy  AICD (automatic cardioverter/defibrillator) present        Allergies    No Known Allergies    Intolerances      FAMILY HISTORY:    Social history: Social History:         Smoking:     Drinking:     Drug use:    Review of Systems: as stated above    CONSTITUTIONAL: No fever, No chills, No fatigue  EYES: No eye pain, No visual disturbances, No discharge  ENMT:  No difficulty hearing, No tinnitus, No vertigo; No sinus or throat pain  NECK: No pain, No stiffness  RESPIRATORY: No Cough, No SOB, No Secretions  CARDIOVASCULAR: No chest pain, No palpitations, No dizziness, or No leg swelling  GASTROINTESTINAL: No abdominal or epigastric pain. No nausea, No vomiting, No hematemesis; No diarrhea, No constipation. No melena, No hematochezia.  GENITOURINARY: No dysuria, No frequency, No hematuria, No incontinence  NEUROLOGICAL: No headaches, No memory loss, No loss of strength, No numbness, No tremors  SKIN: No itching, No burning, No rashes, No lesions   MUSCULOSKELETAL: No joint pain or swelling; No muscle, back, No extremity pain  PSYCHIATRIC: No depression, No anxiety, No mood swings, No difficulty sleeping      Medications:  MEDICATIONS  (STANDING):  allopurinol 100 milliGRAM(s) Oral daily  apixaban 5 milliGRAM(s) Oral two times a day  AQUAPHOR (petrolatum Ointment) 1 Application(s) Topical daily  aspirin enteric coated 81 milliGRAM(s) Oral daily  atorvastatin 80 milliGRAM(s) Oral at bedtime  carvedilol 12.5 milliGRAM(s) Oral every 12 hours  dextrose 5%. 1000 milliLiter(s) (50 mL/Hr) IV Continuous <Continuous>  dextrose 5%. 1000 milliLiter(s) (100 mL/Hr) IV Continuous <Continuous>  dextrose 50% Injectable 12.5 Gram(s) IV Push once  dextrose 50% Injectable 25 Gram(s) IV Push once  dextrose 50% Injectable 25 Gram(s) IV Push once  digoxin     Tablet 125 MICROGram(s) Oral daily  furosemide   Injectable 40 milliGRAM(s) IV Push every 12 hours  glucagon  Injectable 1 milliGRAM(s) IntraMuscular once  insulin lispro (ADMELOG) corrective regimen sliding scale   SubCutaneous three times a day before meals  insulin lispro (ADMELOG) corrective regimen sliding scale   SubCutaneous at bedtime  isosorbide   dinitrate Tablet (ISORDIL) 5 milliGRAM(s) Oral three times a day  melatonin 6 milliGRAM(s) Oral at bedtime  tamsulosin 0.4 milliGRAM(s) Oral at bedtime    MEDICATIONS  (PRN):  acetaminophen     Tablet .. 650 milliGRAM(s) Oral every 6 hours PRN Temp greater or equal to 38C (100.4F), Mild Pain (1 - 3)  albuterol    90 MICROgram(s) HFA Inhaler 1 Puff(s) Inhalation every 4 hours PRN for bronchospasm  artificial  tears Solution 1 Drop(s) Both EYES three times a day PRN Dry Eyes  dextrose Oral Gel 15 Gram(s) Oral once PRN Blood Glucose LESS THAN 70 milliGRAM(s)/deciliter      Antibiotics History      Heme Medications   apixaban 5 milliGRAM(s) Oral two times a day, 24 @ 16:53  aspirin enteric coated 81 milliGRAM(s) Oral daily, 24 @ 16:50      GI Medications        Home Medications:  Last Order Reconciliation Date: 24 (Admission Reconciliation)  Albuterol (Eqv-ProAir HFA) 90 mcg/inh inhalation aerosol: 2 puff(s) inhaled every 4 hours as needed for  bronchospasm (24)  aspirin 81 mg oral tablet: 1 tab(s) orally once a day (24)  atorvastatin 20 mg oral tablet: 1 tab(s) orally once a day (24)  carvedilol 25 mg oral tablet: 1 tab(s) orally 2 times a day (24)  Eliquis 5 mg oral tablet: 1 tab(s) orally 2 times a day  RESTART 7/10 (24)  Entresto 97 mg-103 mg oral tablet: 1 tab(s) orally 2 times a day (24)  furosemide 40 mg oral tablet: 1 tab(s) orally once a day (24)  isosorbide dinitrate 5 mg oral tablet: 1 tab(s) orally 3 times a day MDD:3 (24)        LABS:                        12.2   4.32  )-----------( 151      ( 11 Mar 2024 06:55 )             39.6         139  |  103  |  53<H>  ----------------------------<  86  3.7   |  22  |  1.78<H>    Ca    8.5      11 Mar 2024 06:55  Phos  3.5       Mg     1.6         TPro  7.2  /  Alb  3.2<L>  /  TBili  1.9<H>  /  DBili  x   /  AST  37  /  ALT  49<H>  /  AlkPhos  119  03-11    TORSTEN Negative  @ 05:00  Anti SS-1 --  Anti SS-2 --  Anti RNP --  RF --  @ 05:00    Atypical ANCA Indeterminate Method interference due to TORSTEN Fluorescence  @ 05:00  c-ANCA titer --  @ 05:00  c-ANCA Negative  @ 05:00  p-ANCA Negative  @ 05:00  HIT ab --  @ 12:10  HIT ab EIA --  D Dimer -1830        Urinalysis Basic - ( 11 Mar 2024 06:55 )    Color: x / Appearance: x / SG: x / pH: x  Gluc: 86 mg/dL / Ketone: x  / Bili: x / Urobili: x   Blood: x / Protein: x / Nitrite: x   Leuk Esterase: x / RBC: x / WBC x   Sq Epi: x / Non Sq Epi: x / Bacteria: x              CULTURES: (if applicable)    Rapid RVP Result: NotDetec (24 @ 17:16)        CAPILLARY BLOOD GLUCOSE      POCT Blood Glucose.: 103 mg/dL (11 Mar 2024 17:14)      RADIOLOGY      ECHO: < from: TTE Echo Complete w/o Contrast w/ Doppler (24 @ 07:53) >  ACC: [id number] EXAM:  ECHO TTE WO CON COMP W DOPP                          PROCEDURE DATE:  2024          INTERPRETATION:  TRANSTHORACIC ECHOCARDIOGRAM REPORT        Patient Name:   OFE KHALIL Patient Location: [address] Rec #:  [id number]         Accession #:      [id number]  Account #:      [id number]         Height:           73.6 in 187.0 cm  YOB: 1965      Weight:           249.1 lb 113.00 kg  Patient Age:    58 years        BSA:              2.38 m²  Patient Gender:M               BP:               113/87 mmHg      Date of Exam:        3/6/2024 7:53:44 AM  Sonographer:         Zeke Tong  Referring Physician: SOM ESCOBEDO    Procedure:     2D Echo/Doppler/Color Doppler Complete.  Indications:   I48.91 -Unspecified atrial fibrillation  Diagnosis:     I50.20 - Unspecified systolic (congestive) heart failure  Study Details: Technically suboptimal study. Study quality was adversely                 affected due to body habitus and COPD.        2D AND M-MODE MEASUREMENTS (normal ranges within parentheses):  Left                 Normal   Aorta/Left            Normal  Ventricle:                    Atrium:  IVSd (2D):    0.96  (0.7-1.1) Aortic Root   3.80  (2.4-3.7)                 cm             (2D):        cm  LVPWd (2D):   0.94  (0.7-1.1) Aortic Root   3.90  (2.4-3.7)                 cm             (Mmode):       cm  LVIDd (2D):   7.66  (3.4-5.7) AoV Cusp      2.30  (1.5-2.6)                 cm             Separation:    cm  LVIDs (2D):   6.98           Left Atrium   5.10  (1.9-4.0)                 cm             (2D):          cm  LV FS (2D):   8.9 %  (>25%)   Left Atrium   5.40  (1.9-4.0)  LV EF (2D):   19 %   (>55%)   (Mmode):       cm  Relative Wall 0.25   (<0.42)  LA Volume     74.3  Thickness                     Index        ml/m²                                Right Ventricle:                                TAPSE:           1.83 cm    LV SYSTOLIC FUNCTION BY 2D PLANIMETRY (MOD):  EF-A4C View: 18.3 % EF-A2C View: 20.8 % EF-Biplane: 20 %    LV DIASTOLIC FUNCTION:  MV Peak E: 1.03 m/s Decel Time: 130 msec    SPECTRAL DOPPLER ANALYSIS (where applicable):  Mitral Valve:  MV P1/2 Time: 37.70 msec  MV Area, PHT: 5.84 cm²    Mitral Insufficiency by PISA:  MR Volume: 101.22 ml MR Flow Rate: 361.03 ml/s MR EROA: 84.35 mm²    Aortic Valve: AoV Max Memo: 1.03 m/s AoV Peak P.2 mmHg AoV Mean P.0 mmHg    LVOT Vmax: 0.66 m/s LVOT VTI: 0.118 m LVOT Diameter: 2.70 cm    AoV Area, Vmax: 3.69 cm² AoV Area, VTI: 4.63 cm² AoV Area, Vmn: 3.58 cm²  Ao VTI: 0.146  Aortic Insufficiency:  AI Half-time:  691 msec  AI Decel Rate: 1.47 m/s²    Tricuspid Valve and PA/RV Systolic Pressure: TR Max Velocity: 2.62 m/s RA   Pressure: 15 mmHg RVSP/PASP: 42.5 mmHg      PHYSICIAN INTERPRETATION:  Left Ventricle: The left ventricular internal cavity size is severely   increased. Left ventricular wall thickness is normal.  Global LV systolic function was severely decreased. Left ventricular   ejection fraction, by visual estimation, is 20 to 25%. The mitral in-flow   pattern reveals no discernable A-wave, therefore no comment on diastolic   function can be made.  Findings are consistent with dilated cardiomyopathy. The left ventricle   endocardium is not well visualized, consider use of IVultrasonic   enhancing agent to better evaluate endocardium, if clinically indicated.   Calculated LVEF by Simpsons Biplane Method 20%. Severe global left   ventricle hypokinesis.  Right Ventricle: The right ventricular size is mildly enlarged. RV   systolic function is low normal.  Left Atrium: Severely enlarged left atrium. LA volume Index is 74.3 ml/m².  Right Atrium: Right atrial enlargement.  Pericardium: There is no evidence of pericardial effusion.  Mitral Valve: Mild thickening of the anterior and posterior mitral valve   leaflets. Moderate mitral valve regurgitation is seen.  Tricuspid Valve: Structurally normal tricuspid valve, with normal leaflet   excursion. Mild-moderate tricuspid regurgitation is visualized. Estimated   pulmonary artery systolic pressure is 42.5 mmHg assuming a right atrial   pressure of 15 mmHg, which is consistent with mild pulmonary hypertension.  Aortic Valve: The aortic valve is trileaflet. Mild aortic valve   regurgitation is seen. Mild aortic valve leaflet calcification. No aortic   valve stenosis.  Pulmonic Valve: The pulmonic valve is normal. Mild to moderate pulmonic   valve regurgitation.  Aorta: The aortic arch was not well visualized. Top normal sized Aorta at   the Sinuses of Valsalva.  Venous: The inferior vena cava is abnormal. The inferior vena cava was   dilated, with respiratory size variation less than 50%, consistent with   elevated right atrial pressure.  Additional Comments: A ICD wire is visualized in the right atrium and   right ventricle.      Summary:   1. Severely decreased global left ventricular systolic function.   2. Left ventricular ejection fraction, by visual estimation, is 20 to   25%.   3. The left ventricle endocardium is not well visualized, consider use   of IV ultrasonic enhancing agent to better evaluate endocardium, if   clinically indicated. Calculated LVEF by Simpsons Biplane Method 20%.   Severe global left ventricle hypokinesis.   4. Severely increased left ventricular internal cavity size.   5. Dilated cardiomyopathy.   6. The mitral in-flow pattern reveals no discernable A-wave, therefore   no comment on diastolic function can be made.   7. Mildly enlarged right ventricle with low normal right ventricle   systolic function.   8. Severely enlarged left atrium.   9. Right atrial enlargement.  10. Mild thickening of the anterior and posterior mitral valve leaflets.  11. Moderate mitral valve regurgitation.  12. Mild-moderate tricuspid regurgitation.  13. Mild aortic valve leaflet calcification. No aortic valve stenosis.  14. Mild aortic regurgitation.  15. Mild to moderate pulmonic valve regurgitation.  16. Top normal sized Aorta at the Sinuses of Valsalva.  17. Estimated pulmonary artery systolic pressure is 42.5 mmHg assuming a   right atrial pressure of 15 mmHg, which is consistent with mild pulmonary   hypertension.  18. There is no evidence of pericardial effusion.    [id number] Yoni Chester MD Electronically signed on   3/6/2024 at 11:20:42 AM        *** Final ***        VITALS:  T(C): 36.1 (24 @ 05:06), Max: 36.6 (03-10-24 @ 21:00)  T(F): 97 (24 @ 05:06), Max: 97.8 (03-10-24 @ 21:00)  HR: 118 (24 @ 17:28) (101 - 138)  BP: 109/86 (24 @ 17:28) (91/58 - 139/79)  RR: 17 (24 @ 05:06) (17 - 17)  SpO2: 94% on room air  (24 @ 05:06) (94% - 96%)      Ins and Outs     03-10-24 @ 07:01  -  24 @ 07:00  --------------------------------------------------------  IN: 0 mL / OUT: 1300 mL / NET: -1300 mL        I&O's Detail    10 Mar 2024 07:01  -  11 Mar 2024 07:00  --------------------------------------------------------  IN:  Total IN: 0 mL    OUT:    Voided (mL): 1300 mL  Total OUT: 1300 mL    Total NET: -1300 mL        Physical Examination:  GENERAL:               Alert, Oriented, No acute distress.    HEENT:                    Pupils equal, reactive to light.  Symmetric. No JVD, Moist MM  PULM:                     Bilateral air entry, Clear to auscultation bilaterally, no significant sputum production, No Rales, No Rhonchi, No Wheezing  CVS:                         S1, S2,  No Murmur  ABD:                        Soft, nondistended, nontender, normoactive bowel sounds,   EXT:                         No edema, nontender, No Cyanosis or Clubbing   Vascular:                Warm Extremities, Normal Capillary refill, Normal Distal Pulses  SKIN:                       Warm and well perfused, no rashes noted.   NEURO:                  Alert, oriented, interactive, nonfocal, follows commands  PSYC:                      Calm, + Insight.     ICU CONSULT  Location of Patient :  3EST E332 D1 ([facility])  Attending requesting Consult:Lourdes Umana  Chief Complaint :     Reason For consult : tacchycardia       Initial HPI on admission:  HPI:  58 y.o male with pmh a fib on eliquis, HTN, CHF, HLD, AICD , anxiety, asthma presented to ED for c/o chest pain and palps found to be in rapid a fib.  (05 Mar 2024 17:27)      BRIEF HOSPITAL COURSE:  Admitted to ICU for a fib with RVR, placed on amiodaronegtt and meds titrated per cardiology. Transferred to tele yesterday and now consult called for HR in 130s.       PAST MEDICAL & SURGICAL HISTORY:  Asthma  Anxiety  Atrial fibrillation, unspecified type  Essential hypertension  Prediabetes  Prolonged QT interval  Cardiomyopathy, unspecified type  AICD (automatic cardioverter/defibrillator) present  Hyperlipidemia  CHF (congestive heart failure)  Afib  PATTIE (obstructive sleep apnea)  History of tonsillectomy  AICD (automatic cardioverter/defibrillator) present        Allergies    No Known Allergies    Intolerances      Review of Systems: as stated above    CONSTITUTIONAL: No fever, No chills, No fatigue  EYES: No eye pain, No visual disturbances, No discharge  ENMT:  No difficulty hearing, No tinnitus, No vertigo; No sinus or throat pain  NECK: No pain, No stiffness  RESPIRATORY: No Cough, No SOB, No Secretions  CARDIOVASCULAR: No chest pain, No palpitations, No dizziness, or No leg swelling  GASTROINTESTINAL: No abdominal or epigastric pain. No nausea, No vomiting, No hematemesis; No diarrhea, No constipation. No melena, No hematochezia.  GENITOURINARY: No dysuria, No frequency, No hematuria, No incontinence  NEUROLOGICAL: No headaches, No memory loss, No loss of strength, No numbness, No tremors  SKIN: No itching, No burning, No rashes, No lesions   MUSCULOSKELETAL: No joint pain or swelling; No muscle, back, No extremity pain  PSYCHIATRIC: No depression, No anxiety, No mood swings, No difficulty sleeping      Medications:  MEDICATIONS  (STANDING):  allopurinol 100 milliGRAM(s) Oral daily  apixaban 5 milliGRAM(s) Oral two times a day  AQUAPHOR (petrolatum Ointment) 1 Application(s) Topical daily  aspirin enteric coated 81 milliGRAM(s) Oral daily  atorvastatin 80 milliGRAM(s) Oral at bedtime  carvedilol 12.5 milliGRAM(s) Oral every 12 hours  dextrose 5%. 1000 milliLiter(s) (50 mL/Hr) IV Continuous <Continuous>  dextrose 5%. 1000 milliLiter(s) (100 mL/Hr) IV Continuous <Continuous>  dextrose 50% Injectable 12.5 Gram(s) IV Push once  dextrose 50% Injectable 25 Gram(s) IV Push once  dextrose 50% Injectable 25 Gram(s) IV Push once  digoxin     Tablet 125 MICROGram(s) Oral daily  furosemide   Injectable 40 milliGRAM(s) IV Push every 12 hours  glucagon  Injectable 1 milliGRAM(s) IntraMuscular once  insulin lispro (ADMELOG) corrective regimen sliding scale   SubCutaneous three times a day before meals  insulin lispro (ADMELOG) corrective regimen sliding scale   SubCutaneous at bedtime  isosorbide   dinitrate Tablet (ISORDIL) 5 milliGRAM(s) Oral three times a day  melatonin 6 milliGRAM(s) Oral at bedtime  tamsulosin 0.4 milliGRAM(s) Oral at bedtime    MEDICATIONS  (PRN):  acetaminophen     Tablet .. 650 milliGRAM(s) Oral every 6 hours PRN Temp greater or equal to 38C (100.4F), Mild Pain (1 - 3)  albuterol    90 MICROgram(s) HFA Inhaler 1 Puff(s) Inhalation every 4 hours PRN for bronchospasm  artificial  tears Solution 1 Drop(s) Both EYES three times a day PRN Dry Eyes  dextrose Oral Gel 15 Gram(s) Oral once PRN Blood Glucose LESS THAN 70 milliGRAM(s)/deciliter      Antibiotics History      Heme Medications   apixaban 5 milliGRAM(s) Oral two times a day, 24 @ 16:53  aspirin enteric coated 81 milliGRAM(s) Oral daily, 24 @ 16:50      GI Medications        Home Medications:  Last Order Reconciliation Date: 24 (Admission Reconciliation)  Albuterol (Eqv-ProAir HFA) 90 mcg/inh inhalation aerosol: 2 puff(s) inhaled every 4 hours as needed for  bronchospasm (24)  aspirin 81 mg oral tablet: 1 tab(s) orally once a day (24)  atorvastatin 20 mg oral tablet: 1 tab(s) orally once a day (24)  carvedilol 25 mg oral tablet: 1 tab(s) orally 2 times a day (24)  Eliquis 5 mg oral tablet: 1 tab(s) orally 2 times a day  RESTART 7/10 (24)  Entresto 97 mg-103 mg oral tablet: 1 tab(s) orally 2 times a day (24)  furosemide 40 mg oral tablet: 1 tab(s) orally once a day (24)  isosorbide dinitrate 5 mg oral tablet: 1 tab(s) orally 3 times a day MDD:3 (24)        LABS:                        12.2   4.32  )-----------( 151      ( 11 Mar 2024 06:55 )             39.6         139  |  103  |  53<H>  ----------------------------<  86  3.7   |  22  |  1.78<H>    Ca    8.5      11 Mar 2024 06:55  Phos  3.5       Mg     1.6         TPro  7.2  /  Alb  3.2<L>  /  TBili  1.9<H>  /  DBili  x   /  AST  37  /  ALT  49<H>  /  AlkPhos  119  03-11    TORSTEN Negative  @ 05:00  Anti SS-1 --  Anti SS-2 --  Anti RNP --  RF --  @ 05:00    Atypical ANCA Indeterminate Method interference due to TORSTEN Fluorescence  @ 05:00  c-ANCA titer --  @ 05:00  c-ANCA Negative  @ 05:00  p-ANCA Negative  @ 05:00  HIT ab --  @ 12:10  HIT ab EIA --  D Dimer -1830        Urinalysis Basic - ( 11 Mar 2024 06:55 )    Color: x / Appearance: x / SG: x / pH: x  Gluc: 86 mg/dL / Ketone: x  / Bili: x / Urobili: x   Blood: x / Protein: x / Nitrite: x   Leuk Esterase: x / RBC: x / WBC x   Sq Epi: x / Non Sq Epi: x / Bacteria: x              CULTURES: (if applicable)    Rapid RVP Result: NotDetec (24 @ 17:16)        CAPILLARY BLOOD GLUCOSE      POCT Blood Glucose.: 103 mg/dL (11 Mar 2024 17:14)      RADIOLOGY      ECHO: < from: TTE Echo Complete w/o Contrast w/ Doppler (24 @ 07:53) >  ACC: [id number] EXAM:  ECHO TTE WO CON COMP W DOPP                          PROCEDURE DATE:  2024          INTERPRETATION:  TRANSTHORACIC ECHOCARDIOGRAM REPORT        Patient Name:   OFE KHALIL Patient Location: [facility]  Medical Rec #:  [id number]         Accession #:      [id number]  Account #:      [id number]         Height:           73.6 in 187.0 cm  YOB: 1965      Weight:           249.1 lb 113.00 kg  Patient Age:    58 years        BSA:              2.38 m²  Patient Gender:M               BP:               113/87 mmHg      Date of Exam:        3/6/2024 7:53:44 AM  Sonographer:         Zeke Tong  Referring Physician: SOM ESCOBEDO    Procedure:     2D Echo/Doppler/Color Doppler Complete.  Indications:   I48.91 -Unspecified atrial fibrillation  Diagnosis:     I50.20 - Unspecified systolic (congestive) heart failure  Study Details: Technically suboptimal study. Study quality was adversely                 affected due to body habitus and COPD.        2D AND M-MODE MEASUREMENTS (normal ranges within parentheses):  Left                 Normal   Aorta/Left            Normal  Ventricle:                    Atrium:  IVSd (2D):    0.96  (0.7-1.1) Aortic Root   3.80  (2.4-3.7)                 cm             (2D):        cm  LVPWd (2D):   0.94  (0.7-1.1) Aortic Root   3.90  (2.4-3.7)                 cm             (Mmode):       cm  LVIDd (2D):   7.66  (3.4-5.7) AoV Cusp      2.30  (1.5-2.6)                 cm             Separation:    cm  LVIDs (2D):   6.98           Left Atrium   5.10  (1.9-4.0)                 cm             (2D):          cm  LV FS (2D):   8.9 %  (>25%)   Left Atrium   5.40  (1.9-4.0)  LV EF (2D):   19 %   (>55%)   (Mmode):       cm  Relative Wall 0.25   (<0.42)  LA Volume     74.3  Thickness                     Index        ml/m²                                Right Ventricle:                                TAPSE:           1.83 cm    LV SYSTOLIC FUNCTION BY 2D PLANIMETRY (MOD):  EF-A4C View: 18.3 % EF-A2C View: 20.8 % EF-Biplane: 20 %    LV DIASTOLIC FUNCTION:  MV Peak E: 1.03 m/s Decel Time: 130 msec    SPECTRAL DOPPLER ANALYSIS (where applicable):  Mitral Valve:  MV P1/2 Time: 37.70 msec  MV Area, PHT: 5.84 cm²    Mitral Insufficiency by PISA:  MR Volume: 101.22 ml MR Flow Rate: 361.03 ml/s MR EROA: 84.35 mm²    Aortic Valve: AoV Max Memo: 1.03 m/s AoV Peak P.2 mmHg AoV Mean P.0 mmHg    LVOT Vmax: 0.66 m/s LVOT VTI: 0.118 m LVOT Diameter: 2.70 cm    AoV Area, Vmax: 3.69 cm² AoV Area, VTI: 4.63 cm² AoV Area, Vmn: 3.58 cm²  Ao VTI: 0.146  Aortic Insufficiency:  AI Half-time:  691 msec  AI Decel Rate: 1.47 m/s²    Tricuspid Valve and PA/RV Systolic Pressure: TR Max Velocity: 2.62 m/s RA   Pressure: 15 mmHg RVSP/PASP: 42.5 mmHg      PHYSICIAN INTERPRETATION:  Left Ventricle: The left ventricular internal cavity size is severely   increased. Left ventricular wall thickness is normal.  Global LV systolic function was severely decreased. Left ventricular   ejection fraction, by visual estimation, is 20 to 25%. The mitral in-flow   pattern reveals no discernable A-wave, therefore no comment on diastolic   function can be made.  Findings are consistent with dilated cardiomyopathy. The left ventricle   endocardium is not well visualized, consider use of IVultrasonic   enhancing agent to better evaluate endocardium, if clinically indicated.   Calculated LVEF by Simpsons Biplane Method 20%. Severe global left   ventricle hypokinesis.  Right Ventricle: The right ventricular size is mildly enlarged. RV   systolic function is low normal.  Left Atrium: Severely enlarged left atrium. LA volume Index is 74.3 ml/m².  Right Atrium: Right atrial enlargement.  Pericardium: There is no evidence of pericardial effusion.  Mitral Valve: Mild thickening of the anterior and posterior mitral valve   leaflets. Moderate mitral valve regurgitation is seen.  Tricuspid Valve: Structurally normal tricuspid valve, with normal leaflet   excursion. Mild-moderate tricuspid regurgitation is visualized. Estimated   pulmonary artery systolic pressure is 42.5 mmHg assuming a right atrial   pressure of 15 mmHg, which is consistent with mild pulmonary hypertension.  Aortic Valve: The aortic valve is trileaflet. Mild aortic valve   regurgitation is seen. Mild aortic valve leaflet calcification. No aortic   valve stenosis.  Pulmonic Valve: The pulmonic valve is normal. Mild to moderate pulmonic   valve regurgitation.  Aorta: The aortic arch was not well visualized. Top normal sized Aorta at   the Sinuses of Valsalva.  Venous: The inferior vena cava is abnormal. The inferior vena cava was   dilated, with respiratory size variation less than 50%, consistent with   elevated right atrial pressure.  Additional Comments: A ICD wire is visualized in the right atrium and   right ventricle.      Summary:   1. Severely decreased global left ventricular systolic function.   2. Left ventricular ejection fraction, by visual estimation, is 20 to   25%.   3. The left ventricle endocardium is not well visualized, consider use   of IV ultrasonic enhancing agent to better evaluate endocardium, if   clinically indicated. Calculated LVEF by Simpsons Biplane Method 20%.   Severe global left ventricle hypokinesis.   4. Severely increased left ventricular internal cavity size.   5. Dilated cardiomyopathy.   6. The mitral in-flow pattern reveals no discernable A-wave, therefore   no comment on diastolic function can be made.   7. Mildly enlarged right ventricle with low normal right ventricle   systolic function.   8. Severely enlarged left atrium.   9. Right atrial enlargement.  10. Mild thickening of the anterior and posterior mitral valve leaflets.  11. Moderate mitral valve regurgitation.  12. Mild-moderate tricuspid regurgitation.  13. Mild aortic valve leaflet calcification. No aortic valve stenosis.  14. Mild aortic regurgitation.  15. Mild to moderate pulmonic valve regurgitation.  16. Top normal sized Aorta at the Sinuses of Valsalva.  17. Estimated pulmonary artery systolic pressure is 42.5 mmHg assuming a   right atrial pressure of 15 mmHg, which is consistent with mild pulmonary   hypertension.  18. There is no evidence of pericardial effusion.    [id number] Yoni Chester MD Electronically signed on   3/6/2024 at 11:20:42 AM        *** Final ***        VITALS:  T(C): 36.1 (24 @ 05:06), Max: 36.6 (03-10-24 @ 21:00)  T(F): 97 (24 @ 05:06), Max: 97.8 (03-10-24 @ 21:00)  HR: 118 (24 @ 17:28) (101 - 138)  BP: 109/86 (24 @ 17:28) (91/58 - 139/79)  RR: 17 (24 @ 05:06) (17 - 17)  SpO2: 94% on room air  (24 @ 05:06) (94% - 96%)      Ins and Outs     03-10-24 @ 07:01  -  24 @ 07:00  --------------------------------------------------------  IN: 0 mL / OUT: 1300 mL / NET: -1300 mL        I&O's Detail    10 Mar 2024 07:01  -  11 Mar 2024 07:00  --------------------------------------------------------  IN:  Total IN: 0 mL    OUT:    Voided (mL): 1300 mL  Total OUT: 1300 mL    Total NET: -1300 mL        Physical Examination:  GENERAL:               Alert, Oriented, No acute distress.    HEENT:                    Pupils equal, reactive to light.  Symmetric. No JVD, Moist MM  PULM:                     Bilateral air entry, Clear to auscultation bilaterally, no significant sputum production, No Rales, No Rhonchi, No Wheezing  CVS:                         rapid a fib, S1, S2,  No Murmur  ABD:                        Soft, nondistended, nontender, normoactive bowel sounds,   EXT:                         trace BLE edema, nontender, No Cyanosis or Clubbing   Vascular:                Warm Extremities, Normal Capillary refill, Normal Distal Pulses  SKIN:                       Warm and well perfused, no rashes noted.   NEURO:                  Alert, oriented, interactive, nonfocal, follows commands  PSYC:                      Calm, + Insight.

## 2024-03-12 LAB
ALBUMIN SERPL ELPH-MCNC: 3.1 G/DL — LOW (ref 3.3–5)
ALP SERPL-CCNC: 122 U/L — HIGH (ref 40–120)
ALT FLD-CCNC: 48 U/L — HIGH (ref 10–45)
ANION GAP SERPL CALC-SCNC: 2 MMOL/L — LOW (ref 5–17)
AST SERPL-CCNC: 37 U/L — SIGNIFICANT CHANGE UP (ref 10–40)
BILIRUB SERPL-MCNC: 1.7 MG/DL — HIGH (ref 0.2–1.2)
BUN SERPL-MCNC: 43 MG/DL — HIGH (ref 7–23)
CALCIUM SERPL-MCNC: 8.7 MG/DL — SIGNIFICANT CHANGE UP (ref 8.4–10.5)
CHLORIDE SERPL-SCNC: 113 MMOL/L — HIGH (ref 96–108)
CO2 SERPL-SCNC: 26 MMOL/L — SIGNIFICANT CHANGE UP (ref 22–31)
CREAT SERPL-MCNC: 1.74 MG/DL — HIGH (ref 0.5–1.3)
DIGOXIN SERPL-MCNC: 1 NG/ML — SIGNIFICANT CHANGE UP (ref 0.8–2)
DIGOXIN SERPL-MCNC: 1.8 NG/ML — SIGNIFICANT CHANGE UP (ref 0.8–2)
EGFR: 45 ML/MIN/1.73M2 — LOW
GLUCOSE BLDC GLUCOMTR-MCNC: 100 MG/DL — HIGH (ref 70–99)
GLUCOSE BLDC GLUCOMTR-MCNC: 101 MG/DL — HIGH (ref 70–99)
GLUCOSE BLDC GLUCOMTR-MCNC: 104 MG/DL — HIGH (ref 70–99)
GLUCOSE BLDC GLUCOMTR-MCNC: 99 MG/DL — SIGNIFICANT CHANGE UP (ref 70–99)
GLUCOSE SERPL-MCNC: 89 MG/DL — SIGNIFICANT CHANGE UP (ref 70–99)
HCT VFR BLD CALC: 38.9 % — LOW (ref 39–50)
HGB BLD-MCNC: 12.2 G/DL — LOW (ref 13–17)
MAGNESIUM SERPL-MCNC: 1.4 MG/DL — LOW (ref 1.6–2.6)
MCHC RBC-ENTMCNC: 26 PG — LOW (ref 27–34)
MCHC RBC-ENTMCNC: 31.4 GM/DL — LOW (ref 32–36)
MCV RBC AUTO: 82.8 FL — SIGNIFICANT CHANGE UP (ref 80–100)
NRBC # BLD: 0 /100 WBCS — SIGNIFICANT CHANGE UP (ref 0–0)
NT-PROBNP SERPL-SCNC: 2476 PG/ML — HIGH (ref 0–300)
PHOSPHATE SERPL-MCNC: 3.4 MG/DL — SIGNIFICANT CHANGE UP (ref 2.5–4.5)
PLATELET # BLD AUTO: 167 K/UL — SIGNIFICANT CHANGE UP (ref 150–400)
POTASSIUM SERPL-MCNC: 3.7 MMOL/L — SIGNIFICANT CHANGE UP (ref 3.5–5.3)
POTASSIUM SERPL-SCNC: 3.7 MMOL/L — SIGNIFICANT CHANGE UP (ref 3.5–5.3)
PROT SERPL-MCNC: 7.2 G/DL — SIGNIFICANT CHANGE UP (ref 6–8.3)
RBC # BLD: 4.7 M/UL — SIGNIFICANT CHANGE UP (ref 4.2–5.8)
RBC # FLD: 16.1 % — HIGH (ref 10.3–14.5)
SODIUM SERPL-SCNC: 141 MMOL/L — SIGNIFICANT CHANGE UP (ref 135–145)
WBC # BLD: 3.98 K/UL — SIGNIFICANT CHANGE UP (ref 3.8–10.5)
WBC # FLD AUTO: 3.98 K/UL — SIGNIFICANT CHANGE UP (ref 3.8–10.5)

## 2024-03-12 PROCEDURE — 99232 SBSQ HOSP IP/OBS MODERATE 35: CPT | Mod: GC

## 2024-03-12 PROCEDURE — 99232 SBSQ HOSP IP/OBS MODERATE 35: CPT

## 2024-03-12 RX ORDER — METOPROLOL TARTRATE 50 MG
50 TABLET ORAL THREE TIMES A DAY
Refills: 0 | Status: DISCONTINUED | OUTPATIENT
Start: 2024-03-12 | End: 2024-03-12

## 2024-03-12 RX ORDER — CARVEDILOL PHOSPHATE 80 MG/1
12.5 CAPSULE, EXTENDED RELEASE ORAL EVERY 12 HOURS
Refills: 0 | Status: DISCONTINUED | OUTPATIENT
Start: 2024-03-12 | End: 2024-03-12

## 2024-03-12 RX ORDER — METOPROLOL TARTRATE 50 MG
50 TABLET ORAL THREE TIMES A DAY
Refills: 0 | Status: DISCONTINUED | OUTPATIENT
Start: 2024-03-12 | End: 2024-03-14

## 2024-03-12 RX ORDER — APIXABAN 2.5 MG/1
5 TABLET, FILM COATED ORAL
Refills: 0 | Status: DISCONTINUED | OUTPATIENT
Start: 2024-03-13 | End: 2024-03-15

## 2024-03-12 RX ORDER — FUROSEMIDE 40 MG
40 TABLET ORAL
Refills: 0 | Status: DISCONTINUED | OUTPATIENT
Start: 2024-03-12 | End: 2024-03-15

## 2024-03-12 RX ORDER — MAGNESIUM SULFATE 500 MG/ML
2 VIAL (ML) INJECTION ONCE
Refills: 0 | Status: DISCONTINUED | OUTPATIENT
Start: 2024-03-12 | End: 2024-03-12

## 2024-03-12 RX ORDER — MAGNESIUM SULFATE 500 MG/ML
1 VIAL (ML) INJECTION ONCE
Refills: 0 | Status: COMPLETED | OUTPATIENT
Start: 2024-03-12 | End: 2024-03-12

## 2024-03-12 RX ORDER — METOPROLOL TARTRATE 50 MG
2.5 TABLET ORAL ONCE
Refills: 0 | Status: DISCONTINUED | OUTPATIENT
Start: 2024-03-12 | End: 2024-03-12

## 2024-03-12 RX ORDER — METOPROLOL TARTRATE 50 MG
2.5 TABLET ORAL ONCE
Refills: 0 | Status: COMPLETED | OUTPATIENT
Start: 2024-03-12 | End: 2024-03-12

## 2024-03-12 RX ADMIN — Medication 40 MILLIGRAM(S): at 05:55

## 2024-03-12 RX ADMIN — TAMSULOSIN HYDROCHLORIDE 0.4 MILLIGRAM(S): 0.4 CAPSULE ORAL at 21:30

## 2024-03-12 RX ADMIN — ALBUTEROL 1 PUFF(S): 90 AEROSOL, METERED ORAL at 17:05

## 2024-03-12 RX ADMIN — Medication 100 MILLIGRAM(S): at 11:51

## 2024-03-12 RX ADMIN — Medication 125 MICROGRAM(S): at 05:56

## 2024-03-12 RX ADMIN — Medication 1 APPLICATION(S): at 12:04

## 2024-03-12 RX ADMIN — Medication 100 GRAM(S): at 19:41

## 2024-03-12 RX ADMIN — APIXABAN 5 MILLIGRAM(S): 2.5 TABLET, FILM COATED ORAL at 17:06

## 2024-03-12 RX ADMIN — ATORVASTATIN CALCIUM 80 MILLIGRAM(S): 80 TABLET, FILM COATED ORAL at 21:30

## 2024-03-12 RX ADMIN — Medication 40 MILLIGRAM(S): at 16:28

## 2024-03-12 RX ADMIN — Medication 6 MILLIGRAM(S): at 21:30

## 2024-03-12 RX ADMIN — ISOSORBIDE DINITRATE 5 MILLIGRAM(S): 5 TABLET ORAL at 05:56

## 2024-03-12 RX ADMIN — Medication 81 MILLIGRAM(S): at 11:50

## 2024-03-12 RX ADMIN — ISOSORBIDE DINITRATE 5 MILLIGRAM(S): 5 TABLET ORAL at 17:06

## 2024-03-12 RX ADMIN — CARVEDILOL PHOSPHATE 12.5 MILLIGRAM(S): 80 CAPSULE, EXTENDED RELEASE ORAL at 05:55

## 2024-03-12 RX ADMIN — Medication 50 MILLIGRAM(S): at 21:31

## 2024-03-12 RX ADMIN — Medication 2.5 MILLIGRAM(S): at 00:58

## 2024-03-12 RX ADMIN — APIXABAN 5 MILLIGRAM(S): 2.5 TABLET, FILM COATED ORAL at 05:56

## 2024-03-12 NOTE — PROGRESS NOTE ADULT - ATTENDING COMMENTS
Overnight events noted.  At the time of evaluation denied any chest pain, palpitations, shortness of breath.    Briefly, patient is a 58 y old male with PMH of sleep apnea, afib on eliquis, HFrEF, on GDMT, s/p AICD placement, morbid obesity, asthma, initially admitted with acute hypoxic respiratory failure and afib with RVR. Treated with supplemental oxygen and lasix and amio drip.    T(C): 36.4 (03-12-24 @ 12:07), Max: 36.9 (03-11-24 @ 20:14)  T(F): 97.5 (03-12-24 @ 12:07), Max: 98.5 (03-11-24 @ 20:14)  HR: 61 (03-12-24 @ 12:07) (61 - 132)  BP: 101/70 (03-12-24 @ 12:07) (101/70 - 121/76)  ABP: --  ABP(mean): --  RR: 16 (03-12-24 @ 12:07) (16 - 18)  SpO2: 94% (03-12-24 @ 12:07) (94% - 95%)      on exam aaox3, no apparent distress, able to talk in full sentences on room air, morbidly obese, both lungs clear, rhythm irregular, abdomen- soft, b/l pedal edema 2+,, skin xerosis noted.    labs reviewed                            12.2   3.98  )-----------( 167      ( 12 Mar 2024 06:54 )             38.9   03-12    141  |  113<H>  |  43<H>  ----------------------------<  89  3.7   |  26  |  1.74<H>    Ca    8.7      12 Mar 2024 06:54  Phos  3.4     03-12  Mg     1.4     03-12    TPro  7.2  /  Alb  3.1<L>  /  TBili  1.7<H>  /  DBili  x   /  AST  37  /  ALT  48<H>  /  AlkPhos  122<H>  03-12         BNP- 2476 < 5387  dig- 1.8  v/q scan- low prob PE  cxr on admission reviewed by me showed congestive changes.  ekg reviewed by me showed afib with rvr  echo showed LVEF 20-25%    a/p:  # Afib with rvr  # Acute hypoxemic respiratory failure - now resolved  # Acute excerbation of HFrEF- improved  # cardio- renal syndrome vs ckd  # mild transaminitis- secondary to congestive changes. continue to monitor.  - change lasix to oral, pro-bnp trednding down, responded well to diuresis. will start gdmt depending on renal function, when stabilizes. continue dig. continue apixaban, monitor for any bleeding. CPAP at night. monitor renal function, avoid nephrotoxic agents.  - PT eval  - rest as per resident note.    disch dispo- medically active 48-72 hrs.     I spent a total of 50 minutes on the date of this encounter coordinating the patient's care. This includes reviewing results/imaging and discussions with specialists, nursing, case management/social work. Further tests, medications, and procedures have been ordered as indicated. Results and the plan of care were communicated to the patient and/or their family member. Supporting documentation was completed and added to the patient's chart. Overnight events noted.  At the time of evaluation denied any chest pain, palpitations, shortness of breath.    Briefly, patient is a 58 y old male with PMH of sleep apnea, afib on eliquis, HFrEF, on GDMT, s/p AICD placement, morbid obesity, asthma, initially admitted with acute hypoxic respiratory failure and afib with RVR. Treated with supplemental oxygen and lasix and amio drip.    T(C): 36.4 (03-12-24 @ 12:07), Max: 36.9 (03-11-24 @ 20:14)  T(F): 97.5 (03-12-24 @ 12:07), Max: 98.5 (03-11-24 @ 20:14)  HR: 61 (03-12-24 @ 12:07) (61 - 132)  BP: 101/70 (03-12-24 @ 12:07) (101/70 - 121/76)  ABP: --  ABP(mean): --  RR: 16 (03-12-24 @ 12:07) (16 - 18)  SpO2: 94% (03-12-24 @ 12:07) (94% - 95%)      on exam aaox3, no apparent distress, able to talk in full sentences on room air, morbidly obese, both lungs clear, rhythm irregular, abdomen- soft, b/l pedal edema 2+,, skin xerosis noted.    labs reviewed                            12.2   3.98  )-----------( 167      ( 12 Mar 2024 06:54 )             38.9   03-12    141  |  113<H>  |  43<H>  ----------------------------<  89  3.7   |  26  |  1.74<H>    Ca    8.7      12 Mar 2024 06:54  Phos  3.4     03-12  Mg     1.4     03-12    TPro  7.2  /  Alb  3.1<L>  /  TBili  1.7<H>  /  DBili  x   /  AST  37  /  ALT  48<H>  /  AlkPhos  122<H>  03-12         BNP- 2476 < 5387  dig- 1.8  v/q scan- low prob PE  cxr on admission reviewed by me showed congestive changes.  ekg reviewed by me showed afib with rvr  echo showed LVEF 20-25%    a/p:  # Afib with rvr  # Acute hypoxemic respiratory failure - now resolved  # Acute excerbation of HFrEF- improved  # cardio- renal syndrome vs ckd  # mild transaminitis- secondary to congestive changes. continue to monitor.  - change lasix to oral, pro-bnp trednding down, responded well to diuresis. will start gdmt depending on renal function, when stabilizes. continue dig, recheck levels. continue apixaban, monitor for any bleeding. CPAP at night. monitor renal function, avoid nephrotoxic agents.  - PT eval  - rest as per resident note.    disch dispo- medically active 48-72 hrs.     I spent a total of 50 minutes on the date of this encounter coordinating the patient's care. This includes reviewing results/imaging and discussions with specialists, nursing, case management/social work. Further tests, medications, and procedures have been ordered as indicated. Results and the plan of care were communicated to the patient and/or their family member. Supporting documentation was completed and added to the patient's chart. Overnight events noted.  At the time of evaluation denied any chest pain, palpitations, shortness of breath.    Briefly, patient is a 58 y old male with PMH of sleep apnea, afib on eliquis, HFrEF, on GDMT, s/p AICD placement, morbid obesity, asthma, initially admitted with acute hypoxic respiratory failure and afib with RVR. Treated with supplemental oxygen and lasix and amio drip.    T(C): 36.4 (03-12-24 @ 12:07), Max: 36.9 (03-11-24 @ 20:14)  T(F): 97.5 (03-12-24 @ 12:07), Max: 98.5 (03-11-24 @ 20:14)  HR: 61 (03-12-24 @ 12:07) (61 - 132)  BP: 101/70 (03-12-24 @ 12:07) (101/70 - 121/76)  ABP: --  ABP(mean): --  RR: 16 (03-12-24 @ 12:07) (16 - 18)  SpO2: 94% (03-12-24 @ 12:07) (94% - 95%)      on exam aaox3, no apparent distress, able to talk in full sentences on room air, morbidly obese, both lungs clear, rhythm irregular, abdomen- soft, b/l pedal edema 2+,, skin xerosis noted.    labs reviewed                            12.2   3.98  )-----------( 167      ( 12 Mar 2024 06:54 )             38.9   03-12    141  |  113<H>  |  43<H>  ----------------------------<  89  3.7   |  26  |  1.74<H>    Ca    8.7      12 Mar 2024 06:54  Phos  3.4     03-12  Mg     1.4     03-12    TPro  7.2  /  Alb  3.1<L>  /  TBili  1.7<H>  /  DBili  x   /  AST  37  /  ALT  48<H>  /  AlkPhos  122<H>  03-12         BNP- 2476 < 5387  dig- 1.8  v/q scan- low prob PE  cxr on admission reviewed by me showed congestive changes.  ekg reviewed by me showed afib with rvr  echo showed LVEF 20-25%    a/p:  # Afib with rvr  # Acute hypoxemic respiratory failure - now resolved  # Acute excerbation of HFrEF- improved  # cardio- renal syndrome vs ckd  # mild transaminitis- secondary to congestive changes. continue to monitor.  - change lasix to oral, pro-bnp trednding down, responded well to diuresis. will start gdmt depending on renal function, when stabilizes. As per chart review last creatinine 1.04 in oct 2023, unclear this is kade vs ckd now. continue dig, recheck levels. continue apixaban, monitor for any bleeding. CPAP at night. monitor renal function, avoid nephrotoxic agents.  - PT eval  - rest as per resident note.    disch dispo- medically active 48-72 hrs.     I spent a total of 50 minutes on the date of this encounter coordinating the patient's care. This includes reviewing results/imaging and discussions with specialists, nursing, case management/social work. Further tests, medications, and procedures have been ordered as indicated. Results and the plan of care were communicated to the patient and/or their family member. Supporting documentation was completed and added to the patient's chart.

## 2024-03-12 NOTE — PROGRESS NOTE ADULT - ASSESSMENT
Physical Examination:  GENERAL:               Alert, Oriented, No acute distress.    HEENT:                    No JVD, Moist MM  PULM:                     Bilateral air entry, Clear to auscultation bilaterally, no significant sputum production, No Rales, No Rhonchi, No Wheezing  CVS:                         S1, S2,  No Murmur  ABD:                        Soft, nondistended, nontender, normoactive bowel sounds, Obese  EXT:                         No edema, nontender,    PSYC:                      Calm, + Insight.        Assessment  Acute on chronic systolic CHF  Afib with RVR  Obesity with PATTIE  MAYTE suspected normal baseline improving    Underlying Asthma, Anxiety, Essential hypertension,   AICD (automatic cardioverter/defibrillator) present    Plan  Dig and BB as per Cardio  agree wih Metoprolol tartrate 50 mg PO TID (hold for SBP < 90 or HR < 60 BPM)  suspect will benefit from EP eval and may need cardio version- Elective  HR difficult to control but does not appear to require drips at this time.     Continue a/c  Cardio f/u    Diurese as tolerated - swelling much imrpoved  cpap 4 QHS for pattie    re encouraged use    at this time patient with hard to control afib and   cardio not recomending to restart amio at this time    as this time will continue care on tele floor  reconsult ICU as needed.

## 2024-03-12 NOTE — PROGRESS NOTE ADULT - SUBJECTIVE AND OBJECTIVE BOX
[facility]  [address]    Chief Complaint: HFrEF, Atrial fibrillation    Interval events: Patient seen and examined, labs and chart reviewed. Denies chest pain or shortness of breath. -120s on tele    ALLERGIES:  No Known Allergies      CURRENT MEDICATIONS:  MEDICATIONS  (STANDING):  aMIOdarone Infusion 0.5 mG/Min (16.7 mL/Hr) IV Continuous <Continuous>  aMIOdarone Infusion 1 mG/Min (33.3 mL/Hr) IV Continuous <Continuous>  apixaban 5 milliGRAM(s) Oral two times a day  aspirin enteric coated 81 milliGRAM(s) Oral daily  atorvastatin 80 milliGRAM(s) Oral at bedtime  carvedilol 6.25 milliGRAM(s) Oral every 12 hours  chlorhexidine 2% Cloths 1 Application(s) Topical <User Schedule>  dextrose 5%. 1000 milliLiter(s) (50 mL/Hr) IV Continuous <Continuous>  dextrose 5%. 1000 milliLiter(s) (100 mL/Hr) IV Continuous <Continuous>  dextrose 50% Injectable 12.5 Gram(s) IV Push once  dextrose 50% Injectable 25 Gram(s) IV Push once  dextrose 50% Injectable 25 Gram(s) IV Push once  furosemide    Tablet 40 milliGRAM(s) Oral daily  glucagon  Injectable 1 milliGRAM(s) IntraMuscular once  insulin lispro (ADMELOG) corrective regimen sliding scale   SubCutaneous at bedtime  insulin lispro (ADMELOG) corrective regimen sliding scale   SubCutaneous three times a day before meals  isosorbide   dinitrate Tablet (ISORDIL) 5 milliGRAM(s) Oral three times a day  sacubitril 97 mG/valsartan 103 mG 1 Tablet(s) Oral two times a day      ROS:  All 10 systems reviewed and positives noted in HPI    OBJECTIVE:    VITAL SIGNS:  Vital Signs Last 24 Hrs  T(C): 36.4 (06 Mar 2024 07:00), Max: 36.8 (05 Mar 2024 11:39)  T(F): 97.6 (06 Mar 2024 07:00), Max: 98.3 (05 Mar 2024 11:39)  HR: 97 (06 Mar 2024 09:00) (52 - 160)  BP: 102/84 (06 Mar 2024 08:00) (83/70 - 119/95)  BP(mean): 91 (06 Mar 2024 08:00) (62 - 104)  RR: 17 (06 Mar 2024 09:00) (12 - 24)  SpO2: 94% (06 Mar 2024 09:00) (91% - 100%)    Parameters below as of 06 Mar 2024 09:00  Patient On (Oxygen Delivery Method): nasal cannula  O2 Flow (L/min): 4      PHYSICAL EXAM:  General: morbidly obese, no acute distress  HEENT: sclera anicteric  Neck: supple  CVS: JVP ~ 12 cm H20, irregular  Chest: CTA   Abdomen: obese  Extremities: + 1 b/l l/e edema  Neuro: awake, alert & oriented  Psych: normal affect      LABS:                        11.5   5.62  )-----------( 119      ( 06 Mar 2024 05:30 )             37.4     03-06    139  |  106  |  95<H>  ----------------------------<  97  4.6   |  17<L>  |  2.90<H>    Ca    8.8      06 Mar 2024 05:30  Phos  5.5     03-06  Mg     2.7     03-06    TPro  7.3  /  Alb  3.4  /  TBili  1.9<H>  /  DBili  x   /  AST  25  /  ALT  43  /  AlkPhos  140<H>  03-05      PT/INR - ( 05 Mar 2024 12:10 )   PT: 16.9 sec;   INR: 1.50 ratio         PTT - ( 05 Mar 2024 12:10 )  PTT:37.6 sec      ECG: AF RVR     < from: TTE Echo Complete w/o Contrast w/ Doppler (03.06.24 @ 07:53) >   1. Severely decreased global left ventricular systolic function.   2. Left ventricular ejection fraction, by visual estimation, is 20 to 25%.   3. The left ventricle endocardium is not well visualized, consider use   of IV ultrasonic enhancing agent to better evaluate endocardium, if   clinically indicated. Calculated LVEF by Simpsons Biplane Method 20%.   Severe global left ventricle hypokinesis.   4. Severely increased left ventricular internal cavity size.   5. Dilated cardiomyopathy.   6. The mitral in-flow pattern reveals no discernable A-wave, therefore   no comment on diastolic function can be made.   7. Mildly enlarged right ventricle with low normal right ventricle   systolic function.   8. Severely enlarged left atrium.   9. Right atrial enlargement.  10. Mild thickening of the anterior and posterior mitral valve leaflets.  11. Moderate mitral valve regurgitation.  12. Mild-moderate tricuspid regurgitation.  13. Mild aortic valve leaflet calcification. No aortic valve stenosis.  14. Mild aortic regurgitation.  15. Mild to moderate pulmonic valve regurgitation.  16. Top normal sized Aorta at the Sinuses of Valsalva.  17. Estimated pulmonary artery systolic pressure is 42.5 mmHg assuming a   right atrial pressure of 15 mmHg, which is consistent with mild pulmonary   hypertension.  18. There is no evidence of pericardial effusion.

## 2024-03-12 NOTE — PROGRESS NOTE ADULT - SUBJECTIVE AND OBJECTIVE BOX
Follow-up Critical Care Progress Note  Chief Complaint : Atrial fibrillation      patient seen and examined  comfortable  HR remains tachy     discussed with cardio to optimize cardiac meds further and consider EP Eval.         Allergies :No Known Allergies      PAST MEDICAL & SURGICAL HISTORY:  Asthma    Anxiety    Atrial fibrillation, unspecified type    Essential hypertension    Prediabetes    Prolonged QT interval    Cardiomyopathy, unspecified type    AICD (automatic cardioverter/defibrillator) present    Hyperlipidemia    CHF (congestive heart failure)    Afib    PATTIE (obstructive sleep apnea)    History of tonsillectomy    AICD (automatic cardioverter/defibrillator) present        Medications:  MEDICATIONS  (STANDING):  allopurinol 100 milliGRAM(s) Oral daily  apixaban 5 milliGRAM(s) Oral two times a day  AQUAPHOR (petrolatum Ointment) 1 Application(s) Topical daily  aspirin enteric coated 81 milliGRAM(s) Oral daily  atorvastatin 80 milliGRAM(s) Oral at bedtime  carvedilol 12.5 milliGRAM(s) Oral every 12 hours  dextrose 5%. 1000 milliLiter(s) (100 mL/Hr) IV Continuous <Continuous>  dextrose 5%. 1000 milliLiter(s) (50 mL/Hr) IV Continuous <Continuous>  dextrose 50% Injectable 25 Gram(s) IV Push once  dextrose 50% Injectable 25 Gram(s) IV Push once  dextrose 50% Injectable 12.5 Gram(s) IV Push once  digoxin     Tablet 125 MICROGram(s) Oral daily  furosemide   Injectable 40 milliGRAM(s) IV Push every 12 hours  glucagon  Injectable 1 milliGRAM(s) IntraMuscular once  insulin lispro (ADMELOG) corrective regimen sliding scale   SubCutaneous three times a day before meals  insulin lispro (ADMELOG) corrective regimen sliding scale   SubCutaneous at bedtime  isosorbide   dinitrate Tablet (ISORDIL) 5 milliGRAM(s) Oral three times a day  melatonin 6 milliGRAM(s) Oral at bedtime  tamsulosin 0.4 milliGRAM(s) Oral at bedtime    MEDICATIONS  (PRN):  acetaminophen     Tablet .. 650 milliGRAM(s) Oral every 6 hours PRN Temp greater or equal to 38C (100.4F), Mild Pain (1 - 3)  albuterol    90 MICROgram(s) HFA Inhaler 1 Puff(s) Inhalation every 4 hours PRN for bronchospasm  artificial  tears Solution 1 Drop(s) Both EYES three times a day PRN Dry Eyes  dextrose Oral Gel 15 Gram(s) Oral once PRN Blood Glucose LESS THAN 70 milliGRAM(s)/deciliter      Antibiotics History      Heme Medications   apixaban 5 milliGRAM(s) Oral two times a day, 03-05-24 @ 16:53  aspirin enteric coated 81 milliGRAM(s) Oral daily, 03-05-24 @ 16:50      GI Medications      COVID  03-05-24 @ 17:16  COVID -   NotDetec  03-07-22 @ 15:00  COVID -   NotDetec      COVID Biomarkers    03-05-24 @ 12:10 ESR --  ---  CRP --  ---  DDimer  1830<H>   ---   LDH --   ---   Ferritin --         Trend BNP  03-12-24 @ 06:54   -  2476<H>  03-06-24 @ 05:30   -  5387<H>  03-05-24 @ 12:10   -  5661<H>     WBC Trend  03-12-24 @ 06:54   -  3.98  03-11-24 @ 06:55   -  4.32  03-10-24 @ 06:00   -  4.79    H/H Trend  03-12-24 @ 06:54   -   12.2<L>/ 38.9<L>  03-11-24 @ 06:55   -   12.2<L>/ 39.6  03-10-24 @ 06:00   -   12.6<L>/ 40.1  03-09-24 @ 06:00   -   11.9<L>/ 37.0<L>  03-08-24 @ 05:00   -   10.9<L>/ 34.6<L>  03-07-24 @ 05:00   -   11.2<L>/ 35.7<L>    Stool Occult Blood    Platelet Trend  03-12-24 @ 06:54   -  167  03-11-24 @ 06:55   -  151  03-10-24 @ 06:00   -  159    Trend Sodium  03-12-24 @ 06:54   -  141  03-11-24 @ 06:55   -  139  03-10-24 @ 06:00   -  139    Trend Potassium  03-12-24 @ 06:54   -  3.7  03-11-24 @ 06:55   -  3.7  03-10-24 @ 06:00   -  3.7    Trend Bun/Cr  03-12-24 @ 06:54  BUN/CR -  43<H> / 1.74<H>  03-11-24 @ 06:55  BUN/CR -  53<H> / 1.78<H>  03-10-24 @ 06:00  BUN/CR -  59<H> / 2.15<H>    Lactic Acid Trend  03-06-24 @ 05:30   -   2.2<H>  03-05-24 @ 12:10   -   2.2<H>    ABG Trend    Trend AST/ALT/ALK Phos/Bili  03-12-24 @ 06:54   37/48<H>/122<H>/1.7<H>  03-11-24 @ 06:55   37/49<H>/119/1.9<H>  03-10-24 @ 06:00   34/46<H>/131<H>/1.8<H>  03-09-24 @ 06:00   31/42/131<H>/1.7<H>  03-08-24 @ 05:00   23/42/122<H>/1.9<H>  03-07-24 @ 05:00   43<H>/44/120/2.1<H>       Amylase / Lipase Trend  03-05-24 @ 12:10   -   -- / 74      Albumin Trend  03-12-24 @ 06:54   -   3.1<L>  03-11-24 @ 06:55   -   3.2<L>  03-10-24 @ 06:00   -   3.5  03-09-24 @ 06:00   -   3.3  03-08-24 @ 05:00   -   3.2<L>  03-07-24 @ 05:00   -   3.1<L>      PTT - PT - INR Trend  03-05-24 @ 12:10   -   37.6<H> - 16.9<H> - 1.50<H>    Glucose Trend  03-12-24 @ 11:44   -  -- -- 100<H>  03-12-24 @ 08:23   -  -- -- 99  03-12-24 @ 06:54   -  89 -- --  03-11-24 @ 21:18   -  -- -- 110<H>  03-11-24 @ 17:14   -  -- -- 103<H>  03-11-24 @ 11:44   -  -- -- 99  03-11-24 @ 08:15   -  -- -- 82  03-11-24 @ 06:55   -  86 -- --  03-10-24 @ 21:11   -  -- -- 116<H>  03-10-24 @ 17:38   -  -- -- 98    A1C with Estimated Average Glucose Result: 7.2 % *H* [4.0 - 5.6] (03-06-24 @ 05:30)      LABS:                        12.2   3.98  )-----------( 167      ( 12 Mar 2024 06:54 )             38.9     03-12    141  |  113<H>  |  43<H>  ----------------------------<  89  3.7   |  26  |  1.74<H>    Ca    8.7      12 Mar 2024 06:54  Phos  3.4     03-12  Mg     1.4     03-12    TPro  7.2  /  Alb  3.1<L>  /  TBili  1.7<H>  /  DBili  x   /  AST  37  /  ALT  48<H>  /  AlkPhos  122<H>  03-12    TORSTEN Negative 03-07 @ 05:00  Anti SS-1 --  Anti SS-2 --  Anti RNP --  RF -- 03-07 @ 05:00    Atypical ANCA Indeterminate Method interference due to TORSTEN Fluorescence 03-07 @ 05:00  c-ANCA titer -- 03-07 @ 05:00  c-ANCA Negative 03-07 @ 05:00  p-ANCA Negative 03-07 @ 05:00            Urinalysis Basic - ( 12 Mar 2024 06:54 )    Color: x / Appearance: x / SG: x / pH: x  Gluc: 89 mg/dL / Ketone: x  / Bili: x / Urobili: x   Blood: x / Protein: x / Nitrite: x   Leuk Esterase: x / RBC: x / WBC x   Sq Epi: x / Non Sq Epi: x / Bacteria: x        RADIOLOGY  CXR:    < from: Xray Chest 1 View- PORTABLE-Urgent (03.05.24 @ 12:14) >    ACC: [id number] EXAM:  XR CHEST PORTABLE URGENT 1V   ORDERED BY:  SAUNDRA SY     PROCEDURE DATE:  03/05/2024       INTERPRETATION:  EXAM: XR CHEST URGENT    INDICATION: sob GXR    COMPARISON: August 27, 2018    IMPRESSION: Slightly more prominent parahilar interstitial markings since previous exam. Pacer as before. Cardiomegaly. I would favor congestive changes. Regional osseous structures appropriate for age.    --- End of Report ---    < end of copied text >         VITALS:  T(C): 36.4 (03-12-24 @ 12:07), Max: 36.9 (03-11-24 @ 20:14)  T(F): 97.5 (03-12-24 @ 12:07), Max: 98.5 (03-11-24 @ 20:14)  HR: 61 (03-12-24 @ 12:07) (61 - 132)  BP: 101/70 (03-12-24 @ 12:07) (101/70 - 121/76)  BP(mean): --  ABP: --  ABP(mean): --  RR: 16 (03-12-24 @ 12:07) (16 - 18)  SpO2: 94% (03-12-24 @ 12:07) (94% - 95%)  CVP(mm Hg): --  CVP(cm H2O): --    Ins and Outs     03-11-24 @ 07:01  -  03-12-24 @ 07:00  --------------------------------------------------------  IN: 0 mL / OUT: 650 mL / NET: -650 mL    03-12-24 @ 07:01  -  03-12-24 @ 13:37  --------------------------------------------------------  IN: 0 mL / OUT: 850 mL / NET: -850 mL                I&O's Detail    11 Mar 2024 07:01  -  12 Mar 2024 07:00  --------------------------------------------------------  IN:  Total IN: 0 mL    OUT:    Voided (mL): 650 mL  Total OUT: 650 mL    Total NET: -650 mL      12 Mar 2024 07:01  -  12 Mar 2024 13:37  --------------------------------------------------------  IN:  Total IN: 0 mL    OUT:    Voided (mL): 850 mL  Total OUT: 850 mL    Total NET: -850 mL

## 2024-03-12 NOTE — PROGRESS NOTE ADULT - SUBJECTIVE AND OBJECTIVE BOX
Patient is a 58y old  Male PMHx CHF, Afib, h/o Vtach s/p AICD, PATTIE, HLD, HTN, Anxiety and Asthma  who presents with a chief complaint of a fib with RVR (11 Mar 2024 14:04)      Subjective: Patient was seen and examined this morning at bedside. Pt reports feeling better, less SOB. Pt found eating, tolerating diet well.  Overnight events: No overnight event    REVIEW OF SYSTEMS:  CONSTITUTIONAL: No weakness, fevers or chills  EYES/ENT: No visual changes;  No vertigo or throat pain   NECK: No pain or stiffness  RESPIRATORY: No cough, wheezing, or hemoptysis; No shortness of breath  CARDIOVASCULAR: No chest pain or palpitations  GASTROINTESTINAL: No abdominal or epigastric pain. No nausea, vomiting; No diarrhea or constipation.   GENITOURINARY: No dysuria, frequency or hematuria  NEUROLOGICAL: No numbness or weakness  SKIN: No itching, burning, rashes, or lesions       Vital Signs Last 24 Hrs  T(C): 36.4 (12 Mar 2024 12:07), Max: 36.9 (11 Mar 2024 20:14)  T(F): 97.5 (12 Mar 2024 12:07), Max: 98.5 (11 Mar 2024 20:14)  HR: 61 (12 Mar 2024 12:07) (61 - 132)  BP: 101/70 (12 Mar 2024 12:07) (101/70 - 121/76)  BP(mean): --  RR: 16 (12 Mar 2024 12:07) (16 - 18)  SpO2: 94% (12 Mar 2024 12:07) (94% - 95%)    Parameters below as of 12 Mar 2024 12:07  Patient On (Oxygen Delivery Method): room air        PHYSICAL EXAM  Constitutional: Pt sitting up in bed, awake and alert, NAD  HEENT: EOMI, normocephalic, moist mucous membranes  Respiratory: CTABL, No wheezing, rales or rhonchi  Cardiovascular: S1S2+, irregular, tachycardic, no M/G/R  Gastrointestinal: BS+, soft, obese abdomen, nontender, nondistended, no guarding, no rebound  Extremities: +Pitting edema on b/l lower ext, No calf pain   Vascular: Peripheral pulses present  Neurological: AAOx3, no focal deficits  Musculoskeletal: Normal muscle tone, no atrophy, no rigidity, no contractions  Skin: +b/l feet xerosis, No significant new skin lesions or rashes    MEDICATIONS:  MEDICATIONS  (STANDING):  allopurinol 100 milliGRAM(s) Oral daily  apixaban 5 milliGRAM(s) Oral two times a day  AQUAPHOR (petrolatum Ointment) 1 Application(s) Topical daily  aspirin enteric coated 81 milliGRAM(s) Oral daily  atorvastatin 80 milliGRAM(s) Oral at bedtime  dextrose 5%. 1000 milliLiter(s) (100 mL/Hr) IV Continuous <Continuous>  dextrose 5%. 1000 milliLiter(s) (50 mL/Hr) IV Continuous <Continuous>  dextrose 50% Injectable 25 Gram(s) IV Push once  dextrose 50% Injectable 25 Gram(s) IV Push once  dextrose 50% Injectable 12.5 Gram(s) IV Push once  digoxin     Tablet 125 MICROGram(s) Oral daily  furosemide    Tablet 40 milliGRAM(s) Oral two times a day  glucagon  Injectable 1 milliGRAM(s) IntraMuscular once  insulin lispro (ADMELOG) corrective regimen sliding scale   SubCutaneous three times a day before meals  insulin lispro (ADMELOG) corrective regimen sliding scale   SubCutaneous at bedtime  isosorbide   dinitrate Tablet (ISORDIL) 5 milliGRAM(s) Oral three times a day  magnesium sulfate  IVPB 1 Gram(s) IV Intermittent once  melatonin 6 milliGRAM(s) Oral at bedtime  metoprolol tartrate 50 milliGRAM(s) Oral three times a day  tamsulosin 0.4 milliGRAM(s) Oral at bedtime    MEDICATIONS  (PRN):  acetaminophen     Tablet .. 650 milliGRAM(s) Oral every 6 hours PRN Temp greater or equal to 38C (100.4F), Mild Pain (1 - 3)  albuterol    90 MICROgram(s) HFA Inhaler 1 Puff(s) Inhalation every 4 hours PRN for bronchospasm  artificial  tears Solution 1 Drop(s) Both EYES three times a day PRN Dry Eyes  dextrose Oral Gel 15 Gram(s) Oral once PRN Blood Glucose LESS THAN 70 milliGRAM(s)/deciliter      LABS: All Labs Reviewed:                          12.2   3.98  )-----------( 167      ( 12 Mar 2024 06:54 )             38.9     03-12    141  |  113<H>  |  43<H>  ----------------------------<  89  3.7   |  26  |  1.74<H>    Ca    8.7      12 Mar 2024 06:54  Phos  3.4     03-12  Mg     1.4     03-12    TPro  7.2  /  Alb  3.1<L>  /  TBili  1.7<H>  /  DBili  x   /  AST  37  /  ALT  48<H>  /  AlkPhos  122<H>  03-12    LIVER FUNCTIONS - ( 12 Mar 2024 06:54 )  Alb: 3.1 g/dL / Pro: 7.2 g/dL / ALK PHOS: 122 U/L / ALT: 48 U/L / AST: 37 U/L / GGT: x                   RADIOLOGY/EKG:    Xray Chest 1 View- PORTABLE-Urgent (03.05.24 @ 12:14)   IMPRESSION: Slightly more prominent parahilar interstitial markings since   previous exam. Pacer as before. Cardiomegaly. I would favor congestive   changes. Regional osseous structures appropriate for age.      NM Pulmonary Ventilation/Perfusion Scan (03.05.24 @ 14:59)   IMPRESSION: Very low probability of pulmonary embolus.      US Duplex Venous Lower Ext Complete, Bilateral (03.06.24 @ 17:32)   IMPRESSION:  No evidence of deep venous thrombosis in either lower extremity.      US Renal (03.07.24 @ 19:58)   IMPRESSION:  No renal mass, hydronephrosis or calculus is visualized sonographically.  A small right pleural effusion is partially visualized.

## 2024-03-12 NOTE — PROGRESS NOTE ADULT - SUBJECTIVE AND OBJECTIVE BOX
No distress    Vital Signs Last 24 Hrs  T(C): 36.3 (03-12-24 @ 19:48), Max: 36.8 (03-12-24 @ 00:42)  T(F): 97.4 (03-12-24 @ 19:48), Max: 98.3 (03-12-24 @ 00:42)  HR: 72 (03-12-24 @ 19:48) (61 - 132)  BP: 112/68 (03-12-24 @ 19:48) (101/70 - 126/71)  RR: 19 (03-12-24 @ 19:48) (16 - 19)  SpO2: 95% (03-12-24 @ 19:48) (94% - 95%)    I&O's Detail    11 Mar 2024 07:01  -  12 Mar 2024 07:00  --------------------------------------------------------  OUT:    Voided (mL): 650 mL  Total OUT: 650 mL    12 Mar 2024 07:01  -  12 Mar 2024 20:17  --------------------------------------------------------  OUT:    Voided (mL): 850 mL  Total OUT: 850 mL    s1s2  b/l air entry  soft, ND  + edema                                                                 12.2   3.98  )-----------( 167      ( 12 Mar 2024 06:54 )             38.9     12 Mar 2024 06:54    141    |  113    |  43     ----------------------------<  89     3.7     |  26     |  1.74     Ca    8.7        12 Mar 2024 06:54  Phos  3.4       12 Mar 2024 06:54  Mg     1.4       12 Mar 2024 06:54    TPro  7.2    /  Alb  3.1    /  TBili  1.7    /  DBili  x      /  AST  37     /  ALT  48     /  AlkPhos  122    12 Mar 2024 06:54    LIVER FUNCTIONS - ( 12 Mar 2024 06:54 )  Alb: 3.1 g/dL / Pro: 7.2 g/dL / ALK PHOS: 122 U/L / ALT: 48 U/L / AST: 37 U/L / GGT: x           acetaminophen     Tablet .. 650 milliGRAM(s) Oral every 6 hours PRN  albuterol    90 MICROgram(s) HFA Inhaler 1 Puff(s) Inhalation every 4 hours PRN  allopurinol 100 milliGRAM(s) Oral daily  apixaban 5 milliGRAM(s) Oral two times a day  AQUAPHOR (petrolatum Ointment) 1 Application(s) Topical daily  artificial  tears Solution 1 Drop(s) Both EYES three times a day PRN  aspirin enteric coated 81 milliGRAM(s) Oral daily  atorvastatin 80 milliGRAM(s) Oral at bedtime  dextrose 5%. 1000 milliLiter(s) IV Continuous <Continuous>  dextrose 5%. 1000 milliLiter(s) IV Continuous <Continuous>  dextrose 50% Injectable 25 Gram(s) IV Push once  dextrose 50% Injectable 12.5 Gram(s) IV Push once  dextrose 50% Injectable 25 Gram(s) IV Push once  dextrose Oral Gel 15 Gram(s) Oral once PRN  digoxin     Tablet 125 MICROGram(s) Oral daily  furosemide    Tablet 40 milliGRAM(s) Oral two times a day  glucagon  Injectable 1 milliGRAM(s) IntraMuscular once  insulin lispro (ADMELOG) corrective regimen sliding scale   SubCutaneous three times a day before meals  insulin lispro (ADMELOG) corrective regimen sliding scale   SubCutaneous at bedtime  isosorbide   dinitrate Tablet (ISORDIL) 5 milliGRAM(s) Oral three times a day  melatonin 6 milliGRAM(s) Oral at bedtime  metoprolol tartrate 50 milliGRAM(s) Oral three times a day  tamsulosin 0.4 milliGRAM(s) Oral at bedtime    A/P:    CM, EF 20 - 25%, mod MR, CHF, R Afib  Hemodynamic, cardio-renal MAYTE (Cr 1.04 - 10/9/23)  Volume overload, diuresis   Cr is improving   Suppl Mg   HR control   Would avoid ACE/ARB/Entresto at this time   Renal SONO w/o hydro   Avoid nephrotoxins  F/u BMP, Mg, UO, SPEP  Serologies are negative  Will follow    [phone]

## 2024-03-12 NOTE — PROGRESS NOTE ADULT - ASSESSMENT
Assessment: 58-year-old male with history of A-fib on Eliquis, hypertension, CHF, and hyperlipidemia admitted for A-fib RVR.  Patient reports worsening shortness of breath over the last 2 weeks, also with CHF.    Recommendations:  – EKG with A-fib RVR, pt s/p amio drip. rates still elevated. continue renally dosed digoxin. resume BB for rate control, consider metoprolol with hold paramters sbp<90, hr <55  - consider hydralazine/isorbide nitrate as part of GDMT. entresto to be resumed when renal function stabilizes  –Troponins negative, proBNP elevated  – TTE with Severely reduced LV function, EF 20 to 25%.  IVC dilated, Continue IV Lasix, monitor kidney function and electrolytes, though kidney function has been improving  – Pacemaker interrogated with no events.   - continue eliquis for AF    Jayda CONTRERAS-BC     Assessment: 58-year-old male with history of A-fib on Eliquis, hypertension, CHF, and hyperlipidemia admitted for A-fib RVR.  Patient reports worsening shortness of breath over the last 2 weeks, also with CHF.    Recommendations:  – EKG with A-fib RVR, pt s/p amio drip. rates still elevated. continue renally dosed digoxin. resume BB for rate control, consider metoprolol with hold paramters sbp<90, hr <55  - consider hydralazine/isorbide nitrate as part of GDMT. entresto to be resumed when renal function stabilizes  –Troponins negative, proBNP elevated  – TTE with Severely reduced LV function, EF 20 to 25%.  IVC dilated, Continue IV Lasix, monitor kidney function and electrolytes, though kidney function has been improving  – Pacemaker interrogated with no events.   - continue eliquis for AF  - discussed potential need for cardioversion / ablation and importance of medication compliance.    Jayda PATEL-BC

## 2024-03-12 NOTE — PROGRESS NOTE ADULT - ASSESSMENT
58y old  Male PMHx CHF, Afib, h/o Vtach s/p AICD, PATTIE, HLD, HTN, Anxiety and Asthma,  who presents with respiratory distress and found to have Afib with RVR. Pt was admitted to ICU and downgraded to medicine on 3/11.      #Afib with RVR  -uncontrolled -130s  -EKG shows Afib with RVR  -VQ scan shows low probability of pulmonary embolism  -s/p amiodarone drip, HR still elevated  -Pacemaker interrogated with no events  -discontinue carvedilol 12.5mg bid  -hold entresto until renal function stabilizes  -start metoprolol 50mg tid with hold paramenters SBP<90 and HR<60  -c/w digoxin 125 qd (renally dosed)  -c/w aspirin  -c/w eliquis 5mg bid  -continue telemetry  -monitor digoxin level  -cardio consult appreciated: may potentially need cardioversion/ablation    #Acute on Chronic systolic CHF  -fluid overload improved s/p diuresis  -Trops neg, d-dimer elevated, probnp elevated  -Echo LVEF 20-25%, dilated LV cavity, severe left atrial enlargement, mild pHTN  -Cardio consult appreciated  -change lasix 40mg IV bid to lasix 40mg PO bid  -c/w isosorbide dinitrate 5mg tid  -c/w albuterol inhaler prn  -strict I&Os, daily weight    #Cardio renal MAYTE  -resolved  -Cr 2.84 on admission  -down trending Cr with improvement in eGFR  -US renal no hydronephrosis b/l  -avoid nephrotoxins  -continue with diuresis  -c/w allopurinol 100mg qd  -Nephro consult appreciated    #T2DM  -A1C 7.2%  -c/w mod ISS, accuchecks and hypoglycemia protocol    #Obesity with PATTIE  -c/w CPAP 4L qhs    #BPH  -c/w flomax 0.4 qhs    #HLD  -c/w atorvastatin 80mg qhs    #Xerosis  -c/w aquaphor ointment to b/l feet    #DVT ppx  -c/w eliquis    #FULL CODE      Dispo: medically active pend HR control, PT eval, 48-72hrs    Patient updated regarding plan of care at bedside.    Discussed with Dr. Umana  58y old  Male PMHx CHF, Afib, h/o Vtach s/p AICD, PATTIE, HLD, HTN, Anxiety and Asthma,  who presents with respiratory distress and found to have Afib with RVR. Pt was admitted to ICU and downgraded to medicine on 3/11.      #Afib with RVR  -uncontrolled -130s  -EKG shows Afib with RVR  -VQ scan shows low probability of pulmonary embolism  -s/p amiodarone drip, HR still elevated  -Pacemaker interrogated with no events  -discontinue carvedilol 12.5mg bid  -hold entresto until renal function stabilizes  -start metoprolol 50mg tid with hold parameters SBP<90 and HR<60  -c/w digoxin 125 qd (renally dosed), currently at therapeutic level  -c/w aspirin  -c/w eliquis 5mg bid  -continue telemetry  -monitor digoxin level  -cardio consult appreciated: may potentially need cardioversion/ablation    #Acute on Chronic systolic CHF  -fluid overload improved s/p diuresis  -Trops neg, d-dimer elevated, probnp elevated  -Echo LVEF 20-25%, dilated LV cavity, severe left atrial enlargement, mild pHTN  -Cardio consult appreciated  -change lasix 40mg IV bid to lasix 40mg PO bid  -c/w isosorbide dinitrate 5mg tid  -c/w albuterol inhaler prn  -strict I&Os, daily weight    #Cardio renal MAYTE  -resolved  -Cr 2.84 on admission  -down trending Cr with improvement in eGFR  -US renal no hydronephrosis b/l  -avoid nephrotoxins  -continue with diuresis  -c/w allopurinol 100mg qd  -Nephro consult appreciated    #hypomagnesemia  -replete  -continue to monitor    #T2DM  -A1C 7.2%  -c/w mod ISS, accuchecks and hypoglycemia protocol    #Obesity with PATTIE  -c/w CPAP 4L qhs    #BPH  -c/w flomax 0.4 qhs    #HLD  -c/w atorvastatin 80mg qhs    #Xerosis  -c/w aquaphor ointment to b/l feet    #DVT ppx  -c/w eliquis    #FULL CODE      Dispo: medically active pend HR control, PT eval, 48-72hrs    Patient updated regarding plan of care at bedside.    Discussed with Dr. Umana

## 2024-03-12 NOTE — PROGRESS NOTE ADULT - NS ATTEND AMEND GEN_ALL_CORE FT
I have seen and examined the patient. I have discussed case with AMAN Weems.     Allen York is a 58 year old man with past medical history of Non ischemic cardiomyopathy (LVEF 34% in 2018), history of Ventricular tachycardia, s/p AICD, Atrial fibrillation (on Eliquis), Hypertension, Type II Diabetes mellitus, Obstructive sleep apnea and Morbid obesity who was brought in by EMS due to respiratory distress, found to have atrial fibrillation with rapid ventricular response and acute on chronic systolic heart failure exacerbation and acute renal injury.     ECG on admission consistent with atrial fibrillation with RVR, PVCs, left axis deviation. Troponins are not elevated, does not appear to be an acute coronary syndrome. Pro BNP elevated, CXR suggestive of congestion. D-dimer elevated, VQ scan with very low probability of pulmonary embolism. Echo consistent with LVEF 20-25%, dilated LV cavity, severe left atrial enlargement, moderate valvular disease and mild pulmonary hypertension.     Overall, is admitted with acute on chronic systolic heart failure and renal function is improving with IV diuresis, would continue today. In regards to GDMT; plan to resume Entresto when renal function stabilizes, recommend Hydralazine/Isosorbide dinitrate in meantime. Recommend Metoprolol. Not candidate for SGLT2i or MRA due to MAYTE. In regards to atrial fibrillation, rates elevated would dose Metoprolol tartrate 50 mg PO TID (hold for SBP < 90 or HR < 60 BPM), continue renally dosed digoxin, no need for Amiodarone at this time, likely plan for outpatient EP evaluation when euvolemic. [employer] ICD re- interrogated and functioning well with 10 year battery life and no recent events.     We will continue to follow along.    Yoni Chester MD  Cardiology

## 2024-03-13 LAB
ALBUMIN SERPL ELPH-MCNC: 3.5 G/DL — SIGNIFICANT CHANGE UP (ref 3.3–5)
ALP SERPL-CCNC: 149 U/L — HIGH (ref 40–120)
ALT FLD-CCNC: 54 U/L — HIGH (ref 10–45)
ANION GAP SERPL CALC-SCNC: 14 MMOL/L — SIGNIFICANT CHANGE UP (ref 5–17)
AST SERPL-CCNC: 43 U/L — HIGH (ref 10–40)
BILIRUB SERPL-MCNC: 1.6 MG/DL — HIGH (ref 0.2–1.2)
BUN SERPL-MCNC: 40 MG/DL — HIGH (ref 7–23)
CALCIUM SERPL-MCNC: 8.8 MG/DL — SIGNIFICANT CHANGE UP (ref 8.4–10.5)
CHLORIDE SERPL-SCNC: 100 MMOL/L — SIGNIFICANT CHANGE UP (ref 96–108)
CO2 SERPL-SCNC: 25 MMOL/L — SIGNIFICANT CHANGE UP (ref 22–31)
CREAT SERPL-MCNC: 1.67 MG/DL — HIGH (ref 0.5–1.3)
DIGITOXIN SERPL-MCNC: <5 NG/ML — LOW (ref 10–25)
EGFR: 47 ML/MIN/1.73M2 — LOW
GLUCOSE BLDC GLUCOMTR-MCNC: 100 MG/DL — HIGH (ref 70–99)
GLUCOSE BLDC GLUCOMTR-MCNC: 79 MG/DL — SIGNIFICANT CHANGE UP (ref 70–99)
GLUCOSE BLDC GLUCOMTR-MCNC: 81 MG/DL — SIGNIFICANT CHANGE UP (ref 70–99)
GLUCOSE BLDC GLUCOMTR-MCNC: 88 MG/DL — SIGNIFICANT CHANGE UP (ref 70–99)
GLUCOSE SERPL-MCNC: 90 MG/DL — SIGNIFICANT CHANGE UP (ref 70–99)
HCT VFR BLD CALC: 41.5 % — SIGNIFICANT CHANGE UP (ref 39–50)
HGB BLD-MCNC: 13.3 G/DL — SIGNIFICANT CHANGE UP (ref 13–17)
MAGNESIUM SERPL-MCNC: 1.7 MG/DL — SIGNIFICANT CHANGE UP (ref 1.6–2.6)
MCHC RBC-ENTMCNC: 26.1 PG — LOW (ref 27–34)
MCHC RBC-ENTMCNC: 32 GM/DL — SIGNIFICANT CHANGE UP (ref 32–36)
MCV RBC AUTO: 81.4 FL — SIGNIFICANT CHANGE UP (ref 80–100)
NRBC # BLD: 0 /100 WBCS — SIGNIFICANT CHANGE UP (ref 0–0)
PHOSPHATE SERPL-MCNC: 3.7 MG/DL — SIGNIFICANT CHANGE UP (ref 2.5–4.5)
PLATELET # BLD AUTO: 176 K/UL — SIGNIFICANT CHANGE UP (ref 150–400)
POTASSIUM SERPL-MCNC: 3.7 MMOL/L — SIGNIFICANT CHANGE UP (ref 3.5–5.3)
POTASSIUM SERPL-SCNC: 3.7 MMOL/L — SIGNIFICANT CHANGE UP (ref 3.5–5.3)
PROT SERPL-MCNC: 7.9 G/DL — SIGNIFICANT CHANGE UP (ref 6–8.3)
RBC # BLD: 5.1 M/UL — SIGNIFICANT CHANGE UP (ref 4.2–5.8)
RBC # FLD: 16 % — HIGH (ref 10.3–14.5)
SODIUM SERPL-SCNC: 139 MMOL/L — SIGNIFICANT CHANGE UP (ref 135–145)
WBC # BLD: 4.47 K/UL — SIGNIFICANT CHANGE UP (ref 3.8–10.5)
WBC # FLD AUTO: 4.47 K/UL — SIGNIFICANT CHANGE UP (ref 3.8–10.5)

## 2024-03-13 PROCEDURE — 99232 SBSQ HOSP IP/OBS MODERATE 35: CPT

## 2024-03-13 PROCEDURE — 99232 SBSQ HOSP IP/OBS MODERATE 35: CPT | Mod: GC

## 2024-03-13 RX ORDER — DAPAGLIFLOZIN 10 MG/1
10 TABLET, FILM COATED ORAL EVERY 24 HOURS
Refills: 0 | Status: DISCONTINUED | OUTPATIENT
Start: 2024-03-13 | End: 2024-03-15

## 2024-03-13 RX ORDER — ALLOPURINOL 300 MG
50 TABLET ORAL DAILY
Refills: 0 | Status: DISCONTINUED | OUTPATIENT
Start: 2024-03-13 | End: 2024-03-15

## 2024-03-13 RX ADMIN — Medication 6 MILLIGRAM(S): at 23:20

## 2024-03-13 RX ADMIN — ATORVASTATIN CALCIUM 80 MILLIGRAM(S): 80 TABLET, FILM COATED ORAL at 23:20

## 2024-03-13 RX ADMIN — Medication 81 MILLIGRAM(S): at 12:14

## 2024-03-13 RX ADMIN — APIXABAN 5 MILLIGRAM(S): 2.5 TABLET, FILM COATED ORAL at 05:53

## 2024-03-13 RX ADMIN — Medication 125 MICROGRAM(S): at 05:54

## 2024-03-13 RX ADMIN — Medication 50 MILLIGRAM(S): at 12:15

## 2024-03-13 RX ADMIN — Medication 50 MILLIGRAM(S): at 05:53

## 2024-03-13 RX ADMIN — APIXABAN 5 MILLIGRAM(S): 2.5 TABLET, FILM COATED ORAL at 17:37

## 2024-03-13 RX ADMIN — DAPAGLIFLOZIN 10 MILLIGRAM(S): 10 TABLET, FILM COATED ORAL at 18:33

## 2024-03-13 RX ADMIN — Medication 40 MILLIGRAM(S): at 17:28

## 2024-03-13 RX ADMIN — ALBUTEROL 1 PUFF(S): 90 AEROSOL, METERED ORAL at 15:21

## 2024-03-13 RX ADMIN — Medication 1 APPLICATION(S): at 12:14

## 2024-03-13 RX ADMIN — TAMSULOSIN HYDROCHLORIDE 0.4 MILLIGRAM(S): 0.4 CAPSULE ORAL at 23:21

## 2024-03-13 RX ADMIN — Medication 50 MILLIGRAM(S): at 23:22

## 2024-03-13 RX ADMIN — Medication 40 MILLIGRAM(S): at 05:55

## 2024-03-13 NOTE — PROGRESS NOTE ADULT - SUBJECTIVE AND OBJECTIVE BOX
Patient is a 58y old  Male PMHx CHF, Afib, h/o Vtach s/p AICD, PATTIE, HLD, HTN, Anxiety and Asthma  who presents with a chief complaint of a fib with RVR (11 Mar 2024 14:04)      Subjective: Patient was seen and examined this morning at bedside. Pt reports feeling ok but had discomfort overnight. Denies chest pain or SOB.  Overnight events: 3 episodes of non sustained Vtach, no shocks given from AICD. HRs 110-130s.    REVIEW OF SYSTEMS:  CONSTITUTIONAL: No weakness, fevers or chills  EYES/ENT: No visual changes;  No vertigo or throat pain   NECK: No pain or stiffness  RESPIRATORY: No cough, wheezing, or hemoptysis; No shortness of breath  CARDIOVASCULAR: No chest pain or palpitations  GASTROINTESTINAL: No abdominal or epigastric pain. No nausea, vomiting; No diarrhea or constipation.   GENITOURINARY: No dysuria, frequency or hematuria  NEUROLOGICAL: No numbness or weakness  SKIN: No itching, burning, rashes, or lesions       Vital Signs Last 24 Hrs  T(C): 36.8 (13 Mar 2024 13:00), Max: 36.8 (13 Mar 2024 13:00)  T(F): 98.2 (13 Mar 2024 13:00), Max: 98.2 (13 Mar 2024 13:00)  HR: 78 (13 Mar 2024 13:00) (71 - 97)  BP: 99/64 (13 Mar 2024 13:00) (99/64 - 126/71)  BP(mean): --  RR: 16 (13 Mar 2024 13:00) (16 - 19)  SpO2: 93% (13 Mar 2024 13:00) (93% - 95%)    Parameters below as of 13 Mar 2024 13:00  Patient On (Oxygen Delivery Method): room air      PHYSICAL EXAM  Constitutional: Pt sitting up in bed, awake and alert, NAD  HEENT: EOMI, normocephalic, moist mucous membranes  Respiratory: CTABL, No wheezing, rales or rhonchi  Cardiovascular: S1S2+, irregular, tachycardic, no M/G/R  Gastrointestinal: BS+, soft, obese abdomen, nontender, nondistended, no guarding, no rebound  Extremities: +Pitting edema on b/l lower ext, No calf pain   Vascular: Peripheral pulses present  Neurological: AAOx3, no focal deficits  Musculoskeletal: Normal muscle tone, no atrophy, no rigidity, no contractions  Skin: +b/l feet xerosis, No significant new skin lesions or rashes    MEDICATIONS:  MEDICATIONS  (STANDING):  allopurinol 50 milliGRAM(s) Oral daily  apixaban 5 milliGRAM(s) Oral two times a day  AQUAPHOR (petrolatum Ointment) 1 Application(s) Topical daily  aspirin enteric coated 81 milliGRAM(s) Oral daily  atorvastatin 80 milliGRAM(s) Oral at bedtime  dextrose 5%. 1000 milliLiter(s) (100 mL/Hr) IV Continuous <Continuous>  dextrose 5%. 1000 milliLiter(s) (50 mL/Hr) IV Continuous <Continuous>  dextrose 50% Injectable 25 Gram(s) IV Push once  dextrose 50% Injectable 25 Gram(s) IV Push once  dextrose 50% Injectable 12.5 Gram(s) IV Push once  digoxin     Tablet 125 MICROGram(s) Oral daily  furosemide    Tablet 40 milliGRAM(s) Oral two times a day  glucagon  Injectable 1 milliGRAM(s) IntraMuscular once  insulin lispro (ADMELOG) corrective regimen sliding scale   SubCutaneous three times a day before meals  insulin lispro (ADMELOG) corrective regimen sliding scale   SubCutaneous at bedtime  isosorbide   dinitrate Tablet (ISORDIL) 5 milliGRAM(s) Oral three times a day  melatonin 6 milliGRAM(s) Oral at bedtime  metoprolol tartrate 50 milliGRAM(s) Oral three times a day  tamsulosin 0.4 milliGRAM(s) Oral at bedtime    MEDICATIONS  (PRN):  acetaminophen     Tablet .. 650 milliGRAM(s) Oral every 6 hours PRN Temp greater or equal to 38C (100.4F), Mild Pain (1 - 3)  albuterol    90 MICROgram(s) HFA Inhaler 1 Puff(s) Inhalation every 4 hours PRN for bronchospasm  artificial  tears Solution 1 Drop(s) Both EYES three times a day PRN Dry Eyes  dextrose Oral Gel 15 Gram(s) Oral once PRN Blood Glucose LESS THAN 70 milliGRAM(s)/deciliter        LABS: All Labs Reviewed:                    13.3   4.47  )-----------( 176      ( 13 Mar 2024 06:06 )             41.5     03-13    139  |  100  |  40<H>  ----------------------------<  90  3.7   |  25  |  1.67<H>    Ca    8.8      13 Mar 2024 06:06  Phos  3.7     03-13  Mg     1.7     03-13    TPro  7.9  /  Alb  3.5  /  TBili  1.6<H>  /  DBili  x   /  AST  43<H>  /  ALT  54<H>  /  AlkPhos  149<H>  03-13    LIVER FUNCTIONS - ( 13 Mar 2024 06:06 )  Alb: 3.5 g/dL / Pro: 7.9 g/dL / ALK PHOS: 149 U/L / ALT: 54 U/L / AST: 43 U/L / GGT: x                   RADIOLOGY/EKG:    Xray Chest 1 View- PORTABLE-Urgent (03.05.24 @ 12:14)   IMPRESSION: Slightly more prominent parahilar interstitial markings since   previous exam. Pacer as before. Cardiomegaly. I would favor congestive   changes. Regional osseous structures appropriate for age.      NM Pulmonary Ventilation/Perfusion Scan (03.05.24 @ 14:59)   IMPRESSION: Very low probability of pulmonary embolus.      US Duplex Venous Lower Ext Complete, Bilateral (03.06.24 @ 17:32)   IMPRESSION:  No evidence of deep venous thrombosis in either lower extremity.      US Renal (03.07.24 @ 19:58)   IMPRESSION:  No renal mass, hydronephrosis or calculus is visualized sonographically.  A small right pleural effusion is partially visualized.

## 2024-03-13 NOTE — PHYSICAL THERAPY INITIAL EVALUATION ADULT - PERSONAL SAFETY AND JUDGMENT, REHAB EVAL
Patient presents to ED with complaints of "lump sensation in throat" since July and is unable to get an appointment for treatment. "The doctor said there's something in my esophagus and they won't give me the medicine to treat it."
intact

## 2024-03-13 NOTE — PHYSICAL THERAPY INITIAL EVALUATION ADULT - ADDITIONAL COMMENTS
pt lives w/parents in private house, [address] w/rail, 1st floor setup. pt uses straight cane to ambulate, independent w/ADL's, +

## 2024-03-13 NOTE — PROGRESS NOTE ADULT - SUBJECTIVE AND OBJECTIVE BOX
No distress    Vital Signs Last 24 Hrs  T(C): 36.7 (03-13-24 @ 20:28), Max: 36.9 (03-13-24 @ 14:00)  T(F): 98.1 (03-13-24 @ 20:28), Max: 98.4 (03-13-24 @ 14:00)  HR: 73 (03-13-24 @ 20:28) (71 - 84)  BP: 104/70 (03-13-24 @ 20:28) (89/68 - 111/80)  RR: 17 (03-13-24 @ 20:28) (16 - 17)  SpO2: 95% (03-13-24 @ 20:28) (93% - 95%)    I&O's Detail    12 Mar 2024 07:01  -  13 Mar 2024 07:00  --------------------------------------------------------  OUT:    Voided (mL): 850 mL  Total OUT: 850 mL    s1s2  b/l air entry  soft, ND  + edema                                                                         13.3   4.47  )-----------( 176      ( 13 Mar 2024 06:06 )             41.5     13 Mar 2024 06:06    139    |  100    |  40     ----------------------------<  90     3.7     |  25     |  1.67     Ca    8.8        13 Mar 2024 06:06  Phos  3.7       13 Mar 2024 06:06  Mg     1.7       13 Mar 2024 06:06    TPro  7.9    /  Alb  3.5    /  TBili  1.6    /  DBili  x      /  AST  43     /  ALT  54     /  AlkPhos  149    13 Mar 2024 06:06    LIVER FUNCTIONS - ( 13 Mar 2024 06:06 )  Alb: 3.5 g/dL / Pro: 7.9 g/dL / ALK PHOS: 149 U/L / ALT: 54 U/L / AST: 43 U/L / GGT: x           acetaminophen     Tablet .. 650 milliGRAM(s) Oral every 6 hours PRN  albuterol    90 MICROgram(s) HFA Inhaler 1 Puff(s) Inhalation every 4 hours PRN  allopurinol 50 milliGRAM(s) Oral daily  apixaban 5 milliGRAM(s) Oral two times a day  AQUAPHOR (petrolatum Ointment) 1 Application(s) Topical daily  artificial  tears Solution 1 Drop(s) Both EYES three times a day PRN  aspirin enteric coated 81 milliGRAM(s) Oral daily  atorvastatin 80 milliGRAM(s) Oral at bedtime  dapagliflozin 10 milliGRAM(s) Oral every 24 hours  dextrose 5%. 1000 milliLiter(s) IV Continuous <Continuous>  dextrose 5%. 1000 milliLiter(s) IV Continuous <Continuous>  dextrose 50% Injectable 12.5 Gram(s) IV Push once  dextrose 50% Injectable 25 Gram(s) IV Push once  dextrose 50% Injectable 25 Gram(s) IV Push once  dextrose Oral Gel 15 Gram(s) Oral once PRN  digoxin     Tablet 125 MICROGram(s) Oral daily  furosemide    Tablet 40 milliGRAM(s) Oral two times a day  glucagon  Injectable 1 milliGRAM(s) IntraMuscular once  insulin lispro (ADMELOG) corrective regimen sliding scale   SubCutaneous three times a day before meals  insulin lispro (ADMELOG) corrective regimen sliding scale   SubCutaneous at bedtime  isosorbide   dinitrate Tablet (ISORDIL) 5 milliGRAM(s) Oral three times a day  melatonin 6 milliGRAM(s) Oral at bedtime  metoprolol tartrate 50 milliGRAM(s) Oral three times a day  tamsulosin 0.4 milliGRAM(s) Oral at bedtime    A/P:    CM, EF 20 - 25%, mod MR, CHF, R Afib  Hemodynamic, cardio-renal MAYTE (Cr 1.04 - 10/9/23)  Volume overload, diuresis   Cr is improving   HR control   Renal SONO w/o hydro   Trial of Farxiga   F/u BMP, Mg, UO, SPEP  Serologies are negative  Will follow    [phone]

## 2024-03-13 NOTE — PROGRESS NOTE ADULT - ATTENDING COMMENTS
Overnight events noted.  At the time of evaluation denied any chest pain, palpitations, shortness of breath.    Briefly, patient is a 58 y old male with PMH of sleep apnea, afib on eliquis, HFrEF, on GDMT, s/p AICD placement, morbid obesity, asthma, initially admitted with acute hypoxic respiratory failure and afib with RVR. Treated with supplemental oxygen and lasix and amio drip.    T(C): 36.9 (03-13-24 @ 14:00), Max: 36.9 (03-13-24 @ 14:00)  T(F): 98.4 (03-13-24 @ 14:00), Max: 98.4 (03-13-24 @ 14:00)  HR: 84 (03-13-24 @ 17:00) (71 - 84)  BP: 104/71 (03-13-24 @ 17:00) (89/68 - 112/68)  ABP: --  ABP(mean): --  RR: 17 (03-13-24 @ 17:00) (16 - 19)  SpO2: 95% (03-13-24 @ 17:00) (93% - 95%)      on exam aaox3, no apparent distress, able to talk in full sentences on room air, morbidly obese, both lungs clear, rhythm irregular, abdomen- soft, b/l pedal edema 2+,, skin xerosis noted.    labs reviewed                               13.3   4.47  )-----------( 176      ( 13 Mar 2024 06:06 )             41.5   03-13    139  |  100  |  40<H>  ----------------------------<  90  3.7   |  25  |  1.67<H>    Ca    8.8      13 Mar 2024 06:06  Phos  3.7     03-13  Mg     1.7     03-13    TPro  7.9  /  Alb  3.5  /  TBili  1.6<H>  /  DBili  x   /  AST  43<H>  /  ALT  54<H>  /  AlkPhos  149<H>  03-13             BNP- 2476 < 5387  dig- 1.8  v/q scan- low prob PE  cxr on admission reviewed by me showed congestive changes.  ekg reviewed by me showed afib with rvr  echo showed LVEF 20-25%    a/p:  # Afib with rvr  # Acute excerbation of HFrEF- improved  # cardio- renal syndrome vs ckd  # mild transaminitis- secondary to congestive changes. continue to monitor.  - changed lasix to oral, pro-bnp trednding down, responded well to diuresis. will start gdmt depending on renal function, when stabilizes. As per chart review last creatinine 1.04 in oct 2023, unclear this is kade vs ckd now. continue dig.. continue apixaban, monitor for any bleeding. CPAP at night. monitor renal function, avoid nephrotoxic agents.  - seen by PT, need rolling walker.  - rest as per resident note.    disch dispo- medically active 48-72 hrs.     I spent a total of 50 minutes on the date of this encounter coordinating the patient's care. This includes reviewing results/imaging and discussions with specialists, nursing, case management/social work. Further tests, medications, and procedures have been ordered as indicated. Results and the plan of care were communicated to the patient and/or their family member. Supporting documentation was completed and added to the patient's chart.

## 2024-03-13 NOTE — PHYSICAL THERAPY INITIAL EVALUATION ADULT - NSACTIVITYREC_GEN_A_PT
oob to chair w/standby assist oob to chair w/standby assist  The patient has a mobility limitation that significantly impairs their ability to perform Mobility Related Activities of Daily Living (MRADLs) in the home. The patient can safely use the rolling walker and their functional mobility deficit is sufficiently resolved with the use of a rolling walker

## 2024-03-13 NOTE — PROGRESS NOTE ADULT - ASSESSMENT
Assessment:  Allen York is a 58 year old man with past medical history of Non ischemic cardiomyopathy (LVEF 34% in 2018), history of Ventricular tachycardia, s/p AICD, Atrial fibrillation (on Eliquis), Hypertension, Type II Diabetes mellitus, Obstructive sleep apnea and Morbid obesity who was brought in by EMS due to respiratory distress, found to have atrial fibrillation with rapid ventricular response and acute on chronic systolic heart failure exacerbation and acute renal injury.     ECG on admission consistent with atrial fibrillation with RVR, PVCs, left axis deviation. Troponins are not elevated, does not appear to be an acute coronary syndrome. Pro BNP elevated, CXR suggestive of congestion. D-dimer elevated, VQ scan with very low probability of pulmonary embolism. Echo consistent with LVEF 20-25%, dilated LV cavity, severe left atrial enlargement, moderate valvular disease and mild pulmonary hypertension.     Recommendations:  [] Acute on chronic systolic heart failure: Renal function improving with IV diuresis, patient transitioned to PO diuretic; Lasix 40 mg PO BID. Plan to resume Entresto when renal function stabilizes, recommend Hydralazine/Isosorbide dinitrate in meantime. Continue Metoprolol as per below. Not candidate for SGLT2i or MRA due to MAYTE.  [employer] ICD re- interrogated and functioning well with 10 year battery life and no recent events.   [] Atrial fibrillation with RVR: HR improving, plan for Metoprolol tartrate 50 mg PO TID (hold for SBP < 90 or HR < 60 BPM), continue renally dosed digoxin, no need for Amiodarone at this time (will try and avoid due to toxicity profile), likely plan for outpatient EP evaluation when euvolemic.    Will sign out this case to cardiologist to follow along tomorrow.    Yoni Chester MD  Cardiology   Assessment:  Allen York is a 58 year old man with past medical history of Non ischemic cardiomyopathy (LVEF 34% in 2018), history of Ventricular tachycardia, s/p AICD, Atrial fibrillation (on Eliquis), Hypertension, Type II Diabetes mellitus, Obstructive sleep apnea and Morbid obesity who was brought in by EMS due to respiratory distress, found to have atrial fibrillation with rapid ventricular response and acute on chronic systolic heart failure exacerbation and acute renal injury.     ECG on admission consistent with atrial fibrillation with RVR, PVCs, left axis deviation. Troponins are not elevated, does not appear to be an acute coronary syndrome. Pro BNP elevated, CXR suggestive of congestion. D-dimer elevated, VQ scan with very low probability of pulmonary embolism. Echo consistent with LVEF 20-25%, dilated LV cavity, severe left atrial enlargement, moderate valvular disease and mild pulmonary hypertension.     Recommendations:  [] Acute on chronic systolic heart failure: Renal function improving with IV diuresis, patient transitioned to PO diuretic; Lasix 40 mg PO BID. Plan to resume Entresto when renal function stabilizes, recommend Hydralazine/Isosorbide dinitrate in meantime. Continue Metoprolol as per below, eventual transition to succinate dosing. Not candidate for SGLT2i or MRA due to MAYTE.  [employer] ICD re- interrogated and functioning well with 10 year battery life and no recent events.   [] Atrial fibrillation with RVR: HR improving, plan for Metoprolol tartrate 50 mg PO TID (hold for SBP < 90 or HR < 60 BPM), continue renally dosed digoxin, no need for Amiodarone at this time (will try and avoid due to toxicity profile), likely plan for outpatient EP evaluation when euvolemic.    Will sign out this case to cardiologist to follow along tomorrow.    Yoni Chester MD  Cardiology

## 2024-03-13 NOTE — PROGRESS NOTE ADULT - SUBJECTIVE AND OBJECTIVE BOX
[facility]  [id number]      Chief Complaint: Acute on chronic systolic heart failure/ Atrial fibrillation     Interval History: The patient reports improvement in breathing. Denies palpitations or chest pain.     Tele: atrial fibrillation 110s BPM, NSVT      Current meds:   acetaminophen     Tablet .. 650 milliGRAM(s) Oral every 6 hours PRN  albuterol    90 MICROgram(s) HFA Inhaler 1 Puff(s) Inhalation every 4 hours PRN  allopurinol 50 milliGRAM(s) Oral daily  apixaban 5 milliGRAM(s) Oral two times a day  AQUAPHOR (petrolatum Ointment) 1 Application(s) Topical daily  artificial  tears Solution 1 Drop(s) Both EYES three times a day PRN  aspirin enteric coated 81 milliGRAM(s) Oral daily  atorvastatin 80 milliGRAM(s) Oral at bedtime  dextrose 5%. 1000 milliLiter(s) IV Continuous <Continuous>  dextrose 5%. 1000 milliLiter(s) IV Continuous <Continuous>  dextrose 50% Injectable 12.5 Gram(s) IV Push once  dextrose 50% Injectable 25 Gram(s) IV Push once  dextrose 50% Injectable 25 Gram(s) IV Push once  dextrose Oral Gel 15 Gram(s) Oral once PRN  digoxin     Tablet 125 MICROGram(s) Oral daily  furosemide    Tablet 40 milliGRAM(s) Oral two times a day  glucagon  Injectable 1 milliGRAM(s) IntraMuscular once  insulin lispro (ADMELOG) corrective regimen sliding scale   SubCutaneous three times a day before meals  insulin lispro (ADMELOG) corrective regimen sliding scale   SubCutaneous at bedtime  isosorbide   dinitrate Tablet (ISORDIL) 5 milliGRAM(s) Oral three times a day  melatonin 6 milliGRAM(s) Oral at bedtime  metoprolol tartrate 50 milliGRAM(s) Oral three times a day  tamsulosin 0.4 milliGRAM(s) Oral at bedtime      Objective:     Vital Signs:   T(C): 36.7 (03-13-24 @ 05:05), Max: 36.7 (03-13-24 @ 05:05)  HR: 71 (03-13-24 @ 05:05) (61 - 97)  BP: 111/80 (03-13-24 @ 05:05) (101/70 - 126/71)  RR: 16 (03-13-24 @ 05:05) (16 - 19)  SpO2: 95% (03-13-24 @ 05:05) (94% - 95%)  Wt(kg): --    Physical Exam:   General: morbidly obese, no acute distress  Neck: supple   CVS: JVP ~ 9 cm H20, irregularly irregular, s1, s2  Pulm: unlabored respirations, mostly clear to ausculation   Abd: obese  Ext: trace lower extremity edema b/l   Neuro: awake, alert and oriented   Psych: Normal affect      Labs:   13 Mar 2024 06:06    139    |  100    |  40     ----------------------------<  90     3.7     |  25     |  1.67     Ca    8.8        13 Mar 2024 06:06  Phos  3.7       13 Mar 2024 06:06  Mg     1.7       13 Mar 2024 06:06    TPro  7.9    /  Alb  3.5    /  TBili  1.6    /  DBili  x      /  AST  43     /  ALT  54     /  AlkPhos  149    13 Mar 2024 06:06                          13.3   4.47  )-----------( 176      ( 13 Mar 2024 06:06 )             41.5         TTE (2018):  LVEF 34%    TTE (3/2024):   1. Severely decreased global left ventricular systolic function.   2. Left ventricular ejection fraction, by visual estimation, is 20 to 25%.   3. The left ventricle endocardium is not well visualized, consider use   of IV ultrasonic enhancing agent to better evaluate endocardium, if   clinically indicated. Calculated LVEF by Simpsons Biplane Method 20%. Severe global left ventricle hypokinesis.   4. Severely increased left ventricular internal cavity size.   5. Dilated cardiomyopathy.   6. The mitral in-flow pattern reveals no discernable A-wave, therefore no comment on diastolic function can be made.   7. Mildly enlarged right ventricle with low normal right ventricle   systolic function.   8. Severely enlarged left atrium.   9. Right atrial enlargement.  10. Mild thickening of the anterior and posterior mitral valve leaflets.  11. Moderate mitral valve regurgitation.  12. Mild-moderate tricuspid regurgitation.  13. Mild aortic valve leaflet calcification. No aortic valve stenosis.  14. Mild aortic regurgitation.  15. Mild to moderate pulmonic valve regurgitation.  16. Top normal sized Aorta at the Sinuses of Valsalva.  17. Estimated pulmonary artery systolic pressure is 42.5 mmHg assuming a right atrial pressure of 15 mmHg, which is consistent with mild pulmonary hypertension.  18. There is no evidence of pericardial effusion.    ECG (3/5/24): atrial fibrillation with RVR, PVC

## 2024-03-13 NOTE — PROGRESS NOTE ADULT - ASSESSMENT
58y old  Male PMHx CHF, Afib, h/o Vtach s/p AICD, PATTIE, HLD, HTN, Anxiety and Asthma,  who presents with respiratory distress and found to have Afib with RVR. Pt was admitted to ICU and downgraded to medicine on 3/11.      #Afib with RVR  -uncontrolled HR max 130s  -EKG shows Afib with RVR  -VQ scan shows low probability of pulmonary embolism  -s/p amiodarone drip, HR still elevated  -Pacemaker interrogated twice with no events  -discontinue carvedilol 12.5mg bid  -hold entresto until renal function stabilizes  -c/w metoprolol 50mg tid with hold parameters SBP<90 and HR<60  -c/w digoxin 125 qd (renally dosed), currently at therapeutic level  -c/w aspirin  -c/w eliquis 5mg bid  -continue telemetry  -monitor digoxin level  -cardio consult appreciated: may potentially need cardioversion/ablation outpatient    #Acute on Chronic systolic CHF  -fluid overload improved s/p diuresis  -Trops neg, d-dimer elevated, probnp elevated  -Echo LVEF 20-25%, dilated LV cavity, severe left atrial enlargement, mild pHTN  -Cardio consult appreciated  -c/w lasix 40mg PO bid  -c/w isosorbide dinitrate 5mg tid  -c/w albuterol inhaler prn  -strict I&Os, daily weight  -PT eval appreciated: no PT needs    #Cardio renal MAYTE  -resolved  -Cr 2.84 on admission  -down trending Cr with improvement in eGFR  -US renal no hydronephrosis b/l  -avoid nephrotoxins  -continue with diuresis  -change to allopurinol 100mg to 50mg qd (renally dosed)  -Nephro consult appreciated    #hypomagnesemia  -resolved after repletion  -continue to monitor    #T2DM  -A1C 7.2%  -c/w mod ISS, accuchecks and hypoglycemia protocol    #Obesity with PATTIE  -c/w CPAP 4L qhs    #BPH  -c/w flomax 0.4 qhs    #HLD  -c/w atorvastatin 80mg qhs    #Xerosis  -c/w aquaphor ointment to b/l feet    #DVT ppx  -c/w eliquis    #FULL CODE      Dispo: medically active pend HR control, 48-72hrs    Patient updated regarding plan of care at bedside.    Discussed with Dr. Umana

## 2024-03-14 LAB
ALBUMIN SERPL ELPH-MCNC: 3.5 G/DL — SIGNIFICANT CHANGE UP (ref 3.3–5)
ALP SERPL-CCNC: 127 U/L — HIGH (ref 40–120)
ALT FLD-CCNC: 60 U/L — HIGH (ref 10–45)
ANION GAP SERPL CALC-SCNC: 12 MMOL/L — SIGNIFICANT CHANGE UP (ref 5–17)
AST SERPL-CCNC: 47 U/L — HIGH (ref 10–40)
BILIRUB SERPL-MCNC: 1.5 MG/DL — HIGH (ref 0.2–1.2)
BUN SERPL-MCNC: 36 MG/DL — HIGH (ref 7–23)
CALCIUM SERPL-MCNC: 9.1 MG/DL — SIGNIFICANT CHANGE UP (ref 8.4–10.5)
CHLORIDE SERPL-SCNC: 101 MMOL/L — SIGNIFICANT CHANGE UP (ref 96–108)
CO2 SERPL-SCNC: 26 MMOL/L — SIGNIFICANT CHANGE UP (ref 22–31)
CREAT SERPL-MCNC: 1.58 MG/DL — HIGH (ref 0.5–1.3)
DIGOXIN SERPL-MCNC: 1.3 NG/ML — SIGNIFICANT CHANGE UP (ref 0.8–2)
EGFR: 51 ML/MIN/1.73M2 — LOW
GLUCOSE BLDC GLUCOMTR-MCNC: 105 MG/DL — HIGH (ref 70–99)
GLUCOSE BLDC GLUCOMTR-MCNC: 110 MG/DL — HIGH (ref 70–99)
GLUCOSE BLDC GLUCOMTR-MCNC: 122 MG/DL — HIGH (ref 70–99)
GLUCOSE BLDC GLUCOMTR-MCNC: 94 MG/DL — SIGNIFICANT CHANGE UP (ref 70–99)
GLUCOSE SERPL-MCNC: 91 MG/DL — SIGNIFICANT CHANGE UP (ref 70–99)
HCT VFR BLD CALC: 43.4 % — SIGNIFICANT CHANGE UP (ref 39–50)
HGB BLD-MCNC: 13.5 G/DL — SIGNIFICANT CHANGE UP (ref 13–17)
MCHC RBC-ENTMCNC: 26.2 PG — LOW (ref 27–34)
MCHC RBC-ENTMCNC: 31.1 GM/DL — LOW (ref 32–36)
MCV RBC AUTO: 84.3 FL — SIGNIFICANT CHANGE UP (ref 80–100)
NRBC # BLD: 0 /100 WBCS — SIGNIFICANT CHANGE UP (ref 0–0)
PLATELET # BLD AUTO: 189 K/UL — SIGNIFICANT CHANGE UP (ref 150–400)
POTASSIUM SERPL-MCNC: 4 MMOL/L — SIGNIFICANT CHANGE UP (ref 3.5–5.3)
POTASSIUM SERPL-SCNC: 4 MMOL/L — SIGNIFICANT CHANGE UP (ref 3.5–5.3)
PROT SERPL-MCNC: 8 G/DL — SIGNIFICANT CHANGE UP (ref 6–8.3)
RBC # BLD: 5.15 M/UL — SIGNIFICANT CHANGE UP (ref 4.2–5.8)
RBC # FLD: 16.6 % — HIGH (ref 10.3–14.5)
SODIUM SERPL-SCNC: 139 MMOL/L — SIGNIFICANT CHANGE UP (ref 135–145)
WBC # BLD: 3.75 K/UL — LOW (ref 3.8–10.5)
WBC # FLD AUTO: 3.75 K/UL — LOW (ref 3.8–10.5)

## 2024-03-14 PROCEDURE — 99233 SBSQ HOSP IP/OBS HIGH 50: CPT

## 2024-03-14 PROCEDURE — 99233 SBSQ HOSP IP/OBS HIGH 50: CPT | Mod: GC

## 2024-03-14 RX ORDER — SPIRONOLACTONE 25 MG/1
25 TABLET, FILM COATED ORAL DAILY
Refills: 0 | Status: DISCONTINUED | OUTPATIENT
Start: 2024-03-14 | End: 2024-03-20

## 2024-03-14 RX ORDER — METOPROLOL TARTRATE 50 MG
50 TABLET ORAL EVERY 6 HOURS
Refills: 0 | Status: DISCONTINUED | OUTPATIENT
Start: 2024-03-14 | End: 2024-03-15

## 2024-03-14 RX ORDER — DAPAGLIFLOZIN 10 MG/1
1 TABLET, FILM COATED ORAL
Qty: 30 | Refills: 0
Start: 2024-03-14 | End: 2024-04-12

## 2024-03-14 RX ADMIN — DAPAGLIFLOZIN 10 MILLIGRAM(S): 10 TABLET, FILM COATED ORAL at 17:18

## 2024-03-14 RX ADMIN — Medication 50 MILLIGRAM(S): at 23:44

## 2024-03-14 RX ADMIN — Medication 40 MILLIGRAM(S): at 15:31

## 2024-03-14 RX ADMIN — Medication 50 MILLIGRAM(S): at 17:18

## 2024-03-14 RX ADMIN — Medication 50 MILLIGRAM(S): at 05:54

## 2024-03-14 RX ADMIN — TAMSULOSIN HYDROCHLORIDE 0.4 MILLIGRAM(S): 0.4 CAPSULE ORAL at 21:37

## 2024-03-14 RX ADMIN — Medication 6 MILLIGRAM(S): at 21:37

## 2024-03-14 RX ADMIN — Medication 125 MICROGRAM(S): at 05:54

## 2024-03-14 RX ADMIN — ATORVASTATIN CALCIUM 80 MILLIGRAM(S): 80 TABLET, FILM COATED ORAL at 21:37

## 2024-03-14 RX ADMIN — APIXABAN 5 MILLIGRAM(S): 2.5 TABLET, FILM COATED ORAL at 05:55

## 2024-03-14 RX ADMIN — SPIRONOLACTONE 25 MILLIGRAM(S): 25 TABLET, FILM COATED ORAL at 15:31

## 2024-03-14 RX ADMIN — Medication 40 MILLIGRAM(S): at 05:54

## 2024-03-14 RX ADMIN — Medication 50 MILLIGRAM(S): at 11:14

## 2024-03-14 RX ADMIN — Medication 650 MILLIGRAM(S): at 15:30

## 2024-03-14 RX ADMIN — Medication 650 MILLIGRAM(S): at 16:30

## 2024-03-14 RX ADMIN — APIXABAN 5 MILLIGRAM(S): 2.5 TABLET, FILM COATED ORAL at 17:18

## 2024-03-14 NOTE — PROGRESS NOTE ADULT - SUBJECTIVE AND OBJECTIVE BOX
Patient is a 58y old  Male PMHx CHF, Afib, h/o Vtach s/p AICD, PATTIE, HLD, HTN, Anxiety and Asthma  who presents with a chief complaint of a fib with RVR (11 Mar 2024 14:04)      Subjective: Patient was seen and examined this morning at bedside.   Overnight events: none    REVIEW OF SYSTEMS:  CONSTITUTIONAL: No weakness, fevers or chills  EYES/ENT: No visual changes;  No vertigo or throat pain   NECK: No pain or stiffness  RESPIRATORY: No cough, wheezing, or hemoptysis; No shortness of breath  CARDIOVASCULAR: No chest pain or palpitations  GASTROINTESTINAL: No abdominal or epigastric pain. No nausea, vomiting; No diarrhea or constipation.   GENITOURINARY: No dysuria, frequency or hematuria  NEUROLOGICAL: No numbness or weakness  SKIN: No itching, burning, rashes, or lesions     Vital Signs Last 24 Hrs  T(C): 36.4 (14 Mar 2024 05:18), Max: 36.9 (13 Mar 2024 14:00)  T(F): 97.6 (14 Mar 2024 05:18), Max: 98.4 (13 Mar 2024 14:00)  HR: 72 (14 Mar 2024 05:18) (72 - 84)  BP: 106/67 (14 Mar 2024 05:18) (89/68 - 106/67)  BP(mean): --  RR: 16 (14 Mar 2024 05:18) (16 - 17)  SpO2: 94% (14 Mar 2024 05:18) (93% - 95%)    Parameters below as of 14 Mar 2024 05:18  Patient On (Oxygen Delivery Method): room air      PHYSICAL EXAM  Constitutional: Pt sitting up in bed, awake and alert, NAD  HEENT: EOMI, normocephalic, moist mucous membranes  Respiratory: CTABL, No wheezing, rales or rhonchi  Cardiovascular: S1S2+, irregular, tachycardic, no M/G/R  Gastrointestinal: BS+, soft, obese abdomen, nontender, nondistended, no guarding, no rebound  Extremities: +Pitting edema on b/l lower ext, No calf pain   Vascular: Peripheral pulses present  Neurological: AAOx3, no focal deficits  Musculoskeletal: Normal muscle tone, no atrophy, no rigidity, no contractions  Skin: +b/l feet xerosis, No significant new skin lesions or rashes    MEDICATIONS:  MEDICATIONS  (STANDING):  allopurinol 50 milliGRAM(s) Oral daily  apixaban 5 milliGRAM(s) Oral two times a day  AQUAPHOR (petrolatum Ointment) 1 Application(s) Topical daily  aspirin enteric coated 81 milliGRAM(s) Oral daily  atorvastatin 80 milliGRAM(s) Oral at bedtime  dapagliflozin 10 milliGRAM(s) Oral every 24 hours  dextrose 5%. 1000 milliLiter(s) (50 mL/Hr) IV Continuous <Continuous>  dextrose 5%. 1000 milliLiter(s) (100 mL/Hr) IV Continuous <Continuous>  dextrose 50% Injectable 12.5 Gram(s) IV Push once  dextrose 50% Injectable 25 Gram(s) IV Push once  dextrose 50% Injectable 25 Gram(s) IV Push once  digoxin     Tablet 125 MICROGram(s) Oral daily  furosemide    Tablet 40 milliGRAM(s) Oral two times a day  glucagon  Injectable 1 milliGRAM(s) IntraMuscular once  insulin lispro (ADMELOG) corrective regimen sliding scale   SubCutaneous three times a day before meals  insulin lispro (ADMELOG) corrective regimen sliding scale   SubCutaneous at bedtime  isosorbide   dinitrate Tablet (ISORDIL) 5 milliGRAM(s) Oral three times a day  melatonin 6 milliGRAM(s) Oral at bedtime  metoprolol tartrate 50 milliGRAM(s) Oral three times a day  tamsulosin 0.4 milliGRAM(s) Oral at bedtime    MEDICATIONS  (PRN):  acetaminophen     Tablet .. 650 milliGRAM(s) Oral every 6 hours PRN Temp greater or equal to 38C (100.4F), Mild Pain (1 - 3)  albuterol    90 MICROgram(s) HFA Inhaler 1 Puff(s) Inhalation every 4 hours PRN for bronchospasm  artificial  tears Solution 1 Drop(s) Both EYES three times a day PRN Dry Eyes  dextrose Oral Gel 15 Gram(s) Oral once PRN Blood Glucose LESS THAN 70 milliGRAM(s)/deciliter        LABS: All Labs Reviewed:         *pending AM labs               RADIOLOGY/EKG:    Xray Chest 1 View- PORTABLE-Urgent (03.05.24 @ 12:14)   IMPRESSION: Slightly more prominent parahilar interstitial markings since   previous exam. Pacer as before. Cardiomegaly. I would favor congestive   changes. Regional osseous structures appropriate for age.      NM Pulmonary Ventilation/Perfusion Scan (03.05.24 @ 14:59)   IMPRESSION: Very low probability of pulmonary embolus.      US Duplex Venous Lower Ext Complete, Bilateral (03.06.24 @ 17:32)   IMPRESSION:  No evidence of deep venous thrombosis in either lower extremity.      US Renal (03.07.24 @ 19:58)   IMPRESSION:  No renal mass, hydronephrosis or calculus is visualized sonographically.  A small right pleural effusion is partially visualized.       Patient is a 58y old  Male PMHx CHF, Afib, h/o Vtach s/p AICD, PATTIE, HLD, HTN, Anxiety and Asthma  who presents with a chief complaint of a fib with RVR (11 Mar 2024 14:04)      Subjective: Patient was seen and examined this morning at bedside. Pt reports feeling well with no chest discomfort at this time.  Overnight events: none    REVIEW OF SYSTEMS:  CONSTITUTIONAL: No weakness, fevers or chills  EYES/ENT: No visual changes;  No vertigo or throat pain   NECK: No pain or stiffness  RESPIRATORY: No cough, wheezing, or hemoptysis; No shortness of breath  CARDIOVASCULAR: No chest pain or palpitations  GASTROINTESTINAL: No abdominal or epigastric pain. No nausea, vomiting; No diarrhea or constipation.   GENITOURINARY: No dysuria, frequency or hematuria  NEUROLOGICAL: No numbness or weakness  SKIN: No itching, burning, rashes, or lesions     Vital Signs Last 24 Hrs  T(C): 36.4 (14 Mar 2024 05:18), Max: 36.9 (13 Mar 2024 14:00)  T(F): 97.6 (14 Mar 2024 05:18), Max: 98.4 (13 Mar 2024 14:00)  HR: 72 (14 Mar 2024 05:18) (72 - 84)  BP: 106/67 (14 Mar 2024 05:18) (89/68 - 106/67)  BP(mean): --  RR: 16 (14 Mar 2024 05:18) (16 - 17)  SpO2: 94% (14 Mar 2024 05:18) (93% - 95%)    Parameters below as of 14 Mar 2024 05:18  Patient On (Oxygen Delivery Method): room air      PHYSICAL EXAM  Constitutional: Pt sitting in chair, awake and alert, NAD  HEENT: EOMI, normocephalic, moist mucous membranes  Respiratory: CTABL, No wheezing, rales or rhonchi  Cardiovascular: S1S2+, irregular, tachycardic, no M/G/R  Gastrointestinal: BS+, soft, obese abdomen, nontender, nondistended, no guarding, no rebound  Extremities: +Pitting edema on b/l lower ext, No calf pain   Vascular: Peripheral pulses present  Neurological: AAOx3, no focal deficits  Musculoskeletal: Normal muscle tone, no atrophy, no rigidity, no contractions  Skin: +b/l feet xerosis, No significant new skin lesions or rashes    MEDICATIONS:  MEDICATIONS  (STANDING):  allopurinol 50 milliGRAM(s) Oral daily  apixaban 5 milliGRAM(s) Oral two times a day  AQUAPHOR (petrolatum Ointment) 1 Application(s) Topical daily  aspirin enteric coated 81 milliGRAM(s) Oral daily  atorvastatin 80 milliGRAM(s) Oral at bedtime  dapagliflozin 10 milliGRAM(s) Oral every 24 hours  dextrose 5%. 1000 milliLiter(s) (50 mL/Hr) IV Continuous <Continuous>  dextrose 5%. 1000 milliLiter(s) (100 mL/Hr) IV Continuous <Continuous>  dextrose 50% Injectable 12.5 Gram(s) IV Push once  dextrose 50% Injectable 25 Gram(s) IV Push once  dextrose 50% Injectable 25 Gram(s) IV Push once  digoxin     Tablet 125 MICROGram(s) Oral daily  furosemide    Tablet 40 milliGRAM(s) Oral two times a day  glucagon  Injectable 1 milliGRAM(s) IntraMuscular once  insulin lispro (ADMELOG) corrective regimen sliding scale   SubCutaneous three times a day before meals  insulin lispro (ADMELOG) corrective regimen sliding scale   SubCutaneous at bedtime  isosorbide   dinitrate Tablet (ISORDIL) 5 milliGRAM(s) Oral three times a day  melatonin 6 milliGRAM(s) Oral at bedtime  metoprolol tartrate 50 milliGRAM(s) Oral three times a day  tamsulosin 0.4 milliGRAM(s) Oral at bedtime    MEDICATIONS  (PRN):  acetaminophen     Tablet .. 650 milliGRAM(s) Oral every 6 hours PRN Temp greater or equal to 38C (100.4F), Mild Pain (1 - 3)  albuterol    90 MICROgram(s) HFA Inhaler 1 Puff(s) Inhalation every 4 hours PRN for bronchospasm  artificial  tears Solution 1 Drop(s) Both EYES three times a day PRN Dry Eyes  dextrose Oral Gel 15 Gram(s) Oral once PRN Blood Glucose LESS THAN 70 milliGRAM(s)/deciliter        LABS: All Labs Reviewed:                              13.5   3.75  )-----------( 189      ( 14 Mar 2024 06:47 )             43.4     03-14    139  |  101  |  36<H>  ----------------------------<  91  4.0   |  26  |  1.58<H>    Ca    9.1      14 Mar 2024 06:47  Phos  3.7     03-13  Mg     1.7     03-13    TPro  8.0  /  Alb  3.5  /  TBili  1.5<H>  /  DBili  x   /  AST  47<H>  /  ALT  60<H>  /  AlkPhos  127<H>  03-14    LIVER FUNCTIONS - ( 14 Mar 2024 06:47 )  Alb: 3.5 g/dL / Pro: 8.0 g/dL / ALK PHOS: 127 U/L / ALT: 60 U/L / AST: 47 U/L / GGT: x               RADIOLOGY/EKG:    Xray Chest 1 View- PORTABLE-Urgent (03.05.24 @ 12:14)   IMPRESSION: Slightly more prominent parahilar interstitial markings since   previous exam. Pacer as before. Cardiomegaly. I would favor congestive   changes. Regional osseous structures appropriate for age.      NM Pulmonary Ventilation/Perfusion Scan (03.05.24 @ 14:59)   IMPRESSION: Very low probability of pulmonary embolus.      US Duplex Venous Lower Ext Complete, Bilateral (03.06.24 @ 17:32)   IMPRESSION:  No evidence of deep venous thrombosis in either lower extremity.      US Renal (03.07.24 @ 19:58)   IMPRESSION:  No renal mass, hydronephrosis or calculus is visualized sonographically.  A small right pleural effusion is partially visualized.

## 2024-03-14 NOTE — PROGRESS NOTE ADULT - ASSESSMENT
58y old  Male PMHx CHF, Afib, h/o Vtach s/p AICD, PATTIE, HLD, HTN, Anxiety and Asthma,  who presents with respiratory distress and found to have Afib with RVR. Pt was admitted to ICU and downgraded to medicine on 3/11.      #Afib with RVR  -uncontrolled HR max 130s  -EKG shows Afib with RVR  -VQ scan shows low probability of pulmonary embolism  -s/p amiodarone drip, HR still elevated  -Pacemaker interrogated twice with no events  -hold entresto until renal function stabilizes  -c/w metoprolol 50mg tid with hold parameters SBP<90 and HR<60  -c/w digoxin 125 qd (renally dosed), currently at therapeutic level  -c/w aspirin  -c/w eliquis 5mg bid  -continue telemetry  -monitor digoxin level  -cardio consult appreciated: may potentially need cardioversion/ablation outpatient    #Acute on Chronic systolic CHF  -fluid overload improved s/p diuresis  -Trops neg, d-dimer elevated, probnp elevated  -Echo LVEF 20-25%, dilated LV cavity, severe left atrial enlargement, mild pHTN  -Cardio consult appreciated  -c/w lasix 40mg PO bid  -c/w isosorbide dinitrate 5mg tid  -c/w albuterol inhaler prn  -strict I&Os, daily weight  -PT eval appreciated: no PT needs    #Cardio renal MAYTE  -resolved  -Cr 2.84 on admission  -down trending Cr with improvement in eGFR  -US renal no hydronephrosis b/l  -avoid nephrotoxins  -continue with diuresis  -change to allopurinol 100mg to 50mg qd (renally dosed)  -Nephro consult appreciated    #hypomagnesemia  -resolved after repletion  -continue to monitor    #T2DM  -A1C 7.2%  -c/w mod ISS, accuchecks and hypoglycemia protocol    #Obesity with PATTIE  -c/w CPAP 4L qhs    #BPH  -c/w flomax 0.4 qhs    #HLD  -c/w atorvastatin 80mg qhs    #Xerosis  -c/w aquaphor ointment to b/l feet    #DVT ppx  -c/w eliquis    #FULL CODE      Dispo: medically active pend HR control, 24-48hr    Patient updated regarding plan of care at bedside.    Discussed with Dr. Umana  58y old  Male PMHx CHF, Afib, h/o Vtach s/p AICD, PATTIE, HLD, HTN, Anxiety and Asthma,  who presents with respiratory distress and found to have Afib with RVR. Pt was admitted to ICU and downgraded to medicine on 3/11.      #Afib with RVR  -uncontrolled HR max 130s  -EKG shows Afib with RVR  -VQ scan shows low probability of pulmonary embolism  -s/p amiodarone drip, HR still elevated  -Pacemaker interrogated twice with no events  -hold entresto until renal function stabilizes  -c/w metoprolol 50mg tid with hold parameters SBP<90 and HR<60  -c/w digoxin 125 qd (renally dosed), currently at therapeutic level  -c/w aspirin  -c/w eliquis 5mg bid  -continue telemetry  -monitor digoxin level  -cardio consult appreciated: may potentially need cardioversion/ablation outpatient    #Acute on Chronic systolic CHF  -fluid overload improved s/p diuresis  -Trops neg, d-dimer elevated, probnp elevated  -Echo LVEF 20-25%, dilated LV cavity, severe left atrial enlargement, mild pHTN  -Cardio consult appreciated  -c/w lasix 40mg PO bid  -c/w isosorbide dinitrate 5mg tid  -c/w albuterol inhaler prn  -strict I&Os, daily weight  -PT eval appreciated: no PT needs    #Cardio renal MAYTE  -resolved  -Cr 2.84 on admission  -down trending Cr with improvement in eGFR  -US renal no hydronephrosis b/l  -avoid nephrotoxins  -continue with diuresis  -change to allopurinol 100mg to 50mg qd (renally dosed)  -Nephro consult appreciated    #hypomagnesemia  -resolved after repletion  -continue to monitor    #T2DM  -A1C 7.2%  -c/w mod ISS, accuchecks and hypoglycemia protocol    #Obesity with PATTIE  -c/w CPAP 4L qhs    #BPH  -c/w flomax 0.4 qhs    #HLD  -c/w atorvastatin 80mg qhs    #Xerosis  -c/w aquaphor ointment to b/l feet    #DVT ppx  -c/w eliquis    #FULL CODE      Dispo: medically active pend HR control, 24-48hr    Patient updated regarding plan of care at bedside.    Discussed with Dr. Christopher  58y old  Male PMHx CHF, Afib, h/o Vtach s/p AICD, PATTIE, HLD, HTN, Anxiety and Asthma,  who presents with respiratory distress and found to have Afib with RVR. Pt was admitted to ICU and downgraded to medicine on 3/11.      #Afib with RVR  -uncontrolled HR, improving max 120s  -EKG shows Afib with RVR  -VQ scan shows low probability of pulmonary embolism  -s/p amiodarone drip, HR still elevated  -Pacemaker interrogated twice with no events  -hold entresto until renal function stabilizes  -increase metoprolol 50mg from q8h to q6h with hold parameters SBP<90 and HR<60  -start spironolactone 25mg qd  -c/w digoxin 125 qd (renally dosed), currently at therapeutic level  -c/w aspirin  -c/w eliquis 5mg bid  -continue telemetry  -monitor digoxin level  -cardio consult appreciated: may potentially need cardioversion/ablation outpatient    #Acute on Chronic systolic CHF  -fluid overload improved s/p diuresis  -Trops neg, d-dimer elevated, probnp elevated  -Echo LVEF 20-25%, dilated LV cavity, severe left atrial enlargement, mild pHTN  -Cardio consult appreciated  -c/w lasix 40mg PO bid  -c/w isosorbide dinitrate 5mg tid  -c/w albuterol inhaler prn  -c/w farxiga 10mg qd  -start spironolactone 25mg qd  -strict I&Os, daily weight  -PT eval appreciated: no PT needs    #Cardio renal MAYTE  -resolved  -Cr 2.84 on admission  -down trending Cr with improvement in eGFR  -US renal no hydronephrosis b/l  -avoid nephrotoxins  -continue with diuresis  -change to allopurinol 100mg to 50mg qd (renally dosed)  -Nephro consult appreciated    #hypomagnesemia  -resolved after repletion  -continue to monitor    #T2DM  -A1C 7.2%  -c/w mod ISS, accuchecks and hypoglycemia protocol    #Obesity with PATTIE  -c/w CPAP 4L qhs    #BPH  -c/w flomax 0.4 qhs    #HLD  -c/w atorvastatin 80mg qhs    #Xerosis  -c/w aquaphor ointment to b/l feet    #DVT ppx  -c/w eliquis    #FULL CODE      Dispo: medically active pend HR control, 24-48hr    Patient updated regarding plan of care at bedside. Pt's mother Radha was also updated via phone about plan of care and possible discharge once HR is controlled    Discussed with Dr. Christopher

## 2024-03-14 NOTE — PHARMACOTHERAPY INTERVENTION NOTE - COMMENTS
recommended obtaining repeat digoxin level 
patient does not have active prescription insurance coverage. discussed cost-effective options with Dr. Christopher for anticoagulation and heart failure medications. recommended sending medications to [facility].

## 2024-03-14 NOTE — PROGRESS NOTE ADULT - SUBJECTIVE AND OBJECTIVE BOX
No distress    Vital Signs Last 24 Hrs  T(C): 36.7 (03-14-24 @ 15:36), Max: 36.7 (03-14-24 @ 15:36)  T(F): 98 (03-14-24 @ 15:36), Max: 98 (03-14-24 @ 15:36)  HR: 62 (03-14-24 @ 15:36) (62 - 77)  BP: 100/58 (03-14-24 @ 15:36) (97/76 - 106/67)  RR: 16 (03-14-24 @ 15:36) (16 - 16)  SpO2: 94% (03-14-24 @ 15:36) (94% - 94%)    I&O's Detail    13 Mar 2024 07:01  -  14 Mar 2024 07:00  --------------------------------------------------------  OUT:    Voided (mL): 1300 mL  Total OUT: 1300 mL    14 Mar 2024 07:01  -  14 Mar 2024 21:19  --------------------------------------------------------  OUT:    Voided (mL): 500 mL  Total OUT: 500 mL    s1s2  b/l air entry  soft, ND  + edema                                                                                 13.5   3.75  )-----------( 189      ( 14 Mar 2024 06:47 )             43.4     14 Mar 2024 06:47    139    |  101    |  36     ----------------------------<  91     4.0     |  26     |  1.58     Ca    9.1        14 Mar 2024 06:47  Phos  3.7       13 Mar 2024 06:06  Mg     1.7       13 Mar 2024 06:06    TPro  8.0    /  Alb  3.5    /  TBili  1.5    /  DBili  x      /  AST  47     /  ALT  60     /  AlkPhos  127    14 Mar 2024 06:47    LIVER FUNCTIONS - ( 14 Mar 2024 06:47 )  Alb: 3.5 g/dL / Pro: 8.0 g/dL / ALK PHOS: 127 U/L / ALT: 60 U/L / AST: 47 U/L / GGT: x           acetaminophen     Tablet .. 650 milliGRAM(s) Oral every 6 hours PRN  albuterol    90 MICROgram(s) HFA Inhaler 1 Puff(s) Inhalation every 4 hours PRN  allopurinol 50 milliGRAM(s) Oral daily  apixaban 5 milliGRAM(s) Oral two times a day  AQUAPHOR (petrolatum Ointment) 1 Application(s) Topical daily  artificial  tears Solution 1 Drop(s) Both EYES three times a day PRN  aspirin enteric coated 81 milliGRAM(s) Oral daily  atorvastatin 80 milliGRAM(s) Oral at bedtime  dapagliflozin 10 milliGRAM(s) Oral every 24 hours  dextrose 5%. 1000 milliLiter(s) IV Continuous <Continuous>  dextrose 5%. 1000 milliLiter(s) IV Continuous <Continuous>  dextrose 50% Injectable 25 Gram(s) IV Push once  dextrose 50% Injectable 25 Gram(s) IV Push once  dextrose 50% Injectable 12.5 Gram(s) IV Push once  dextrose Oral Gel 15 Gram(s) Oral once PRN  digoxin     Tablet 125 MICROGram(s) Oral daily  furosemide    Tablet 40 milliGRAM(s) Oral two times a day  glucagon  Injectable 1 milliGRAM(s) IntraMuscular once  insulin lispro (ADMELOG) corrective regimen sliding scale   SubCutaneous three times a day before meals  insulin lispro (ADMELOG) corrective regimen sliding scale   SubCutaneous at bedtime  isosorbide   dinitrate Tablet (ISORDIL) 5 milliGRAM(s) Oral three times a day  melatonin 6 milliGRAM(s) Oral at bedtime  metoprolol tartrate 50 milliGRAM(s) Oral every 6 hours  spironolactone 25 milliGRAM(s) Oral daily  tamsulosin 0.4 milliGRAM(s) Oral at bedtime    A/P:    CM, EF 20 - 25%, mod MR, CHF, R Afib  Hemodynamic, cardio-renal MAYTE (Cr 1.04 - 10/9/23)  Volume overload, diuresis   Cr is improving   HR control   Renal SONO w/o hydro   F/u BMP w/addition of Farxiga, Aldactone   SPEP, serologies are negative  Will follow    [phone]

## 2024-03-14 NOTE — PROGRESS NOTE ADULT - SUBJECTIVE AND OBJECTIVE BOX
[facility]  [id number]      Chief Complaint: Acute on chronic systolic heart failure/ Atrial fibrillation     Interval History: The patient reports improvement in breathing. Denies chest pain. Reports palpitations overnight    Tele: atrial fibrillation 100-130s BPM    Current meds:   acetaminophen     Tablet .. 650 milliGRAM(s) Oral every 6 hours PRN  albuterol    90 MICROgram(s) HFA Inhaler 1 Puff(s) Inhalation every 4 hours PRN  allopurinol 50 milliGRAM(s) Oral daily  apixaban 5 milliGRAM(s) Oral two times a day  AQUAPHOR (petrolatum Ointment) 1 Application(s) Topical daily  artificial  tears Solution 1 Drop(s) Both EYES three times a day PRN  aspirin enteric coated 81 milliGRAM(s) Oral daily  atorvastatin 80 milliGRAM(s) Oral at bedtime  dextrose 5%. 1000 milliLiter(s) IV Continuous <Continuous>  dextrose 5%. 1000 milliLiter(s) IV Continuous <Continuous>  dextrose 50% Injectable 12.5 Gram(s) IV Push once  dextrose 50% Injectable 25 Gram(s) IV Push once  dextrose 50% Injectable 25 Gram(s) IV Push once  dextrose Oral Gel 15 Gram(s) Oral once PRN  digoxin     Tablet 125 MICROGram(s) Oral daily  furosemide    Tablet 40 milliGRAM(s) Oral two times a day  glucagon  Injectable 1 milliGRAM(s) IntraMuscular once  insulin lispro (ADMELOG) corrective regimen sliding scale   SubCutaneous three times a day before meals  insulin lispro (ADMELOG) corrective regimen sliding scale   SubCutaneous at bedtime  isosorbide   dinitrate Tablet (ISORDIL) 5 milliGRAM(s) Oral three times a day  melatonin 6 milliGRAM(s) Oral at bedtime  metoprolol tartrate 50 milliGRAM(s) Oral three times a day  tamsulosin 0.4 milliGRAM(s) Oral at bedtime      Objective:     Vital Signs:   T(C): 36.7 (03-13-24 @ 05:05), Max: 36.7 (03-13-24 @ 05:05)  HR: 71 (03-13-24 @ 05:05) (61 - 97)  BP: 111/80 (03-13-24 @ 05:05) (101/70 - 126/71)  RR: 16 (03-13-24 @ 05:05) (16 - 19)  SpO2: 95% (03-13-24 @ 05:05) (94% - 95%)  Wt(kg): --    Physical Exam:   General: morbidly obese, no acute distress  Neck: supple   CVS: JVP ~ 9 cm H20, irregularly irregular, s1, s2  Pulm: unlabored respirations, mostly clear to ausculation   Abd: obese  Ext: trace lower extremity edema b/l   Neuro: awake, alert and oriented   Psych: Normal affect      Labs:   13 Mar 2024 06:06    139    |  100    |  40     ----------------------------<  90     3.7     |  25     |  1.67     Ca    8.8        13 Mar 2024 06:06  Phos  3.7       13 Mar 2024 06:06  Mg     1.7       13 Mar 2024 06:06    TPro  7.9    /  Alb  3.5    /  TBili  1.6    /  DBili  x      /  AST  43     /  ALT  54     /  AlkPhos  149    13 Mar 2024 06:06                          13.3   4.47  )-----------( 176      ( 13 Mar 2024 06:06 )             41.5         TTE (2018):  LVEF 34%    TTE (3/2024):   1. Severely decreased global left ventricular systolic function.   2. Left ventricular ejection fraction, by visual estimation, is 20 to 25%.   3. The left ventricle endocardium is not well visualized, consider use   of IV ultrasonic enhancing agent to better evaluate endocardium, if   clinically indicated. Calculated LVEF by Simpsons Biplane Method 20%. Severe global left ventricle hypokinesis.   4. Severely increased left ventricular internal cavity size.   5. Dilated cardiomyopathy.   6. The mitral in-flow pattern reveals no discernable A-wave, therefore no comment on diastolic function can be made.   7. Mildly enlarged right ventricle with low normal right ventricle   systolic function.   8. Severely enlarged left atrium.   9. Right atrial enlargement.  10. Mild thickening of the anterior and posterior mitral valve leaflets.  11. Moderate mitral valve regurgitation.  12. Mild-moderate tricuspid regurgitation.  13. Mild aortic valve leaflet calcification. No aortic valve stenosis.  14. Mild aortic regurgitation.  15. Mild to moderate pulmonic valve regurgitation.  16. Top normal sized Aorta at the Sinuses of Valsalva.  17. Estimated pulmonary artery systolic pressure is 42.5 mmHg assuming a right atrial pressure of 15 mmHg, which is consistent with mild pulmonary hypertension.  18. There is no evidence of pericardial effusion.    ECG (3/5/24): atrial fibrillation with RVR, PVC

## 2024-03-14 NOTE — PROGRESS NOTE ADULT - ATTENDING COMMENTS
Please see resident note for full details regarding the service.     PE: A+Ox3, no murmurs, lungs CTA b/l, abd NT/ND, 2+ lower extremity swelling, no FND     Assessment:   - A fib with rapid ventricular response  - Acute on chronic systolic heart failure exacerbation CHFrEF (EF 20-25%)   - Acute renal injury likely cardiorenal syndrome   - Hypomagnesemia   - History of Non ischemic cardiomyopathy (LVEF 34% in 2018), history of Ventricular tachycardia, s/p AICD, Atrial fibrillation (on Eliquis), Hypertension, Type II Diabetes mellitus, Obstructive sleep apnea and Morbid obesity     Plan:   - Pt requires GDMT  - c/w Lasix 40 mg PO BID   - Pt started on Farxiga and Spironolactone   - HR remained above 110 today -> increase Metoprolol to Q6H   - c/w Digoxin (renally dosed)   - If renal function remains stable, will consider Entresto after discussing with nephrology   - Pt does not currently have insurance, Farxiga not covered and Eliquis may be difficult for pt to afford   - I spoke to Dr. Khoury, concerns about starting Entresto given renal function   - I spoke to Dr. Grimes, given elevated HR pt needs additional inpatient management at this time, if pt non-compliant with medications may also be non-compliant with cardiomems, does not require cardioversion/ablation/cath at this time   - Will speak to social work about getting pt Medicaid to see if medications can be covered   - PT - no skilled needs   - Dispo: anticipate 48-72 hours of continued care before returning home     I spent a total of 45 minutes on the date of this encounter coordinating the patient's care. This includes reviewing results/imaging and discussions with specialists, nursing, case management/social work. Further tests, medications, and procedures have been ordered as indicated. Results and the plan of care were communicated to the patient and/or their family member. Supporting documentation was completed and added to the patient's chart.

## 2024-03-14 NOTE — PROGRESS NOTE ADULT - ASSESSMENT
Assessment: 58 year old man with past medical history of Non ischemic cardiomyopathy (LVEF 34% in 2018), history of Ventricular tachycardia, s/p AICD, Atrial fibrillation (on Eliquis), Hypertension, Type II Diabetes mellitus, Obstructive sleep apnea and Morbid obesity who was brought in by EMS due to respiratory distress, found to have atrial fibrillation with rapid ventricular response and acute on chronic systolic heart failure exacerbation and acute renal injury.     ECG on admission consistent with atrial fibrillation with RVR, PVCs, left axis deviation. Troponins are not elevated, does not appear to be an acute coronary syndrome. Pro BNP elevated, CXR suggestive of congestion. D-dimer elevated, VQ scan with very low probability of pulmonary embolism. Echo consistent with LVEF 20-25%, dilated LV cavity, severe left atrial enlargement, moderate valvular disease and mild pulmonary hypertension.     Recommendations:  - Renal function improved with IV diuresis, patient transitioned to PO diuretic; Lasix 40 mg PO BID. pt started on farxiga and spirinolactone, may resume entresto  - Pt remains with AFib RVR at times, increase metoprolol to q6h dosing. continue with renally dosed digoxin  - PPM interrogated with no events

## 2024-03-14 NOTE — PROGRESS NOTE ADULT - NS ATTEND AMEND GEN_ALL_CORE FT
-  58-year-old man admitted secondary to respiratory distress secondary to acute exacerbation of chronic systolic heart failure.  Patient noted to be in rapid atrial fibrillation at time of admission.  Medical history includes nonischemic cardiomyopathy, history of ventricular tachycardia, status post ICD, atrial fibrillation on anticoagulation, hypertension, diabetes, obstructive sleep apnea and morbid obesity.  He is symptomatically improved, though still appears to be somewhat volume overloaded.  Echo consistent with dilated cardiomyopathy LVEF 20 to 25%, severe left atrial enlargement, moderate valvular heart disease.  He remains tachycardic.    Recommendations  -Continue diuresis with furosemide 40 mg twice daily.  -Guideline directed medical therapy is indicated.  -Patient now on Farxiga and spironolactone.  -Consider Entresto in near future if kidney status remains stable.  -Continue with metoprolol for rate control of atrial fibrillation.  -Cardiology will continue to follow.  -Discussed with patient, primary team and NP.

## 2024-03-15 LAB
ALBUMIN SERPL ELPH-MCNC: 3.5 G/DL — SIGNIFICANT CHANGE UP (ref 3.3–5)
ALP SERPL-CCNC: 123 U/L — HIGH (ref 40–120)
ALT FLD-CCNC: 52 U/L — HIGH (ref 10–45)
ANION GAP SERPL CALC-SCNC: 12 MMOL/L — SIGNIFICANT CHANGE UP (ref 5–17)
APTT BLD: 41.7 SEC — HIGH (ref 24.5–35.6)
AST SERPL-CCNC: 45 U/L — HIGH (ref 10–40)
BILIRUB SERPL-MCNC: 1.3 MG/DL — HIGH (ref 0.2–1.2)
BUN SERPL-MCNC: 34 MG/DL — HIGH (ref 7–23)
CALCIUM SERPL-MCNC: 9 MG/DL — SIGNIFICANT CHANGE UP (ref 8.4–10.5)
CHLORIDE SERPL-SCNC: 103 MMOL/L — SIGNIFICANT CHANGE UP (ref 96–108)
CO2 SERPL-SCNC: 24 MMOL/L — SIGNIFICANT CHANGE UP (ref 22–31)
CREAT SERPL-MCNC: 1.8 MG/DL — HIGH (ref 0.5–1.3)
EGFR: 44 ML/MIN/1.73M2 — LOW
GLUCOSE BLDC GLUCOMTR-MCNC: 103 MG/DL — HIGH (ref 70–99)
GLUCOSE BLDC GLUCOMTR-MCNC: 85 MG/DL — SIGNIFICANT CHANGE UP (ref 70–99)
GLUCOSE BLDC GLUCOMTR-MCNC: 87 MG/DL — SIGNIFICANT CHANGE UP (ref 70–99)
GLUCOSE BLDC GLUCOMTR-MCNC: 93 MG/DL — SIGNIFICANT CHANGE UP (ref 70–99)
GLUCOSE SERPL-MCNC: 93 MG/DL — SIGNIFICANT CHANGE UP (ref 70–99)
HCT VFR BLD CALC: 41.3 % — SIGNIFICANT CHANGE UP (ref 39–50)
HGB BLD-MCNC: 13.1 G/DL — SIGNIFICANT CHANGE UP (ref 13–17)
INR BLD: 1.35 RATIO — HIGH (ref 0.85–1.18)
MAGNESIUM SERPL-MCNC: 1.7 MG/DL — SIGNIFICANT CHANGE UP (ref 1.6–2.6)
MCHC RBC-ENTMCNC: 26 PG — LOW (ref 27–34)
MCHC RBC-ENTMCNC: 31.7 GM/DL — LOW (ref 32–36)
MCV RBC AUTO: 82.1 FL — SIGNIFICANT CHANGE UP (ref 80–100)
NRBC # BLD: 0 /100 WBCS — SIGNIFICANT CHANGE UP (ref 0–0)
NT-PROBNP SERPL-SCNC: 2075 PG/ML — HIGH (ref 0–300)
PHOSPHATE SERPL-MCNC: 3.9 MG/DL — SIGNIFICANT CHANGE UP (ref 2.5–4.5)
PLATELET # BLD AUTO: 201 K/UL — SIGNIFICANT CHANGE UP (ref 150–400)
POTASSIUM SERPL-MCNC: 4.3 MMOL/L — SIGNIFICANT CHANGE UP (ref 3.5–5.3)
POTASSIUM SERPL-SCNC: 4.3 MMOL/L — SIGNIFICANT CHANGE UP (ref 3.5–5.3)
PROT SERPL-MCNC: 7.9 G/DL — SIGNIFICANT CHANGE UP (ref 6–8.3)
PROTHROM AB SERPL-ACNC: 15.3 SEC — HIGH (ref 9.5–13)
RBC # BLD: 5.03 M/UL — SIGNIFICANT CHANGE UP (ref 4.2–5.8)
RBC # FLD: 16.4 % — HIGH (ref 10.3–14.5)
SODIUM SERPL-SCNC: 139 MMOL/L — SIGNIFICANT CHANGE UP (ref 135–145)
WBC # BLD: 4.2 K/UL — SIGNIFICANT CHANGE UP (ref 3.8–10.5)
WBC # FLD AUTO: 4.2 K/UL — SIGNIFICANT CHANGE UP (ref 3.8–10.5)

## 2024-03-15 PROCEDURE — 99233 SBSQ HOSP IP/OBS HIGH 50: CPT | Mod: GC

## 2024-03-15 PROCEDURE — 99233 SBSQ HOSP IP/OBS HIGH 50: CPT

## 2024-03-15 PROCEDURE — 93010 ELECTROCARDIOGRAM REPORT: CPT

## 2024-03-15 RX ORDER — METOPROLOL TARTRATE 50 MG
50 TABLET ORAL
Refills: 0 | Status: DISCONTINUED | OUTPATIENT
Start: 2024-03-15 | End: 2024-03-15

## 2024-03-15 RX ORDER — WARFARIN SODIUM 2.5 MG/1
5 TABLET ORAL ONCE
Refills: 0 | Status: COMPLETED | OUTPATIENT
Start: 2024-03-15 | End: 2024-03-15

## 2024-03-15 RX ORDER — MAGNESIUM SULFATE 500 MG/ML
1 VIAL (ML) INJECTION ONCE
Refills: 0 | Status: COMPLETED | OUTPATIENT
Start: 2024-03-15 | End: 2024-03-15

## 2024-03-15 RX ORDER — LISINOPRIL 2.5 MG/1
10 TABLET ORAL DAILY
Refills: 0 | Status: DISCONTINUED | OUTPATIENT
Start: 2024-03-15 | End: 2024-03-15

## 2024-03-15 RX ORDER — ALLOPURINOL 300 MG
100 TABLET ORAL DAILY
Refills: 0 | Status: DISCONTINUED | OUTPATIENT
Start: 2024-03-15 | End: 2024-03-21

## 2024-03-15 RX ORDER — METOPROLOL TARTRATE 50 MG
50 TABLET ORAL
Refills: 0 | Status: DISCONTINUED | OUTPATIENT
Start: 2024-03-15 | End: 2024-03-21

## 2024-03-15 RX ORDER — FUROSEMIDE 40 MG
40 TABLET ORAL
Refills: 0 | Status: DISCONTINUED | OUTPATIENT
Start: 2024-03-15 | End: 2024-03-20

## 2024-03-15 RX ADMIN — Medication 50 MILLIGRAM(S): at 06:21

## 2024-03-15 RX ADMIN — Medication 6 MILLIGRAM(S): at 21:44

## 2024-03-15 RX ADMIN — Medication 50 MILLIGRAM(S): at 17:04

## 2024-03-15 RX ADMIN — Medication 650 MILLIGRAM(S): at 11:34

## 2024-03-15 RX ADMIN — Medication 650 MILLIGRAM(S): at 12:14

## 2024-03-15 RX ADMIN — SPIRONOLACTONE 25 MILLIGRAM(S): 25 TABLET, FILM COATED ORAL at 06:21

## 2024-03-15 RX ADMIN — TAMSULOSIN HYDROCHLORIDE 0.4 MILLIGRAM(S): 0.4 CAPSULE ORAL at 21:44

## 2024-03-15 RX ADMIN — APIXABAN 5 MILLIGRAM(S): 2.5 TABLET, FILM COATED ORAL at 06:21

## 2024-03-15 RX ADMIN — Medication 40 MILLIGRAM(S): at 06:21

## 2024-03-15 RX ADMIN — Medication 1 APPLICATION(S): at 11:34

## 2024-03-15 RX ADMIN — Medication 40 MILLIGRAM(S): at 13:36

## 2024-03-15 RX ADMIN — Medication 125 MICROGRAM(S): at 06:21

## 2024-03-15 RX ADMIN — ATORVASTATIN CALCIUM 80 MILLIGRAM(S): 80 TABLET, FILM COATED ORAL at 21:44

## 2024-03-15 RX ADMIN — Medication 50 MILLIGRAM(S): at 21:44

## 2024-03-15 RX ADMIN — Medication 100 MILLIGRAM(S): at 21:44

## 2024-03-15 RX ADMIN — WARFARIN SODIUM 5 MILLIGRAM(S): 2.5 TABLET ORAL at 21:44

## 2024-03-15 RX ADMIN — Medication 50 MILLIGRAM(S): at 11:34

## 2024-03-15 RX ADMIN — Medication 81 MILLIGRAM(S): at 11:33

## 2024-03-15 RX ADMIN — Medication 100 GRAM(S): at 09:04

## 2024-03-15 NOTE — PROGRESS NOTE ADULT - ATTENDING COMMENTS
Please see resident note for full details regarding the service.     PE: A+Ox3, no murmurs, lungs CTA b/l, abd NT/ND, 2+ lower extremity swelling, no FND     Assessment:   - A fib with rapid ventricular response  - Acute on chronic systolic heart failure exacerbation CHFrEF (EF 20-25%)   - Acute renal injury likely cardiorenal syndrome   - Hypomagnesemia   - History of Non ischemic cardiomyopathy (LVEF 34% in 2018), history of Ventricular tachycardia, s/p AICD, Atrial fibrillation (on Eliquis), Hypertension, Type II Diabetes mellitus, Obstructive sleep apnea and Morbid obesity     Plan:   - I spoke to Dr. Grimes regarding pt today, adjusted medications   - HR remains labile -> change to metoprolol succinate 50 mg BID and plan to increase to 100 mg BID over the weekend depending on HR and BP   - Stop Farxiga as pt does not have insurance coverage, unable to order Entresto d/t cost, d/c ISDN   - Start Lisinopril 10 mg daily and can uptitrate to 40 mg (monitor renal function closely)   - Will change back to Lasix 40 mg IVP BID as pt still fluid overloaded with 2+ pitting edema and weight gain   - Pt states that he is compliant with medications but has not filled many prescriptions as per pharmacy, he is also unable to afford Eliquis   - We will start him on Warfarin 5 mg PO this evening and check INR in AM -> we will continue with 5 mg and he will need very close follow up outpatient for INR checks   - c/w renally dosed Digoxin   - PT - no skilled needs   - Dispo: anticipate 48 hours of continued care before returning home     I spent a total of 45 minutes on the date of this encounter coordinating the patient's care. This includes reviewing results/imaging and discussions with specialists, nursing, case management/social work. Further tests, medications, and procedures have been ordered as indicated. Results and the plan of care were communicated to the patient and/or their family member. Supporting documentation was completed and added to the patient's chart.

## 2024-03-15 NOTE — PROGRESS NOTE ADULT - NS ATTEND BILL GEN_ALL_CORE
Attending to bill
PA/NP to bill
Attending to bill
PA/NP to bill
Attending to bill

## 2024-03-15 NOTE — PROGRESS NOTE ADULT - SUBJECTIVE AND OBJECTIVE BOX
Patient is a 58y old  Male PMHx CHF, Afib, h/o Vtach s/p AICD, PATTIE, HLD, HTN, Anxiety and Asthma  who presents with a chief complaint of a fib with RVR (11 Mar 2024 14:04)      Subjective: Patient was seen and examined this morning at bedside. Pt reports feeling well with no chest discomfort at this time.  Overnight events: none    REVIEW OF SYSTEMS:  CONSTITUTIONAL: No weakness, fevers or chills  EYES/ENT: No visual changes;  No vertigo or throat pain   NECK: No pain or stiffness  RESPIRATORY: No cough, wheezing, or hemoptysis; No shortness of breath  CARDIOVASCULAR: No chest pain or palpitations  GASTROINTESTINAL: No abdominal or epigastric pain. No nausea, or vomiting; No diarrhea or constipation.   GENITOURINARY: No dysuria, frequency or hematuria  NEUROLOGICAL: No numbness or weakness  SKIN: No itching, burning, rashes, or lesions     Vital Signs Last 24 Hrs  T(C): 36.3 (15 Mar 2024 11:30), Max: 36.7 (14 Mar 2024 15:36)  T(F): 97.3 (15 Mar 2024 11:30), Max: 98 (14 Mar 2024 15:36)  HR: 72 (15 Mar 2024 11:30) (62 - 98)  BP: 90/68 (15 Mar 2024 11:30) (90/68 - 108/73)  BP(mean): 86 (15 Mar 2024 05:00) (86 - 86)  RR: 16 (15 Mar 2024 11:30) (16 - 18)  SpO2: 97% (15 Mar 2024 11:30) (93% - 97%)    Parameters below as of 15 Mar 2024 11:30  Patient On (Oxygen Delivery Method): room air      PHYSICAL EXAM  Constitutional: Pt sitting in chair, awake and alert, NAD  HEENT: EOMI, normocephalic, moist mucous membranes  Respiratory: CTABL, No wheezing, rales or rhonchi  Cardiovascular: S1S2+, irregular, tachycardic, no M/G/R  Gastrointestinal: BS+, soft, obese abdomen, nontender, nondistended, no guarding, no rebound  Extremities: +Pitting edema on b/l lower ext, No calf pain   Vascular: Peripheral pulses present  Neurological: AAOx3, no focal deficits  Musculoskeletal: Normal muscle tone, no atrophy, no rigidity, no contractions  Skin: +b/l feet xerosis, No significant new skin lesions or rashes    MEDICATIONS:  MEDICATIONS  (STANDING):  allopurinol 50 milliGRAM(s) Oral daily  apixaban 5 milliGRAM(s) Oral two times a day  AQUAPHOR (petrolatum Ointment) 1 Application(s) Topical daily  aspirin enteric coated 81 milliGRAM(s) Oral daily  atorvastatin 80 milliGRAM(s) Oral at bedtime  dapagliflozin 10 milliGRAM(s) Oral every 24 hours  dextrose 5%. 1000 milliLiter(s) (50 mL/Hr) IV Continuous <Continuous>  dextrose 5%. 1000 milliLiter(s) (100 mL/Hr) IV Continuous <Continuous>  dextrose 50% Injectable 12.5 Gram(s) IV Push once  dextrose 50% Injectable 25 Gram(s) IV Push once  dextrose 50% Injectable 25 Gram(s) IV Push once  digoxin     Tablet 125 MICROGram(s) Oral daily  furosemide    Tablet 40 milliGRAM(s) Oral two times a day  glucagon  Injectable 1 milliGRAM(s) IntraMuscular once  insulin lispro (ADMELOG) corrective regimen sliding scale   SubCutaneous three times a day before meals  insulin lispro (ADMELOG) corrective regimen sliding scale   SubCutaneous at bedtime  isosorbide   dinitrate Tablet (ISORDIL) 5 milliGRAM(s) Oral three times a day  melatonin 6 milliGRAM(s) Oral at bedtime  metoprolol tartrate 50 milliGRAM(s) Oral three times a day  tamsulosin 0.4 milliGRAM(s) Oral at bedtime    MEDICATIONS  (PRN):  acetaminophen     Tablet .. 650 milliGRAM(s) Oral every 6 hours PRN Temp greater or equal to 38C (100.4F), Mild Pain (1 - 3)  albuterol    90 MICROgram(s) HFA Inhaler 1 Puff(s) Inhalation every 4 hours PRN for bronchospasm  artificial  tears Solution 1 Drop(s) Both EYES three times a day PRN Dry Eyes  dextrose Oral Gel 15 Gram(s) Oral once PRN Blood Glucose LESS THAN 70 milliGRAM(s)/deciliter        LABS: All Labs Reviewed:                         13.1   4.20  )-----------( 201      ( 15 Mar 2024 06:20 )             41.3     03-15    139  |  103  |  34<H>  ----------------------------<  93  4.3   |  24  |  1.80<H>    Ca    9.0      15 Mar 2024 06:20  Phos  3.9     03-15  Mg     1.7     03-15    TPro  7.9  /  Alb  3.5  /  TBili  1.3<H>  /  DBili  x   /  AST  45<H>  /  ALT  52<H>  /  AlkPhos  123<H>  03-15    LIVER FUNCTIONS - ( 15 Mar 2024 06:20 )  Alb: 3.5 g/dL / Pro: 7.9 g/dL / ALK PHOS: 123 U/L / ALT: 52 U/L / AST: 45 U/L / GGT: x                         RADIOLOGY/EKG:    Xray Chest 1 View- PORTABLE-Urgent (03.05.24 @ 12:14)   IMPRESSION: Slightly more prominent parahilar interstitial markings since   previous exam. Pacer as before. Cardiomegaly. I would favor congestive   changes. Regional osseous structures appropriate for age.      NM Pulmonary Ventilation/Perfusion Scan (03.05.24 @ 14:59)   IMPRESSION: Very low probability of pulmonary embolus.      US Duplex Venous Lower Ext Complete, Bilateral (03.06.24 @ 17:32)   IMPRESSION:  No evidence of deep venous thrombosis in either lower extremity.      US Renal (03.07.24 @ 19:58)   IMPRESSION:  No renal mass, hydronephrosis or calculus is visualized sonographically.  A small right pleural effusion is partially visualized.

## 2024-03-15 NOTE — PROGRESS NOTE ADULT - NS ATTEND OPT1A GEN_ALL_CORE
History/Exam/Medical decision making
Exam/Medical decision making
History/Exam/Medical decision making
History/Exam/Medical decision making

## 2024-03-15 NOTE — PROGRESS NOTE ADULT - ASSESSMENT
Assessment: 58 year old man with past medical history of Non ischemic cardiomyopathy (LVEF 34% in 2018), history of Ventricular tachycardia, s/p AICD, Atrial fibrillation (on Eliquis), Hypertension, Type II Diabetes mellitus, Obstructive sleep apnea and Morbid obesity who was brought in by EMS due to respiratory distress, found to have atrial fibrillation with rapid ventricular response and acute on chronic systolic heart failure exacerbation and acute renal injury.     ECG on admission consistent with atrial fibrillation with RVR, PVCs, left axis deviation. Troponins are not elevated, does not appear to be an acute coronary syndrome. Pro BNP elevated, CXR suggestive of congestion. D-dimer elevated, VQ scan with very low probability of pulmonary embolism. Echo consistent with LVEF 20-25%, dilated LV cavity, severe left atrial enlargement, moderate valvular disease and mild pulmonary hypertension.     Recommendations:  - Renal function improved with IV diuresis, patient transitioned to PO diuretic; Lasix 40 mg PO BID. pt started on farxiga and spirinolactone, resume entresto when CR remains stable  - Pt remains with AFib RVR at times, increase metoprolol to q6h dosing. continue with renally dosed digoxin. pt remains rapid with an episode of NSVT overnight  - PPM interrogated with no events    ** note not complete

## 2024-03-15 NOTE — PROGRESS NOTE ADULT - NS ATTEND AMEND GEN_ALL_CORE FT
-  -  58-year-old man admitted with acute exacerbation of chronic systolic heart failure.  Patient noted to be in rapid atrial fibrillation at the time of admission.  His medical history includes nonischemic cardiomyopathy, atrial fibrillation on anticoagulation, history of ventricular tachycardia, status post ICD.  Medical issues include obesity, obstructive sleep apnea, diabetes and hypertension.  He remains tachycardic, fluid overloaded.  Telemetry with atrial fibrillation, intermittent rapid rates, and episode of nonsustained VT.  Echo is consistent with dilated cardiomyopathy, LVEF 20 to 25%, severe left atrial enlargement, moderate valvular heart disease.    Recommendations  -Resume furosemide 40 mg IV push daily.  -Transition to long-acting metoprolol.  Start metoprolol succinate 50 mg twice daily and uptitrate dose as tolerable by blood pressure.  -Start lisinopril 10 mg daily with close monitoring of renal function.  Patient will not be able to afford Entresto as an outpatient.  -Cardiology will continue to follow.  I will sign out for weekend cardiology team.  -Discussed with patient and with primary team.

## 2024-03-15 NOTE — PROGRESS NOTE ADULT - ASSESSMENT
58y old  Male PMHx CHF, Afib, h/o Vtach s/p AICD, PATTIE, HLD, HTN, Anxiety and Asthma,  who presents with respiratory distress and found to have Afib with RVR. Pt was admitted to ICU and downgraded to medicine on 3/11.      #Afib with RVR  -uncontrolled HR, improving max 120s  -EKG shows Afib with RVR  -VQ scan shows low probability of pulmonary embolism  -s/p amiodarone drip, HR still elevated  -Pacemaker interrogated twice with no events  -hold entresto until renal function stabilizes  -change metoprolol tartrate 50mg q6h to Metoprolol Succinate 50mg bid   -c/w spironolactone 25mg qd  -c/w digoxin 125 qd (renally dosed), currently at therapeutic level  -c/w aspirin  -c/w eliquis 5mg bid  -continue telemetry  -monitor digoxin level  -cardio consult appreciated: may potentially need cardioversion/ablation outpatient    #Acute on Chronic systolic CHF  -fluid overload improved s/p diuresis  -Trops neg, d-dimer elevated, probnp elevated  -Echo LVEF 20-25%, dilated LV cavity, severe left atrial enlargement, mild pHTN  -Cardio consult appreciated  -change lasix 40mg PO bid to Lasix 40mg IV bid due to current fluid overload with increase leg edema  -discontinue isosorbide dinitrate 5mg tid  -discontinue farxiga 10mg qd   -c/w albuterol inhaler prn  -c/w spironolactone 25mg qd  -strict I&Os, daily weight  -PT eval appreciated    #Cardio renal MAYTE  -Cr 2.84 on admission  -down trending Cr with improvement in eGFR  -US renal no hydronephrosis b/l  -avoid nephrotoxins  -continue with diuresis  -c/w allopurinol 50mg qd (renally dosed)  -Nephro consult appreciated    #hypomagnesemia  -resolved after repletion  -continue to monitor    #T2DM  -A1C 7.2%  -c/w mod ISS, accuchecks and hypoglycemia protocol    #Obesity with PATTIE  -c/w CPAP 4L qhs    #BPH  -c/w flomax 0.4 qhs    #HLD  -c/w atorvastatin 80mg qhs    #Xerosis  -c/w aquaphor ointment to b/l feet    #DVT ppx  -c/w eliquis    #FULL CODE      Dispo: medically active pend HR control, 24-48hr    Patient updated regarding plan of care at bedside.     Discussed with Dr. Christopher

## 2024-03-15 NOTE — PROGRESS NOTE ADULT - SUBJECTIVE AND OBJECTIVE BOX
[facility]  [id number]      Chief Complaint: Acute on chronic systolic heart failure/ Atrial fibrillation     Interval History: The patient reports improvement in breathing. Denies chest pain.   Tele: atrial fibrillation 100-120s BPM    Current meds:   acetaminophen     Tablet .. 650 milliGRAM(s) Oral every 6 hours PRN  albuterol    90 MICROgram(s) HFA Inhaler 1 Puff(s) Inhalation every 4 hours PRN  allopurinol 50 milliGRAM(s) Oral daily  apixaban 5 milliGRAM(s) Oral two times a day  AQUAPHOR (petrolatum Ointment) 1 Application(s) Topical daily  artificial  tears Solution 1 Drop(s) Both EYES three times a day PRN  aspirin enteric coated 81 milliGRAM(s) Oral daily  atorvastatin 80 milliGRAM(s) Oral at bedtime  dextrose 5%. 1000 milliLiter(s) IV Continuous <Continuous>  dextrose 5%. 1000 milliLiter(s) IV Continuous <Continuous>  dextrose 50% Injectable 12.5 Gram(s) IV Push once  dextrose 50% Injectable 25 Gram(s) IV Push once  dextrose 50% Injectable 25 Gram(s) IV Push once  dextrose Oral Gel 15 Gram(s) Oral once PRN  digoxin     Tablet 125 MICROGram(s) Oral daily  furosemide    Tablet 40 milliGRAM(s) Oral two times a day  glucagon  Injectable 1 milliGRAM(s) IntraMuscular once  insulin lispro (ADMELOG) corrective regimen sliding scale   SubCutaneous three times a day before meals  insulin lispro (ADMELOG) corrective regimen sliding scale   SubCutaneous at bedtime  isosorbide   dinitrate Tablet (ISORDIL) 5 milliGRAM(s) Oral three times a day  melatonin 6 milliGRAM(s) Oral at bedtime  metoprolol tartrate 50 milliGRAM(s) Oral three times a day  tamsulosin 0.4 milliGRAM(s) Oral at bedtime      Objective:     Vital Signs:   T(C): 36.7 (03-13-24 @ 05:05), Max: 36.7 (03-13-24 @ 05:05)  HR: 71 (03-13-24 @ 05:05) (61 - 97)  BP: 111/80 (03-13-24 @ 05:05) (101/70 - 126/71)  RR: 16 (03-13-24 @ 05:05) (16 - 19)  SpO2: 95% (03-13-24 @ 05:05) (94% - 95%)  Wt(kg): --    Physical Exam:   General: morbidly obese, no acute distress  Neck: supple   CVS: JVP ~ 9 cm H20, irregularly irregular, s1, s2  Pulm: unlabored respirations, mostly clear to ausculation   Abd: obese  Ext: trace lower extremity edema b/l   Neuro: awake, alert and oriented   Psych: Normal affect      Labs:   13 Mar 2024 06:06    139    |  100    |  40     ----------------------------<  90     3.7     |  25     |  1.67     Ca    8.8        13 Mar 2024 06:06  Phos  3.7       13 Mar 2024 06:06  Mg     1.7       13 Mar 2024 06:06    TPro  7.9    /  Alb  3.5    /  TBili  1.6    /  DBili  x      /  AST  43     /  ALT  54     /  AlkPhos  149    13 Mar 2024 06:06                          13.3   4.47  )-----------( 176      ( 13 Mar 2024 06:06 )             41.5         TTE (2018):  LVEF 34%    TTE (3/2024):   1. Severely decreased global left ventricular systolic function.   2. Left ventricular ejection fraction, by visual estimation, is 20 to 25%.   3. The left ventricle endocardium is not well visualized, consider use   of IV ultrasonic enhancing agent to better evaluate endocardium, if   clinically indicated. Calculated LVEF by Simpsons Biplane Method 20%. Severe global left ventricle hypokinesis.   4. Severely increased left ventricular internal cavity size.   5. Dilated cardiomyopathy.   6. The mitral in-flow pattern reveals no discernable A-wave, therefore no comment on diastolic function can be made.   7. Mildly enlarged right ventricle with low normal right ventricle   systolic function.   8. Severely enlarged left atrium.   9. Right atrial enlargement.  10. Mild thickening of the anterior and posterior mitral valve leaflets.  11. Moderate mitral valve regurgitation.  12. Mild-moderate tricuspid regurgitation.  13. Mild aortic valve leaflet calcification. No aortic valve stenosis.  14. Mild aortic regurgitation.  15. Mild to moderate pulmonic valve regurgitation.  16. Top normal sized Aorta at the Sinuses of Valsalva.  17. Estimated pulmonary artery systolic pressure is 42.5 mmHg assuming a right atrial pressure of 15 mmHg, which is consistent with mild pulmonary hypertension.  18. There is no evidence of pericardial effusion.    ECG (3/5/24): atrial fibrillation with RVR, PVC          [facility]  [id number]      Chief Complaint: Acute on chronic systolic heart failure/ Atrial fibrillation     Interval History: The patient reports improvement in breathing. Denies chest pain.   Tele: atrial fibrillation 100-120s BPM    Current meds:   acetaminophen     Tablet .. 650 milliGRAM(s) Oral every 6 hours PRN  albuterol    90 MICROgram(s) HFA Inhaler 1 Puff(s) Inhalation every 4 hours PRN  allopurinol 50 milliGRAM(s) Oral daily  apixaban 5 milliGRAM(s) Oral two times a day  AQUAPHOR (petrolatum Ointment) 1 Application(s) Topical daily  artificial  tears Solution 1 Drop(s) Both EYES three times a day PRN  aspirin enteric coated 81 milliGRAM(s) Oral daily  atorvastatin 80 milliGRAM(s) Oral at bedtime  dextrose 5%. 1000 milliLiter(s) IV Continuous <Continuous>  dextrose 5%. 1000 milliLiter(s) IV Continuous <Continuous>  dextrose 50% Injectable 12.5 Gram(s) IV Push once  dextrose 50% Injectable 25 Gram(s) IV Push once  dextrose 50% Injectable 25 Gram(s) IV Push once  dextrose Oral Gel 15 Gram(s) Oral once PRN  digoxin     Tablet 125 MICROGram(s) Oral daily  furosemide    Tablet 40 milliGRAM(s) Oral two times a day  glucagon  Injectable 1 milliGRAM(s) IntraMuscular once  insulin lispro (ADMELOG) corrective regimen sliding scale   SubCutaneous three times a day before meals  insulin lispro (ADMELOG) corrective regimen sliding scale   SubCutaneous at bedtime  isosorbide   dinitrate Tablet (ISORDIL) 5 milliGRAM(s) Oral three times a day  melatonin 6 milliGRAM(s) Oral at bedtime  metoprolol tartrate 50 milliGRAM(s) Oral three times a day  tamsulosin 0.4 milliGRAM(s) Oral at bedtime      Objective:     Vital Signs:   T(C): 36.7 (03-13-24 @ 05:05), Max: 36.7 (03-13-24 @ 05:05)  HR: 71 (03-13-24 @ 05:05) (61 - 97)  BP: 111/80 (03-13-24 @ 05:05) (101/70 - 126/71)  RR: 16 (03-13-24 @ 05:05) (16 - 19)  SpO2: 95% (03-13-24 @ 05:05) (94% - 95%)  Wt(kg): --    Physical Exam:   General: morbidly obese, no acute distress  Neck: supple   CVS: JVP ~ 9 cm H20, irregularly irregular, s1, s2  Pulm: unlabored respirations, mostly clear to ausculation   Abd: obese  Ext: trace lower extremity edema b/l   Neuro: awake, alert and oriented   Psych: Normal affect      Labs:   13 Mar 2024 06:06    139    |  100    |  40     ----------------------------<  90     3.7     |  25     |  1.67     Ca    8.8        13 Mar 2024 06:06  Phos  3.7       13 Mar 2024 06:06  Mg     1.7       13 Mar 2024 06:06    TPro  7.9    /  Alb  3.5    /  TBili  1.6    /  DBili  x      /  AST  43     /  ALT  54     /  AlkPhos  149    13 Mar 2024 06:06                          13.3   4.47  )-----------( 176      ( 13 Mar 2024 06:06 )             41.5         TTE (2018):  LVEF 34%    TTE (3/2024):   1. Severely decreased global left ventricular systolic function.   2. Left ventricular ejection fraction, by visual estimation, is 20 to 25%.   3. The left ventricle endocardium is not well visualized, consider use   of IV ultrasonic enhancing agent to better evaluate endocardium, if   clinically indicated. Calculated LVEF by Simpsons Biplane Method 20%. Severe global left ventricle hypokinesis.   4. Severely increased left ventricular internal cavity size.   5. Dilated cardiomyopathy.   6. The mitral in-flow pattern reveals no discernable A-wave, therefore no comment on diastolic function can be made.   7. Mildly enlarged right ventricle with low normal right ventricle   systolic function.   8. Severely enlarged left atrium.   9. Right atrial enlargement.  10. Mild thickening of the anterior and posterior mitral valve leaflets.  11. Moderate mitral valve regurgitation.  12. Mild-moderate tricuspid regurgitation.  13. Mild aortic valve leaflet calcification. No aortic valve stenosis.  14. Mild aortic regurgitation.  15. Mild to moderate pulmonic valve regurgitation.  16. Top normal sized Aorta at the Sinuses of Valsalva.  17. Estimated pulmonary artery systolic pressure is 42.5 mmHg assuming a right atrial pressure of 15 mmHg, which is consistent with mild pulmonary hypertension.  18. There is no evidence of pericardial effusion.    ECG (3/5/24): atrial fibrillation with RVR, PVC    Telemetry: Atrial fibrillation, rapid rates intermittently, nonsustained VT

## 2024-03-15 NOTE — PROGRESS NOTE ADULT - SUBJECTIVE AND OBJECTIVE BOX
No distress    Vital Signs Last 24 Hrs  T(C): 36.3 (03-15-24 @ 11:30), Max: 36.6 (03-14-24 @ 21:36)  T(F): 97.3 (03-15-24 @ 11:30), Max: 97.9 (03-14-24 @ 21:36)  HR: 72 (03-15-24 @ 11:30) (69 - 98)  BP: 90/68 (03-15-24 @ 11:30) (90/68 - 108/73)  BP(mean): 86 (03-15-24 @ 05:00) (86 - 86)  RR: 16 (03-15-24 @ 11:30) (16 - 18)  SpO2: 97% (03-15-24 @ 11:30) (93% - 97%)    I&O's Detail    14 Mar 2024 07:01  -  15 Mar 2024 07:00  --------------------------------------------------------  IN:    Oral Fluid: 720 mL  Total IN: 720 mL    OUT:    Voided (mL): 1500 mL  Total OUT: 1500 mL    s1s2  b/l air entry  soft, ND  + edema                                                                                         13.1   4.20  )-----------( 201      ( 15 Mar 2024 06:20 )             41.3     15 Mar 2024 06:20    139    |  103    |  34     ----------------------------<  93     4.3     |  24     |  1.80     Ca    9.0        15 Mar 2024 06:20  Phos  3.9       15 Mar 2024 06:20  Mg     1.7       15 Mar 2024 06:20    TPro  7.9    /  Alb  3.5    /  TBili  1.3    /  DBili  x      /  AST  45     /  ALT  52     /  AlkPhos  123    15 Mar 2024 06:20    LIVER FUNCTIONS - ( 15 Mar 2024 06:20 )  Alb: 3.5 g/dL / Pro: 7.9 g/dL / ALK PHOS: 123 U/L / ALT: 52 U/L / AST: 45 U/L / GGT: x           PT/INR - ( 15 Mar 2024 13:15 )   PT: 15.3 sec;   INR: 1.35 ratio      acetaminophen     Tablet .. 650 milliGRAM(s) Oral every 6 hours PRN  albuterol    90 MICROgram(s) HFA Inhaler 1 Puff(s) Inhalation every 4 hours PRN  allopurinol 100 milliGRAM(s) Oral daily  AQUAPHOR (petrolatum Ointment) 1 Application(s) Topical daily  artificial  tears Solution 1 Drop(s) Both EYES three times a day PRN  aspirin enteric coated 81 milliGRAM(s) Oral daily  atorvastatin 80 milliGRAM(s) Oral at bedtime  dextrose 5%. 1000 milliLiter(s) IV Continuous <Continuous>  dextrose 5%. 1000 milliLiter(s) IV Continuous <Continuous>  dextrose 50% Injectable 12.5 Gram(s) IV Push once  dextrose 50% Injectable 25 Gram(s) IV Push once  dextrose 50% Injectable 25 Gram(s) IV Push once  dextrose Oral Gel 15 Gram(s) Oral once PRN  digoxin     Tablet 125 MICROGram(s) Oral daily  furosemide   Injectable 40 milliGRAM(s) IV Push two times a day  glucagon  Injectable 1 milliGRAM(s) IntraMuscular once  insulin lispro (ADMELOG) corrective regimen sliding scale   SubCutaneous three times a day before meals  insulin lispro (ADMELOG) corrective regimen sliding scale   SubCutaneous at bedtime  melatonin 6 milliGRAM(s) Oral at bedtime  metoprolol succinate ER 50 milliGRAM(s) Oral two times a day  spironolactone 25 milliGRAM(s) Oral daily  tamsulosin 0.4 milliGRAM(s) Oral at bedtime  warfarin 5 milliGRAM(s) Oral once    A/P:    CM, EF 20 - 25%, mod MR, CHF, R Afib  Hemodynamic, cardio-renal MAYTE (Cr 1.04 - 10/9/23)  Volume overload, diuresis   Cr was improving, but now rising again  Would avoid ACE/ARB/Entresto/farxiga at this time    HR control   Renal SONO w/o hydro   F/u BMP, if Cr is rising, would d/c Aldactone   SPEP, serologies are negative  Will follow    [phone]

## 2024-03-15 NOTE — PROGRESS NOTE ADULT - NS ATTEND OPT1 GEN_ALL_CORE
I attest my time as attending is greater than 50% of the total combined time spent on qualifying patient care activities by the PA/NP and attending.
I independently performed the documented:
I attest my time as attending is greater than 50% of the total combined time spent on qualifying patient care activities by the PA/NP and attending.
I attest my time as attending is greater than 50% of the total combined time spent on qualifying patient care activities by the PA/NP and attending.
I independently performed the documented:

## 2024-03-16 LAB
ANION GAP SERPL CALC-SCNC: 13 MMOL/L — SIGNIFICANT CHANGE UP (ref 5–17)
BUN SERPL-MCNC: 30 MG/DL — HIGH (ref 7–23)
CALCIUM SERPL-MCNC: 9 MG/DL — SIGNIFICANT CHANGE UP (ref 8.4–10.5)
CHLORIDE SERPL-SCNC: 102 MMOL/L — SIGNIFICANT CHANGE UP (ref 96–108)
CO2 SERPL-SCNC: 27 MMOL/L — SIGNIFICANT CHANGE UP (ref 22–31)
CREAT SERPL-MCNC: 1.68 MG/DL — HIGH (ref 0.5–1.3)
EGFR: 47 ML/MIN/1.73M2 — LOW
GLUCOSE BLDC GLUCOMTR-MCNC: 116 MG/DL — HIGH (ref 70–99)
GLUCOSE BLDC GLUCOMTR-MCNC: 86 MG/DL — SIGNIFICANT CHANGE UP (ref 70–99)
GLUCOSE BLDC GLUCOMTR-MCNC: 88 MG/DL — SIGNIFICANT CHANGE UP (ref 70–99)
GLUCOSE BLDC GLUCOMTR-MCNC: 91 MG/DL — SIGNIFICANT CHANGE UP (ref 70–99)
GLUCOSE SERPL-MCNC: 86 MG/DL — SIGNIFICANT CHANGE UP (ref 70–99)
INR BLD: 1.19 RATIO — HIGH (ref 0.85–1.18)
POTASSIUM SERPL-MCNC: 3.7 MMOL/L — SIGNIFICANT CHANGE UP (ref 3.5–5.3)
POTASSIUM SERPL-SCNC: 3.7 MMOL/L — SIGNIFICANT CHANGE UP (ref 3.5–5.3)
PROTHROM AB SERPL-ACNC: 13.8 SEC — HIGH (ref 9.5–13)
SODIUM SERPL-SCNC: 142 MMOL/L — SIGNIFICANT CHANGE UP (ref 135–145)

## 2024-03-16 PROCEDURE — 99233 SBSQ HOSP IP/OBS HIGH 50: CPT | Mod: GC

## 2024-03-16 PROCEDURE — 99233 SBSQ HOSP IP/OBS HIGH 50: CPT

## 2024-03-16 RX ORDER — APIXABAN 2.5 MG/1
5 TABLET, FILM COATED ORAL EVERY 12 HOURS
Refills: 0 | Status: DISCONTINUED | OUTPATIENT
Start: 2024-03-16 | End: 2024-03-21

## 2024-03-16 RX ORDER — AMIODARONE HYDROCHLORIDE 400 MG/1
200 TABLET ORAL
Refills: 0 | Status: DISCONTINUED | OUTPATIENT
Start: 2024-03-19 | End: 2024-03-21

## 2024-03-16 RX ORDER — AMIODARONE HYDROCHLORIDE 400 MG/1
400 TABLET ORAL EVERY 12 HOURS
Refills: 0 | Status: COMPLETED | OUTPATIENT
Start: 2024-03-16 | End: 2024-03-19

## 2024-03-16 RX ORDER — AMIODARONE HYDROCHLORIDE 400 MG/1
TABLET ORAL
Refills: 0 | Status: DISCONTINUED | OUTPATIENT
Start: 2024-03-16 | End: 2024-03-21

## 2024-03-16 RX ORDER — METOPROLOL TARTRATE 50 MG
2.5 TABLET ORAL ONCE
Refills: 0 | Status: COMPLETED | OUTPATIENT
Start: 2024-03-16 | End: 2024-03-16

## 2024-03-16 RX ADMIN — Medication 50 MILLIGRAM(S): at 17:34

## 2024-03-16 RX ADMIN — Medication 81 MILLIGRAM(S): at 11:53

## 2024-03-16 RX ADMIN — Medication 2.5 MILLIGRAM(S): at 02:28

## 2024-03-16 RX ADMIN — Medication 100 MILLIGRAM(S): at 11:52

## 2024-03-16 RX ADMIN — Medication 40 MILLIGRAM(S): at 05:32

## 2024-03-16 RX ADMIN — APIXABAN 5 MILLIGRAM(S): 2.5 TABLET, FILM COATED ORAL at 17:31

## 2024-03-16 RX ADMIN — Medication 50 MILLIGRAM(S): at 05:40

## 2024-03-16 RX ADMIN — SPIRONOLACTONE 25 MILLIGRAM(S): 25 TABLET, FILM COATED ORAL at 05:32

## 2024-03-16 RX ADMIN — ATORVASTATIN CALCIUM 80 MILLIGRAM(S): 80 TABLET, FILM COATED ORAL at 21:29

## 2024-03-16 RX ADMIN — AMIODARONE HYDROCHLORIDE 400 MILLIGRAM(S): 400 TABLET ORAL at 19:52

## 2024-03-16 RX ADMIN — TAMSULOSIN HYDROCHLORIDE 0.4 MILLIGRAM(S): 0.4 CAPSULE ORAL at 21:29

## 2024-03-16 RX ADMIN — Medication 125 MICROGRAM(S): at 05:32

## 2024-03-16 RX ADMIN — Medication 1 APPLICATION(S): at 11:52

## 2024-03-16 RX ADMIN — Medication 6 MILLIGRAM(S): at 21:29

## 2024-03-16 RX ADMIN — Medication 40 MILLIGRAM(S): at 14:42

## 2024-03-16 NOTE — PROGRESS NOTE ADULT - SUBJECTIVE AND OBJECTIVE BOX
HPI:  58 y.o male with pmh a fib on eliquis, HTN, CHF, HLD, AICD , anxiety, asthma presented to ED for c/o chest pain and palps found to be in rapid a fib.  (05 Mar 2024 17:27)      SUBJECTIVE:   Patient seen and Examined at bedside.     REVIEW OF SYSTEMS:  CONSTITUTIONAL: No weakness, fevers or chills  EYES/ENT: No visual changes;  No vertigo or throat pain   NECK: No pain or stiffness  RESPIRATORY: No cough, wheezing, hemoptysis; No shortness of breath  CARDIOVASCULAR: No chest pain or palpitations  GASTROINTESTINAL: No abdominal or epigastric pain. No nausea, vomiting, or hematemesis; No diarrhea or constipation. No melena or hematochezia.  GENITOURINARY: No dysuria, frequency or hematuria  NEUROLOGICAL: No numbness or weakness  SKIN: No itching, burning, rashes, or lesions   All other review of systems is negative unless indicated above    Vital Signs Last 24 Hrs  T(C): 36.9 (16 Mar 2024 05:00), Max: 36.9 (16 Mar 2024 05:00)  T(F): 98.4 (16 Mar 2024 05:00), Max: 98.4 (16 Mar 2024 05:00)  HR: 83 (16 Mar 2024 05:00) (68 - 83)  BP: 105/74 (16 Mar 2024 05:00) (90/68 - 120/71)  BP(mean): 84 (16 Mar 2024 05:00) (84 - 84)  RR: 17 (16 Mar 2024 05:00) (16 - 18)  SpO2: 91% (16 Mar 2024 05:00) (91% - 97%)    Parameters below as of 16 Mar 2024 05:00  Patient On (Oxygen Delivery Method): room air        I&O's Summary      CAPILLARY BLOOD GLUCOSE      POCT Blood Glucose.: 91 mg/dL (16 Mar 2024 08:06)  POCT Blood Glucose.: 93 mg/dL (15 Mar 2024 21:37)  POCT Blood Glucose.: 87 mg/dL (15 Mar 2024 16:51)  POCT Blood Glucose.: 103 mg/dL (15 Mar 2024 12:12)      PHYSICAL EXAM:  Constitutional: NAD, awake and alert, well-developed  HEENT: PERR, EOMI, Normal Hearing, MMM  Neck: Soft and supple, No LAD, No JVD  Respiratory: Breath sounds are clear bilaterally, No wheezing, rales or rhonchi  Cardiovascular: S1 and S2, regular rate and rhythm, no Murmurs, gallops or rubs  Gastrointestinal: Bowel Sounds present, soft, nontender, nondistended, no guarding, no rebound  Extremities: No peripheral edema  Vascular: 2+ peripheral pulses  Neurological: A/O x 3, no focal deficits  Musculoskeletal: 5/5 strength b/l upper and lower extremities  Skin: No rashes    MEDICATIONS:  MEDICATIONS  (STANDING):  allopurinol 100 milliGRAM(s) Oral daily  AQUAPHOR (petrolatum Ointment) 1 Application(s) Topical daily  aspirin enteric coated 81 milliGRAM(s) Oral daily  atorvastatin 80 milliGRAM(s) Oral at bedtime  dextrose 5%. 1000 milliLiter(s) (50 mL/Hr) IV Continuous <Continuous>  dextrose 5%. 1000 milliLiter(s) (100 mL/Hr) IV Continuous <Continuous>  dextrose 50% Injectable 12.5 Gram(s) IV Push once  dextrose 50% Injectable 25 Gram(s) IV Push once  dextrose 50% Injectable 25 Gram(s) IV Push once  digoxin     Tablet 125 MICROGram(s) Oral daily  furosemide   Injectable 40 milliGRAM(s) IV Push two times a day  glucagon  Injectable 1 milliGRAM(s) IntraMuscular once  insulin lispro (ADMELOG) corrective regimen sliding scale   SubCutaneous three times a day before meals  insulin lispro (ADMELOG) corrective regimen sliding scale   SubCutaneous at bedtime  melatonin 6 milliGRAM(s) Oral at bedtime  metoprolol succinate ER 50 milliGRAM(s) Oral two times a day  spironolactone 25 milliGRAM(s) Oral daily  tamsulosin 0.4 milliGRAM(s) Oral at bedtime      LABS: All Labs Reviewed:                        13.1   4.20  )-----------( 201      ( 15 Mar 2024 06:20 )             41.3     03-15    139  |  103  |  34<H>  ----------------------------<  93  4.3   |  24  |  1.80<H>    Ca    9.0      15 Mar 2024 06:20  Phos  3.9     03-15  Mg     1.7     03-15    TPro  7.9  /  Alb  3.5  /  TBili  1.3<H>  /  DBili  x   /  AST  45<H>  /  ALT  52<H>  /  AlkPhos  123<H>  03-15    PT/INR - ( 16 Mar 2024 05:43 )   PT: 13.8 sec;   INR: 1.19 ratio         PTT - ( 15 Mar 2024 13:15 )  PTT:41.7 sec      Blood Culture:     RADIOLOGY/EKG:   HPI:  58 y.o male with pmh a fib on eliquis, HTN, CHF, HLD, AICD , anxiety, asthma presented to ED for c/o chest pain and palps found to be in rapid a fib.  (05 Mar 2024 17:27)      SUBJECTIVE:   3/16: Patient seen and Examined at bedside. Patient has no acute complaints at this time. Patient states he called the insurance company with regards to [facility] and they told him they are working on it but nothing can be done until Monday.       Vital Signs Last 24 Hrs  T(C): 36.9 (16 Mar 2024 05:00), Max: 36.9 (16 Mar 2024 05:00)  T(F): 98.4 (16 Mar 2024 05:00), Max: 98.4 (16 Mar 2024 05:00)  HR: 83 (16 Mar 2024 05:00) (68 - 83)  BP: 105/74 (16 Mar 2024 05:00) (90/68 - 120/71)  BP(mean): 84 (16 Mar 2024 05:00) (84 - 84)  RR: 17 (16 Mar 2024 05:00) (16 - 18)  SpO2: 91% (16 Mar 2024 05:00) (91% - 97%)    Parameters below as of 16 Mar 2024 05:00  Patient On (Oxygen Delivery Method): room air        I&O's Summary      CAPILLARY BLOOD GLUCOSE      POCT Blood Glucose.: 91 mg/dL (16 Mar 2024 08:06)  POCT Blood Glucose.: 93 mg/dL (15 Mar 2024 21:37)  POCT Blood Glucose.: 87 mg/dL (15 Mar 2024 16:51)  POCT Blood Glucose.: 103 mg/dL (15 Mar 2024 12:12)      PHYSICAL EXAM:  Constitutional: Pt sitting in chair, awake and alert, NAD  HEENT: EOMI, normocephalic, moist mucous membranes  Respiratory: CTABL, No wheezing, rales or rhonchi  Cardiovascular: S1S2+, irregular, tachycardic, no M/G/R  Gastrointestinal: BS+, soft, obese abdomen, nontender, nondistended, no guarding, no rebound  Extremities: +Pitting edema on b/l lower ext, No calf pain   Vascular: Peripheral pulses present  Neurological: AAOx3, no focal deficits  Musculoskeletal: Normal muscle tone, no atrophy, no rigidity, no contractions  Skin: +b/l feet xerosis, No significant new skin lesions or rashes      MEDICATIONS:  MEDICATIONS  (STANDING):  allopurinol 100 milliGRAM(s) Oral daily  AQUAPHOR (petrolatum Ointment) 1 Application(s) Topical daily  aspirin enteric coated 81 milliGRAM(s) Oral daily  atorvastatin 80 milliGRAM(s) Oral at bedtime  dextrose 5%. 1000 milliLiter(s) (50 mL/Hr) IV Continuous <Continuous>  dextrose 5%. 1000 milliLiter(s) (100 mL/Hr) IV Continuous <Continuous>  dextrose 50% Injectable 12.5 Gram(s) IV Push once  dextrose 50% Injectable 25 Gram(s) IV Push once  dextrose 50% Injectable 25 Gram(s) IV Push once  digoxin     Tablet 125 MICROGram(s) Oral daily  furosemide   Injectable 40 milliGRAM(s) IV Push two times a day  glucagon  Injectable 1 milliGRAM(s) IntraMuscular once  insulin lispro (ADMELOG) corrective regimen sliding scale   SubCutaneous three times a day before meals  insulin lispro (ADMELOG) corrective regimen sliding scale   SubCutaneous at bedtime  melatonin 6 milliGRAM(s) Oral at bedtime  metoprolol succinate ER 50 milliGRAM(s) Oral two times a day  spironolactone 25 milliGRAM(s) Oral daily  tamsulosin 0.4 milliGRAM(s) Oral at bedtime      LABS: All Labs Reviewed:                        13.1   4.20  )-----------( 201      ( 15 Mar 2024 06:20 )             41.3     03-15    139  |  103  |  34<H>  ----------------------------<  93  4.3   |  24  |  1.80<H>    Ca    9.0      15 Mar 2024 06:20  Phos  3.9     03-15  Mg     1.7     03-15    TPro  7.9  /  Alb  3.5  /  TBili  1.3<H>  /  DBili  x   /  AST  45<H>  /  ALT  52<H>  /  AlkPhos  123<H>  03-15    PT/INR - ( 16 Mar 2024 05:43 )   PT: 13.8 sec;   INR: 1.19 ratio         PTT - ( 15 Mar 2024 13:15 )  PTT:41.7 sec      Blood Culture:     RADIOLOGY/EKG:  < from: Xray Chest 1 View- PORTABLE-Urgent (03.05.24 @ 12:14) >  IMPRESSION: Slightly more prominent parahilar interstitial markings since   previous exam. Pacer as before. Cardiomegaly. I would favor congestive   changes. Regional osseous structures appropriate for age.    < end of copied text >  < from: NM Pulmonary Ventilation/Perfusion Scan (03.05.24 @ 14:59) >  IMPRESSION: Very low probability of pulmonary embolus.    < end of copied text >  < from: US Duplex Venous Lower Ext Complete, Bilateral (03.06.24 @ 17:32) >  IMPRESSION:  No evidence of deep venous thrombosis in either lower extremity.    < end of copied text >

## 2024-03-16 NOTE — PROGRESS NOTE ADULT - SUBJECTIVE AND OBJECTIVE BOX
esFollow up for chf/af  SUBJ: patient remains in persisent af with rvr and has been in af ONLY SINCE 12/21/2023. He offers no complaints  PMH  Cardiomyopathy    Asthma    Anxiety    Atrial fibrillation, unspecified type    Acute combined systolic and diastolic congestive heart failure    Essential hypertension    Hyperlipidemia, unspecified hyperlipidemia type    Prediabetes        Cardiomyopathy, unspecified type    Chronic combined systolic and diastolic congestive heart failure    AICD (automatic cardioverter/defibrillator) present    Hypertension    Hyperlipidemia    CHF (congestive heart failure)    Afib    PATTIE (obstructive sleep apnea)        MEDICATIONS  (STANDING):  allopurinol 100 milliGRAM(s) Oral daily  AQUAPHOR (petrolatum Ointment) 1 Application(s) Topical daily  aspirin enteric coated 81 milliGRAM(s) Oral daily  atorvastatin 80 milliGRAM(s) Oral at bedtime  dextrose 5%. 1000 milliLiter(s) (50 mL/Hr) IV Continuous <Continuous>  dextrose 5%. 1000 milliLiter(s) (100 mL/Hr) IV Continuous <Continuous>  dextrose 50% Injectable 25 Gram(s) IV Push once  dextrose 50% Injectable 12.5 Gram(s) IV Push once  dextrose 50% Injectable 25 Gram(s) IV Push once  digoxin     Tablet 125 MICROGram(s) Oral daily  furosemide   Injectable 40 milliGRAM(s) IV Push two times a day  glucagon  Injectable 1 milliGRAM(s) IntraMuscular once  insulin lispro (ADMELOG) corrective regimen sliding scale   SubCutaneous three times a day before meals  insulin lispro (ADMELOG) corrective regimen sliding scale   SubCutaneous at bedtime  melatonin 6 milliGRAM(s) Oral at bedtime  metoprolol succinate ER 50 milliGRAM(s) Oral two times a day  spironolactone 25 milliGRAM(s) Oral daily  tamsulosin 0.4 milliGRAM(s) Oral at bedtime    MEDICATIONS  (PRN):  acetaminophen     Tablet .. 650 milliGRAM(s) Oral every 6 hours PRN Temp greater or equal to 38C (100.4F), Mild Pain (1 - 3)  albuterol    90 MICROgram(s) HFA Inhaler 1 Puff(s) Inhalation every 4 hours PRN for bronchospasm  artificial  tears Solution 1 Drop(s) Both EYES three times a day PRN Dry Eyes  dextrose Oral Gel 15 Gram(s) Oral once PRN Blood Glucose LESS THAN 70 milliGRAM(s)/deciliter        PHYSICAL EXAM:  Vital Signs Last 24 Hrs  T(C): 36.8 (16 Mar 2024 13:07), Max: 36.9 (16 Mar 2024 05:00)  T(F): 98.3 (16 Mar 2024 13:07), Max: 98.4 (16 Mar 2024 05:00)  HR: 81 (16 Mar 2024 13:07) (68 - 83)  BP: 102/66 (16 Mar 2024 13:07) (102/66 - 120/71)  BP(mean): 84 (16 Mar 2024 05:00) (84 - 84)  RR: 17 (16 Mar 2024 13:07) (17 - 18)  SpO2: 94% (16 Mar 2024 13:07) (91% - 95%)    Parameters below as of 16 Mar 2024 13:07  Patient On (Oxygen Delivery Method): room air        GENERAL: NAD, well-groomed, well-developed  HEAD:  Atraumatic, Normocephalic  EYES: EOMI, PERRLA, conjunctiva and sclera clear  ENT: Moist mucous membranes,  NECK: Supple, No JVD, no bruits  CHEST/LUNG: Clear to percussion bilaterally; No rales, rhonchi, wheezing, or rubs  HEART: irregularly irregular  ABDOMEN: Soft, Nontender, Nondistended; Bowel sounds present  EXTREMITIES:  2+ Peripheral Pulses, No clubbing, cyanosis, or edema  SKIN: No rashes or lesions  NERVOUS SYSTEM:  Alert & Oriented X3, Good concentration; Motor Strength 5/5 B/L upper and lower extremities; DTRs 2+ intact and symmetric      TELEMETRY:af with rvr rate 115-130        LABS:                        13.1   4.20  )-----------( 201      ( 15 Mar 2024 06:20 )             41.3     03-16    142  |  102  |  30<H>  ----------------------------<  86  3.7   |  27  |  1.68<H>    Ca    9.0      16 Mar 2024 05:43  Phos  3.9     03-15  Mg     1.7     03-15    TPro  7.9  /  Alb  3.5  /  TBili  1.3<H>  /  DBili  x   /  AST  45<H>  /  ALT  52<H>  /  AlkPhos  123<H>  03-15        PT/INR - ( 16 Mar 2024 05:43 )   PT: 13.8 sec;   INR: 1.19 ratio         PTT - ( 15 Mar 2024 13:15 )  PTT:41.7 sec    I&O's Summary    BNP    RADIOLOGY & ADDITIONAL STUDIES:    ECHO:

## 2024-03-16 NOTE — PROVIDER CONTACT NOTE (OTHER) - SITUATION
Tele tech called and stated pt was sustaining a HR in the 140s-150s. Patient was asymptomatic and stated that he was just coughing. When I checked his HR was 132 and /70.
pts bp 105/74. per resident, give morning heart meds

## 2024-03-16 NOTE — PROGRESS NOTE ADULT - ATTENDING COMMENTS
Please see resident note for full details regarding the service.     PE: A+Ox3, no murmurs, lungs CTA b/l, abd NT/ND, 2+ lower extremity swelling, no FND     Assessment:   - A fib with rapid ventricular response  - Acute on chronic systolic heart failure exacerbation CHFrEF (EF 20-25%)   - Acute renal injury likely cardiorenal syndrome   - Hypomagnesemia   - History of Non ischemic cardiomyopathy (LVEF 34% in 2018), history of Ventricular tachycardia, s/p AICD, Atrial fibrillation (on Eliquis), Hypertension, Type II Diabetes mellitus, Obstructive sleep apnea and Morbid obesity     Plan:   - Cardiology recs appreciated - restart Eliquis 5 mg BID, d/c ASA, start Amiodarone 400 mg PO Q12H x 6 doses and switch to 200 BID, consider transfer for cardioversion next week after being on Eliquis and Amiodarone for 4 days, digoxin level Monday   - Will repeat CXR   - HR improved today, will keep on Metoprolol succinate 50 mg BID for now   - c/w Lisinopril 10 mg daily for now (BP low/normal), lasix 40 mg IVP BID, renally dosed Digoxin   - Pt does not have prescription insurance coverage at this time (states that he can call his insurance Monday and try to reinstate it?) unable to order Farxiga, Entresto, Eliquis d/t cost   - There are also concerns regarding compliance with medications   - PT - no skilled needs   - Dispo: will f/u with cardiology regarding recommendations, start Amiodarone load and plan to transfer for cardioversion      I spent a total of 45 minutes on the date of this encounter coordinating the patient's care. This includes reviewing results/imaging and discussions with specialists, nursing, case management/social work. Further tests, medications, and procedures have been ordered as indicated. Results and the plan of care were communicated to the patient and/or their family member. Supporting documentation was completed and added to the patient's chart. Please see resident note for full details regarding the service.     PE: A+Ox3, no murmurs, lungs CTA b/l, abd NT/ND, 2+ lower extremity swelling, no FND     Assessment:   - A fib with rapid ventricular response  - Acute on chronic systolic heart failure exacerbation CHFrEF (EF 20-25%)   - Acute renal injury likely cardiorenal syndrome   - Hypomagnesemia   - History of Non ischemic cardiomyopathy (LVEF 34% in 2018), history of Ventricular tachycardia, s/p AICD, Atrial fibrillation (on Eliquis), Hypertension, Type II Diabetes mellitus, Obstructive sleep apnea and Morbid obesity     Plan:   - Cardiology recs appreciated - restart Eliquis 5 mg BID, d/c ASA, start Amiodarone 400 mg PO Q12H x 6 doses and switch to 200 BID, consider transfer for cardioversion next week (Thursday or Friday) after being on Eliquis and Amiodarone for 4 days, digoxin level Monday   - Will repeat CXR   - HR improved today, will keep on Metoprolol succinate 50 mg BID for now   - c/w Lisinopril 10 mg daily for now (BP low/normal), lasix 40 mg IVP BID, renally dosed Digoxin   - Pt does not have prescription insurance coverage at this time (states that he can call his insurance Monday and try to reinstate it?) unable to order Farxiga, Entresto, Eliquis d/t cost   - There are also concerns regarding compliance with medications   - PT - no skilled needs   - I made Dr. Márquez aware that I attested his note by accident   - Dispo: will f/u with cardiology regarding recommendations, start Amiodarone load and plan to transfer for cardioversion later in the week     I spent a total of 45 minutes on the date of this encounter coordinating the patient's care. This includes reviewing results/imaging and discussions with specialists, nursing, case management/social work. Further tests, medications, and procedures have been ordered as indicated. Results and the plan of care were communicated to the patient and/or their family member. Supporting documentation was completed and added to the patient's chart.

## 2024-03-16 NOTE — PROGRESS NOTE ADULT - ATTENDING COMMENTS
Please see resident note for full details regarding the service.     PE: A+Ox3, no murmurs, lungs CTA b/l, abd NT/ND, 2+ lower extremity swelling, no FND     Assessment:   - A fib with rapid ventricular response  - Acute on chronic systolic heart failure exacerbation CHFrEF (EF 20-25%)   - Acute renal injury likely cardiorenal syndrome   - Hypomagnesemia   - History of Non ischemic cardiomyopathy (LVEF 34% in 2018), history of Ventricular tachycardia, s/p AICD, Atrial fibrillation (on Eliquis), Hypertension, Type II Diabetes mellitus, Obstructive sleep apnea and Morbid obesity     Plan:   - Cardiology recs appreciated - restart Eliquis 5 mg BID, d/c ASA, start Amiodarone 400 mg PO Q12H x 6 doses and switch to 200 BID, consider transfer for cardioversion next week after being on Eliquis and Amiodarone for 4 days, digoxin level Monday   - Will repeat CXR   - HR improved today, will keep on Metoprolol succinate 50 mg BID for now   - c/w Lisinopril 10 mg daily for now (BP low/normal), lasix 40 mg IVP BID, renally dosed Digoxin   - Pt does not have prescription insurance coverage at this time (states that he can call his insurance Monday and try to reinstate it?) unable to order Farxiga, Entresto, Eliquis d/t cost   - There are also concerns regarding complicance with medications   - PT - no skilled needs   - Dispo: will f/u with cardiology regarding recommendations, start Amiodarone load and plan to transfer for cardioversion      I spent a total of 45 minutes on the date of this encounter coordinating the patient's care. This includes reviewing results/imaging and discussions with specialists, nursing, case management/social work. Further tests, medications, and procedures have been ordered as indicated. Results and the plan of care were communicated to the patient and/or their family member. Supporting documentation was completed and added to the patient's chart.

## 2024-03-16 NOTE — PROGRESS NOTE ADULT - ASSESSMENT
58y old  Male PMHx CHF, Afib, h/o Vtach s/p AICD, PATTIE, HLD, HTN, Anxiety and Asthma,  who presents with respiratory distress and found to have Afib with RVR. Pt was admitted to ICU and downgraded to medicine on 3/11.    #Afib with RVR  -uncontrolled HR, improving max 120s  -EKG shows Afib with RVR  -VQ scan shows low probability of pulmonary embolism  -s/p amiodarone drip, HR still elevated  -Pacemaker interrogated twice with no events  -hold entresto until renal function stabilizes  -change metoprolol tartrate 50mg q6h to Metoprolol Succinate 50mg bid   -c/w spironolactone 25mg qd  -c/w digoxin 125 qd (renally dosed), currently at therapeutic level  -c/w aspirin  -c/w eliquis 5mg bid  -continue telemetry  -monitor digoxin level  -cardio consult appreciated: transfer for Cardioversion next week, restart apixaban 5mg BID, Amiodorone 400mg Q12, then switch to 200 BID, Digoxin level to be ordered for Monday.     #Acute on Chronic systolic CHF  -fluid overload improved s/p diuresis  -Trops neg, d-dimer elevated, probnp elevated  -Echo LVEF 20-25%, dilated LV cavity, severe left atrial enlargement, mild pHTN  -Cardio consult appreciated  -change lasix 40mg PO bid to Lasix 40mg IV bid due to current fluid overload with increase leg edema  -discontinue isosorbide dinitrate 5mg tid  -discontinue farxiga 10mg qd   -c/w albuterol inhaler prn  -c/w spironolactone 25mg qd  -strict I&Os, daily weight  -PT eval appreciated    #Cardio renal MAYTE  -Cr 2.84 on admission  -down trending Cr with improvement in eGFR  -US renal no hydronephrosis b/l  -avoid nephrotoxins  -continue with diuresis  -c/w allopurinol 50mg qd (renally dosed)  -Nephro consult appreciated    #hypomagnesemia  -resolved after repletion  -continue to monitor    #T2DM  -A1C 7.2%  -c/w mod ISS, accuchecks and hypoglycemia protocol    #Obesity with PATTIE  -c/w CPAP 4L qhs    #BPH  -c/w flomax 0.4 qhs    #HLD  -c/w atorvastatin 80mg qhs    #Xerosis  -c/w aquaphor ointment to b/l feet    #DVT ppx  -c/w eliquis    #FULL CODE      Dispo: medically active pend HR control, 24-48hr    Patient updated regarding plan of care at bedside.     Discussed with Dr. Christopher    58y old  Male PMHx CHF, Afib, h/o Vtach s/p AICD, PATTIE, HLD, HTN, Anxiety and Asthma,  who presents with respiratory distress and found to have Afib with RVR. Pt was admitted to ICU and downgraded to medicine on 3/11.    #Afib with RVR  -uncontrolled HR, improving max 120s  -EKG shows Afib with RVR  -VQ scan shows low probability of pulmonary embolism  -s/p amiodarone drip, HR still elevated  -Pacemaker interrogated twice with no events  -hold entresto until renal function stabilizes  -change metoprolol tartrate 50mg q6h to Metoprolol Succinate 50mg bid   -c/w spironolactone 25mg qd  -c/w digoxin 125 qd (renally dosed), currently at therapeutic level  -c/w aspirin  -c/w eliquis 5mg bid  -continue telemetry  -monitor digoxin level  -cardio consult appreciated: transfer for Cardioversion next week, restart apixaban 5mg BID, amiodarone 400mg Q12, then switch to 200 BID, Digoxin level to be ordered for Monday.   - f/u repeat CXR, if improved, decrease to Lasix 40 IV QD    #Acute on Chronic systolic CHF  -fluid overload improved s/p diuresis  -Trops neg, d-dimer elevated, probnp elevated  -Echo LVEF 20-25%, dilated LV cavity, severe left atrial enlargement, mild pHTN  -Cardio consult appreciated  -change lasix 40mg PO bid to Lasix 40mg IV bid due to current fluid overload with increase leg edema  -discontinue isosorbide dinitrate 5mg tid  -discontinue farxiga 10mg qd   -c/w albuterol inhaler prn  -c/w spironolactone 25mg qd  -strict I&Os, daily weight  -PT eval appreciated    #Cardio renal MAYTE  -Cr 2.84 on admission  -down trending Cr with improvement in eGFR  -US renal no hydronephrosis b/l  -avoid nephrotoxins  -continue with diuresis  -c/w allopurinol 50mg qd (renally dosed)  -Nephro consult appreciated    #hypomagnesemia  -resolved after repletion  -continue to monitor    #T2DM  -A1C 7.2%  -c/w mod ISS, accuchecks and hypoglycemia protocol    #Obesity with PATTIE  -c/w CPAP 4L qhs    #BPH  -c/w flomax 0.4 qhs    #HLD  -c/w atorvastatin 80mg qhs    #Xerosis  -c/w aquaphor ointment to b/l feet    #DVT ppx  -c/w eliquis    #FULL CODE      Dispo: Transfer next week for cardioversion after amiodarone loading.     Patient updated regarding plan of care at bedside.     Discussed with Dr. Christopher

## 2024-03-17 LAB
ANION GAP SERPL CALC-SCNC: 15 MMOL/L — SIGNIFICANT CHANGE UP (ref 5–17)
BUN SERPL-MCNC: 32 MG/DL — HIGH (ref 7–23)
CALCIUM SERPL-MCNC: 9.2 MG/DL — SIGNIFICANT CHANGE UP (ref 8.4–10.5)
CHLORIDE SERPL-SCNC: 101 MMOL/L — SIGNIFICANT CHANGE UP (ref 96–108)
CO2 SERPL-SCNC: 23 MMOL/L — SIGNIFICANT CHANGE UP (ref 22–31)
CREAT SERPL-MCNC: 1.66 MG/DL — HIGH (ref 0.5–1.3)
EGFR: 48 ML/MIN/1.73M2 — LOW
GLUCOSE BLDC GLUCOMTR-MCNC: 105 MG/DL — HIGH (ref 70–99)
GLUCOSE BLDC GLUCOMTR-MCNC: 82 MG/DL — SIGNIFICANT CHANGE UP (ref 70–99)
GLUCOSE BLDC GLUCOMTR-MCNC: 84 MG/DL — SIGNIFICANT CHANGE UP (ref 70–99)
GLUCOSE BLDC GLUCOMTR-MCNC: 90 MG/DL — SIGNIFICANT CHANGE UP (ref 70–99)
GLUCOSE SERPL-MCNC: 88 MG/DL — SIGNIFICANT CHANGE UP (ref 70–99)
HCT VFR BLD CALC: 41.1 % — SIGNIFICANT CHANGE UP (ref 39–50)
HGB BLD-MCNC: 13.1 G/DL — SIGNIFICANT CHANGE UP (ref 13–17)
MCHC RBC-ENTMCNC: 25.6 PG — LOW (ref 27–34)
MCHC RBC-ENTMCNC: 31.9 GM/DL — LOW (ref 32–36)
MCV RBC AUTO: 80.3 FL — SIGNIFICANT CHANGE UP (ref 80–100)
NRBC # BLD: 0 /100 WBCS — SIGNIFICANT CHANGE UP (ref 0–0)
PLATELET # BLD AUTO: 202 K/UL — SIGNIFICANT CHANGE UP (ref 150–400)
POTASSIUM SERPL-MCNC: 3.7 MMOL/L — SIGNIFICANT CHANGE UP (ref 3.5–5.3)
POTASSIUM SERPL-SCNC: 3.7 MMOL/L — SIGNIFICANT CHANGE UP (ref 3.5–5.3)
RBC # BLD: 5.12 M/UL — SIGNIFICANT CHANGE UP (ref 4.2–5.8)
RBC # FLD: 16.4 % — HIGH (ref 10.3–14.5)
SODIUM SERPL-SCNC: 139 MMOL/L — SIGNIFICANT CHANGE UP (ref 135–145)
WBC # BLD: 4.25 K/UL — SIGNIFICANT CHANGE UP (ref 3.8–10.5)
WBC # FLD AUTO: 4.25 K/UL — SIGNIFICANT CHANGE UP (ref 3.8–10.5)

## 2024-03-17 PROCEDURE — 99233 SBSQ HOSP IP/OBS HIGH 50: CPT | Mod: GC

## 2024-03-17 PROCEDURE — 99233 SBSQ HOSP IP/OBS HIGH 50: CPT

## 2024-03-17 RX ADMIN — Medication 40 MILLIGRAM(S): at 05:37

## 2024-03-17 RX ADMIN — ATORVASTATIN CALCIUM 80 MILLIGRAM(S): 80 TABLET, FILM COATED ORAL at 22:06

## 2024-03-17 RX ADMIN — APIXABAN 5 MILLIGRAM(S): 2.5 TABLET, FILM COATED ORAL at 18:03

## 2024-03-17 RX ADMIN — APIXABAN 5 MILLIGRAM(S): 2.5 TABLET, FILM COATED ORAL at 05:38

## 2024-03-17 RX ADMIN — TAMSULOSIN HYDROCHLORIDE 0.4 MILLIGRAM(S): 0.4 CAPSULE ORAL at 22:06

## 2024-03-17 RX ADMIN — Medication 50 MILLIGRAM(S): at 05:37

## 2024-03-17 RX ADMIN — Medication 50 MILLIGRAM(S): at 18:03

## 2024-03-17 RX ADMIN — Medication 40 MILLIGRAM(S): at 13:26

## 2024-03-17 RX ADMIN — Medication 125 MICROGRAM(S): at 05:38

## 2024-03-17 RX ADMIN — Medication 6 MILLIGRAM(S): at 22:06

## 2024-03-17 RX ADMIN — AMIODARONE HYDROCHLORIDE 400 MILLIGRAM(S): 400 TABLET ORAL at 18:03

## 2024-03-17 RX ADMIN — Medication 100 MILLIGRAM(S): at 13:03

## 2024-03-17 RX ADMIN — Medication 1 APPLICATION(S): at 13:04

## 2024-03-17 RX ADMIN — AMIODARONE HYDROCHLORIDE 400 MILLIGRAM(S): 400 TABLET ORAL at 09:16

## 2024-03-17 RX ADMIN — SPIRONOLACTONE 25 MILLIGRAM(S): 25 TABLET, FILM COATED ORAL at 05:37

## 2024-03-17 NOTE — PROGRESS NOTE ADULT - ASSESSMENT
impression:  1.  Patient with tachycardia induced myopathy A-fib somewhat improved with addition of amiodarone.    Plan:  1.  For now continue loading dose of amiodarone  2.  Await results of chest x-ray  3.  Daily renal profile  4.  If patient does not convert to sinus rhythm will need to be cardioverted.  This cannot occur prior to  loading of amiodarone

## 2024-03-17 NOTE — PROGRESS NOTE ADULT - ASSESSMENT
58y old  Male PMHx CHF, Afib, h/o Vtach s/p AICD, PATTIE, HLD, HTN, Anxiety and Asthma,  who presents with respiratory distress and found to have Afib with RVR. Pt was admitted to ICU and downgraded to medicine on 3/11.    #Afib with RVR  -uncontrolled HR, improving max 120s  -EKG shows Afib with RVR  -VQ scan shows low probability of pulmonary embolism  -s/p amiodarone drip, HR still elevated  -Pacemaker interrogated twice with no events  -hold entresto until renal function stabilizes  -change metoprolol tartrate 50mg q6h to Metoprolol Succinate 50mg bid   -c/w spironolactone 25mg qd  -c/w digoxin 125 qd (renally dosed), currently at therapeutic level  -c/w aspirin  -c/w eliquis 5mg bid  -continue telemetry  -monitor digoxin level  -cardio consult appreciated: transfer for Cardioversion next week, restart apixaban 5mg BID, amiodarone 400mg Q12, then switch to 200 BID, Digoxin level to be ordered for Monday.   - f/u repeat CXR, if improved, decrease to Lasix 40 IV QD    #Acute on Chronic systolic CHF  -fluid overload improving s/p diuresis  -Trops neg, d-dimer elevated, probnp elevated  -Echo LVEF 20-25%, dilated LV cavity, severe left atrial enlargement, mild pHTN  -Cardio consult appreciated  -c/w Lasix 40mg IV bid due to current fluid overload with increase leg edema  -c/w albuterol inhaler prn  -c/w spironolactone 25mg qd  -strict I&Os, daily weight  -PT eval appreciated    #Cardio renal MAYTE  -Cr 2.84 on admission  -down trending Cr with improvement in eGFR  -US renal no hydronephrosis b/l  -avoid nephrotoxins  -continue with diuresis  -c/w allopurinol 50mg qd (renally dosed)  -Nephro consult appreciated    #hypomagnesemia  -resolved after repletion  -continue to monitor    #T2DM  -A1C 7.2%  -c/w mod ISS, accuchecks and hypoglycemia protocol    #Obesity with PATTIE  -c/w CPAP 4L qhs    #BPH  -c/w flomax 0.4 qhs    #HLD  -c/w atorvastatin 80mg qhs    #Xerosis  -c/w aquaphor ointment to b/l feet    #DVT ppx  -c/w eliquis    #FULL CODE      Dispo: Transfer next week for cardioversion after amiodarone loading.     Patient updated regarding plan of care at bedside.     Discussed with Dr. Christopher

## 2024-03-17 NOTE — PROGRESS NOTE ADULT - SUBJECTIVE AND OBJECTIVE BOX
Patient is a 59y/o Male PMHx CHF, Afib, h/o Vtach s/p AICD, PATITE, HLD, HTN, Anxiety and Asthma who presents with a chief complaint of a fib with RVR (17 Mar 2024 15:56)      Subjective: Patient was seen and examined this morning at bedside. Pt reports feeling ok and denies SOB or chest pain.  Overnight events: none    REVIEW OF SYSTEMS:  CONSTITUTIONAL: No weakness, fevers or chills  EYES/ENT: No visual changes;  No vertigo or throat pain   NECK: No pain or stiffness  RESPIRATORY: No cough, wheezing, hemoptysis; No shortness of breath  CARDIOVASCULAR: No chest pain or palpitations  GASTROINTESTINAL: No abdominal or epigastric pain. No nausea, vomiting; No diarrhea or constipation.   GENITOURINARY: No dysuria, frequency or hematuria  NEUROLOGICAL: No numbness or weakness  SKIN: No itching, burning, rashes, or lesions       Vital Signs Last 24 Hrs  T(C): 36.8 (17 Mar 2024 13:05), Max: 37.1 (16 Mar 2024 17:00)  T(F): 98.2 (17 Mar 2024 13:05), Max: 98.7 (16 Mar 2024 17:00)  HR: 87 (17 Mar 2024 13:05) (70 - 125)  BP: 118/86 (17 Mar 2024 13:05) (93/68 - 124/86)  BP(mean): 70 (16 Mar 2024 18:27) (70 - 70)  RR: 18 (17 Mar 2024 13:05) (16 - 18)  SpO2: 95% (17 Mar 2024 13:05) (91% - 95%)    Parameters below as of 17 Mar 2024 13:05  Patient On (Oxygen Delivery Method): room air        PHYSICAL EXAM  Constitutional: Pt sitting in chair, awake and alert, NAD  HEENT: EOMI, normocephalic, moist mucous membranes  Respiratory: CTABL, No wheezing, rales or rhonchi  Cardiovascular: S1S2+, irregular, tachycardic, no M/G/R  Gastrointestinal: BS+, soft, obese abdomen, nontender, nondistended, no guarding, no rebound  Extremities: +Pitting edema on b/l lower ext, No calf pain   Vascular: Peripheral pulses present  Neurological: AAOx3, no focal deficits  Musculoskeletal: Normal muscle tone, no atrophy, no rigidity, no contractions  Skin: +b/l feet xerosis, No significant new skin lesions or rashes    MEDICATIONS:  MEDICATIONS  (STANDING):  allopurinol 100 milliGRAM(s) Oral daily  aMIOdarone    Tablet   Oral   aMIOdarone    Tablet 400 milliGRAM(s) Oral every 12 hours  apixaban 5 milliGRAM(s) Oral every 12 hours  AQUAPHOR (petrolatum Ointment) 1 Application(s) Topical daily  atorvastatin 80 milliGRAM(s) Oral at bedtime  dextrose 5%. 1000 milliLiter(s) (50 mL/Hr) IV Continuous <Continuous>  dextrose 5%. 1000 milliLiter(s) (100 mL/Hr) IV Continuous <Continuous>  dextrose 50% Injectable 12.5 Gram(s) IV Push once  dextrose 50% Injectable 25 Gram(s) IV Push once  dextrose 50% Injectable 25 Gram(s) IV Push once  digoxin     Tablet 125 MICROGram(s) Oral daily  furosemide   Injectable 40 milliGRAM(s) IV Push two times a day  glucagon  Injectable 1 milliGRAM(s) IntraMuscular once  insulin lispro (ADMELOG) corrective regimen sliding scale   SubCutaneous three times a day before meals  insulin lispro (ADMELOG) corrective regimen sliding scale   SubCutaneous at bedtime  melatonin 6 milliGRAM(s) Oral at bedtime  metoprolol succinate ER 50 milliGRAM(s) Oral two times a day  spironolactone 25 milliGRAM(s) Oral daily  tamsulosin 0.4 milliGRAM(s) Oral at bedtime    MEDICATIONS  (PRN):  acetaminophen     Tablet .. 650 milliGRAM(s) Oral every 6 hours PRN Temp greater or equal to 38C (100.4F), Mild Pain (1 - 3)  albuterol    90 MICROgram(s) HFA Inhaler 1 Puff(s) Inhalation every 4 hours PRN for bronchospasm  artificial  tears Solution 1 Drop(s) Both EYES three times a day PRN Dry Eyes  dextrose Oral Gel 15 Gram(s) Oral once PRN Blood Glucose LESS THAN 70 milliGRAM(s)/deciliter      LABS: All Labs Reviewed:                        13.1   4.25  )-----------( 202      ( 17 Mar 2024 06:56 )             41.1     03-17    139  |  101  |  32<H>  ----------------------------<  88  3.7   |  23  |  1.66<H>    Ca    9.2      17 Mar 2024 06:56      PT/INR - ( 16 Mar 2024 05:43 )   PT: 13.8 sec;   INR: 1.19 ratio        CAPILLARY BLOOD GLUCOSE      POCT Blood Glucose.: 82 mg/dL (17 Mar 2024 13:02)  POCT Blood Glucose.: 90 mg/dL (17 Mar 2024 09:11)  POCT Blood Glucose.: 116 mg/dL (16 Mar 2024 21:26)  POCT Blood Glucose.: 86 mg/dL (16 Mar 2024 17:27)        RADIOLOGY/EKG:    Xray Chest 1 View- PORTABLE-Urgent (03.05.24 @ 12:14)   IMPRESSION: Slightly more prominent parahilar interstitial markings since   previous exam. Pacer as before. Cardiomegaly. I would favor congestive   changes. Regional osseous structures appropriate for age.    NM Pulmonary Ventilation/Perfusion Scan (03.05.24 @ 14:59)   IMPRESSION: Very low probability of pulmonary embolus.    US Duplex Venous Lower Ext Complete, Bilateral (03.06.24 @ 17:32)   IMPRESSION:  No evidence of deep venous thrombosis in either lower extremity.    US Renal (03.07.24 @ 19:58)   IMPRESSION:  No renal mass, hydronephrosis or calculus is visualized sonographically.  A small right pleural effusion is partially visualized.

## 2024-03-17 NOTE — PROGRESS NOTE ADULT - ATTENDING COMMENTS
Please see resident note for full details regarding the service.     PE: A+Ox3, no murmurs, lungs CTA b/l, abd NT/ND, 2+ lower extremity swelling, no FND     Assessment:   - A fib with rapid ventricular response  - Acute on chronic systolic heart failure exacerbation CHFrEF (EF 20-25%)   - Acute renal injury likely cardiorenal syndrome   - Hypomagnesemia   - History of Non ischemic cardiomyopathy (LVEF 34% in 2018), history of Ventricular tachycardia, s/p AICD, Atrial fibrillation (on Eliquis), Hypertension, Type II Diabetes mellitus, Obstructive sleep apnea and Morbid obesity     Plan:   - Overnight HR went up to the 160's, has been between  today   - Will discuss increasing Metoprolol succinate from 50 mg BID to 100 mg BID and changing lasix to PO with cardiology   - As per cardiology recs yesterday - c/w Eliquis 5 mg BID, d/c ASA, start Amiodarone 400 mg PO Q12H x 6 doses and switch to 200 BID, consider transfer for cardioversion next week (Thursday or Friday) after being on Eliquis and Amiodarone for 4 days, digoxin level Monday   - c/w Lisinopril 10 mg daily for now (BP low/normal), renally dosed Digoxin   - Awaiting CXR to be completed   - Concerns regarding medication compliance and ability to afford medications   - PT - no skilled needs   - Dispo: will f/u with cardiology regarding recommendations, c/w Amiodarone load and plan to transfer for cardioversion later in the week     I spent a total of 45 minutes on the date of this encounter coordinating the patient's care. This includes reviewing results/imaging and discussions with specialists, nursing, case management/social work. Further tests, medications, and procedures have been ordered as indicated. Results and the plan of care were communicated to the patient and/or their family member. Supporting documentation was completed and added to the patient's chart.

## 2024-03-17 NOTE — PROGRESS NOTE ADULT - SUBJECTIVE AND OBJECTIVE BOX
Follow up for af/chf  SUBJPatient feels well.  Seems to be somewhat improved in terms of his heart rate:  PMH  Cardiomyopathy    Asthma    Anxiety    Atrial fibrillation, unspecified type    Acute combined systolic and diastolic congestive heart failure    Essential hypertension    Hyperlipidemia, unspecified hyperlipidemia type    Prediabetes    Prolonged QT interval    Cardiomyopathy, unspecified type    Chronic combined systolic and diastolic congestive heart failure    AICD (automatic cardioverter/defibrillator) present    Hypertension    Hyperlipidemia    CHF (congestive heart failure)    Afib    PATTIE (obstructive sleep apnea)        MEDICATIONS  (STANDING):  allopurinol 100 milliGRAM(s) Oral daily  aMIOdarone    Tablet 400 milliGRAM(s) Oral every 12 hours  aMIOdarone    Tablet   Oral   apixaban 5 milliGRAM(s) Oral every 12 hours  AQUAPHOR (petrolatum Ointment) 1 Application(s) Topical daily  atorvastatin 80 milliGRAM(s) Oral at bedtime  dextrose 5%. 1000 milliLiter(s) (100 mL/Hr) IV Continuous <Continuous>  dextrose 5%. 1000 milliLiter(s) (50 mL/Hr) IV Continuous <Continuous>  dextrose 50% Injectable 12.5 Gram(s) IV Push once  dextrose 50% Injectable 25 Gram(s) IV Push once  dextrose 50% Injectable 25 Gram(s) IV Push once  digoxin     Tablet 125 MICROGram(s) Oral daily  furosemide   Injectable 40 milliGRAM(s) IV Push two times a day  glucagon  Injectable 1 milliGRAM(s) IntraMuscular once  insulin lispro (ADMELOG) corrective regimen sliding scale   SubCutaneous three times a day before meals  insulin lispro (ADMELOG) corrective regimen sliding scale   SubCutaneous at bedtime  melatonin 6 milliGRAM(s) Oral at bedtime  metoprolol succinate ER 50 milliGRAM(s) Oral two times a day  spironolactone 25 milliGRAM(s) Oral daily  tamsulosin 0.4 milliGRAM(s) Oral at bedtime    MEDICATIONS  (PRN):  acetaminophen     Tablet .. 650 milliGRAM(s) Oral every 6 hours PRN Temp greater or equal to 38C (100.4F), Mild Pain (1 - 3)  albuterol    90 MICROgram(s) HFA Inhaler 1 Puff(s) Inhalation every 4 hours PRN for bronchospasm  artificial  tears Solution 1 Drop(s) Both EYES three times a day PRN Dry Eyes  dextrose Oral Gel 15 Gram(s) Oral once PRN Blood Glucose LESS THAN 70 milliGRAM(s)/deciliter        PHYSICAL EXAM:  Vital Signs Last 24 Hrs  T(C): 36.8 (17 Mar 2024 13:05), Max: 37.1 (16 Mar 2024 17:00)  T(F): 98.2 (17 Mar 2024 13:05), Max: 98.7 (16 Mar 2024 17:00)  HR: 87 (17 Mar 2024 13:05) (70 - 125)  BP: 118/86 (17 Mar 2024 13:05) (93/68 - 124/86)  BP(mean): 70 (16 Mar 2024 18:27) (70 - 70)  RR: 18 (17 Mar 2024 13:05) (16 - 18)  SpO2: 95% (17 Mar 2024 13:05) (91% - 95%)    Parameters below as of 17 Mar 2024 13:05  Patient On (Oxygen Delivery Method): room air        GENERAL: NAD, well-groomed, well-developed  HEAD:  Atraumatic, Normocephalic  EYES: EOMI, PERRLA, conjunctiva and sclera clear  ENT: Moist mucous membranes,  NECK: Supple, No JVD, no bruits  CHEST/LUNG: Clear to percussion bilaterally; No rales, rhonchi, wheezing, or rubs  HEART: irregularly irregular  ABDOMEN: Soft, Nontender, Nondistended; Bowel sounds present  EXTREMITIES:  2+ Peripheral Pulses, No clubbing, cyanosis, or edema  SKIN: No rashes or lesions  NERVOUS SYSTEM:  Alert & Oriented X3, Good concentration; Motor Strength 5/5 B/L upper and lower extremities; DTRs 2+ intact and symmetric      TELEMETRY:af somewhat improve    ECG:    LABS:                        13.1   4.25  )-----------( 202      ( 17 Mar 2024 06:56 )             41.1     03-17    139  |  101  |  32<H>  ----------------------------<  88  3.7   |  23  |  1.66<H>    Ca    9.2      17 Mar 2024 06:56          PT/INR - ( 16 Mar 2024 05:43 )   PT: 13.8 sec;   INR: 1.19 ratio             I&O's Summary    16 Mar 2024 07:01  -  17 Mar 2024 07:00  --------------------------------------------------------  IN: 0 mL / OUT: 100 mL / NET: -100 mL      BNP    RADIOLOGY & ADDITIONAL STUDIES:    ECHO:

## 2024-03-18 ENCOUNTER — TRANSCRIPTION ENCOUNTER (OUTPATIENT)
Age: 59
End: 2024-03-18

## 2024-03-18 LAB
ALBUMIN SERPL ELPH-MCNC: 3.6 G/DL — SIGNIFICANT CHANGE UP (ref 3.3–5)
ALP SERPL-CCNC: 112 U/L — SIGNIFICANT CHANGE UP (ref 40–120)
ALT FLD-CCNC: 56 U/L — HIGH (ref 10–45)
ANION GAP SERPL CALC-SCNC: 15 MMOL/L — SIGNIFICANT CHANGE UP (ref 5–17)
AST SERPL-CCNC: 44 U/L — HIGH (ref 10–40)
BILIRUB SERPL-MCNC: 1.5 MG/DL — HIGH (ref 0.2–1.2)
BUN SERPL-MCNC: 32 MG/DL — HIGH (ref 7–23)
CALCIUM SERPL-MCNC: 9.2 MG/DL — SIGNIFICANT CHANGE UP (ref 8.4–10.5)
CHLORIDE SERPL-SCNC: 101 MMOL/L — SIGNIFICANT CHANGE UP (ref 96–108)
CO2 SERPL-SCNC: 22 MMOL/L — SIGNIFICANT CHANGE UP (ref 22–31)
CREAT SERPL-MCNC: 1.56 MG/DL — HIGH (ref 0.5–1.3)
DIGITOXIN SERPL-MCNC: <5 NG/ML — LOW (ref 10–25)
DIGOXIN SERPL-MCNC: 2 NG/ML — SIGNIFICANT CHANGE UP (ref 0.8–2)
EGFR: 51 ML/MIN/1.73M2 — LOW
GLUCOSE BLDC GLUCOMTR-MCNC: 86 MG/DL — SIGNIFICANT CHANGE UP (ref 70–99)
GLUCOSE BLDC GLUCOMTR-MCNC: 90 MG/DL — SIGNIFICANT CHANGE UP (ref 70–99)
GLUCOSE BLDC GLUCOMTR-MCNC: 90 MG/DL — SIGNIFICANT CHANGE UP (ref 70–99)
GLUCOSE BLDC GLUCOMTR-MCNC: 94 MG/DL — SIGNIFICANT CHANGE UP (ref 70–99)
GLUCOSE SERPL-MCNC: 90 MG/DL — SIGNIFICANT CHANGE UP (ref 70–99)
HCT VFR BLD CALC: 42.6 % — SIGNIFICANT CHANGE UP (ref 39–50)
HGB BLD-MCNC: 13.3 G/DL — SIGNIFICANT CHANGE UP (ref 13–17)
MAGNESIUM SERPL-MCNC: 1.6 MG/DL — SIGNIFICANT CHANGE UP (ref 1.6–2.6)
MCHC RBC-ENTMCNC: 25.7 PG — LOW (ref 27–34)
MCHC RBC-ENTMCNC: 31.2 GM/DL — LOW (ref 32–36)
MCV RBC AUTO: 82.2 FL — SIGNIFICANT CHANGE UP (ref 80–100)
NRBC # BLD: 0 /100 WBCS — SIGNIFICANT CHANGE UP (ref 0–0)
PHOSPHATE SERPL-MCNC: 4.8 MG/DL — HIGH (ref 2.5–4.5)
PLATELET # BLD AUTO: 203 K/UL — SIGNIFICANT CHANGE UP (ref 150–400)
POTASSIUM SERPL-MCNC: 3.6 MMOL/L — SIGNIFICANT CHANGE UP (ref 3.5–5.3)
POTASSIUM SERPL-SCNC: 3.6 MMOL/L — SIGNIFICANT CHANGE UP (ref 3.5–5.3)
PROT SERPL-MCNC: 7.9 G/DL — SIGNIFICANT CHANGE UP (ref 6–8.3)
RBC # BLD: 5.18 M/UL — SIGNIFICANT CHANGE UP (ref 4.2–5.8)
RBC # FLD: 16.3 % — HIGH (ref 10.3–14.5)
SODIUM SERPL-SCNC: 138 MMOL/L — SIGNIFICANT CHANGE UP (ref 135–145)
WBC # BLD: 3.84 K/UL — SIGNIFICANT CHANGE UP (ref 3.8–10.5)
WBC # FLD AUTO: 3.84 K/UL — SIGNIFICANT CHANGE UP (ref 3.8–10.5)

## 2024-03-18 PROCEDURE — 71046 X-RAY EXAM CHEST 2 VIEWS: CPT | Mod: 26

## 2024-03-18 PROCEDURE — 99232 SBSQ HOSP IP/OBS MODERATE 35: CPT

## 2024-03-18 PROCEDURE — 99232 SBSQ HOSP IP/OBS MODERATE 35: CPT | Mod: GC

## 2024-03-18 RX ORDER — ATORVASTATIN CALCIUM 80 MG/1
1 TABLET, FILM COATED ORAL
Refills: 0 | DISCHARGE

## 2024-03-18 RX ORDER — ATORVASTATIN CALCIUM 80 MG/1
1 TABLET, FILM COATED ORAL
Qty: 0 | Refills: 0 | DISCHARGE
Start: 2024-03-18

## 2024-03-18 RX ORDER — PETROLATUM,WHITE
1 JELLY (GRAM) TOPICAL
Qty: 0 | Refills: 0 | DISCHARGE
Start: 2024-03-18

## 2024-03-18 RX ORDER — MAGNESIUM OXIDE 400 MG ORAL TABLET 241.3 MG
400 TABLET ORAL
Refills: 0 | Status: COMPLETED | OUTPATIENT
Start: 2024-03-18 | End: 2024-03-19

## 2024-03-18 RX ORDER — SPIRONOLACTONE 25 MG/1
1 TABLET, FILM COATED ORAL
Qty: 0 | Refills: 0 | DISCHARGE
Start: 2024-03-18

## 2024-03-18 RX ORDER — ALLOPURINOL 300 MG
1 TABLET ORAL
Qty: 0 | Refills: 0 | DISCHARGE
Start: 2024-03-18

## 2024-03-18 RX ORDER — DIGOXIN 250 MCG
1 TABLET ORAL
Qty: 0 | Refills: 0 | DISCHARGE
Start: 2024-03-18

## 2024-03-18 RX ORDER — FUROSEMIDE 40 MG
40 TABLET ORAL
Qty: 0 | Refills: 0 | DISCHARGE
Start: 2024-03-18

## 2024-03-18 RX ORDER — MAGNESIUM OXIDE 400 MG ORAL TABLET 241.3 MG
1 TABLET ORAL
Qty: 0 | Refills: 0 | DISCHARGE
Start: 2024-03-18

## 2024-03-18 RX ORDER — TAMSULOSIN HYDROCHLORIDE 0.4 MG/1
1 CAPSULE ORAL
Qty: 0 | Refills: 0 | DISCHARGE
Start: 2024-03-18

## 2024-03-18 RX ORDER — FUROSEMIDE 40 MG
1 TABLET ORAL
Qty: 0 | Refills: 0 | DISCHARGE

## 2024-03-18 RX ORDER — POTASSIUM CHLORIDE 20 MEQ
40 PACKET (EA) ORAL ONCE
Refills: 0 | Status: COMPLETED | OUTPATIENT
Start: 2024-03-18 | End: 2024-03-18

## 2024-03-18 RX ADMIN — Medication 1 APPLICATION(S): at 11:11

## 2024-03-18 RX ADMIN — TAMSULOSIN HYDROCHLORIDE 0.4 MILLIGRAM(S): 0.4 CAPSULE ORAL at 22:27

## 2024-03-18 RX ADMIN — Medication 125 MICROGRAM(S): at 06:19

## 2024-03-18 RX ADMIN — APIXABAN 5 MILLIGRAM(S): 2.5 TABLET, FILM COATED ORAL at 06:20

## 2024-03-18 RX ADMIN — Medication 100 MILLIGRAM(S): at 11:11

## 2024-03-18 RX ADMIN — ATORVASTATIN CALCIUM 80 MILLIGRAM(S): 80 TABLET, FILM COATED ORAL at 22:27

## 2024-03-18 RX ADMIN — Medication 40 MILLIEQUIVALENT(S): at 10:55

## 2024-03-18 RX ADMIN — AMIODARONE HYDROCHLORIDE 400 MILLIGRAM(S): 400 TABLET ORAL at 06:18

## 2024-03-18 RX ADMIN — AMIODARONE HYDROCHLORIDE 400 MILLIGRAM(S): 400 TABLET ORAL at 18:04

## 2024-03-18 RX ADMIN — Medication 50 MILLIGRAM(S): at 06:19

## 2024-03-18 RX ADMIN — MAGNESIUM OXIDE 400 MG ORAL TABLET 400 MILLIGRAM(S): 241.3 TABLET ORAL at 18:05

## 2024-03-18 RX ADMIN — Medication 6 MILLIGRAM(S): at 22:27

## 2024-03-18 RX ADMIN — SPIRONOLACTONE 25 MILLIGRAM(S): 25 TABLET, FILM COATED ORAL at 06:19

## 2024-03-18 RX ADMIN — Medication 40 MILLIGRAM(S): at 14:16

## 2024-03-18 RX ADMIN — Medication 40 MILLIGRAM(S): at 08:32

## 2024-03-18 RX ADMIN — Medication 50 MILLIGRAM(S): at 18:04

## 2024-03-18 RX ADMIN — APIXABAN 5 MILLIGRAM(S): 2.5 TABLET, FILM COATED ORAL at 18:05

## 2024-03-18 NOTE — PROGRESS NOTE ADULT - SUBJECTIVE AND OBJECTIVE BOX
No distress    Vital Signs Last 24 Hrs  T(C): 36.4 (03-18-24 @ 12:52), Max: 37 (03-18-24 @ 05:00)  T(F): 97.6 (03-18-24 @ 12:52), Max: 98.6 (03-18-24 @ 05:00)  HR: 96 (03-18-24 @ 14:10) (62 - 107)  BP: 107/80 (03-18-24 @ 14:10) (107/80 - 117/78)  RR: 17 (03-18-24 @ 12:52) (16 - 17)  SpO2: 95% (03-18-24 @ 12:52) (95% - 95%)    s1s2  b/l air entry  soft, ND  + edema                                                                                                  13.3   3.84  )-----------( 203      ( 18 Mar 2024 07:57 )             42.6     18 Mar 2024 07:57    138    |  101    |  32     ----------------------------<  90     3.6     |  22     |  1.56     Ca    9.2        18 Mar 2024 07:57  Phos  4.8       18 Mar 2024 07:57  Mg     1.6       18 Mar 2024 07:57    TPro  7.9    /  Alb  3.6    /  TBili  1.5    /  DBili  x      /  AST  44     /  ALT  56     /  AlkPhos  112    18 Mar 2024 07:57    LIVER FUNCTIONS - ( 18 Mar 2024 07:57 )  Alb: 3.6 g/dL / Pro: 7.9 g/dL / ALK PHOS: 112 U/L / ALT: 56 U/L / AST: 44 U/L / GGT: x           acetaminophen     Tablet .. 650 milliGRAM(s) Oral every 6 hours PRN  albuterol    90 MICROgram(s) HFA Inhaler 1 Puff(s) Inhalation every 4 hours PRN  allopurinol 100 milliGRAM(s) Oral daily  aMIOdarone    Tablet 400 milliGRAM(s) Oral every 12 hours  aMIOdarone    Tablet   Oral   apixaban 5 milliGRAM(s) Oral every 12 hours  AQUAPHOR (petrolatum Ointment) 1 Application(s) Topical daily  artificial  tears Solution 1 Drop(s) Both EYES three times a day PRN  atorvastatin 80 milliGRAM(s) Oral at bedtime  dextrose 5%. 1000 milliLiter(s) IV Continuous <Continuous>  dextrose 5%. 1000 milliLiter(s) IV Continuous <Continuous>  dextrose 50% Injectable 12.5 Gram(s) IV Push once  dextrose 50% Injectable 25 Gram(s) IV Push once  dextrose 50% Injectable 25 Gram(s) IV Push once  dextrose Oral Gel 15 Gram(s) Oral once PRN  digoxin     Tablet 125 MICROGram(s) Oral daily  furosemide   Injectable 40 milliGRAM(s) IV Push two times a day  glucagon  Injectable 1 milliGRAM(s) IntraMuscular once  insulin lispro (ADMELOG) corrective regimen sliding scale   SubCutaneous three times a day before meals  insulin lispro (ADMELOG) corrective regimen sliding scale   SubCutaneous at bedtime  magnesium oxide 400 milliGRAM(s) Oral two times a day with meals  melatonin 6 milliGRAM(s) Oral at bedtime  metoprolol succinate ER 50 milliGRAM(s) Oral two times a day  spironolactone 25 milliGRAM(s) Oral daily  tamsulosin 0.4 milliGRAM(s) Oral at bedtime    A/P:    CM, EF 20 - 25%, mod MR, CHF, R Afib  Hemodynamic, cardio-renal MAYTE (Cr 1.04 - 10/9/23)  Volume overload, diuresis   Would avoid ACE/ARB/Entresto/farxiga at this time    Renal SONO w/o hydro   F/u BMP, UO  SPEP, serologies are negative  Will follow    [phone]

## 2024-03-18 NOTE — DISCHARGE NOTE PROVIDER - NSDCCPCAREPLAN_GEN_ALL_CORE_FT
PRINCIPAL DISCHARGE DIAGNOSIS  Diagnosis: Atrial fibrillation with RVR  Assessment and Plan of Treatment:       SECONDARY DISCHARGE DIAGNOSES  Diagnosis: Heart failure with reduced ejection fraction  Assessment and Plan of Treatment:      PRINCIPAL DISCHARGE DIAGNOSIS  Diagnosis: Atrial fibrillation with RVR  Assessment and Plan of Treatment: You heart rate remains uncontrolled despite several medications. You are being transferred for better management and possible cardioversion or ablation.  Atrial fibrillation (AFib) is a type of irregular or rapid heartbeat (arrhythmia). In AFib, the top part of the heart (atria) beats in an irregular pattern. This makes the heart unable to pump blood normally and effectively. The goal of treatment is to prevent blood clots from forming, control your heart rate, or restore your heartbeat to a normal rhythm. If this condition is not treated, it can cause serious problems, such as a weakened heart muscle (cardiomyopathy) or a stroke.  Contact a health care provider if:  You notice a change in the rate, rhythm, or strength of your heartbeat.  You are taking a blood thinner and you notice more bruising.  You tire more easily when you exercise or do heavy work.  You have a sudden change in weight.  Get help right away if:  A sign showing the "BE FAST" categories for the warning signs of a stroke: balance, eyes, face, arms, speech, and time.   You have chest pain.  You have trouble breathing.  You have side effects of blood thinners, such as blood in your vomit, poop (stool), or pee (urine), or bleeding that does not stop.  You have any symptoms of a stroke. "BE FAST" is an easy way to remember the main warning signs of a stroke:  B - Balance. Signs are dizziness, sudden trouble walking, or loss of balance.  E - Eyes. Signs are trouble seeing or a sudden change in vision.  F - Face. Signs are sudden weakness or numbness of the face, or the face or eyelid drooping on one side.  A - Arms. Signs are weakness or numbness in an arm. This happens suddenly and usually on one side of the body.  S - Speech.Signs are sudden trouble speaking, slurred speech, or trouble understanding what people say.  T - Time. Time to call emergency services. Write down what time symptoms started.  These symptoms may be an emergency. Get help right away. Call 11.  Do not wait to see if the symptoms will go away.      SECONDARY DISCHARGE DIAGNOSES  Diagnosis: Heart failure with reduced ejection fraction  Assessment and Plan of Treatment: Heart failure is a condition in which the heart has trouble pumping blood. This may mean that the heart cannot pump enough blood out to the body or that the heart does not fill up with enough blood. When this happens, parts of the body do not get the blood and oxygen they need to function properly. This can cause symptoms such as breathing problems, tiredness (fatigue), swelling, and confusion.  Heart failure exacerbation refers to heart failure symptoms that get worse. The symptoms may get worse suddenly or develop slowly over time. Heart failure exacerbation is a serious medical problem that should be treated right away.  What are the causes?  A heart failure exacerbation can be triggered by:  Not taking your heart failure medicines correctly.  Infections.  Eating an unhealthy diet or a diet that is high in salt (sodium).  Drinking too much fluid.  Drinking alcohol.  Using drugs, such as cocaine or methamphetamine.  Not exercising.  Other causes include:  Other heart conditions such as an irregular heart rhythm (arrhythmia).  Worsening heart valve function.  Low blood counts (anemia).  Other medical problems, such as kidney failure, thyroid problems, or diabetes mellitus.  Sometimes the cause of the exacerbation is not known.  Contact a health care provider if:  You have questions about your medicines or you miss a dose.  You feel anxious, depressed, or stressed.  You develop swelling in your feet, ankles, legs, or abdomen.  You develop a cough.  You have a fever.  You have trouble sleeping.  You gain 2–3 lb (1–1.4 kg) in 24 hours or 5 lb (2.3 kg) in a week.

## 2024-03-18 NOTE — DISCHARGE NOTE PROVIDER - NSDCCAREPROVSEEN_GEN_ALL_CORE_FT
Dinesh Garcia, Sulaiman Mcpherson, Daniela Chester, [facility]  Candi, Estephanie Umana, Lourdes Reeder, Lorna Lopez, Tristen Castellon, Jeffery Khoury, Dinesh Patel Radha, Dinesh Hawley, Sulaiman Mcpherson, Daniela Chester, [facility]  Candi, Estephanie Umana, Lourdes Reeder, Lorna Lopez, Tristen Castellon, Jeffery Khoury, Maia Lopez, Dinesh Schaefer, Julius

## 2024-03-18 NOTE — PROGRESS NOTE ADULT - ASSESSMENT
58y old  Male PMHx CHF, Afib, h/o Vtach s/p AICD, PATTIE, HLD, HTN, Anxiety and Asthma,  who presents with respiratory distress and found to have Afib with RVR. Pt was admitted to ICU and downgraded to medicine on 3/11.    #Afib with RVR  -uncontrolled HR, improving max 120s  -EKG shows Afib with RVR  -VQ scan shows low probability of pulmonary embolism  -s/p amiodarone drip, HR still elevated  -Pacemaker interrogated twice with no events  -hold entresto until renal function stabilizes  -change metoprolol tartrate 50mg q6h to Metoprolol Succinate 50mg bid   -c/w spironolactone 25mg qd  -c/w digoxin 125 qd (renally dosed), currently at therapeutic level  -c/w aspirin  -c/w eliquis 5mg bid  -c/w amiodarone loading  -continue telemetry  -monitor digoxin level  -cardio consult appreciated: transfer for Cardioversion next week, restart apixaban 5mg BID, amiodarone 400mg Q12, then switch to 200 BID, Digoxin level to be ordered for Monday.   - f/u repeat CXR, if improved, decrease to Lasix 40 IV QD    #Acute on Chronic systolic CHF  -fluid overload improving s/p diuresis  -Trops neg, d-dimer elevated, probnp elevated  -Echo LVEF 20-25%, dilated LV cavity, severe left atrial enlargement, mild pHTN  -Cardio consult appreciated  -c/w Lasix 40mg IV bid due to current fluid overload with increase leg edema  -c/w albuterol inhaler prn  -c/w spironolactone 25mg qd  -strict I&Os, daily weight  -PT eval appreciated  -maintain K>4 and Mg>2, will replete as needed    #Cardio renal MAYTE  -Cr 2.84 on admission  -down trending Cr with improvement in eGFR  -US renal no hydronephrosis b/l  -avoid nephrotoxins  -continue with diuresis  -c/w allopurinol 50mg qd (renally dosed)  -Nephro consult appreciated    #hypomagnesemia  -resolved after repletion  -continue to monitor    #T2DM  -A1C 7.2%  -c/w mod ISS, accuchecks and hypoglycemia protocol    #Obesity with PATTIE  -c/w CPAP 4L qhs    #BPH  -c/w flomax 0.4 qhs    #HLD  -c/w atorvastatin 80mg qhs    #Xerosis  -c/w aquaphor ointment to b/l feet    #DVT ppx  -c/w eliquis    #FULL CODE      Dispo: Transfer next week for cardioversion after amiodarone loading.     Patient updated regarding plan of care at bedside.     Discussed with Dr. Christopher

## 2024-03-18 NOTE — DISCHARGE NOTE PROVIDER - ATTENDING DISCHARGE PHYSICAL EXAMINATION:
A+Ox3, no murmurs, lungs CTA b/l, abd NT/ND, 2+ lower extremity swelling, no FND  Constitutional: Pt sitting in chair, awake and alert, NAD  HEENT: EOMI, normocephalic, moist mucous membranes  Respiratory: CTABL, No wheezing, rales or rhonchi  Cardiovascular: S1S2+, irregular, no M/G/R  Gastrointestinal: BS+, soft, obese abdomen, nontender, nondistended, no guarding, no rebound  Extremities: +improving Pitting edema on b/l lower ext, No calf pain   Vascular: Peripheral pulses present  Neurological: AAOx3, no focal deficits  Musculoskeletal: Normal muscle tone, no atrophy, no rigidity, no contractions  Skin: +b/l feet xerosis, No significant new skin lesions or rashes

## 2024-03-18 NOTE — DISCHARGE NOTE PROVIDER - PROVIDER TOKENS
PROVIDER:[TOKEN:[[id number]],ESTABLISHEDPATIENT:[T]] PROVIDER:[TOKEN:[[id number]],ESTABLISHEDPATIENT:[T]],FREE:[LAST:[ Clinic],PHONE:[phone]],FAX:[phone]],ADDRESS:[[facility]  [address] [place] [address]]] PROVIDER:[TOKEN:[[id number]],FOLLOWUP:[1 week],ESTABLISHEDPATIENT:[T]],FREE:[LAST:[ Clinic],PHONE:[phone]],FAX:[phone]],ADDRESS:[[facility]  [address] [place] [address]]]

## 2024-03-18 NOTE — DISCHARGE NOTE PROVIDER - CARE PROVIDERS DIRECT ADDRESSES
[email or web address]

## 2024-03-18 NOTE — DISCHARGE NOTE PROVIDER - HOSPITAL COURSE
57y/o M PMHx CHF, Afib on Eliquis, h/o Vtach s/p AICD, severe PATTIE requiring CPAP, HLD, HTN, Anxiety and Asthma, who was BIBA from home to the ED c/o chest pain, palpitations and worsening SOB x 3 weeks. Labs significant for Ddimer 1830, ISAIAS 96, Cr 2.84, GFR 25, Trops neg x2, and proBNP 59046. EKG showed Afib with RVR and PVCs rate 168/min. In the ED, Pt was given diltiazem 15mg IVP x1 and diltiazem 30mg PO with improvement in the HR. However, Pt became hypotensive and ICU was contacted for admission on 3/5. Pt was admitted for acute hypoxic respiratory failure,  Afib with RVR, acute decompensated HF and pulmonary edema 2/2 fluid overload. Pt was started on amiodarone drip,  digoxin 250mcg IV, lasix IV, carvedilol and Lopressor with mild improvement in heart rate but persistently uncontrolled Afib. Cardiology evaluated the patient and recommended Echo to asses EF, continue IV diuresis and amiodarone drip; also recommended to keep Mg>2 and K>4 for optimal arrythmia suppression. Echo showed severely decreased global left ventricular systolic function with EF 20-25% and mild pulmonary edema. Medtronic ICD was interrogated with no events. Nephroloy evaluated the Pt while in ICU and found cardio-renal MAYTE (basline Cr 1.09 in 10/9/23) and recommened renal US, avoid ACE/ARB/Entresto due to nephrotoxicity. Renal US showed no hydronephrosis. Palliative met with the Pt for GOC inclusive of FULL Code and HCP form completed. Pt was eventually downgraded to medicine on 3/11 s/p amiodarone drip, on digoxin 125mcg (renally dosed), carvedilol 12.5mg bid, and isosorbide dinitrate 5mg tid however HR was still uncontrolled in the 130s and Pt was started on metoprolol tratrate 50mg tid. Hospital course was complicated due to episodes of hypotension. Pt's renal function improved and he was started on Spironolacton 25mg qd. Cardio recommended Amiodarone loading and transfer for EP evaluation and CHF consultation for optimization due to Afuib with RVR refractory to medications. Transfer to [facility] was initated with Accepting Physician Dr. Mariana Mo and Admitting Physician Dr. Joyce Jordan.        CONSULT: Critical Care, Cardiology, Nephrology, Physical Therapy, Palliative    IMAGING:  Xray Chest 1 View- PORTABLE-Urgent (03.05.24 @ 12:14)   IMPRESSION: Slightly more prominent parahilar interstitial markings since   previous exam. Pacer as before. Cardiomegaly. I would favor congestive   changes. Regional osseous structures appropriate for age.     NM Pulmonary Ventilation/Perfusion Scan (03.05.24 @ 14:59)   IMPRESSION: Very low probability of pulmonary embolus.    US Duplex Venous Lower Ext Complete, Bilateral (03.06.24 @ 17:32)   IMPRESSION:  No evidence of deep venous thrombosis in either lower extremity.    US Renal (03.07.24 @ 19:58)   IMPRESSION:  No renal mass, hydronephrosis or calculus is visualized sonographically.  A small right pleural effusion is partially visualized.     TTE Echo Complete w/o Contrast w/ Doppler (03.06.24 @ 07:53)   Summary:   1. Severely decreased global left ventricular systolic function.   2. Left ventricular ejection fraction, by visual estimation, is 20 to   25%.   3. The left ventricle endocardium is not well visualized, consider use   of IV ultrasonic enhancing agent to better evaluate endocardium, if   clinically indicated. Calculated LVEF by Simpsons Biplane Method 20%.   Severe global left ventricle hypokinesis.   4. Severely increased left ventricular internal cavity size.   5. Dilated cardiomyopathy.   6. The mitral in-flow pattern reveals no discernable A-wave, therefore   no comment on diastolic function can be made.   7. Mildly enlarged right ventricle with low normal right ventricle   systolic function.   8. Severely enlarged left atrium.   9. Right atrial enlargement.  10. Mild thickening of the anterior and posterior mitral valve leaflets.  11. Moderate mitral valve regurgitation.  12. Mild-moderate tricuspid regurgitation.  13. Mild aortic valve leaflet calcification. No aortic valve stenosis.  14. Mild aortic regurgitation.  15. Mild to moderate pulmonic valve regurgitation.  16. Top normal sized Aorta at the Sinuses of Valsalva.  17. Estimated pulmonary artery systolic pressure is 42.5 mmHg assuming a   right atrial pressure of 15 mmHg, which is consistent with mild pulmonary   hypertension.  18. There is no evidence of pericardial effusion.             57y/o M PMHx CHF, Afib on Eliquis, h/o Vtach s/p AICD, severe PATTIE requiring CPAP, HLD, HTN, Anxiety and Asthma, who was BIBA from home to the ED c/o chest pain, palpitations and worsening SOB x 3 weeks. Labs significant for Ddimer 1830, ISAIAS 96, Cr 2.84, GFR 25, Trops neg x2, and proBNP 08650. EKG showed Afib with RVR and PVCs rate 168/min. In the ED, Pt was given diltiazem 15mg IVP x1 and diltiazem 30mg PO with improvement in the HR. However, Pt became hypotensive and ICU was contacted for admission on 3/5. Pt was admitted for acute hypoxic respiratory failure,  Afib with RVR, acute decompensated HF and pulmonary edema 2/2 fluid overload. Pt was started on amiodarone drip,  digoxin 250mcg IV, lasix IV, carvedilol and Lopressor with mild improvement in heart rate but persistently uncontrolled Afib. Cardiology evaluated the patient and recommended Echo to asses EF, continue IV diuresis and amiodarone drip; also recommended to keep Mg>2 and K>4 for optimal arrythmia suppression. Echo showed severely decreased global left ventricular systolic function with EF 20-25% and mild pulmonary edema. Medtronic ICD was interrogated with no events. Nephrology evaluated the Pt while in ICU and found cardio-renal MAYTE (baseline Cr 1.09 in 10/9/23) and recommended renal US, avoid ACE/ARB/Entresto due to nephrotoxicity. Renal US showed no hydronephrosis. Palliative met with the Pt for GOC inclusive of FULL Code and HCP form completed. Pt was eventually downgraded to medicine on 3/11 s/p amiodarone drip, on digoxin 125mcg (renally dosed), carvedilol 12.5mg bid, and isosorbide dinitrate 5mg tid however HR was still uncontrolled in the 130s and Pt was started on metoprolol tartrate 50mg tid. Hospital course was complicated due to episodes of hypotension. Pt's renal function improved and he was started on Spironolactone 25mg qd. Cardio recommended Amiodarone loading and transfer for EP evaluation and CHF consultation for optimization due to Afib with RVR refractory to medications. Transfer to [facility] was initiated with Accepting Physician Dr. Mariana Mo and Admitting Physician Dr. Joyce Jordan.        CONSULT: Critical Care, Cardiology, Nephrology, Physical Therapy, Palliative    IMAGING:  Xray Chest 1 View- PORTABLE-Urgent (03.05.24 @ 12:14)   IMPRESSION: Slightly more prominent parahilar interstitial markings since   previous exam. Pacer as before. Cardiomegaly. I would favor congestive   changes. Regional osseous structures appropriate for age.     NM Pulmonary Ventilation/Perfusion Scan (03.05.24 @ 14:59)   IMPRESSION: Very low probability of pulmonary embolus.    US Duplex Venous Lower Ext Complete, Bilateral (03.06.24 @ 17:32)   IMPRESSION:  No evidence of deep venous thrombosis in either lower extremity.    US Renal (03.07.24 @ 19:58)   IMPRESSION:  No renal mass, hydronephrosis or calculus is visualized sonographically.  A small right pleural effusion is partially visualized.     TTE Echo Complete w/o Contrast w/ Doppler (03.06.24 @ 07:53)   Summary:   1. Severely decreased global left ventricular systolic function.   2. Left ventricular ejection fraction, by visual estimation, is 20 to   25%.   3. The left ventricle endocardium is not well visualized, consider use   of IV ultrasonic enhancing agent to better evaluate endocardium, if   clinically indicated. Calculated LVEF by Simpsons Biplane Method 20%.   Severe global left ventricle hypokinesis.   4. Severely increased left ventricular internal cavity size.   5. Dilated cardiomyopathy.   6. The mitral in-flow pattern reveals no discernable A-wave, therefore   no comment on diastolic function can be made.   7. Mildly enlarged right ventricle with low normal right ventricle   systolic function.   8. Severely enlarged left atrium.   9. Right atrial enlargement.  10. Mild thickening of the anterior and posterior mitral valve leaflets.  11. Moderate mitral valve regurgitation.  12. Mild-moderate tricuspid regurgitation.  13. Mild aortic valve leaflet calcification. No aortic valve stenosis.  14. Mild aortic regurgitation.  15. Mild to moderate pulmonic valve regurgitation.  16. Top normal sized Aorta at the Sinuses of Valsalva.  17. Estimated pulmonary artery systolic pressure is 42.5 mmHg assuming a   right atrial pressure of 15 mmHg, which is consistent with mild pulmonary   hypertension.  18. There is no evidence of pericardial effusion.             57y/o M PMHx CHF, Afib on Eliquis, h/o Vtach s/p AICD, severe PATTIE requiring CPAP, HLD, HTN, Anxiety and Asthma, who was BIBA from home to the ED c/o chest pain, palpitations and worsening SOB x 3 weeks. Labs significant for Ddimer 1830, ISAIAS 96, Cr 2.84, GFR 25, Trops neg x2, and proBNP 99159. EKG showed Afib with RVR and PVCs rate 168/min. In the ED, Pt was given diltiazem 15mg IVP x1 and diltiazem 30mg PO with improvement in the HR. However, Pt became hypotensive and ICU was contacted for admission on 3/5. Pt was admitted for acute hypoxic respiratory failure,  Afib with RVR, acute decompensated HF and pulmonary edema 2/2 fluid overload. Pt was started on amiodarone drip,  digoxin 250mcg IV, lasix IV, carvedilol and Lopressor with mild improvement in heart rate but persistently uncontrolled Afib. Cardiology evaluated the patient and recommended Echo to asses EF, continue IV diuresis and amiodarone drip; also recommended to keep Mg>2 and K>4 for optimal arrythmia suppression. Echo showed severely decreased global left ventricular systolic function with EF 20-25% and mild pulmonary edema. Medtronic ICD was interrogated with no events. Nephrology evaluated the Pt while in ICU and found cardio-renal MAYTE (baseline Cr 1.09 in 10/9/23) and recommended renal US, avoid ACE/ARB/Entresto due to nephrotoxicity. Renal US showed no hydronephrosis. Palliative met with the Pt for GOC inclusive of FULL Code and HCP form completed. Pt was eventually downgraded to medicine on 3/11 s/p amiodarone drip, on digoxin 125mcg (renally dosed), carvedilol 12.5mg bid, and isosorbide dinitrate 5mg tid however HR was still uncontrolled in the 130s and Pt was started on metoprolol tartrate 50mg tid. Hospital course was complicated due to episodes of hypotension. Pt's renal function improved and he was started on Spironolactone 25mg qd. Cardio recommended Amiodarone loading and transfer for EP evaluation and CHF consultation for optimization due to Afib with RVR refractory to medications. Pt is currently maintained on renally dosed digoxin, amiodarone, metoprolol, IVP lasix, and spironolactone. Transfer to [facility] was initiated with Accepting Physician Dr. Mariana Mo and Admitting Physician Dr. Joyce Jordan.  Notified PCP.     CONSULT: Critical Care, Cardiology, Nephrology, Physical Therapy, Palliative    IMAGING:  Xray Chest 1 View- PORTABLE-Urgent (03.05.24 @ 12:14)   IMPRESSION: Slightly more prominent parahilar interstitial markings since   previous exam. Pacer as before. Cardiomegaly. I would favor congestive   changes. Regional osseous structures appropriate for age.     NM Pulmonary Ventilation/Perfusion Scan (03.05.24 @ 14:59)   IMPRESSION: Very low probability of pulmonary embolus.    US Duplex Venous Lower Ext Complete, Bilateral (03.06.24 @ 17:32)   IMPRESSION:  No evidence of deep venous thrombosis in either lower extremity.    US Renal (03.07.24 @ 19:58)   IMPRESSION:  No renal mass, hydronephrosis or calculus is visualized sonographically.  A small right pleural effusion is partially visualized.     TTE Echo Complete w/o Contrast w/ Doppler (03.06.24 @ 07:53)   Summary:   1. Severely decreased global left ventricular systolic function.   2. Left ventricular ejection fraction, by visual estimation, is 20 to   25%.   3. The left ventricle endocardium is not well visualized, consider use   of IV ultrasonic enhancing agent to better evaluate endocardium, if   clinically indicated. Calculated LVEF by Simpsons Biplane Method 20%.   Severe global left ventricle hypokinesis.   4. Severely increased left ventricular internal cavity size.   5. Dilated cardiomyopathy.   6. The mitral in-flow pattern reveals no discernable A-wave, therefore   no comment on diastolic function can be made.   7. Mildly enlarged right ventricle with low normal right ventricle   systolic function.   8. Severely enlarged left atrium.   9. Right atrial enlargement.  10. Mild thickening of the anterior and posterior mitral valve leaflets.  11. Moderate mitral valve regurgitation.  12. Mild-moderate tricuspid regurgitation.  13. Mild aortic valve leaflet calcification. No aortic valve stenosis.  14. Mild aortic regurgitation.  15. Mild to moderate pulmonic valve regurgitation.  16. Top normal sized Aorta at the Sinuses of Valsalva.  17. Estimated pulmonary artery systolic pressure is 42.5 mmHg assuming a   right atrial pressure of 15 mmHg, which is consistent with mild pulmonary   hypertension.  18. There is no evidence of pericardial effusion.             57y/o M PMHx CHF, Afib on Eliquis, h/o Vtach s/p AICD, severe PATTIE requiring CPAP, HLD, HTN, Anxiety and Asthma, who was BIBA from home to the ED c/o chest pain, palpitations and worsening SOB x 3 weeks. Labs significant for Ddimer 1830, ISAIAS 96, Cr 2.84, GFR 25, Trops neg x2, and proBNP 07667. EKG showed Afib with RVR and PVCs rate 168/min. In the ED, Pt was given diltiazem 15mg IVP x1 and diltiazem 30mg PO with improvement in the HR. However, Pt became hypotensive and ICU was contacted for admission on 3/5. Pt was admitted for acute hypoxic respiratory failure,  Afib with RVR, acute decompensated HF and pulmonary edema 2/2 fluid overload. Pt was started on amiodarone drip,  digoxin 250mcg IV, lasix IV, carvedilol and Lopressor with mild improvement in heart rate but persistently uncontrolled Afib. Cardiology evaluated the patient and recommended Echo to asses EF, continue IV diuresis and amiodarone drip; also recommended to keep Mg>2 and K>4 for optimal arrythmia suppression. Echo showed severely decreased global left ventricular systolic function with EF 20-25% and mild pulmonary edema. Medtronic ICD was interrogated with no events. Nephrology evaluated the Pt while in ICU and found cardio-renal MAYTE (baseline Cr 1.09 in 10/9/23) and recommended renal US, avoid ACE/ARB/Entresto due to nephrotoxicity. Renal US showed no hydronephrosis. Palliative met with the Pt for GOC inclusive of FULL Code and HCP form completed. Pt was eventually downgraded to medicine on 3/11 s/p amiodarone drip, on digoxin 125mcg (renally dosed), carvedilol 12.5mg bid, and isosorbide dinitrate 5mg tid however HR was still uncontrolled in the 130s and Pt was started on metoprolol tartrate 50mg tid. Hospital course was complicated due to episodes of hypotension. Pt's renal function improved and he was started on Spironolactone 25mg qd. Cardio recommended Amiodarone loading and transfer for EP evaluation and CHF consultation for optimization due to Afib with RVR refractory to medications. Pt is currently maintained on renally dosed digoxin, amiodarone, metoprolol, IVP lasix, and spironolactone. Transfer to [facility] was initiated with Accepting Physician Dr. Mariana Mo and Admitting Physician Dr. Joyce Jordan.  Notified PCP.     *Pt will need all medications sent to pharmacy once HF meds are optimized after being discharged from [facility].    CONSULT: Critical Care, Cardiology, Nephrology, Physical Therapy, Palliative    IMAGING:  Xray Chest 1 View- PORTABLE-Urgent (03.05.24 @ 12:14)   IMPRESSION: Slightly more prominent parahilar interstitial markings since   previous exam. Pacer as before. Cardiomegaly. I would favor congestive   changes. Regional osseous structures appropriate for age.     NM Pulmonary Ventilation/Perfusion Scan (03.05.24 @ 14:59)   IMPRESSION: Very low probability of pulmonary embolus.    US Duplex Venous Lower Ext Complete, Bilateral (03.06.24 @ 17:32)   IMPRESSION:  No evidence of deep venous thrombosis in either lower extremity.    US Renal (03.07.24 @ 19:58)   IMPRESSION:  No renal mass, hydronephrosis or calculus is visualized sonographically.  A small right pleural effusion is partially visualized.     TTE Echo Complete w/o Contrast w/ Doppler (03.06.24 @ 07:53)   Summary:   1. Severely decreased global left ventricular systolic function.   2. Left ventricular ejection fraction, by visual estimation, is 20 to   25%.   3. The left ventricle endocardium is not well visualized, consider use   of IV ultrasonic enhancing agent to better evaluate endocardium, if   clinically indicated. Calculated LVEF by Simpsons Biplane Method 20%.   Severe global left ventricle hypokinesis.   4. Severely increased left ventricular internal cavity size.   5. Dilated cardiomyopathy.   6. The mitral in-flow pattern reveals no discernable A-wave, therefore   no comment on diastolic function can be made.   7. Mildly enlarged right ventricle with low normal right ventricle   systolic function.   8. Severely enlarged left atrium.   9. Right atrial enlargement.  10. Mild thickening of the anterior and posterior mitral valve leaflets.  11. Moderate mitral valve regurgitation.  12. Mild-moderate tricuspid regurgitation.  13. Mild aortic valve leaflet calcification. No aortic valve stenosis.  14. Mild aortic regurgitation.  15. Mild to moderate pulmonic valve regurgitation.  16. Top normal sized Aorta at the Sinuses of Valsalva.  17. Estimated pulmonary artery systolic pressure is 42.5 mmHg assuming a   right atrial pressure of 15 mmHg, which is consistent with mild pulmonary   hypertension.  18. There is no evidence of pericardial effusion.             57y/o M PMHx CHF, Afib on Eliquis, h/o Vtach s/p AICD, severe PATTIE requiring CPAP, HLD, HTN, Anxiety and Asthma, who was BIBA from home to the ED c/o chest pain, palpitations and worsening SOB x 3 weeks. Labs significant for Ddimer 1830, ISAIAS 96, Cr 2.84, GFR 25, Trops neg x2, and proBNP 83514. EKG showed Afib with RVR and PVCs rate 168/min. In the ED, Pt was given diltiazem 15mg IVP x1 and diltiazem 30mg PO with improvement in the HR. However, Pt became hypotensive and ICU was contacted for admission on 3/5. Pt was admitted for acute hypoxic respiratory failure,  Afib with RVR, acute decompensated HF and pulmonary edema 2/2 fluid overload. Pt was started on amiodarone drip,  digoxin 250mcg IV, lasix IV, carvedilol and Lopressor with mild improvement in heart rate but persistently uncontrolled Afib. Cardiology evaluated the patient and recommended Echo to asses EF, continue IV diuresis and amiodarone drip; also recommended to keep Mg>2 and K>4 for optimal arrythmia suppression. Echo showed severely decreased global left ventricular systolic function with EF 20-25% and mild pulmonary edema. Medtronic ICD was interrogated with no events. Nephrology evaluated the Pt while in ICU and found cardio-renal MAYTE (baseline Cr 1.09 in 10/9/23) and recommended renal US, avoid ACE/ARB/Entresto due to nephrotoxicity. Renal US showed no hydronephrosis. Palliative met with the Pt for GOC inclusive of FULL Code and HCP form completed. Pt was eventually downgraded to medicine on 3/11 s/p amiodarone drip, on digoxin 125mcg (renally dosed), carvedilol 12.5mg bid, and isosorbide dinitrate 5mg tid however HR was still uncontrolled in the 130s and Pt was started on metoprolol tartrate 50mg tid. Hospital course was complicated due to episodes of hypotension. Pt's renal function improved and he was started on Spironolactone 25mg qd. Cardio recommended Amiodarone loading and transfer for EP evaluation and CHF consultation for optimization due to Afib with RVR refractory to medications. Pt is currently maintained on renally dosed digoxin, amiodarone, metoprolol, PO lasix, hydralazine, isosorbide dinitrate and spironolactone. Transfer to [facility] was initiated with Accepting Physician Dr. Mariana Mo and Admitting Physician Dr. Joyce Jordan.  Notified PCP.     *Pt will need all medications sent to pharmacy once HF meds are optimized after being discharged from [facility].    CONSULT: Critical Care, Cardiology, Nephrology, Physical Therapy, Palliative    IMAGING:  Xray Chest 1 View- PORTABLE-Urgent (03.05.24 @ 12:14)   IMPRESSION: Slightly more prominent parahilar interstitial markings since   previous exam. Pacer as before. Cardiomegaly. I would favor congestive   changes. Regional osseous structures appropriate for age.     NM Pulmonary Ventilation/Perfusion Scan (03.05.24 @ 14:59)   IMPRESSION: Very low probability of pulmonary embolus.    US Duplex Venous Lower Ext Complete, Bilateral (03.06.24 @ 17:32)   IMPRESSION:  No evidence of deep venous thrombosis in either lower extremity.    US Renal (03.07.24 @ 19:58)   IMPRESSION:  No renal mass, hydronephrosis or calculus is visualized sonographically.  A small right pleural effusion is partially visualized.     TTE Echo Complete w/o Contrast w/ Doppler (03.06.24 @ 07:53)   Summary:   1. Severely decreased global left ventricular systolic function.   2. Left ventricular ejection fraction, by visual estimation, is 20 to   25%.   3. The left ventricle endocardium is not well visualized, consider use   of IV ultrasonic enhancing agent to better evaluate endocardium, if   clinically indicated. Calculated LVEF by Simpsons Biplane Method 20%.   Severe global left ventricle hypokinesis.   4. Severely increased left ventricular internal cavity size.   5. Dilated cardiomyopathy.   6. The mitral in-flow pattern reveals no discernable A-wave, therefore   no comment on diastolic function can be made.   7. Mildly enlarged right ventricle with low normal right ventricle   systolic function.   8. Severely enlarged left atrium.   9. Right atrial enlargement.  10. Mild thickening of the anterior and posterior mitral valve leaflets.  11. Moderate mitral valve regurgitation.  12. Mild-moderate tricuspid regurgitation.  13. Mild aortic valve leaflet calcification. No aortic valve stenosis.  14. Mild aortic regurgitation.  15. Mild to moderate pulmonic valve regurgitation.  16. Top normal sized Aorta at the Sinuses of Valsalva.  17. Estimated pulmonary artery systolic pressure is 42.5 mmHg assuming a   right atrial pressure of 15 mmHg, which is consistent with mild pulmonary   hypertension.  18. There is no evidence of pericardial effusion.

## 2024-03-18 NOTE — PROGRESS NOTE ADULT - SUBJECTIVE AND OBJECTIVE BOX
[facility]  [id number]      Chief Complaint: Acute on chronic systolic heart failure/ Atrial fibrillation     Interval History: The patient reports mild shortness of breath on exertion.    Tele: atrial fibrillation 90-100s BPM, NSVT      Current meds:   acetaminophen     Tablet .. 650 milliGRAM(s) Oral every 6 hours PRN  albuterol    90 MICROgram(s) HFA Inhaler 1 Puff(s) Inhalation every 4 hours PRN  allopurinol 100 milliGRAM(s) Oral daily  aMIOdarone    Tablet 400 milliGRAM(s) Oral every 12 hours  aMIOdarone    Tablet   Oral   apixaban 5 milliGRAM(s) Oral every 12 hours  AQUAPHOR (petrolatum Ointment) 1 Application(s) Topical daily  artificial  tears Solution 1 Drop(s) Both EYES three times a day PRN  atorvastatin 80 milliGRAM(s) Oral at bedtime  dextrose 5%. 1000 milliLiter(s) IV Continuous <Continuous>  dextrose 5%. 1000 milliLiter(s) IV Continuous <Continuous>  dextrose 50% Injectable 25 Gram(s) IV Push once  dextrose 50% Injectable 12.5 Gram(s) IV Push once  dextrose 50% Injectable 25 Gram(s) IV Push once  dextrose Oral Gel 15 Gram(s) Oral once PRN  digoxin     Tablet 125 MICROGram(s) Oral daily  furosemide   Injectable 40 milliGRAM(s) IV Push two times a day  glucagon  Injectable 1 milliGRAM(s) IntraMuscular once  insulin lispro (ADMELOG) corrective regimen sliding scale   SubCutaneous three times a day before meals  insulin lispro (ADMELOG) corrective regimen sliding scale   SubCutaneous at bedtime  magnesium oxide 400 milliGRAM(s) Oral two times a day with meals  melatonin 6 milliGRAM(s) Oral at bedtime  metoprolol succinate ER 50 milliGRAM(s) Oral two times a day  spironolactone 25 milliGRAM(s) Oral daily  tamsulosin 0.4 milliGRAM(s) Oral at bedtime      Objective:     Vital Signs:   T(C): 37 (03-18-24 @ 05:00), Max: 37 (03-18-24 @ 05:00)  HR: 107 (03-18-24 @ 08:29) (84 - 108)  BP: 111/61 (03-18-24 @ 08:29) (101/76 - 124/90)  RR: 16 (03-18-24 @ 05:00) (16 - 18)  SpO2: 95% (03-18-24 @ 05:00) (95% - 95%)  Wt(kg): --    Physical Exam:   General: morbidly obese, no acute distress  Neck: supple   CVS: JVP ~ 9 cm H20, irregularly irregular, s1, s2  Pulm: unlabored respirations, mostly clear to ausculation   Abd: obese  Ext: trace lower extremity edema b/l   Neuro: awake, alert and oriented   Psych: Normal affect        Labs:   18 Mar 2024 07:57    138    |  101    |  32     ----------------------------<  90     3.6     |  22     |  1.56     Ca    9.2        18 Mar 2024 07:57  Phos  4.8       18 Mar 2024 07:57  Mg     1.6       18 Mar 2024 07:57    TPro  7.9    /  Alb  3.6    /  TBili  1.5    /  DBili  x      /  AST  44     /  ALT  56     /  AlkPhos  112    18 Mar 2024 07:57                          13.3   3.84  )-----------( 203      ( 18 Mar 2024 07:57 )             42.6         TTE (2018):  LVEF 34%    TTE (3/2024):   1. Severely decreased global left ventricular systolic function.   2. Left ventricular ejection fraction, by visual estimation, is 20 to 25%.   3. The left ventricle endocardium is not well visualized, consider use   of IV ultrasonic enhancing agent to better evaluate endocardium, if   clinically indicated. Calculated LVEF by Simpsons Biplane Method 20%. Severe global left ventricle hypokinesis.   4. Severely increased left ventricular internal cavity size.   5. Dilated cardiomyopathy.   6. The mitral in-flow pattern reveals no discernable A-wave, therefore no comment on diastolic function can be made.   7. Mildly enlarged right ventricle with low normal right ventricle   systolic function.   8. Severely enlarged left atrium.   9. Right atrial enlargement.  10. Mild thickening of the anterior and posterior mitral valve leaflets.  11. Moderate mitral valve regurgitation.  12. Mild-moderate tricuspid regurgitation.  13. Mild aortic valve leaflet calcification. No aortic valve stenosis.  14. Mild aortic regurgitation.  15. Mild to moderate pulmonic valve regurgitation.  16. Top normal sized Aorta at the Sinuses of Valsalva.  17. Estimated pulmonary artery systolic pressure is 42.5 mmHg assuming a right atrial pressure of 15 mmHg, which is consistent with mild pulmonary hypertension.  18. There is no evidence of pericardial effusion.    ECG (3/5/24): atrial fibrillation with RVR, PVC

## 2024-03-18 NOTE — CHART NOTE - NSCHARTNOTEFT_GEN_A_CORE
Nutrition Follow Up Note  Hospital Course (Per Electronic Medical Record):   Source: Medical Record [X] Patient [X]  Nursing Staff [X]     Diet: Consistent Carbohydrate , DASH/TLC     Patient remains tolerating current diet , consuming % of meals as per nursing flow sheet , POCT reviewed ,insulin coverage noted , A1c 7.2% , instructed patient on DM / CHF , understanding appear fair, will require ongoing diabetes education reinforcement . Encourage Carbohydrate / protein pairing with meals , portion control , Will follow clinical course     Current Weight: no follow up weight since admission suggest obtain daily weight due to dx of CHF & Lasix therapy     Pertinent Medications: MEDICATIONS  (STANDING):  allopurinol 100 milliGRAM(s) Oral daily  apixaban 5 milliGRAM(s) Oral two times a day  AQUAPHOR (petrolatum Ointment) 1 Application(s) Topical daily  aspirin enteric coated 81 milliGRAM(s) Oral daily  atorvastatin 80 milliGRAM(s) Oral at bedtime  carvedilol 12.5 milliGRAM(s) Oral every 12 hours  dextrose 5%. 1000 milliLiter(s) (50 mL/Hr) IV Continuous <Continuous>  dextrose 5%. 1000 milliLiter(s) (100 mL/Hr) IV Continuous <Continuous>  dextrose 50% Injectable 12.5 Gram(s) IV Push once  dextrose 50% Injectable 25 Gram(s) IV Push once  dextrose 50% Injectable 25 Gram(s) IV Push once  digoxin     Tablet 125 MICROGram(s) Oral once  digoxin     Tablet 125 MICROGram(s) Oral daily  furosemide   Injectable 40 milliGRAM(s) IV Push every 12 hours  glucagon  Injectable 1 milliGRAM(s) IntraMuscular once  insulin lispro (ADMELOG) corrective regimen sliding scale   SubCutaneous at bedtime  insulin lispro (ADMELOG) corrective regimen sliding scale   SubCutaneous three times a day before meals  isosorbide   dinitrate Tablet (ISORDIL) 5 milliGRAM(s) Oral three times a day  melatonin 6 milliGRAM(s) Oral at bedtime  tamsulosin 0.4 milliGRAM(s) Oral at bedtime    MEDICATIONS  (PRN):  acetaminophen     Tablet .. 650 milliGRAM(s) Oral every 6 hours PRN Temp greater or equal to 38C (100.4F), Mild Pain (1 - 3)  albuterol    90 MICROgram(s) HFA Inhaler 1 Puff(s) Inhalation every 4 hours PRN for bronchospasm  artificial  tears Solution 1 Drop(s) Both EYES three times a day PRN Dry Eyes  dextrose Oral Gel 15 Gram(s) Oral once PRN Blood Glucose LESS THAN 70 milliGRAM(s)/deciliter      Pertinent Labs:  03-11 Na139 mmol/L Glu 86 mg/dL K+ 3.7 mmol/L Cr  1.78 mg/dL<H> BUN 53 mg/dL<H> 03-11 Phos 3.5 mg/dL 03-11 Alb 3.2 g/dL<L> POCT 99,82,116,98  Hgb 12.2g/dl<L> , Hct39.6%     Skin: intact    Edema: none    Last BM: (3/10)     Estimated Needs:   [X] No Change since Previous Assessment       Previous Nutrition Diagnosis: none     New Nutrition Diagnosis:   [X] Food & Nutrition Related Knowledge Deficit related to DM & CHF as evidence by obtained diet history       Interventions:   1. continue current diet   2 obtain daily weight due to Lasix therapy & dx of CHF .       Monitoring & Evaluation: will monitor:  [X] Weights   [X] PO Intake   [X] Follow Up (Per Protocol)  [X] Tolerance to Diet Prescription       RD to follow as per Nutrition protocol  Capri Hartman RDN
Date/Time: 3/6/24 1000  :            Sociall              Model:                         Cobalt XT  [id number]  Mode:   AAI<=> DDD                                                 --Quick Look Report:    Therapy Summary:                          VT/VF        AT/AF  Pace-Terminated Episodes:                0               0  Shock-Terminated Episodes:              0               0  Total Shocks:                                    0               0           Aborted Shocks:                               0               0    Total pace 0.2%    <0.1% AP  10.3 years longevity  Atrial impedence 323 ohms  ventricle impedence 437 ohms  atrial sensitivity 0.3 mv  ventricle sensiticity 0.3mv  atrial threshold 0.625mv   ventricle threshold 0.75 mv
NUTRITION FOLLOW UP    SOURCE: Patient [X]   Family [ ]    Medical Record [X]    DIET: Diet, Consistent Carbohydrate w/Evening Snack:   DASH/TLC {Sodium & Cholesterol Restricted} (03-08-24 @ 12:03) [Active]  ALLERGIES: NKFA    IMPRESSION:  Met with pt this AM at bedside. Pt was seated upright in chair and was able to articulate his nutrition hx well. Pt reported good appetite and taking adequate POs. Pt denied N/V/C, however endorsed diarrhea, stated last BM this AM 3/18. RD offered low Na crackers or banana at lunch to assist diarrhea. Pt was accepting to RD suggestion. RD reviewed food preferences with patient and encouraged protein intake to regain strength and maintain muscle mass - will note in kardex.     *POCT BG x 24 hours:  POCT Blood Glucose.: 94 mg/dL (18 Mar 2024 12:56)  POCT Blood Glucose.: 86 mg/dL (18 Mar 2024 08:32)  POCT Blood Glucose.: 105 mg/dL (17 Mar 2024 22:04)  POCT Blood Glucose.: 84 mg/dL (17 Mar 2024 17:20)    PATIENT REPORT: [X] diarrhea     PO INTAKE: % per RN flowsheets    CURRENT WEIGHT: 295.4 lbs (3/18)  WEIGHT HX:   268.9 lbs (3/17)  248.9 lbs (3/16)  277.7 lbs (3/14)  278.2 lbs (3/13)  218.9 lbs (3/12)  *weight change likely 2/2 fluid fluctuations in setting of 3+ generalized edema    PERTINENT MEDS:   Pertinent Medications: MEDICATIONS  (STANDING):  allopurinol 100 milliGRAM(s) Oral daily  aMIOdarone    Tablet   Oral   aMIOdarone    Tablet 400 milliGRAM(s) Oral every 12 hours  apixaban 5 milliGRAM(s) Oral every 12 hours  AQUAPHOR (petrolatum Ointment) 1 Application(s) Topical daily  atorvastatin 80 milliGRAM(s) Oral at bedtime  dextrose 5%. 1000 milliLiter(s) (50 mL/Hr) IV Continuous <Continuous>  dextrose 5%. 1000 milliLiter(s) (100 mL/Hr) IV Continuous <Continuous>  dextrose 50% Injectable 25 Gram(s) IV Push once  dextrose 50% Injectable 25 Gram(s) IV Push once  dextrose 50% Injectable 12.5 Gram(s) IV Push once  digoxin     Tablet 125 MICROGram(s) Oral daily  furosemide   Injectable 40 milliGRAM(s) IV Push two times a day  glucagon  Injectable 1 milliGRAM(s) IntraMuscular once  insulin lispro (ADMELOG) corrective regimen sliding scale   SubCutaneous at bedtime  insulin lispro (ADMELOG) corrective regimen sliding scale   SubCutaneous three times a day before meals  magnesium oxide 400 milliGRAM(s) Oral two times a day with meals  melatonin 6 milliGRAM(s) Oral at bedtime  metoprolol succinate ER 50 milliGRAM(s) Oral two times a day  spironolactone 25 milliGRAM(s) Oral daily  tamsulosin 0.4 milliGRAM(s) Oral at bedtime    MEDICATIONS  (PRN):  acetaminophen     Tablet .. 650 milliGRAM(s) Oral every 6 hours PRN Temp greater or equal to 38C (100.4F), Mild Pain (1 - 3)  albuterol    90 MICROgram(s) HFA Inhaler 1 Puff(s) Inhalation every 4 hours PRN for bronchospasm  artificial  tears Solution 1 Drop(s) Both EYES three times a day PRN Dry Eyes  dextrose Oral Gel 15 Gram(s) Oral once PRN Blood Glucose LESS THAN 70 milliGRAM(s)/deciliter    PERTINENT LABS:  03-18 Na138 mmol/L Glu 90 mg/dL K+ 3.6 mmol/L Cr  1.56 mg/dL<H> BUN 32 mg/dL<H> 03-18 Phos 4.8 mg/dL<H> 03-18 Alb 3.6 g/dL    SKIN: no pressure injury per RN flowsheets    EDEMA: 3+ generalized edema; 3+ L + R leg edema per RN flowsheets    ESTIMATED NEEDS:   [X] no change since previous assessment     PREVIOUS NUTRITION DIAGNOSIS:  Food & Nutrition Related Knowledge Deficit related to DM & CHF as evidence by obtained diet history     NUTRITION DIAGNOSIS is: [X] Ongoing - addressed with ongoing diet education    NEW NUTRITION DIAGNOSIS: N/A    MONITORING AND EVALUATION:   Current diet order is appropriate and is well tolerated, will continue to monitor:  - Food and nutrient intake/POs  - Nutrition related lab value, specifically POCT BG  - Weight/weight trends  - GI functions  - Supplement acceptance    NUTRITION RECOMMENDATIONS:   1. Continue with Consistent Carbohydrate {evening snack}, DASH/TLC diet  - RD will continue to monitor POCT BG  - Insulin per team  - Honor food preferences, as medically able  - Appreciate continued PO intake % documentation in RN flowsheets  2. Appreciate continued weekly weight trends  3. Ongoing diet education    Gabby Quinn RDN also available via TEAMS
Notified by RN and Abril that Pt was sustaining HR 130s since 5:30pm. Pt found laying in bed watching TV with wife and grandson at bedside. Pt denies feeling chest pain or SOB. Spoke with on call Cardiologist Dr. Garcia, who recommended ICU consult. ICU PA was consulted. Will sign out to night team for f/u on HR and BP.       Vital Signs Last 24 Hrs  T(C): 36.1 (11 Mar 2024 05:06), Max: 36.6 (10 Mar 2024 21:00)  T(F): 97 (11 Mar 2024 05:06), Max: 97.8 (10 Mar 2024 21:00)  HR: 118 (11 Mar 2024 17:28) (101 - 138)  BP: 109/86 (11 Mar 2024 17:28) (91/58 - 139/79)  BP(mean): --  RR: 17 (11 Mar 2024 05:06) (17 - 17)  SpO2: 94% (11 Mar 2024 05:06) (94% - 96%)    Parameters below as of 11 Mar 2024 05:06  Patient On (Oxygen Delivery Method): room air    GEN: laying in bed, NAD  Lung: CTA x2  Heart: irregular rhythm and rate, S1 S2 present  Ext: BLE pitting edema

## 2024-03-18 NOTE — PROGRESS NOTE ADULT - ASSESSMENT
Assessment:  Allen York is a 58 year old man with past medical history of Non ischemic cardiomyopathy (LVEF 34% in 2018), history of Ventricular tachycardia, s/p AICD, Atrial fibrillation (on Eliquis), Hypertension, Type II Diabetes mellitus, Obstructive sleep apnea and Morbid obesity who was brought in by EMS due to respiratory distress, found to have atrial fibrillation with rapid ventricular response and acute on chronic systolic heart failure exacerbation and acute renal injury.     ECG on admission consistent with atrial fibrillation with RVR, PVCs, left axis deviation. Troponins are not elevated, does not appear to be an acute coronary syndrome. Pro BNP elevated, CXR suggestive of congestion. D-dimer elevated, VQ scan with very low probability of pulmonary embolism. Echo consistent with LVEF 20-25%, dilated LV cavity, severe left atrial enlargement, moderate valvular disease and mild pulmonary hypertension.     Recommendations:  [] Acute on chronic systolic heart failure: Renal function improving with IV diuresis, may continue today. Plan to resume Entresto when renal function stabilizes, recommend Hydralazine/Isosorbide dinitrate in meantime. Continue Metoprolol as per below. Not candidate for SGLT2i or MRA due to abnormal renal function. Medtronic ICD re- interrogated and functioning well with 10 year battery life and no recent events.   [] Atrial fibrillation with RVR: HR improving, now restarted on Amiodarone. As per his cardiologist, Dr. Garcia he recommends transfer this week to [facility] for EP evaluation for BG/DCCV if patient remains in AF, discussed importance of anticoagulation compliance post DCCV with patient given risk for stroke, patient reports he will be compliant. Continue Metoprolol succinate as dosed. Will need to monitor LFTs, TSH and PFTs and opthamologic exams if patient will be on amiodarone long term, have discussed toxicity profile of Amiodarone with patient.    Discussed with Dr. Garcia. We will continue to follow along.    Yoni Chester MD  Cardiology       Assessment:  Allen York is a 58 year old man with past medical history of Non ischemic cardiomyopathy (LVEF 34% in 2018), history of Ventricular tachycardia, s/p AICD, Atrial fibrillation (on Eliquis), Hypertension, Type II Diabetes mellitus, Obstructive sleep apnea and Morbid obesity who was brought in by EMS due to respiratory distress, found to have atrial fibrillation with rapid ventricular response and acute on chronic systolic heart failure exacerbation and acute renal injury.     ECG on admission consistent with atrial fibrillation with RVR, PVCs, left axis deviation. Troponins are not elevated, does not appear to be an acute coronary syndrome. Pro BNP elevated, CXR suggestive of congestion. D-dimer elevated, VQ scan with very low probability of pulmonary embolism. Echo consistent with LVEF 20-25%, dilated LV cavity, severe left atrial enlargement, moderate valvular disease and mild pulmonary hypertension.     Recommendations:  [] Acute on chronic systolic heart failure: Renal function improving with IV diuresis, may continue today. Plan to resume Entresto when renal function stabilizes, recommend Hydralazine/Isosorbide dinitrate in meantime. Continue Metoprolol as per below. Not candidate for SGLT2i or MRA due to abnormal renal function. Medtronic ICD re- interrogated and functioning well with 10 year battery life and no recent events.   [] Atrial fibrillation with RVR: HR improving, now restarted on Amiodarone. As per his cardiologist, Dr. Garcia he recommends transfer this week to [facility] for EP evaluation for BG/DCCV if patient remains in AF, discussed importance of anticoagulation compliance post DCCV with patient given risk for stroke, patient reports he will be compliant. Continue Metoprolol succinate as dosed. Will need to monitor LFTs, TSH and PFTs and opthamologic exams if patient will be on amiodarone long term, have discussed toxicity profile of Amiodarone with patient.    Discussed with Dr. Garcia. We will continue to follow along.    Addendum:  Discussed with primary team, recommend transfer to [facility] medicine service for EP consultation and CHF consultation for optimization due to atrial fibrillation with RVR refractory to medications. Primary team to arrange transfer today.     Yoni Chester MD  Cardiology

## 2024-03-18 NOTE — PROGRESS NOTE ADULT - ATTENDING COMMENTS
Please see resident note for full details regarding the service.     PE: A+Ox3, no murmurs, lungs CTA b/l, abd NT/ND, 2+ lower extremity swelling, no FND     Assessment:   - A fib with rapid ventricular response  - Acute on chronic systolic heart failure exacerbation CHFrEF (EF 20-25%)   - Acute renal injury likely cardiorenal syndrome   - Hypomagnesemia   - History of Non ischemic cardiomyopathy (LVEF 34% in 2018), history of Ventricular tachycardia, s/p AICD, Atrial fibrillation (on Eliquis), Hypertension, Type II Diabetes mellitus, Obstructive sleep apnea and Morbid obesity     Plan:   - HR , started on Amiodarone load and will transfer likely Wednesday to [facility] for BG/DCCV   - c/w Metoprolol succinate 50 mg BID, renally dosed Digoxin, Spironolactone, Eliquis   - c/w Lasix 40 mg PO BID IVP for today -> consider changing to oral tomorrow   - As per cardiology - when renal function stabilizes can consider adding Entresto, not a candidate for SGLT2I or MRA d/t renal function   - Will discuss restarting Hydralazine/ISDN with cardiology   - CXR appears improved   - Mg 1.6 today, will replete and f/u in AM   - Concerns regarding medication compliance and ability to afford medications   - PT - no skilled needs   - Dispo: will f/u with cardiology regarding recommendations, c/w Amiodarone load and plan to transfer for cardioversion later in the week     I spent a total of 30 minutes on the date of this encounter coordinating the patient's care. This includes reviewing results/imaging and discussions with specialists, nursing, case management/social work. Further tests, medications, and procedures have been ordered as indicated. Results and the plan of care were communicated to the patient and/or their family member. Supporting documentation was completed and added to the patient's chart.

## 2024-03-18 NOTE — DISCHARGE NOTE PROVIDER - NSDCMRMEDTOKEN_GEN_ALL_CORE_FT
Albuterol (Eqv-ProAir HFA) 90 mcg/inh inhalation aerosol: 2 puff(s) inhaled every 4 hours as needed for  bronchospasm  aspirin 81 mg oral tablet: 1 tab(s) orally once a day  atorvastatin 20 mg oral tablet: 1 tab(s) orally once a day  carvedilol 25 mg oral tablet: 1 tab(s) orally 2 times a day  dapagliflozin 10 mg oral tablet: 1 tab(s) orally once a day  Eliquis 5 mg oral tablet: 1 tab(s) orally 2 times a day  RESTART 7/10  Entresto 97 mg-103 mg oral tablet: 1 tab(s) orally 2 times a day  furosemide 40 mg oral tablet: 1 tab(s) orally once a day  isosorbide dinitrate 5 mg oral tablet: 1 tab(s) orally 3 times a day MDD:3   Albuterol (Eqv-ProAir HFA) 90 mcg/inh inhalation aerosol: 2 puff(s) inhaled every 4 hours as needed for  bronchospasm  allopurinol 100 mg oral tablet: 1 tab(s) orally once a day  aspirin 81 mg oral tablet: 1 tab(s) orally once a day  atorvastatin 80 mg oral tablet: 1 tab(s) orally once a day (at bedtime)  carvedilol 25 mg oral tablet: 1 tab(s) orally 2 times a day  dapagliflozin 10 mg oral tablet: 1 tab(s) orally once a day  digoxin 125 mcg (0.125 mg) oral tablet: 1 tab(s) orally once a day  Eliquis 5 mg oral tablet: 1 tab(s) orally 2 times a day  RESTART 7/10  Entresto 97 mg-103 mg oral tablet: 1 tab(s) orally 2 times a day  furosemide 100 mg/100 mL-0.9% intravenous solution: 40 milliliter(s) intravenous 2 times a day  magnesium oxide 400 mg oral tablet: 1 tab(s) orally 2 times a day (with meals)  ocular lubricant ophthalmic solution: 1 drop(s) to each affected eye 3 times a day As needed Dry Eyes  petrolatum topical ointment: 1 Apply topically to affected area once a day  spironolactone 25 mg oral tablet: 1 tab(s) orally once a day  tamsulosin 0.4 mg oral capsule: 1 cap(s) orally once a day (at bedtime)   Albuterol (Eqv-ProAir HFA) 90 mcg/inh inhalation aerosol: 2 puff(s) inhaled every 4 hours as needed for  bronchospasm  allopurinol 100 mg oral tablet: 1 tab(s) orally once a day  amiodarone 200 mg oral tablet: 1 tab(s) orally 2 times a day  aspirin 81 mg oral tablet: 1 tab(s) orally once a day  atorvastatin 80 mg oral tablet: 1 tab(s) orally once a day (at bedtime)  carvedilol 25 mg oral tablet: 1 tab(s) orally 2 times a day  dapagliflozin 10 mg oral tablet: 1 tab(s) orally once a day  digoxin 125 mcg (0.125 mg) oral tablet: 1 tab(s) orally once a day  Eliquis 5 mg oral tablet: 1 tab(s) orally 2 times a day  RESTART 7/10  Entresto 97 mg-103 mg oral tablet: 1 tab(s) orally 2 times a day  furosemide 100 mg/100 mL-0.9% intravenous solution: 40 milliliter(s) intravenous 2 times a day  magnesium oxide 400 mg oral tablet: 1 tab(s) orally 2 times a day (with meals)  metoprolol succinate 50 mg oral tablet, extended release: 1 tab(s) orally 2 times a day  ocular lubricant ophthalmic solution: 1 drop(s) to each affected eye 3 times a day As needed Dry Eyes  petrolatum topical ointment: 1 Apply topically to affected area once a day  spironolactone 25 mg oral tablet: 1 tab(s) orally once a day  tamsulosin 0.4 mg oral capsule: 1 cap(s) orally once a day (at bedtime)   Albuterol (Eqv-ProAir HFA) 90 mcg/inh inhalation aerosol: 2 puff(s) inhaled every 4 hours as needed for  bronchospasm  allopurinol 100 mg oral tablet: 1 tab(s) orally once a day  amiodarone 200 mg oral tablet: 1 tab(s) orally 2 times a day  aspirin 81 mg oral tablet: 1 tab(s) orally once a day  atorvastatin 80 mg oral tablet: 1 tab(s) orally once a day (at bedtime)  carvedilol 25 mg oral tablet: 1 tab(s) orally 2 times a day  dapagliflozin 10 mg oral tablet: 1 tab(s) orally once a day  digoxin 125 mcg (0.125 mg) oral tablet: 1 tab(s) orally once a day  Eliquis 5 mg oral tablet: 1 tab(s) orally 2 times a day  RESTART 7/10  Entresto 97 mg-103 mg oral tablet: 1 tab(s) orally 2 times a day  furosemide 40 mg oral tablet: 1 tab(s) orally 2 times a day  hydrALAZINE 25 mg oral tablet: 1 tab(s) orally 3 times a day  isosorbide dinitrate 5 mg oral tablet: 1 tab(s) orally 3 times a day  magnesium oxide 400 mg oral tablet: 1 tab(s) orally 2 times a day (with meals)  metoprolol succinate 50 mg oral tablet, extended release: 1 tab(s) orally 2 times a day  ocular lubricant ophthalmic solution: 1 drop(s) to each affected eye 3 times a day As needed Dry Eyes  petrolatum topical ointment: 1 Apply topically to affected area once a day  spironolactone 25 mg oral tablet: 1 tab(s) orally once a day  tamsulosin 0.4 mg oral capsule: 1 cap(s) orally once a day (at bedtime)

## 2024-03-18 NOTE — PROGRESS NOTE ADULT - SUBJECTIVE AND OBJECTIVE BOX
Patient is a 57y/o Male PMHx CHF, Afib, h/o Vtach s/p AICD, PATTIE, HLD, HTN, Anxiety and Asthma who presents with a chief complaint of a fib with RVR (17 Mar 2024 15:56)      Subjective: Patient was seen and examined this morning at bedside. Pt reports feeling ok but was unable to sleep overnight due to too much noise. Denies chest pain/discomfort or SOB  Overnight events: none    REVIEW OF SYSTEMS:  CONSTITUTIONAL: No weakness, fevers or chills  EYES/ENT: No visual changes;  No vertigo or throat pain   NECK: No pain or stiffness  RESPIRATORY: No cough, wheezing, hemoptysis; No shortness of breath  CARDIOVASCULAR: No chest pain or palpitations  GASTROINTESTINAL: No abdominal or epigastric pain. No nausea, vomiting; No diarrhea or constipation.   GENITOURINARY: No dysuria, frequency or hematuria  NEUROLOGICAL: No numbness or weakness  SKIN: No itching, burning, rashes, or lesions     Vital Signs Last 24 Hrs  T(C): 37 (18 Mar 2024 05:00), Max: 37 (18 Mar 2024 05:00)  T(F): 98.6 (18 Mar 2024 05:00), Max: 98.6 (18 Mar 2024 05:00)  HR: 107 (18 Mar 2024 08:29) (84 - 108)  BP: 111/61 (18 Mar 2024 08:29) (101/76 - 124/90)  BP(mean): 84 (17 Mar 2024 17:57) (84 - 84)  RR: 16 (18 Mar 2024 05:00) (16 - 18)  SpO2: 95% (18 Mar 2024 05:00) (95% - 95%)    Parameters below as of 18 Mar 2024 05:00  Patient On (Oxygen Delivery Method): room air        PHYSICAL EXAM  Constitutional: Pt sitting in chair, awake and alert, NAD  HEENT: EOMI, normocephalic, moist mucous membranes  Respiratory: CTABL, No wheezing, rales or rhonchi  Cardiovascular: S1S2+, irregular, no M/G/R  Gastrointestinal: BS+, soft, obese abdomen, nontender, nondistended, no guarding, no rebound  Extremities: +Pitting edema on b/l lower ext, No calf pain   Vascular: Peripheral pulses present  Neurological: AAOx3, no focal deficits  Musculoskeletal: Normal muscle tone, no atrophy, no rigidity, no contractions  Skin: +b/l feet xerosis, No significant new skin lesions or rashes    MEDICATIONS:  MEDICATIONS  (STANDING):  allopurinol 100 milliGRAM(s) Oral daily  aMIOdarone    Tablet   Oral   aMIOdarone    Tablet 400 milliGRAM(s) Oral every 12 hours  apixaban 5 milliGRAM(s) Oral every 12 hours  AQUAPHOR (petrolatum Ointment) 1 Application(s) Topical daily  atorvastatin 80 milliGRAM(s) Oral at bedtime  dextrose 5%. 1000 milliLiter(s) (50 mL/Hr) IV Continuous <Continuous>  dextrose 5%. 1000 milliLiter(s) (100 mL/Hr) IV Continuous <Continuous>  dextrose 50% Injectable 12.5 Gram(s) IV Push once  dextrose 50% Injectable 25 Gram(s) IV Push once  dextrose 50% Injectable 25 Gram(s) IV Push once  digoxin     Tablet 125 MICROGram(s) Oral daily  furosemide   Injectable 40 milliGRAM(s) IV Push two times a day  glucagon  Injectable 1 milliGRAM(s) IntraMuscular once  insulin lispro (ADMELOG) corrective regimen sliding scale   SubCutaneous three times a day before meals  insulin lispro (ADMELOG) corrective regimen sliding scale   SubCutaneous at bedtime  melatonin 6 milliGRAM(s) Oral at bedtime  metoprolol succinate ER 50 milliGRAM(s) Oral two times a day  spironolactone 25 milliGRAM(s) Oral daily  tamsulosin 0.4 milliGRAM(s) Oral at bedtime    MEDICATIONS  (PRN):  acetaminophen     Tablet .. 650 milliGRAM(s) Oral every 6 hours PRN Temp greater or equal to 38C (100.4F), Mild Pain (1 - 3)  albuterol    90 MICROgram(s) HFA Inhaler 1 Puff(s) Inhalation every 4 hours PRN for bronchospasm  artificial  tears Solution 1 Drop(s) Both EYES three times a day PRN Dry Eyes  dextrose Oral Gel 15 Gram(s) Oral once PRN Blood Glucose LESS THAN 70 milliGRAM(s)/deciliter      LABS: All Labs Reviewed:                          13.3   3.84  )-----------( 203      ( 18 Mar 2024 07:57 )             42.6     03-18    138  |  101  |  32<H>  ----------------------------<  90  3.6   |  22  |  1.56<H>    Ca    9.2      18 Mar 2024 07:57  Phos  4.8     03-18  Mg     1.6     03-18    TPro  7.9  /  Alb  3.6  /  TBili  1.5<H>  /  DBili  x   /  AST  44<H>  /  ALT  56<H>  /  AlkPhos  112  03-18    LIVER FUNCTIONS - ( 18 Mar 2024 07:57 )  Alb: 3.6 g/dL / Pro: 7.9 g/dL / ALK PHOS: 112 U/L / ALT: 56 U/L / AST: 44 U/L / GGT: x                 RADIOLOGY/EKG:    Xray Chest 1 View- PORTABLE-Urgent (03.05.24 @ 12:14)   IMPRESSION: Slightly more prominent parahilar interstitial markings since   previous exam. Pacer as before. Cardiomegaly. I would favor congestive   changes. Regional osseous structures appropriate for age.    NM Pulmonary Ventilation/Perfusion Scan (03.05.24 @ 14:59)   IMPRESSION: Very low probability of pulmonary embolus.    US Duplex Venous Lower Ext Complete, Bilateral (03.06.24 @ 17:32)   IMPRESSION:  No evidence of deep venous thrombosis in either lower extremity.    US Renal (03.07.24 @ 19:58)   IMPRESSION:  No renal mass, hydronephrosis or calculus is visualized sonographically.  A small right pleural effusion is partially visualized.

## 2024-03-18 NOTE — DISCHARGE NOTE PROVIDER - DISCHARGING ATTENDING PHYSICIAN:
Due to acute limb ischemia  Uptrending CK   -monitor CK q6h  -monitor bmp q6h  -IVF @100/h  -if worsening renal function in setting of rhabdo will need to consider renal consult if consistent with family wishes. Dr. Estephanie Christopher Julius Schaefer MD MSc

## 2024-03-18 NOTE — DISCHARGE NOTE PROVIDER - CARE PROVIDER_API CALL
Dinesh Garcia  Cardiology  [address], [address]  [place] [address]  Phone: [phone]  Fax: [phone]  Established Patient  Follow Up Time:    Dinesh Garcia  Cardiology  [address] [facility], [address]  [place] [address]  Phone: [phone]  Fax: [phone]  Established Patient  Follow Up Time:      Clinic,   Family Medicine Practice  [address] [place] [address]  Phone: [phone]  Fax: [phone]  Follow Up Time:    Dinesh Garcia  Cardiology  [address] [facility], [address]  [place] [address]  Phone: [phone]  Fax: [phone]  Established Patient  Follow Up Time: 1 week     Clinic,   Family Medicine Practice  [address] [place] [address]  Phone: [phone]  Fax: [phone]  Follow Up Time:

## 2024-03-18 NOTE — DISCHARGE NOTE PROVIDER - NSDCFUSCHEDAPPT_GEN_ALL_CORE_FT
[facility]  [address]  Scheduled Appointment: 05/01/2024

## 2024-03-19 LAB
ALBUMIN SERPL ELPH-MCNC: 3.4 G/DL — SIGNIFICANT CHANGE UP (ref 3.3–5)
ALP SERPL-CCNC: 113 U/L — SIGNIFICANT CHANGE UP (ref 40–120)
ALT FLD-CCNC: 56 U/L — HIGH (ref 10–45)
ANION GAP SERPL CALC-SCNC: 11 MMOL/L — SIGNIFICANT CHANGE UP (ref 5–17)
AST SERPL-CCNC: 49 U/L — HIGH (ref 10–40)
BILIRUB SERPL-MCNC: 1.4 MG/DL — HIGH (ref 0.2–1.2)
BUN SERPL-MCNC: 33 MG/DL — HIGH (ref 7–23)
CALCIUM SERPL-MCNC: 9 MG/DL — SIGNIFICANT CHANGE UP (ref 8.4–10.5)
CHLORIDE SERPL-SCNC: 101 MMOL/L — SIGNIFICANT CHANGE UP (ref 96–108)
CO2 SERPL-SCNC: 26 MMOL/L — SIGNIFICANT CHANGE UP (ref 22–31)
CREAT SERPL-MCNC: 1.59 MG/DL — HIGH (ref 0.5–1.3)
DIGOXIN SERPL-MCNC: 1.3 NG/ML — SIGNIFICANT CHANGE UP (ref 0.8–2)
EGFR: 50 ML/MIN/1.73M2 — LOW
GLUCOSE BLDC GLUCOMTR-MCNC: 78 MG/DL — SIGNIFICANT CHANGE UP (ref 70–99)
GLUCOSE BLDC GLUCOMTR-MCNC: 84 MG/DL — SIGNIFICANT CHANGE UP (ref 70–99)
GLUCOSE BLDC GLUCOMTR-MCNC: 90 MG/DL — SIGNIFICANT CHANGE UP (ref 70–99)
GLUCOSE BLDC GLUCOMTR-MCNC: 93 MG/DL — SIGNIFICANT CHANGE UP (ref 70–99)
GLUCOSE SERPL-MCNC: 84 MG/DL — SIGNIFICANT CHANGE UP (ref 70–99)
HCT VFR BLD CALC: 41.3 % — SIGNIFICANT CHANGE UP (ref 39–50)
HGB BLD-MCNC: 13 G/DL — SIGNIFICANT CHANGE UP (ref 13–17)
MAGNESIUM SERPL-MCNC: 1.6 MG/DL — SIGNIFICANT CHANGE UP (ref 1.6–2.6)
MCHC RBC-ENTMCNC: 25.9 PG — LOW (ref 27–34)
MCHC RBC-ENTMCNC: 31.5 GM/DL — LOW (ref 32–36)
MCV RBC AUTO: 82.4 FL — SIGNIFICANT CHANGE UP (ref 80–100)
NRBC # BLD: 0 /100 WBCS — SIGNIFICANT CHANGE UP (ref 0–0)
PHOSPHATE SERPL-MCNC: 4.5 MG/DL — SIGNIFICANT CHANGE UP (ref 2.5–4.5)
PLATELET # BLD AUTO: 211 K/UL — SIGNIFICANT CHANGE UP (ref 150–400)
POTASSIUM SERPL-MCNC: 4.1 MMOL/L — SIGNIFICANT CHANGE UP (ref 3.5–5.3)
POTASSIUM SERPL-SCNC: 4.1 MMOL/L — SIGNIFICANT CHANGE UP (ref 3.5–5.3)
PROT SERPL-MCNC: 7.7 G/DL — SIGNIFICANT CHANGE UP (ref 6–8.3)
RBC # BLD: 5.01 M/UL — SIGNIFICANT CHANGE UP (ref 4.2–5.8)
RBC # FLD: 16.1 % — HIGH (ref 10.3–14.5)
SODIUM SERPL-SCNC: 138 MMOL/L — SIGNIFICANT CHANGE UP (ref 135–145)
WBC # BLD: 4.31 K/UL — SIGNIFICANT CHANGE UP (ref 3.8–10.5)
WBC # FLD AUTO: 4.31 K/UL — SIGNIFICANT CHANGE UP (ref 3.8–10.5)

## 2024-03-19 PROCEDURE — 99232 SBSQ HOSP IP/OBS MODERATE 35: CPT | Mod: GC

## 2024-03-19 PROCEDURE — 99232 SBSQ HOSP IP/OBS MODERATE 35: CPT

## 2024-03-19 RX ORDER — METOPROLOL TARTRATE 50 MG
1 TABLET ORAL
Qty: 0 | Refills: 0 | DISCHARGE
Start: 2024-03-19

## 2024-03-19 RX ORDER — MAGNESIUM OXIDE 400 MG ORAL TABLET 241.3 MG
400 TABLET ORAL ONCE
Refills: 0 | Status: DISCONTINUED | OUTPATIENT
Start: 2024-03-19 | End: 2024-03-19

## 2024-03-19 RX ORDER — AMIODARONE HYDROCHLORIDE 400 MG/1
1 TABLET ORAL
Qty: 0 | Refills: 0 | DISCHARGE
Start: 2024-03-19

## 2024-03-19 RX ORDER — MAGNESIUM OXIDE 400 MG ORAL TABLET 241.3 MG
400 TABLET ORAL
Refills: 0 | Status: COMPLETED | OUTPATIENT
Start: 2024-03-19 | End: 2024-03-19

## 2024-03-19 RX ADMIN — ATORVASTATIN CALCIUM 80 MILLIGRAM(S): 80 TABLET, FILM COATED ORAL at 21:08

## 2024-03-19 RX ADMIN — APIXABAN 5 MILLIGRAM(S): 2.5 TABLET, FILM COATED ORAL at 18:33

## 2024-03-19 RX ADMIN — AMIODARONE HYDROCHLORIDE 400 MILLIGRAM(S): 400 TABLET ORAL at 06:20

## 2024-03-19 RX ADMIN — MAGNESIUM OXIDE 400 MG ORAL TABLET 400 MILLIGRAM(S): 241.3 TABLET ORAL at 09:15

## 2024-03-19 RX ADMIN — MAGNESIUM OXIDE 400 MG ORAL TABLET 400 MILLIGRAM(S): 241.3 TABLET ORAL at 18:34

## 2024-03-19 RX ADMIN — SPIRONOLACTONE 25 MILLIGRAM(S): 25 TABLET, FILM COATED ORAL at 06:20

## 2024-03-19 RX ADMIN — Medication 125 MICROGRAM(S): at 06:19

## 2024-03-19 RX ADMIN — Medication 40 MILLIGRAM(S): at 06:24

## 2024-03-19 RX ADMIN — Medication 6 MILLIGRAM(S): at 21:08

## 2024-03-19 RX ADMIN — Medication 100 MILLIGRAM(S): at 14:42

## 2024-03-19 RX ADMIN — TAMSULOSIN HYDROCHLORIDE 0.4 MILLIGRAM(S): 0.4 CAPSULE ORAL at 21:08

## 2024-03-19 RX ADMIN — AMIODARONE HYDROCHLORIDE 200 MILLIGRAM(S): 400 TABLET ORAL at 18:34

## 2024-03-19 RX ADMIN — Medication 40 MILLIGRAM(S): at 15:46

## 2024-03-19 RX ADMIN — Medication 1 APPLICATION(S): at 14:42

## 2024-03-19 RX ADMIN — APIXABAN 5 MILLIGRAM(S): 2.5 TABLET, FILM COATED ORAL at 06:20

## 2024-03-19 RX ADMIN — Medication 50 MILLIGRAM(S): at 18:34

## 2024-03-19 RX ADMIN — Medication 50 MILLIGRAM(S): at 06:20

## 2024-03-19 RX ADMIN — MAGNESIUM OXIDE 400 MG ORAL TABLET 400 MILLIGRAM(S): 241.3 TABLET ORAL at 14:42

## 2024-03-19 NOTE — PROGRESS NOTE ADULT - SUBJECTIVE AND OBJECTIVE BOX
[facility]  [id number]      Chief Complaint: Acute on chronic systolic heart failure/ Atrial fibrillation     Interval History: The patient reports breathing is stable.     Tele: atrial fibrillation 100s BPM, NSVT    Current meds:   acetaminophen     Tablet .. 650 milliGRAM(s) Oral every 6 hours PRN  albuterol    90 MICROgram(s) HFA Inhaler 1 Puff(s) Inhalation every 4 hours PRN  allopurinol 100 milliGRAM(s) Oral daily  aMIOdarone    Tablet   Oral   aMIOdarone    Tablet 200 milliGRAM(s) Oral two times a day  apixaban 5 milliGRAM(s) Oral every 12 hours  AQUAPHOR (petrolatum Ointment) 1 Application(s) Topical daily  artificial  tears Solution 1 Drop(s) Both EYES three times a day PRN  atorvastatin 80 milliGRAM(s) Oral at bedtime  dextrose 5%. 1000 milliLiter(s) IV Continuous <Continuous>  dextrose 5%. 1000 milliLiter(s) IV Continuous <Continuous>  dextrose 50% Injectable 25 Gram(s) IV Push once  dextrose 50% Injectable 12.5 Gram(s) IV Push once  dextrose 50% Injectable 25 Gram(s) IV Push once  dextrose Oral Gel 15 Gram(s) Oral once PRN  digoxin     Tablet 125 MICROGram(s) Oral daily  furosemide   Injectable 40 milliGRAM(s) IV Push two times a day  glucagon  Injectable 1 milliGRAM(s) IntraMuscular once  insulin lispro (ADMELOG) corrective regimen sliding scale   SubCutaneous three times a day before meals  insulin lispro (ADMELOG) corrective regimen sliding scale   SubCutaneous at bedtime  melatonin 6 milliGRAM(s) Oral at bedtime  metoprolol succinate ER 50 milliGRAM(s) Oral two times a day  spironolactone 25 milliGRAM(s) Oral daily  tamsulosin 0.4 milliGRAM(s) Oral at bedtime      Objective:     Vital Signs:   T(C): 36.3 (03-19-24 @ 05:00), Max: 36.4 (03-18-24 @ 12:52)  HR: 97 (03-19-24 @ 05:00) (62 - 97)  BP: 115/75 (03-19-24 @ 05:00) (107/80 - 116/81)  RR: 18 (03-19-24 @ 05:00) (17 - 18)  SpO2: 95% (03-19-24 @ 05:00) (95% - 95%)  Wt(kg): --    Physical Exam:   General: morbidly obese, no acute distress  Neck: supple   CVS: JVP ~ 9 cm H20, irregularly irregular, s1, s2  Pulm: unlabored respirations, mostly clear to ausculation   Abd: obese  Ext: trace lower extremity edema b/l   Neuro: awake, alert and oriented   Psych: Normal affect      Labs:   19 Mar 2024 06:28    138    |  101    |  33     ----------------------------<  84     4.1     |  26     |  1.59     Ca    9.0        19 Mar 2024 06:28  Phos  4.5       19 Mar 2024 06:28  Mg     1.6       19 Mar 2024 06:28    TPro  7.7    /  Alb  3.4    /  TBili  1.4    /  DBili  x      /  AST  49     /  ALT  56     /  AlkPhos  113    19 Mar 2024 06:28                          13.0   4.31  )-----------( 211      ( 19 Mar 2024 06:28 )             41.3           TTE (2018):  LVEF 34%    TTE (3/2024):   1. Severely decreased global left ventricular systolic function.   2. Left ventricular ejection fraction, by visual estimation, is 20 to 25%.   3. The left ventricle endocardium is not well visualized, consider use   of IV ultrasonic enhancing agent to better evaluate endocardium, if   clinically indicated. Calculated LVEF by Simpsons Biplane Method 20%. Severe global left ventricle hypokinesis.   4. Severely increased left ventricular internal cavity size.   5. Dilated cardiomyopathy.   6. The mitral in-flow pattern reveals no discernable A-wave, therefore no comment on diastolic function can be made.   7. Mildly enlarged right ventricle with low normal right ventricle   systolic function.   8. Severely enlarged left atrium.   9. Right atrial enlargement.  10. Mild thickening of the anterior and posterior mitral valve leaflets.  11. Moderate mitral valve regurgitation.  12. Mild-moderate tricuspid regurgitation.  13. Mild aortic valve leaflet calcification. No aortic valve stenosis.  14. Mild aortic regurgitation.  15. Mild to moderate pulmonic valve regurgitation.  16. Top normal sized Aorta at the Sinuses of Valsalva.  17. Estimated pulmonary artery systolic pressure is 42.5 mmHg assuming a right atrial pressure of 15 mmHg, which is consistent with mild pulmonary hypertension.  18. There is no evidence of pericardial effusion.    ECG (3/5/24): atrial fibrillation with RVR, PVC

## 2024-03-19 NOTE — PROGRESS NOTE ADULT - ATTENDING COMMENTS
Please see resident note for full details regarding the service.     PE: A+Ox3, no murmurs, lungs CTA b/l, abd NT/ND, 2+ lower extremity swelling, no FND     Assessment:   - A fib with rapid ventricular response  - Acute on chronic systolic heart failure exacerbation CHFrEF (EF 20-25%)   - Acute renal injury likely cardiorenal syndrome   - Hypomagnesemia   - History of Non ischemic cardiomyopathy (LVEF 34% in 2018), history of Ventricular tachycardia, s/p AICD, Atrial fibrillation (on Eliquis), Hypertension, Type II Diabetes mellitus, Obstructive sleep apnea and Morbid obesity     Plan:   - HR , c/w Amiodarone and transfer to [facility] for further management   - I spoke to nephrology -> c/w 40 mg IVP lasix BID   - c/w Metoprolol succinate 50 mg BID, renally dosed Digoxin, Spironolactone, Eliquis   - As per cardiology - when renal function stabilizes can consider adding Entresto, not a candidate for SGLT2I or MRA d/t renal function   - Will discuss restarting Hydralazine/ISDN with cardiology   - Mg 1.8 today, will replete and f/u in AM   - Concerns regarding medication compliance and ability to afford medications   - PT - no skilled needs   - Dispo: transfer to [facility] initiated, awaiting bed     I spent a total of 30 minutes on the date of this encounter coordinating the patient's care. This includes reviewing results/imaging and discussions with specialists, nursing, case management/social work. Further tests, medications, and procedures have been ordered as indicated. Results and the plan of care were communicated to the patient and/or their family member. Supporting documentation was completed and added to the patient's chart.

## 2024-03-19 NOTE — PROGRESS NOTE ADULT - ASSESSMENT
58y old  Male PMHx CHF, Afib, h/o Vtach s/p AICD, PATTIE, HLD, HTN, Anxiety and Asthma,  who presents with respiratory distress and found to have Afib with RVR. Pt was admitted to ICU and downgraded to medicine on 3/11.    #Afib with RVR  -uncontrolled HR, improving max 120s  -EKG shows Afib with RVR  -VQ scan shows low probability of pulmonary embolism  -s/p amiodarone drip, HR still elevated  -Pacemaker interrogated twice with no events  -hold entresto until renal function stabilizes  -change metoprolol tartrate 50mg q6h to Metoprolol Succinate 50mg bid   -c/w spironolactone 25mg qd  -c/w digoxin 125 qd (renally dosed), currently at therapeutic level  -c/w aspirin  -c/w eliquis 5mg bid  -c/w amiodarone; finished loading today  -continue telemetry  -monitor digoxin level  -cardio consult appreciated  -pending transfer to [facility] for heart failure evaluation and EP evaluation    #Acute on Chronic systolic CHF  -fluid overload improving s/p diuresis  -Trops neg, d-dimer elevated, probnp elevated  -Echo LVEF 20-25%, dilated LV cavity, severe left atrial enlargement, mild pHTN  -Cardio consult appreciated  -c/w Lasix 40mg IV bid due to current fluid overload with increase leg edema  -c/w albuterol inhaler prn  -c/w spironolactone 25mg qd  -strict I&Os, daily weight  -PT eval appreciated  -maintain K>4 and Mg>2, will replete as needed    #Cardio renal MAYTE  -Cr 2.84 on admission  -improving Cr and GFR trend  -US renal no hydronephrosis b/l  -avoid nephrotoxins  -continue with diuresis  -c/w allopurinol 50mg qd (renally dosed)  -Nephro consult appreciated    #hypomagnesemia, hypokalemia  -resolved after repletion  -continue to monitor    #T2DM  -A1C 7.2%  -c/w mod ISS, accuchecks and hypoglycemia protocol    #Obesity with PATTIE  -c/w CPAP 4L qhs    #BPH  -c/w flomax 0.4 qhs    #HLD  -c/w atorvastatin 80mg qhs    #Xerosis  -c/w aquaphor ointment to b/l feet    #DVT ppx  -c/w eliquis    #FULL CODE      Dispo: pending transfer to [facility] for HF and EP eval    Patient updated regarding plan of care at bedside.     Discussed with Dr. Christopher

## 2024-03-19 NOTE — PROGRESS NOTE ADULT - SUBJECTIVE AND OBJECTIVE BOX
Patient is a 57y/o Male PMHx CHF, Afib, h/o Vtach s/p AICD, PATTIE, HLD, HTN, Anxiety and Asthma who presents with a chief complaint of a fib with RVR (17 Mar 2024 15:56)      Subjective: Patient was seen and examined this morning at bedside.   Overnight events: none    REVIEW OF SYSTEMS:  CONSTITUTIONAL: No weakness, fevers or chills  EYES/ENT: No visual changes;  No vertigo or throat pain   NECK: No pain or stiffness  RESPIRATORY: No cough, wheezing, hemoptysis; No shortness of breath  CARDIOVASCULAR: No chest pain or palpitations  GASTROINTESTINAL: No abdominal or epigastric pain. No nausea, vomiting; No diarrhea or constipation.   GENITOURINARY: No dysuria, frequency or hematuria  NEUROLOGICAL: No numbness or weakness  SKIN: No itching, burning, rashes, or lesions     Vital Signs Last 24 Hrs  T(C): 36.3 (19 Mar 2024 05:00), Max: 36.4 (18 Mar 2024 12:52)  T(F): 97.4 (19 Mar 2024 05:00), Max: 97.6 (18 Mar 2024 12:52)  HR: 97 (19 Mar 2024 05:00) (62 - 107)  BP: 115/75 (19 Mar 2024 05:00) (107/80 - 116/81)  BP(mean): --  RR: 18 (19 Mar 2024 05:00) (17 - 18)  SpO2: 95% (19 Mar 2024 05:00) (95% - 95%)    Parameters below as of 19 Mar 2024 05:00  Patient On (Oxygen Delivery Method): room air          PHYSICAL EXAM  Constitutional: Pt sitting in chair, awake and alert, NAD  HEENT: EOMI, normocephalic, moist mucous membranes  Respiratory: CTABL, No wheezing, rales or rhonchi  Cardiovascular: S1S2+, irregular, no M/G/R  Gastrointestinal: BS+, soft, obese abdomen, nontender, nondistended, no guarding, no rebound  Extremities: +Pitting edema on b/l lower ext, No calf pain   Vascular: Peripheral pulses present  Neurological: AAOx3, no focal deficits  Musculoskeletal: Normal muscle tone, no atrophy, no rigidity, no contractions  Skin: +b/l feet xerosis, No significant new skin lesions or rashes    MEDICATIONS:  MEDICATIONS  (STANDING):  allopurinol 100 milliGRAM(s) Oral daily  aMIOdarone    Tablet   Oral   aMIOdarone    Tablet 200 milliGRAM(s) Oral two times a day  apixaban 5 milliGRAM(s) Oral every 12 hours  AQUAPHOR (petrolatum Ointment) 1 Application(s) Topical daily  atorvastatin 80 milliGRAM(s) Oral at bedtime  dextrose 5%. 1000 milliLiter(s) (100 mL/Hr) IV Continuous <Continuous>  dextrose 5%. 1000 milliLiter(s) (50 mL/Hr) IV Continuous <Continuous>  dextrose 50% Injectable 25 Gram(s) IV Push once  dextrose 50% Injectable 12.5 Gram(s) IV Push once  dextrose 50% Injectable 25 Gram(s) IV Push once  digoxin     Tablet 125 MICROGram(s) Oral daily  furosemide   Injectable 40 milliGRAM(s) IV Push two times a day  glucagon  Injectable 1 milliGRAM(s) IntraMuscular once  insulin lispro (ADMELOG) corrective regimen sliding scale   SubCutaneous three times a day before meals  insulin lispro (ADMELOG) corrective regimen sliding scale   SubCutaneous at bedtime  magnesium oxide 400 milliGRAM(s) Oral two times a day with meals  melatonin 6 milliGRAM(s) Oral at bedtime  metoprolol succinate ER 50 milliGRAM(s) Oral two times a day  spironolactone 25 milliGRAM(s) Oral daily  tamsulosin 0.4 milliGRAM(s) Oral at bedtime    MEDICATIONS  (PRN):  acetaminophen     Tablet .. 650 milliGRAM(s) Oral every 6 hours PRN Temp greater or equal to 38C (100.4F), Mild Pain (1 - 3)  albuterol    90 MICROgram(s) HFA Inhaler 1 Puff(s) Inhalation every 4 hours PRN for bronchospasm  artificial  tears Solution 1 Drop(s) Both EYES three times a day PRN Dry Eyes  dextrose Oral Gel 15 Gram(s) Oral once PRN Blood Glucose LESS THAN 70 milliGRAM(s)/deciliter        LABS: All Labs Reviewed:               **Pending AM labs              RADIOLOGY/EKG:    Xray Chest 1 View- PORTABLE-Urgent (03.05.24 @ 12:14)   IMPRESSION: Slightly more prominent parahilar interstitial markings since   previous exam. Pacer as before. Cardiomegaly. I would favor congestive   changes. Regional osseous structures appropriate for age.    NM Pulmonary Ventilation/Perfusion Scan (03.05.24 @ 14:59)   IMPRESSION: Very low probability of pulmonary embolus.    US Duplex Venous Lower Ext Complete, Bilateral (03.06.24 @ 17:32)   IMPRESSION:  No evidence of deep venous thrombosis in either lower extremity.    US Renal (03.07.24 @ 19:58)   IMPRESSION:  No renal mass, hydronephrosis or calculus is visualized sonographically.  A small right pleural effusion is partially visualized.

## 2024-03-19 NOTE — PROGRESS NOTE ADULT - SUBJECTIVE AND OBJECTIVE BOX
No distress    Vital Signs Last 24 Hrs  T(C): 36.3 (03-19-24 @ 05:00), Max: 36.4 (03-18-24 @ 12:52)  T(F): 97.4 (03-19-24 @ 05:00), Max: 97.6 (03-18-24 @ 12:52)  HR: 97 (03-19-24 @ 05:00) (62 - 97)  BP: 115/75 (03-19-24 @ 05:00) (107/80 - 116/81)  RR: 18 (03-19-24 @ 05:00) (17 - 18)  SpO2: 95% (03-19-24 @ 05:00) (95% - 95%)    s1s2  b/l air entry  soft, ND  + edema                                                                                                          13.0   4.31  )-----------( 211      ( 19 Mar 2024 06:28 )             41.3     19 Mar 2024 06:28    138    |  101    |  33     ----------------------------<  84     4.1     |  26     |  1.59     Ca    9.0        19 Mar 2024 06:28  Phos  4.5       19 Mar 2024 06:28  Mg     1.6       19 Mar 2024 06:28    TPro  7.7    /  Alb  3.4    /  TBili  1.4    /  DBili  x      /  AST  49     /  ALT  56     /  AlkPhos  113    19 Mar 2024 06:28    LIVER FUNCTIONS - ( 19 Mar 2024 06:28 )  Alb: 3.4 g/dL / Pro: 7.7 g/dL / ALK PHOS: 113 U/L / ALT: 56 U/L / AST: 49 U/L / GGT: x           acetaminophen     Tablet .. 650 milliGRAM(s) Oral every 6 hours PRN  albuterol    90 MICROgram(s) HFA Inhaler 1 Puff(s) Inhalation every 4 hours PRN  allopurinol 100 milliGRAM(s) Oral daily  aMIOdarone    Tablet   Oral   aMIOdarone    Tablet 200 milliGRAM(s) Oral two times a day  apixaban 5 milliGRAM(s) Oral every 12 hours  AQUAPHOR (petrolatum Ointment) 1 Application(s) Topical daily  artificial  tears Solution 1 Drop(s) Both EYES three times a day PRN  atorvastatin 80 milliGRAM(s) Oral at bedtime  dextrose 5%. 1000 milliLiter(s) IV Continuous <Continuous>  dextrose 5%. 1000 milliLiter(s) IV Continuous <Continuous>  dextrose 50% Injectable 25 Gram(s) IV Push once  dextrose 50% Injectable 12.5 Gram(s) IV Push once  dextrose 50% Injectable 25 Gram(s) IV Push once  dextrose Oral Gel 15 Gram(s) Oral once PRN  digoxin     Tablet 125 MICROGram(s) Oral daily  furosemide   Injectable 40 milliGRAM(s) IV Push two times a day  glucagon  Injectable 1 milliGRAM(s) IntraMuscular once  insulin lispro (ADMELOG) corrective regimen sliding scale   SubCutaneous three times a day before meals  insulin lispro (ADMELOG) corrective regimen sliding scale   SubCutaneous at bedtime  melatonin 6 milliGRAM(s) Oral at bedtime  metoprolol succinate ER 50 milliGRAM(s) Oral two times a day  spironolactone 25 milliGRAM(s) Oral daily  tamsulosin 0.4 milliGRAM(s) Oral at bedtime    A/P:    CM, EF 20 - 25%, mod MR, CHF, R Afib  Hemodynamic, cardio-renal MAYTE (Cr 1.04 - 10/9/23)  Volume overload, diuresis   Would avoid ACE/ARB/Entresto/farxiga at this time    Renal SONO w/o hydro   F/u BMP, UO  SPEP, serologies are negative  Plan for tx to [facility] for further cardiac w/up/management     [phone]

## 2024-03-19 NOTE — PROGRESS NOTE ADULT - ASSESSMENT
Assessment:  Allen York is a 58 year old man with past medical history of Non ischemic cardiomyopathy (LVEF 34% in 2018), history of Ventricular tachycardia, s/p AICD, Atrial fibrillation (on Eliquis), Hypertension, Type II Diabetes mellitus, Obstructive sleep apnea and Morbid obesity who was brought in by EMS due to respiratory distress, found to have atrial fibrillation with rapid ventricular response and acute on chronic systolic heart failure exacerbation and acute renal injury.     ECG on admission consistent with atrial fibrillation with RVR, PVCs, left axis deviation. Troponins are not elevated, does not appear to be an acute coronary syndrome. Pro BNP elevated, CXR suggestive of congestion. D-dimer elevated, VQ scan with very low probability of pulmonary embolism. Echo consistent with LVEF 20-25%, dilated LV cavity, severe left atrial enlargement, moderate valvular disease and mild pulmonary hypertension.     Recommendations:  [] Acute on chronic systolic heart failure: Renal function stable today with IV diuresis, may continue today. Plan to resume Entresto if renal function stabilizes, recommend Hydralazine/Isosorbide dinitrate in meantime. Continue Metoprolol as per below. Not candidate for SGLT2i or MRA due to abnormal renal function. [employer] ICD re- interrogated and functioning well with 10 year battery life and no recent events.   [] Atrial fibrillation with RVR: HR improving, now restarted on Amiodarone. As per his cardiologist, Dr. Garcia he recommends transfer this week to [facility] for EP evaluation for BG/DCCV if patient remains in AF, discussed importance of anticoagulation compliance post DCCV with patient given risk for stroke, patient reports he will be compliant. Continue Metoprolol succinate as dosed. Will need to monitor LFTs, TSH and PFTs and opthamologic exams if patient will be on amiodarone long term, have discussed toxicity profile of Amiodarone with patient.    Recommend transfer to [facility] medicine service for EP consultation and CHF consultation for optimization due to atrial fibrillation with RVR refractory to medications. Primary team arranging transfer.    Yoni Chester MD  Cardiology

## 2024-03-20 LAB
ALBUMIN SERPL ELPH-MCNC: 3.6 G/DL — SIGNIFICANT CHANGE UP (ref 3.3–5)
ALP SERPL-CCNC: 124 U/L — HIGH (ref 40–120)
ALT FLD-CCNC: 63 U/L — HIGH (ref 10–45)
ANION GAP SERPL CALC-SCNC: 12 MMOL/L — SIGNIFICANT CHANGE UP (ref 5–17)
AST SERPL-CCNC: 53 U/L — HIGH (ref 10–40)
BILIRUB SERPL-MCNC: 1.4 MG/DL — HIGH (ref 0.2–1.2)
BUN SERPL-MCNC: 38 MG/DL — HIGH (ref 7–23)
CALCIUM SERPL-MCNC: 9.4 MG/DL — SIGNIFICANT CHANGE UP (ref 8.4–10.5)
CHLORIDE SERPL-SCNC: 101 MMOL/L — SIGNIFICANT CHANGE UP (ref 96–108)
CO2 SERPL-SCNC: 25 MMOL/L — SIGNIFICANT CHANGE UP (ref 22–31)
CREAT SERPL-MCNC: 1.89 MG/DL — HIGH (ref 0.5–1.3)
EGFR: 41 ML/MIN/1.73M2 — LOW
GLUCOSE BLDC GLUCOMTR-MCNC: 116 MG/DL — HIGH (ref 70–99)
GLUCOSE BLDC GLUCOMTR-MCNC: 87 MG/DL — SIGNIFICANT CHANGE UP (ref 70–99)
GLUCOSE BLDC GLUCOMTR-MCNC: 94 MG/DL — SIGNIFICANT CHANGE UP (ref 70–99)
GLUCOSE BLDC GLUCOMTR-MCNC: 94 MG/DL — SIGNIFICANT CHANGE UP (ref 70–99)
GLUCOSE SERPL-MCNC: 85 MG/DL — SIGNIFICANT CHANGE UP (ref 70–99)
HCT VFR BLD CALC: 43.6 % — SIGNIFICANT CHANGE UP (ref 39–50)
HGB BLD-MCNC: 14 G/DL — SIGNIFICANT CHANGE UP (ref 13–17)
MAGNESIUM SERPL-MCNC: 1.8 MG/DL — SIGNIFICANT CHANGE UP (ref 1.6–2.6)
MCHC RBC-ENTMCNC: 26.1 PG — LOW (ref 27–34)
MCHC RBC-ENTMCNC: 32.1 GM/DL — SIGNIFICANT CHANGE UP (ref 32–36)
MCV RBC AUTO: 81.3 FL — SIGNIFICANT CHANGE UP (ref 80–100)
NRBC # BLD: 0 /100 WBCS — SIGNIFICANT CHANGE UP (ref 0–0)
PLATELET # BLD AUTO: 219 K/UL — SIGNIFICANT CHANGE UP (ref 150–400)
POTASSIUM SERPL-MCNC: 4 MMOL/L — SIGNIFICANT CHANGE UP (ref 3.5–5.3)
POTASSIUM SERPL-SCNC: 4 MMOL/L — SIGNIFICANT CHANGE UP (ref 3.5–5.3)
PROT SERPL-MCNC: 8.2 G/DL — SIGNIFICANT CHANGE UP (ref 6–8.3)
RBC # BLD: 5.36 M/UL — SIGNIFICANT CHANGE UP (ref 4.2–5.8)
RBC # FLD: 16.3 % — HIGH (ref 10.3–14.5)
SODIUM SERPL-SCNC: 138 MMOL/L — SIGNIFICANT CHANGE UP (ref 135–145)
WBC # BLD: 5.3 K/UL — SIGNIFICANT CHANGE UP (ref 3.8–10.5)
WBC # FLD AUTO: 5.3 K/UL — SIGNIFICANT CHANGE UP (ref 3.8–10.5)

## 2024-03-20 PROCEDURE — 99232 SBSQ HOSP IP/OBS MODERATE 35: CPT | Mod: GC

## 2024-03-20 PROCEDURE — 99232 SBSQ HOSP IP/OBS MODERATE 35: CPT

## 2024-03-20 RX ORDER — FUROSEMIDE 40 MG
40 TABLET ORAL
Refills: 0 | Status: DISCONTINUED | OUTPATIENT
Start: 2024-03-20 | End: 2024-03-21

## 2024-03-20 RX ORDER — HYDRALAZINE HCL 50 MG
25 TABLET ORAL THREE TIMES A DAY
Refills: 0 | Status: DISCONTINUED | OUTPATIENT
Start: 2024-03-20 | End: 2024-03-21

## 2024-03-20 RX ORDER — ISOSORBIDE DINITRATE 5 MG/1
5 TABLET ORAL THREE TIMES A DAY
Refills: 0 | Status: DISCONTINUED | OUTPATIENT
Start: 2024-03-20 | End: 2024-03-21

## 2024-03-20 RX ORDER — MAGNESIUM OXIDE 400 MG ORAL TABLET 241.3 MG
400 TABLET ORAL
Refills: 0 | Status: DISCONTINUED | OUTPATIENT
Start: 2024-03-20 | End: 2024-03-20

## 2024-03-20 RX ORDER — MAGNESIUM SULFATE 500 MG/ML
1 VIAL (ML) INJECTION ONCE
Refills: 0 | Status: COMPLETED | OUTPATIENT
Start: 2024-03-20 | End: 2024-03-20

## 2024-03-20 RX ORDER — MAGNESIUM OXIDE 400 MG ORAL TABLET 241.3 MG
400 TABLET ORAL
Refills: 0 | Status: COMPLETED | OUTPATIENT
Start: 2024-03-20 | End: 2024-03-20

## 2024-03-20 RX ADMIN — Medication 100 MILLIGRAM(S): at 12:39

## 2024-03-20 RX ADMIN — Medication 50 MILLIGRAM(S): at 05:48

## 2024-03-20 RX ADMIN — Medication 40 MILLIGRAM(S): at 05:49

## 2024-03-20 RX ADMIN — APIXABAN 5 MILLIGRAM(S): 2.5 TABLET, FILM COATED ORAL at 05:49

## 2024-03-20 RX ADMIN — Medication 25 MILLIGRAM(S): at 14:00

## 2024-03-20 RX ADMIN — Medication 40 MILLIGRAM(S): at 14:00

## 2024-03-20 RX ADMIN — TAMSULOSIN HYDROCHLORIDE 0.4 MILLIGRAM(S): 0.4 CAPSULE ORAL at 21:09

## 2024-03-20 RX ADMIN — Medication 50 MILLIGRAM(S): at 17:16

## 2024-03-20 RX ADMIN — Medication 6 MILLIGRAM(S): at 21:09

## 2024-03-20 RX ADMIN — MAGNESIUM OXIDE 400 MG ORAL TABLET 400 MILLIGRAM(S): 241.3 TABLET ORAL at 09:24

## 2024-03-20 RX ADMIN — SPIRONOLACTONE 25 MILLIGRAM(S): 25 TABLET, FILM COATED ORAL at 05:48

## 2024-03-20 RX ADMIN — AMIODARONE HYDROCHLORIDE 200 MILLIGRAM(S): 400 TABLET ORAL at 05:49

## 2024-03-20 RX ADMIN — Medication 125 MICROGRAM(S): at 05:49

## 2024-03-20 RX ADMIN — ISOSORBIDE DINITRATE 5 MILLIGRAM(S): 5 TABLET ORAL at 17:16

## 2024-03-20 RX ADMIN — MAGNESIUM OXIDE 400 MG ORAL TABLET 400 MILLIGRAM(S): 241.3 TABLET ORAL at 17:16

## 2024-03-20 RX ADMIN — APIXABAN 5 MILLIGRAM(S): 2.5 TABLET, FILM COATED ORAL at 17:16

## 2024-03-20 RX ADMIN — Medication 100 GRAM(S): at 14:00

## 2024-03-20 RX ADMIN — ATORVASTATIN CALCIUM 80 MILLIGRAM(S): 80 TABLET, FILM COATED ORAL at 21:09

## 2024-03-20 RX ADMIN — Medication 1 APPLICATION(S): at 12:44

## 2024-03-20 RX ADMIN — AMIODARONE HYDROCHLORIDE 200 MILLIGRAM(S): 400 TABLET ORAL at 17:16

## 2024-03-20 NOTE — PROGRESS NOTE ADULT - SUBJECTIVE AND OBJECTIVE BOX
No distress    Vital Signs Last 24 Hrs  T(C): 36.4 (03-20-24 @ 20:34), Max: 36.7 (03-20-24 @ 05:26)  T(F): 97.6 (03-20-24 @ 20:34), Max: 98 (03-20-24 @ 05:26)  HR: 70 (03-20-24 @ 20:34) (64 - 101)  BP: 108/76 (03-20-24 @ 20:34) (108/76 - 122/74)  BP(mean): 86 (03-20-24 @ 05:26) (86 - 86)  RR: 18 (03-20-24 @ 20:34) (16 - 18)  SpO2: 93% (03-20-24 @ 20:34) (92% - 96%)    s1s2  b/l air entry  soft, ND  + edema                                                                                                                  14.0   5.30  )-----------( 219      ( 20 Mar 2024 05:53 )             43.6     20 Mar 2024 05:53    138    |  101    |  38     ----------------------------<  85     4.0     |  25     |  1.89     Ca    9.4        20 Mar 2024 05:53  Phos  4.5       19 Mar 2024 06:28  Mg     1.8       20 Mar 2024 05:53    TPro  8.2    /  Alb  3.6    /  TBili  1.4    /  DBili  x      /  AST  53     /  ALT  63     /  AlkPhos  124    20 Mar 2024 05:53    LIVER FUNCTIONS - ( 20 Mar 2024 05:53 )  Alb: 3.6 g/dL / Pro: 8.2 g/dL / ALK PHOS: 124 U/L / ALT: 63 U/L / AST: 53 U/L / GGT: x           acetaminophen     Tablet 650 milliGRAM(s) Oral every 6 hours PRN  albuterol    90 MICROgram(s) HFA Inhaler 1 Puff(s) Inhalation every 4 hours PRN  allopurinol 100 milliGRAM(s) Oral daily  aMIOdarone    Tablet   Oral   aMIOdarone    Tablet 200 milliGRAM(s) Oral two times a day  apixaban 5 milliGRAM(s) Oral every 12 hours  AQUAPHOR (petrolatum Ointment) 1 Application(s) Topical daily  artificial  tears Solution 1 Drop(s) Both EYES three times a day PRN  atorvastatin 80 milliGRAM(s) Oral at bedtime  dextrose 5%. 1000 milliLiter(s) IV Continuous <Continuous>  dextrose 5%. 1000 milliLiter(s) IV Continuous <Continuous>  dextrose 50% Injectable 25 Gram(s) IV Push once  dextrose 50% Injectable 12.5 Gram(s) IV Push once  dextrose 50% Injectable 25 Gram(s) IV Push once  dextrose Oral Gel 15 Gram(s) Oral once PRN  digoxin     Tablet 125 MICROGram(s) Oral daily  furosemide    Tablet 40 milliGRAM(s) Oral two times a day  glucagon  Injectable 1 milliGRAM(s) IntraMuscular once  hydrALAZINE 25 milliGRAM(s) Oral three times a day  insulin lispro (ADMELOG) corrective regimen sliding scale   SubCutaneous three times a day before meals  insulin lispro (ADMELOG) corrective regimen sliding scale   SubCutaneous at bedtime  isosorbide   dinitrate Tablet (ISORDIL) 5 milliGRAM(s) Oral three times a day  melatonin 6 milliGRAM(s) Oral at bedtime  metoprolol succinate ER 50 milliGRAM(s) Oral two times a day  tamsulosin 0.4 milliGRAM(s) Oral at bedtime    A/P:    CM, EF 20 - 25%, mod MR, CHF, R Afib  Hemodynamic, cardio-renal MAYTE (Cr 1.04 - 10/9/23)  Volume overload, diuresis   Would avoid ACE/ARB/Entresto/farxiga at this time    Renal SONO w/o hydro   Higher Cr today noted  Changed to PO lasix   F/u BMP, UO  SPEP, serologies are negative  Awaits tx to [facility] for further cardiac w/up/management     [phone]

## 2024-03-20 NOTE — PROGRESS NOTE ADULT - SUBJECTIVE AND OBJECTIVE BOX
OFE REMI  [id number]      Chief Complaint: Acute on chronic systolic heart failure/ Atrial fibrillation     Interval History: The patient reports breathing is stable.     Tele: atrial fibrillation 100s BPM, NSVT        Current meds:   acetaminophen     Tablet .. 650 milliGRAM(s) Oral every 6 hours PRN  albuterol    90 MICROgram(s) HFA Inhaler 1 Puff(s) Inhalation every 4 hours PRN  allopurinol 100 milliGRAM(s) Oral daily  aMIOdarone    Tablet   Oral   aMIOdarone    Tablet 200 milliGRAM(s) Oral two times a day  apixaban 5 milliGRAM(s) Oral every 12 hours  AQUAPHOR (petrolatum Ointment) 1 Application(s) Topical daily  artificial  tears Solution 1 Drop(s) Both EYES three times a day PRN  atorvastatin 80 milliGRAM(s) Oral at bedtime  dextrose 5%. 1000 milliLiter(s) IV Continuous <Continuous>  dextrose 5%. 1000 milliLiter(s) IV Continuous <Continuous>  dextrose 50% Injectable 25 Gram(s) IV Push once  dextrose 50% Injectable 12.5 Gram(s) IV Push once  dextrose 50% Injectable 25 Gram(s) IV Push once  dextrose Oral Gel 15 Gram(s) Oral once PRN  digoxin     Tablet 125 MICROGram(s) Oral daily  furosemide   Injectable 40 milliGRAM(s) IV Push two times a day  glucagon  Injectable 1 milliGRAM(s) IntraMuscular once  insulin lispro (ADMELOG) corrective regimen sliding scale   SubCutaneous three times a day before meals  insulin lispro (ADMELOG) corrective regimen sliding scale   SubCutaneous at bedtime  magnesium oxide 400 milliGRAM(s) Oral two times a day with meals  melatonin 6 milliGRAM(s) Oral at bedtime  metoprolol succinate ER 50 milliGRAM(s) Oral two times a day  spironolactone 25 milliGRAM(s) Oral daily  tamsulosin 0.4 milliGRAM(s) Oral at bedtime      Objective:     Vital Signs:   T(C): 36.7 (03-20-24 @ 05:26), Max: 36.7 (03-20-24 @ 05:26)  HR: 73 (03-20-24 @ 05:26) (62 - 105)  BP: 119/69 (03-20-24 @ 05:26) (98/69 - 126/88)  RR: 16 (03-20-24 @ 05:26) (16 - 18)  SpO2: 92% (03-20-24 @ 05:26) (92% - 96%)  Wt(kg): --    Physical Exam:   General: morbidly obese, no acute distress  Neck: supple   CVS: JVP ~ 9 cm H20, irregularly irregular, s1, s2  Pulm: unlabored respirations, mostly clear to ausculation   Abd: obese  Ext: trace lower extremity edema b/l   Neuro: awake, alert and oriented   Psych: Normal affect      Labs:   20 Mar 2024 05:53    138    |  101    |  38     ----------------------------<  85     4.0     |  25     |  1.89     Ca    9.4        20 Mar 2024 05:53  Phos  4.5       19 Mar 2024 06:28  Mg     1.8       20 Mar 2024 05:53    TPro  8.2    /  Alb  3.6    /  TBili  1.4    /  DBili  x      /  AST  53     /  ALT  63     /  AlkPhos  124    20 Mar 2024 05:53                          14.0   5.30  )-----------( 219      ( 20 Mar 2024 05:53 )             43.6           TTE (2018):  LVEF 34%    TTE (3/2024):   1. Severely decreased global left ventricular systolic function.   2. Left ventricular ejection fraction, by visual estimation, is 20 to 25%.   3. The left ventricle endocardium is not well visualized, consider use   of IV ultrasonic enhancing agent to better evaluate endocardium, if   clinically indicated. Calculated LVEF by Simpsons Biplane Method 20%. Severe global left ventricle hypokinesis.   4. Severely increased left ventricular internal cavity size.   5. Dilated cardiomyopathy.   6. The mitral in-flow pattern reveals no discernable A-wave, therefore no comment on diastolic function can be made.   7. Mildly enlarged right ventricle with low normal right ventricle   systolic function.   8. Severely enlarged left atrium.   9. Right atrial enlargement.  10. Mild thickening of the anterior and posterior mitral valve leaflets.  11. Moderate mitral valve regurgitation.  12. Mild-moderate tricuspid regurgitation.  13. Mild aortic valve leaflet calcification. No aortic valve stenosis.  14. Mild aortic regurgitation.  15. Mild to moderate pulmonic valve regurgitation.  16. Top normal sized Aorta at the Sinuses of Valsalva.  17. Estimated pulmonary artery systolic pressure is 42.5 mmHg assuming a right atrial pressure of 15 mmHg, which is consistent with mild pulmonary hypertension.  18. There is no evidence of pericardial effusion.    ECG (3/5/24): atrial fibrillation with RVR, PVC

## 2024-03-20 NOTE — PROGRESS NOTE ADULT - ATTENDING COMMENTS
Please see resident note for full details regarding the service.     PE: A+Ox3, no murmurs, lungs CTA b/l, abd NT/ND, 2+ lower extremity swelling, no FND     Assessment:   - A fib with rapid ventricular response  - Acute on chronic systolic heart failure exacerbation CHFrEF (EF 20-25%)   - Acute renal injury likely cardiorenal syndrome   - Hypomagnesemia   - History of Non ischemic cardiomyopathy (LVEF 34% in 2018), history of Ventricular tachycardia, s/p AICD, Atrial fibrillation (on Eliquis), Hypertension, Type II Diabetes mellitus, Obstructive sleep apnea and Morbid obesity     Plan:   - HR , c/w Amiodarone and transfer to [facility] for further management   - Will transition to PO lasix today   - c/w Metoprolol succinate 50 mg BID, renally dosed Digoxin, Spironolactone, Eliquis   - Adding on Hydralazine/ISDN per conversation with cardiology   - As per nephrology - no ACE/ARB/ARNI   - Mg 1.8 today, will replete and f/u in AM   - Concerns regarding medication compliance and ability to afford medications   - PT - no skilled needs   - Dispo: transfer to [facility] initiated, awaiting bed     I spent a total of 30 minutes on the date of this encounter coordinating the patient's care. This includes reviewing results/imaging and discussions with specialists, nursing, case management/social work. Further tests, medications, and procedures have been ordered as indicated. Results and the plan of care were communicated to the patient and/or their family member. Supporting documentation was completed and added to the patient's chart.

## 2024-03-20 NOTE — PROGRESS NOTE ADULT - ASSESSMENT
Assessment:  Allen York is a 58 year old man with past medical history of Non ischemic cardiomyopathy (LVEF 34% in 2018), history of Ventricular tachycardia, s/p AICD, Atrial fibrillation (on Eliquis), Hypertension, Type II Diabetes mellitus, Obstructive sleep apnea and Morbid obesity who was brought in by EMS due to respiratory distress, found to have atrial fibrillation with rapid ventricular response and acute on chronic systolic heart failure exacerbation and acute renal injury.     ECG on admission consistent with atrial fibrillation with RVR, PVCs, left axis deviation. Troponins are not elevated, does not appear to be an acute coronary syndrome. Pro BNP elevated, CXR suggestive of congestion. D-dimer elevated, VQ scan with very low probability of pulmonary embolism. Echo consistent with LVEF 20-25%, dilated LV cavity, severe left atrial enlargement, moderate valvular disease and mild pulmonary hypertension.     Recommendations:  [] Acute on chronic systolic heart failure: Cr rising, patient appears near-euvolemic, would transition to Lasix 40 mg PO BID. Monitor renal function. Plan to resume Entresto if renal function stabilizes, recommend Hydralazine/Isosorbide dinitrate in meantime. Continue Metoprolol as per below. Not candidate for SGLT2i or MRA due to abnormal renal function. [employer] ICD re- interrogated and functioning well with 10 year battery life and no recent events.   [] Atrial fibrillation with RVR: HR in 100s, now restarted on Amiodarone. As per his cardiologist, Dr. Garcia he recommends transfer this week to [facility] for EP evaluation for BG/DCCV if patient remains in AF, discussed importance of anticoagulation compliance post DCCV with patient given risk for stroke, patient reports he will be compliant. Continue Metoprolol succinate as dosed. Will need to monitor LFTs, TSH and PFTs and opthamologic exams if patient will be on amiodarone long term, have discussed toxicity profile of Amiodarone with patient.    Recommend transfer to [facility] medicine service for EP consultation and CHF consultation for optimization due to atrial fibrillation with RVR refractory to medications. Primary team arranged transfer, patient accepted and awaiting bed. Discussed with primary team.    Yoni Chester MD  Cardiology

## 2024-03-20 NOTE — PROGRESS NOTE ADULT - ASSESSMENT
58y old  Male PMHx CHF, Afib, h/o Vtach s/p AICD, PATTIE, HLD, HTN, Anxiety and Asthma,  who presents with respiratory distress and found to have Afib with RVR. Pt was admitted to ICU and downgraded to medicine on 3/11.    #Afib with RVR  -uncontrolled HR, improving max 120s  -EKG shows Afib with RVR  -VQ scan shows low probability of pulmonary embolism  -s/p amiodarone drip, HR still elevated  -Pacemaker interrogated twice with no events  -hold entresto until renal function stabilizes  -change metoprolol tartrate 50mg q6h to Metoprolol Succinate 50mg bid   -c/w spironolactone 25mg qd  -c/w digoxin 125 qd (renally dosed), currently at therapeutic level  -c/w aspirin  -c/w eliquis 5mg bid  -c/w amiodarone; finished loading today  -continue telemetry  -monitor digoxin level  -cardio consult appreciated  -pending transfer to [facility] for heart failure evaluation and EP evaluation    #Acute on Chronic systolic CHF  -fluid overload improving s/p diuresis  -Trops neg, d-dimer elevated, probnp elevated  -Echo LVEF 20-25%, dilated LV cavity, severe left atrial enlargement, mild pHTN  -Cardio consult appreciated  -c/w Lasix 40mg IV bid due to current fluid overload with increase leg edema  -c/w albuterol inhaler prn  -c/w spironolactone 25mg qd  -strict I&Os, daily weight  -PT eval appreciated  -maintain K>4 and Mg>2, will replete as needed    #Cardio renal MAYTE  -Cr 2.84 on admission  -improving Cr and GFR trend  -US renal no hydronephrosis b/l  -avoid nephrotoxins  -continue with diuresis  -c/w allopurinol 50mg qd (renally dosed)  -Nephro consult appreciated    #hypomagnesemia, hypokalemia  -resolved after repletion  -continue to monitor    #T2DM  -A1C 7.2%  -c/w mod ISS, accuchecks and hypoglycemia protocol    #Obesity with PATTIE  -c/w CPAP 4L qhs    #BPH  -c/w flomax 0.4 qhs    #HLD  -c/w atorvastatin 80mg qhs    #Xerosis  -c/w aquaphor ointment to b/l feet    #DVT ppx  -c/w eliquis    #FULL CODE      Dispo: pending transfer to [facility] for HF and EP eval    Patient updated regarding plan of care at bedside.     Discussed with Dr. Christopher    58y old  Male PMHx CHF, Afib, h/o Vtach s/p AICD, PATTIE, HLD, HTN, Anxiety and Asthma,  who presents with respiratory distress and found to have Afib with RVR. Pt was admitted to ICU and downgraded to medicine on 3/11.    #Afib with RVR  -uncontrolled HR, improving max 120s  -EKG shows Afib with RVR  -VQ scan shows low probability of pulmonary embolism  -Pacemaker interrogated twice with no events  -hold entresto until renal function stabilizes  -c/w Metoprolol Succinate 50mg bid   -c/w spironolactone 25mg qd  -c/w digoxin 125 qd (renally dosed), currently at therapeutic level  -c/w aspirin  -c/w eliquis 5mg bid  -c/w amiodarone  -continue telemetry  -monitor digoxin level  -cardio consult appreciated  -pending transfer to [facility] for heart failure evaluation and EP evaluation    #Acute on Chronic systolic CHF  -fluid overload improving s/p diuresis  -Trops neg, d-dimer elevated, probnp elevated  -Echo LVEF 20-25%, dilated LV cavity, severe left atrial enlargement, mild pHTN  -Cardio consult appreciated  -change IV lasix 40mg bid to PO lasix 40mg bid  -start hydralazine 25mg tid and isosorbide dinitrate 5mg tid  -c/w albuterol inhaler prn  -c/w spironolactone 25mg qd  -strict I&Os, daily weight  -PT eval appreciated  -maintain K>4 and Mg>2, will replete as needed    #Cardio renal MAYTE  -Cr 2.84 on admission  -improving Cr and GFR trend  -US renal no hydronephrosis b/l  -avoid nephrotoxins  -continue with diuresis  -c/w allopurinol 50mg qd (renally dosed)  -Nephro consult appreciated    #hypomagnesemia, hypokalemia  -resolved after repletion  -continue to monitor    #T2DM  -A1C 7.2%  -c/w mod ISS, accuchecks and hypoglycemia protocol    #Obesity with PATTIE  -c/w CPAP 4L qhs    #BPH  -c/w flomax 0.4 qhs    #HLD  -c/w atorvastatin 80mg qhs    #Xerosis  -c/w aquaphor ointment to b/l feet    #DVT ppx  -c/w eliquis    #FULL CODE      Dispo: pending transfer to [facility] for HF and EP eval    Patient updated regarding plan of care at bedside.     Discussed with Dr. Christopher

## 2024-03-21 ENCOUNTER — INPATIENT (INPATIENT)
Facility: HOSPITAL | Age: 59
LOS: 2 days | Discharge: HOME | End: 2024-03-24
Attending: STUDENT IN AN ORGANIZED HEALTH CARE EDUCATION/TRAINING PROGRAM | Admitting: STUDENT IN AN ORGANIZED HEALTH CARE EDUCATION/TRAINING PROGRAM
Payer: MEDICARE

## 2024-03-21 VITALS
OXYGEN SATURATION: 98 % | RESPIRATION RATE: 18 BRPM | DIASTOLIC BLOOD PRESSURE: 70 MMHG | TEMPERATURE: 98 F | SYSTOLIC BLOOD PRESSURE: 97 MMHG | HEART RATE: 50 BPM

## 2024-03-21 VITALS — SYSTOLIC BLOOD PRESSURE: 109 MMHG | DIASTOLIC BLOOD PRESSURE: 73 MMHG | HEART RATE: 71 BPM

## 2024-03-21 DIAGNOSIS — I48.91 UNSPECIFIED ATRIAL FIBRILLATION: ICD-10-CM

## 2024-03-21 DIAGNOSIS — Z95.810 PRESENCE OF AUTOMATIC (IMPLANTABLE) CARDIAC DEFIBRILLATOR: Chronic | ICD-10-CM

## 2024-03-21 DIAGNOSIS — Z90.89 ACQUIRED ABSENCE OF OTHER ORGANS: Chronic | ICD-10-CM

## 2024-03-21 LAB
ALBUMIN SERPL ELPH-MCNC: 3.5 G/DL — SIGNIFICANT CHANGE UP (ref 3.3–5)
ALP SERPL-CCNC: 107 U/L — SIGNIFICANT CHANGE UP (ref 40–120)
ALT FLD-CCNC: 63 U/L — HIGH (ref 10–45)
ANION GAP SERPL CALC-SCNC: 17 MMOL/L — SIGNIFICANT CHANGE UP (ref 5–17)
AST SERPL-CCNC: 45 U/L — HIGH (ref 10–40)
BILIRUB SERPL-MCNC: 1.2 MG/DL — SIGNIFICANT CHANGE UP (ref 0.2–1.2)
BUN SERPL-MCNC: 37 MG/DL — HIGH (ref 7–23)
CALCIUM SERPL-MCNC: 9.6 MG/DL — SIGNIFICANT CHANGE UP (ref 8.4–10.5)
CHLORIDE SERPL-SCNC: 101 MMOL/L — SIGNIFICANT CHANGE UP (ref 96–108)
CO2 SERPL-SCNC: 22 MMOL/L — SIGNIFICANT CHANGE UP (ref 22–31)
CREAT SERPL-MCNC: 1.59 MG/DL — HIGH (ref 0.5–1.3)
EGFR: 50 ML/MIN/1.73M2 — LOW
GLUCOSE BLDC GLUCOMTR-MCNC: 84 MG/DL — SIGNIFICANT CHANGE UP (ref 70–99)
GLUCOSE BLDC GLUCOMTR-MCNC: 89 MG/DL — SIGNIFICANT CHANGE UP (ref 70–99)
GLUCOSE BLDC GLUCOMTR-MCNC: 95 MG/DL — SIGNIFICANT CHANGE UP (ref 70–99)
GLUCOSE SERPL-MCNC: 90 MG/DL — SIGNIFICANT CHANGE UP (ref 70–99)
HCT VFR BLD CALC: 42.6 % — SIGNIFICANT CHANGE UP (ref 39–50)
HGB BLD-MCNC: 13.6 G/DL — SIGNIFICANT CHANGE UP (ref 13–17)
MAGNESIUM SERPL-MCNC: 2 MG/DL — SIGNIFICANT CHANGE UP (ref 1.6–2.6)
MCHC RBC-ENTMCNC: 25.9 PG — LOW (ref 27–34)
MCHC RBC-ENTMCNC: 31.9 GM/DL — LOW (ref 32–36)
MCV RBC AUTO: 81.1 FL — SIGNIFICANT CHANGE UP (ref 80–100)
NRBC # BLD: 0 /100 WBCS — SIGNIFICANT CHANGE UP (ref 0–0)
PHOSPHATE SERPL-MCNC: 5.2 MG/DL — HIGH (ref 2.5–4.5)
PLATELET # BLD AUTO: 237 K/UL — SIGNIFICANT CHANGE UP (ref 150–400)
POTASSIUM SERPL-MCNC: 4.4 MMOL/L — SIGNIFICANT CHANGE UP (ref 3.5–5.3)
POTASSIUM SERPL-SCNC: 4.4 MMOL/L — SIGNIFICANT CHANGE UP (ref 3.5–5.3)
PROT SERPL-MCNC: 7.9 G/DL — SIGNIFICANT CHANGE UP (ref 6–8.3)
RBC # BLD: 5.25 M/UL — SIGNIFICANT CHANGE UP (ref 4.2–5.8)
RBC # FLD: 16.4 % — HIGH (ref 10.3–14.5)
SODIUM SERPL-SCNC: 140 MMOL/L — SIGNIFICANT CHANGE UP (ref 135–145)
WBC # BLD: 5.25 K/UL — SIGNIFICANT CHANGE UP (ref 3.8–10.5)
WBC # FLD AUTO: 5.25 K/UL — SIGNIFICANT CHANGE UP (ref 3.8–10.5)

## 2024-03-21 PROCEDURE — 99223 1ST HOSP IP/OBS HIGH 75: CPT

## 2024-03-21 PROCEDURE — 82550 ASSAY OF CK (CPK): CPT

## 2024-03-21 PROCEDURE — 83516 IMMUNOASSAY NONANTIBODY: CPT

## 2024-03-21 PROCEDURE — 93306 TTE W/DOPPLER COMPLETE: CPT

## 2024-03-21 PROCEDURE — 85379 FIBRIN DEGRADATION QUANT: CPT

## 2024-03-21 PROCEDURE — 84100 ASSAY OF PHOSPHORUS: CPT

## 2024-03-21 PROCEDURE — 80299 QUANTITATIVE ASSAY DRUG: CPT

## 2024-03-21 PROCEDURE — 86036 ANCA SCREEN EACH ANTIBODY: CPT

## 2024-03-21 PROCEDURE — 71045 X-RAY EXAM CHEST 1 VIEW: CPT

## 2024-03-21 PROCEDURE — 86160 COMPLEMENT ANTIGEN: CPT

## 2024-03-21 PROCEDURE — 80048 BASIC METABOLIC PNL TOTAL CA: CPT

## 2024-03-21 PROCEDURE — 85027 COMPLETE CBC AUTOMATED: CPT

## 2024-03-21 PROCEDURE — A9540: CPT

## 2024-03-21 PROCEDURE — 97161 PT EVAL LOW COMPLEX 20 MIN: CPT

## 2024-03-21 PROCEDURE — 84484 ASSAY OF TROPONIN QUANT: CPT

## 2024-03-21 PROCEDURE — 76775 US EXAM ABDO BACK WALL LIM: CPT

## 2024-03-21 PROCEDURE — 71046 X-RAY EXAM CHEST 2 VIEWS: CPT

## 2024-03-21 PROCEDURE — 85025 COMPLETE CBC W/AUTO DIFF WBC: CPT

## 2024-03-21 PROCEDURE — 94660 CPAP INITIATION&MGMT: CPT

## 2024-03-21 PROCEDURE — 80053 COMPREHEN METABOLIC PANEL: CPT

## 2024-03-21 PROCEDURE — P9047: CPT

## 2024-03-21 PROCEDURE — A9539: CPT

## 2024-03-21 PROCEDURE — 78582 LUNG VENTILAT&PERFUS IMAGING: CPT | Mod: MC

## 2024-03-21 PROCEDURE — 81001 URINALYSIS AUTO W/SCOPE: CPT

## 2024-03-21 PROCEDURE — 85730 THROMBOPLASTIN TIME PARTIAL: CPT

## 2024-03-21 PROCEDURE — 93005 ELECTROCARDIOGRAM TRACING: CPT

## 2024-03-21 PROCEDURE — 93970 EXTREMITY STUDY: CPT

## 2024-03-21 PROCEDURE — 83036 HEMOGLOBIN GLYCOSYLATED A1C: CPT

## 2024-03-21 PROCEDURE — 99232 SBSQ HOSP IP/OBS MODERATE 35: CPT

## 2024-03-21 PROCEDURE — 36415 COLL VENOUS BLD VENIPUNCTURE: CPT

## 2024-03-21 PROCEDURE — 99239 HOSP IP/OBS DSCHRG MGMT >30: CPT

## 2024-03-21 PROCEDURE — 80162 ASSAY OF DIGOXIN TOTAL: CPT

## 2024-03-21 PROCEDURE — 0225U NFCT DS DNA&RNA 21 SARSCOV2: CPT

## 2024-03-21 PROCEDURE — 94640 AIRWAY INHALATION TREATMENT: CPT

## 2024-03-21 PROCEDURE — G0316 PROLONG INPT EVAL ADD15 M: CPT

## 2024-03-21 PROCEDURE — 96374 THER/PROPH/DIAG INJ IV PUSH: CPT

## 2024-03-21 PROCEDURE — 83880 ASSAY OF NATRIURETIC PEPTIDE: CPT

## 2024-03-21 PROCEDURE — 83690 ASSAY OF LIPASE: CPT

## 2024-03-21 PROCEDURE — 83605 ASSAY OF LACTIC ACID: CPT

## 2024-03-21 PROCEDURE — 83735 ASSAY OF MAGNESIUM: CPT

## 2024-03-21 PROCEDURE — 84550 ASSAY OF BLOOD/URIC ACID: CPT

## 2024-03-21 PROCEDURE — 84155 ASSAY OF PROTEIN SERUM: CPT

## 2024-03-21 PROCEDURE — 86038 ANTINUCLEAR ANTIBODIES: CPT

## 2024-03-21 PROCEDURE — 99497 ADVNCD CARE PLAN 30 MIN: CPT | Mod: 25

## 2024-03-21 PROCEDURE — 84165 PROTEIN E-PHORESIS SERUM: CPT

## 2024-03-21 PROCEDURE — 99291 CRITICAL CARE FIRST HOUR: CPT

## 2024-03-21 PROCEDURE — 84443 ASSAY THYROID STIM HORMONE: CPT

## 2024-03-21 PROCEDURE — 82962 GLUCOSE BLOOD TEST: CPT

## 2024-03-21 PROCEDURE — 82803 BLOOD GASES ANY COMBINATION: CPT

## 2024-03-21 PROCEDURE — 85610 PROTHROMBIN TIME: CPT

## 2024-03-21 RX ORDER — SPIRONOLACTONE 25 MG/1
25 TABLET, FILM COATED ORAL DAILY
Refills: 0 | Status: DISCONTINUED | OUTPATIENT
Start: 2024-03-22 | End: 2024-03-21

## 2024-03-21 RX ORDER — ASPIRIN/CALCIUM CARB/MAGNESIUM 324 MG
81 TABLET ORAL DAILY
Refills: 0 | Status: DISCONTINUED | OUTPATIENT
Start: 2024-03-21 | End: 2024-03-24

## 2024-03-21 RX ORDER — ACETAMINOPHEN 500 MG
650 TABLET ORAL EVERY 6 HOURS
Refills: 0 | Status: DISCONTINUED | OUTPATIENT
Start: 2024-03-21 | End: 2024-03-24

## 2024-03-21 RX ORDER — ALLOPURINOL 300 MG
100 TABLET ORAL DAILY
Refills: 0 | Status: DISCONTINUED | OUTPATIENT
Start: 2024-03-21 | End: 2024-03-24

## 2024-03-21 RX ORDER — ONDANSETRON 8 MG/1
4 TABLET, FILM COATED ORAL EVERY 8 HOURS
Refills: 0 | Status: DISCONTINUED | OUTPATIENT
Start: 2024-03-21 | End: 2024-03-24

## 2024-03-21 RX ORDER — TAMSULOSIN HYDROCHLORIDE 0.4 MG/1
0.4 CAPSULE ORAL AT BEDTIME
Refills: 0 | Status: DISCONTINUED | OUTPATIENT
Start: 2024-03-21 | End: 2024-03-24

## 2024-03-21 RX ORDER — APIXABAN 2.5 MG/1
5 TABLET, FILM COATED ORAL
Refills: 0 | Status: DISCONTINUED | OUTPATIENT
Start: 2024-03-21 | End: 2024-03-24

## 2024-03-21 RX ORDER — SPIRONOLACTONE 25 MG/1
25 TABLET, FILM COATED ORAL DAILY
Refills: 0 | Status: DISCONTINUED | OUTPATIENT
Start: 2024-03-21 | End: 2024-03-24

## 2024-03-21 RX ORDER — SPIRONOLACTONE 25 MG/1
25 TABLET, FILM COATED ORAL DAILY
Refills: 0 | Status: DISCONTINUED | OUTPATIENT
Start: 2024-03-21 | End: 2024-03-21

## 2024-03-21 RX ORDER — FUROSEMIDE 40 MG
1 TABLET ORAL
Qty: 0 | Refills: 0 | DISCHARGE
Start: 2024-03-21

## 2024-03-21 RX ORDER — MAGNESIUM OXIDE 400 MG ORAL TABLET 241.3 MG
400 TABLET ORAL
Refills: 0 | Status: DISCONTINUED | OUTPATIENT
Start: 2024-03-21 | End: 2024-03-24

## 2024-03-21 RX ORDER — METOPROLOL TARTRATE 50 MG
50 TABLET ORAL DAILY
Refills: 0 | Status: DISCONTINUED | OUTPATIENT
Start: 2024-03-21 | End: 2024-03-23

## 2024-03-21 RX ORDER — HYDRALAZINE HCL 50 MG
1 TABLET ORAL
Qty: 0 | Refills: 0 | DISCHARGE
Start: 2024-03-21

## 2024-03-21 RX ORDER — AMIODARONE HYDROCHLORIDE 400 MG/1
200 TABLET ORAL
Refills: 0 | Status: DISCONTINUED | OUTPATIENT
Start: 2024-03-21 | End: 2024-03-24

## 2024-03-21 RX ORDER — FUROSEMIDE 40 MG
40 TABLET ORAL
Refills: 0 | Status: DISCONTINUED | OUTPATIENT
Start: 2024-03-21 | End: 2024-03-24

## 2024-03-21 RX ORDER — DIGOXIN 250 MCG
125 TABLET ORAL DAILY
Refills: 0 | Status: DISCONTINUED | OUTPATIENT
Start: 2024-03-21 | End: 2024-03-22

## 2024-03-21 RX ORDER — ATORVASTATIN CALCIUM 80 MG/1
80 TABLET, FILM COATED ORAL AT BEDTIME
Refills: 0 | Status: DISCONTINUED | OUTPATIENT
Start: 2024-03-21 | End: 2024-03-24

## 2024-03-21 RX ORDER — ISOSORBIDE DINITRATE 5 MG/1
1 TABLET ORAL
Qty: 0 | Refills: 0 | DISCHARGE
Start: 2024-03-21

## 2024-03-21 RX ORDER — LANOLIN ALCOHOL/MO/W.PET/CERES
3 CREAM (GRAM) TOPICAL AT BEDTIME
Refills: 0 | Status: DISCONTINUED | OUTPATIENT
Start: 2024-03-21 | End: 2024-03-24

## 2024-03-21 RX ORDER — SACUBITRIL AND VALSARTAN 24; 26 MG/1; MG/1
1 TABLET, FILM COATED ORAL
Refills: 0 | Status: DISCONTINUED | OUTPATIENT
Start: 2024-03-21 | End: 2024-03-24

## 2024-03-21 RX ORDER — HYDRALAZINE HCL 50 MG
25 TABLET ORAL THREE TIMES A DAY
Refills: 0 | Status: DISCONTINUED | OUTPATIENT
Start: 2024-03-21 | End: 2024-03-24

## 2024-03-21 RX ORDER — ISOSORBIDE DINITRATE 5 MG/1
5 TABLET ORAL THREE TIMES A DAY
Refills: 0 | Status: DISCONTINUED | OUTPATIENT
Start: 2024-03-21 | End: 2024-03-24

## 2024-03-21 RX ADMIN — Medication 40 MILLIGRAM(S): at 06:36

## 2024-03-21 RX ADMIN — AMIODARONE HYDROCHLORIDE 200 MILLIGRAM(S): 400 TABLET ORAL at 17:34

## 2024-03-21 RX ADMIN — Medication 25 MILLIGRAM(S): at 14:55

## 2024-03-21 RX ADMIN — Medication 50 MILLIGRAM(S): at 06:37

## 2024-03-21 RX ADMIN — Medication 100 MILLIGRAM(S): at 11:53

## 2024-03-21 RX ADMIN — ISOSORBIDE DINITRATE 5 MILLIGRAM(S): 5 TABLET ORAL at 06:36

## 2024-03-21 RX ADMIN — AMIODARONE HYDROCHLORIDE 200 MILLIGRAM(S): 400 TABLET ORAL at 06:36

## 2024-03-21 RX ADMIN — Medication 40 MILLIGRAM(S): at 14:55

## 2024-03-21 RX ADMIN — ISOSORBIDE DINITRATE 5 MILLIGRAM(S): 5 TABLET ORAL at 17:35

## 2024-03-21 RX ADMIN — Medication 125 MICROGRAM(S): at 06:36

## 2024-03-21 RX ADMIN — APIXABAN 5 MILLIGRAM(S): 2.5 TABLET, FILM COATED ORAL at 17:35

## 2024-03-21 RX ADMIN — Medication 50 MILLIGRAM(S): at 17:34

## 2024-03-21 RX ADMIN — APIXABAN 5 MILLIGRAM(S): 2.5 TABLET, FILM COATED ORAL at 06:36

## 2024-03-21 RX ADMIN — Medication 25 MILLIGRAM(S): at 06:37

## 2024-03-21 NOTE — PROGRESS NOTE ADULT - REASON FOR ADMISSION
a fib with RVR

## 2024-03-21 NOTE — PROGRESS NOTE ADULT - SUBJECTIVE AND OBJECTIVE BOX
No distress    Vital Signs Last 24 Hrs  T(C): 36.3 (03-21-24 @ 11:47), Max: 37.1 (03-21-24 @ 05:24)  T(F): 97.4 (03-21-24 @ 11:47), Max: 98.7 (03-21-24 @ 05:24)  HR: 79 (03-21-24 @ 11:47) (62 - 79)  BP: 98/73 (03-21-24 @ 11:47) (98/73 - 122/74)  BP(mean): 64 (03-21-24 @ 05:24) (64 - 64)  RR: 18 (03-21-24 @ 11:47) (16 - 18)  SpO2: 94% (03-21-24 @ 11:47) (91% - 96%)    s1s2  b/l air entry  soft, ND  + edema, improving                                                                                                                           13.6   5.25  )-----------( 237      ( 21 Mar 2024 06:52 )             42.6     21 Mar 2024 06:52    140    |  101    |  37     ----------------------------<  90     4.4     |  22     |  1.59     Ca    9.6        21 Mar 2024 06:52  Phos  5.2       21 Mar 2024 06:52  Mg     2.0       21 Mar 2024 06:52    TPro  7.9    /  Alb  3.5    /  TBili  1.2    /  DBili  x      /  AST  45     /  ALT  63     /  AlkPhos  107    21 Mar 2024 06:52    LIVER FUNCTIONS - ( 21 Mar 2024 06:52 )  Alb: 3.5 g/dL / Pro: 7.9 g/dL / ALK PHOS: 107 U/L / ALT: 63 U/L / AST: 45 U/L / GGT: x           acetaminophen     Tablet .. 650 milliGRAM(s) Oral every 6 hours PRN  albuterol    90 MICROgram(s) HFA Inhaler 1 Puff(s) Inhalation every 4 hours PRN  allopurinol 100 milliGRAM(s) Oral daily  aMIOdarone    Tablet   Oral   aMIOdarone    Tablet 200 milliGRAM(s) Oral two times a day  apixaban 5 milliGRAM(s) Oral every 12 hours  AQUAPHOR (petrolatum Ointment) 1 Application(s) Topical daily  artificial  tears Solution 1 Drop(s) Both EYES three times a day PRN  atorvastatin 80 milliGRAM(s) Oral at bedtime  dextrose 5%. 1000 milliLiter(s) IV Continuous <Continuous>  dextrose 5%. 1000 milliLiter(s) IV Continuous <Continuous>  dextrose 50% Injectable 25 Gram(s) IV Push once  dextrose 50% Injectable 12.5 Gram(s) IV Push once  dextrose 50% Injectable 25 Gram(s) IV Push once  dextrose Oral Gel 15 Gram(s) Oral once PRN  digoxin     Tablet 125 MICROGram(s) Oral daily  furosemide    Tablet 40 milliGRAM(s) Oral two times a day  glucagon  Injectable 1 milliGRAM(s) IntraMuscular once  hydrALAZINE 25 milliGRAM(s) Oral three times a day  insulin lispro (ADMELOG) corrective regimen sliding scale   SubCutaneous three times a day before meals  insulin lispro (ADMELOG) corrective regimen sliding scale   SubCutaneous at bedtime  isosorbide   dinitrate Tablet (ISORDIL) 5 milliGRAM(s) Oral three times a day  melatonin 6 milliGRAM(s) Oral at bedtime  metoprolol succinate ER 50 milliGRAM(s) Oral two times a day  tamsulosin 0.4 milliGRAM(s) Oral at bedtime    A/P:    CM, EF 20 - 25%, mod MR, CHF, R Afib  Hemodynamic, cardio-renal MAYTE (Cr 1.04 - 10/9/23)  Volume overload, diuresis   Would avoid ACE/ARB/Entresto/farxiga at this time    Renal SONO w/o hydro   Continue Lasix, Aldactone   F/u BMP, UO  SPEP, serologies are negative  Awaits tx to [facility] for further cardiac w/up/management     [phone]

## 2024-03-21 NOTE — PROGRESS NOTE ADULT - SUBJECTIVE AND OBJECTIVE BOX
Follow up for :  af hfref      SUBJ:   breathing comfortably    PMH  Cardiomyopathy    Asthma    Anxiety    Atrial fibrillation, unspecified type    Acute combined systolic and diastolic congestive heart failure    Essential hypertension    Hyperlipidemia, unspecified hyperlipidemia type    Prediabetes    Prolonged QT interval    Cardiomyopathy, unspecified type    Chronic combined systolic and diastolic congestive heart failure    AICD (automatic cardioverter/defibrillator) present    Hypertension    Hyperlipidemia    CHF (congestive heart failure)    Afib    PATTIE (obstructive sleep apnea)        MEDICATIONS  (STANDING):  allopurinol 100 milliGRAM(s) Oral daily  aMIOdarone    Tablet   Oral   aMIOdarone    Tablet 200 milliGRAM(s) Oral two times a day  apixaban 5 milliGRAM(s) Oral every 12 hours  AQUAPHOR (petrolatum Ointment) 1 Application(s) Topical daily  atorvastatin 80 milliGRAM(s) Oral at bedtime  dextrose 5%. 1000 milliLiter(s) (50 mL/Hr) IV Continuous <Continuous>  dextrose 5%. 1000 milliLiter(s) (100 mL/Hr) IV Continuous <Continuous>  dextrose 50% Injectable 25 Gram(s) IV Push once  dextrose 50% Injectable 12.5 Gram(s) IV Push once  dextrose 50% Injectable 25 Gram(s) IV Push once  digoxin     Tablet 125 MICROGram(s) Oral daily  furosemide    Tablet 40 milliGRAM(s) Oral two times a day  glucagon  Injectable 1 milliGRAM(s) IntraMuscular once  hydrALAZINE 25 milliGRAM(s) Oral three times a day  insulin lispro (ADMELOG) corrective regimen sliding scale   SubCutaneous three times a day before meals  insulin lispro (ADMELOG) corrective regimen sliding scale   SubCutaneous at bedtime  isosorbide   dinitrate Tablet (ISORDIL) 5 milliGRAM(s) Oral three times a day  melatonin 6 milliGRAM(s) Oral at bedtime  metoprolol succinate ER 50 milliGRAM(s) Oral two times a day  tamsulosin 0.4 milliGRAM(s) Oral at bedtime    MEDICATIONS  (PRN):  acetaminophen     Tablet .. 650 milliGRAM(s) Oral every 6 hours PRN Temp greater or equal to 38C (100.4F), Mild Pain (1 - 3)  albuterol    90 MICROgram(s) HFA Inhaler 1 Puff(s) Inhalation every 4 hours PRN for bronchospasm  artificial  tears Solution 1 Drop(s) Both EYES three times a day PRN Dry Eyes  dextrose Oral Gel 15 Gram(s) Oral once PRN Blood Glucose LESS THAN 70 milliGRAM(s)/deciliter        PHYSICAL EXAM:  Vital Signs Last 24 Hrs  T(C): 36.3 (21 Mar 2024 11:47), Max: 37.1 (21 Mar 2024 05:24)  T(F): 97.4 (21 Mar 2024 11:47), Max: 98.7 (21 Mar 2024 05:24)  HR: 79 (21 Mar 2024 11:47) (62 - 79)  BP: 98/73 (21 Mar 2024 11:47) (98/73 - 122/74)  BP(mean): 64 (21 Mar 2024 05:24) (64 - 64)  RR: 18 (21 Mar 2024 11:47) (16 - 18)  SpO2: 94% (21 Mar 2024 11:47) (91% - 96%)    Parameters below as of 21 Mar 2024 11:47  Patient On (Oxygen Delivery Method): room air        GENERAL: NAD, well-groomed, well-developed  HEAD:  Atraumatic, Normocephalic  EYES:  conjunctiva and sclera clear  ENT: Moist mucous membranes,  NECK: Supple, No JVD, no bruits  CHEST/LUNG: Clear to auscultation bilaterally; No rales, rhonchi, wheezing, or rubs  HEART: irregular No murmurs, rubs, or gallops PMI non displaced.  ABDOMEN: Soft, Nontender, Nondistended; Bowel sounds present  EXTREMITIES:   No clubbing, cyanosis, or edema  SKIN: No rashes or lesions  NERVOUS SYSTEM:  Alert       TELEMETRY:  af     ECG:  < from: 12 Lead ECG (03.15.24 @ 20:51) >    Ventricular Rate 117 BPM    Atrial Rate 131 BPM    QRS Duration 112 ms    Q-T Interval 312 ms    QTC Calculation(Bazett) 435 ms    R Axis -46 degrees    T Axis 60 degrees    Diagnosis Line Atrial fibrillation with rapid ventricular response with premature ventricular or aberrantly conducted complexes  Left anterior fascicular block      When compared with ECG of 05-MAR-2024 11:51,  heart rate slightly improved  Confirmed by APRIL LENZ MD () on 3/16/2024 10:10:36 AM    < end of copied text >  < from: Xray Chest 2 Views PA/Lat (24 @ 10:37) >    ACC: [id number] EXAM:  XR CHEST PA LAT 2V   ORDERED BY:  JOHN NOLASCO     PROCEDURE DATE:  2024          INTERPRETATION:  PA and lateral chest radiographs    COMPARISON: 3/5/2024 chest x-ray.    CLINICAL INFORMATION: Shortness of breath.    FINDINGS:  CATHETERS AND TUBES: None    PULMONARY: The airway is midline.  There are no airspace consolidations or radiographic evidence of   pulmonary nodules..  No pleural effusion or pneumothorax.    HEART/VASCULAR: The heart size and mediastinum configuration are within   the limits of normal. Cardiac device wire leads are within right atrium   and right ventricle.    BONES: The visualized osseous thorax is intact.    IMPRESSION:    No radiographic evidence of active chest disease..    --- End of Report ---            MYRON DIAZ MD; Attending Radiologist  This document has been electronically signed. Mar 19 2024  3:18PM    < end of copied text >  < from: TTE Echo Complete w/o Contrast w/ Doppler (24 @ 07:53) >    ACC: [id number] EXAM:  ECHO TTE WO CON COMP W DOPP                          PROCEDURE DATE:  2024          INTERPRETATION:  TRANSTHORACIC ECHOCARDIOGRAM REPORT        Patient Name:   OFE KHALIL Patient Location: [address] Rec #:  [id number]         Accession #:      [id number]  Account #:      [id number]         Height:           73.6 in 187.0 cm  YOB: 1965      Weight:           249.1 lb 113.00 kg  Patient Age:    58 years        BSA:              2.38 m²  Patient Gender:M               BP:               113/87 mmHg      Date of Exam:        3/6/2024 7:53:44 AM  Sonographer:         Zeke Tong  Referring Physician: SOM ESCOBEDO    Procedure:     2D Echo/Doppler/Color Doppler Complete.  Indications:   I48.91 -Unspecified atrial fibrillation  Diagnosis:     I50.20 - Unspecified systolic (congestive) heart failure  Study Details: Technically suboptimal study. Study quality was adversely                 affected due to body habitus and COPD.        2D AND M-MODE MEASUREMENTS (normal ranges within parentheses):  Left                 Normal   Aorta/Left            Normal  Ventricle:                    Atrium:  IVSd (2D):    0.96  (0.7-1.1) Aortic Root   3.80  (2.4-3.7)                 cm             (2D):        cm  LVPWd (2D):   0.94  (0.7-1.1) Aortic Root   3.90  (2.4-3.7)                 cm             (Mmode):       cm  LVIDd (2D):   7.66  (3.4-5.7) AoV Cusp      2.30  (1.5-2.6)                 cm             Separation:    cm  LVIDs (2D):   6.98           Left Atrium   5.10  (1.9-4.0)                 cm             (2D):          cm  LV FS (2D):   8.9 %  (>25%)   Left Atrium   5.40  (1.9-4.0)  LV EF (2D):   19 %   (>55%)   (Mmode):       cm  Relative Wall 0.25   (<0.42)  LA Volume     74.3  Thickness                     Index        ml/m²                                Right Ventricle:                                TAPSE:           1.83 cm    LV SYSTOLIC FUNCTION BY 2D PLANIMETRY (MOD):  EF-A4C View: 18.3 % EF-A2C View: 20.8 % EF-Biplane: 20 %    LV DIASTOLIC FUNCTION:  MV Peak E: 1.03 m/s Decel Time: 130 msec    SPECTRAL DOPPLER ANALYSIS (where applicable):  Mitral Valve:  MV P1/2 Time: 37.70 msec  MV Area, PHT: 5.84 cm²    Mitral Insufficiency by PISA:  MR Volume: 101.22 ml MR Flow Rate: 361.03 ml/s MR EROA: 84.35 mm²    Aortic Valve: AoV Max Memo: 1.03 m/s AoV Peak P.2 mmHg AoV Mean P.0 mmHg    LVOT Vmax: 0.66 m/s LVOT VTI: 0.118 m LVOT Diameter: 2.70 cm    AoV Area, Vmax: 3.69 cm² AoV Area, VTI: 4.63 cm² AoV Area, Vmn: 3.58 cm²  Ao VTI: 0.146  Aortic Insufficiency:  AI Half-time:  691 msec  AI Decel Rate: 1.47 m/s²    Tricuspid Valve and PA/RV Systolic Pressure: TR Max Velocity: 2.62 m/s RA   Pressure: 15 mmHg RVSP/PASP: 42.5 mmHg      PHYSICIAN INTERPRETATION:  Left Ventricle: The left ventricular internal cavity size is severely   increased. Left ventricular wall thickness is normal.  Global LV systolic function was severely decreased. Left ventricular   ejection fraction, by visual estimation, is 20 to 25%. The mitral in-flow   pattern reveals no discernable A-wave, therefore no comment on diastolic   function can be made.  Findings are consistent with dilated cardiomyopathy. The left ventricle   endocardium is not well visualized, consider use of IVultrasonic   enhancing agent to better evaluate endocardium, if clinically indicated.   Calculated LVEF by Simpsons Biplane Method 20%. Severe global left   ventricle hypokinesis.  Right Ventricle: The right ventricular size is mildly enlarged. RV   systolic function is low normal.  Left Atrium: Severely enlarged left atrium. LA volume Index is 74.3 ml/m².  Right Atrium: Right atrial enlargement.  Pericardium: There is no evidence of pericardial effusion.  Mitral Valve: Mild thickening of the anterior and posterior mitral valve   leaflets. Moderate mitral valve regurgitation is seen.  Tricuspid Valve: Structurally normal tricuspid valve, with normal leaflet   excursion. Mild-moderate tricuspid regurgitation is visualized. Estimated   pulmonary artery systolic pressure is 42.5 mmHg assuming a right atrial   pressure of 15 mmHg, which is consistent with mild pulmonary hypertension.  Aortic Valve: The aortic valve is trileaflet. Mild aortic valve   regurgitation is seen. Mild aortic valve leaflet calcification. No aortic   valve stenosis.  Pulmonic Valve: The pulmonic valve is normal. Mild to moderate pulmonic   valve regurgitation.  Aorta: The aortic arch was not well visualized. Top normal sized Aorta at   the Sinuses of Valsalva.  Venous: The inferior vena cava is abnormal. The inferior vena cava was   dilated, with respiratory size variation less than 50%, consistent with   elevated right atrial pressure.  Additional Comments: A ICD wire is visualized in the right atrium and   right ventricle.      Summary:   1. Severely decreased global left ventricular systolic function.   2. Left ventricular ejection fraction, by visual estimation, is 20 to   25%.   3. The left ventricle endocardium is not well visualized, consider use   of IV ultrasonic enhancing agent to better evaluate endocardium, if   clinically indicated. Calculated LVEF by Simpsons Biplane Method 20%.   Severe global left ventricle hypokinesis.   4. Severely increased left ventricular internal cavity size.   5. Dilated cardiomyopathy.   6. The mitral in-flow pattern reveals no discernable A-wave, therefore   no comment on diastolic function can be made.   7. Mildly enlarged right ventricle with low normal right ventricle   systolic function.   8. Severely enlarged left atrium.   9. Right atrial enlargement.  10. Mild thickening of the anterior and posterior mitral valve leaflets.  11. Moderate mitral valve regurgitation.  12. Mild-moderate tricuspid regurgitation.  13. Mild aortic valve leaflet calcification. No aortic valve stenosis.  14. Mild aortic regurgitation.  15. Mild to moderate pulmonic valve regurgitation.  16. Top normal sized Aorta at the Sinuses of Valsalva.  17. Estimated pulmonary artery systolic pressure is 42.5 mmHg assuming a   right atrial pressure of 15 mmHg, which is consistent with mild pulmonary   hypertension.  18. There is no evidence of pericardial effusion.    [id number] Yoni Chester MD Electronically signed on   3/6/2024 at 11:20:42 AM      < end of copied text >      LABS:                        13.6   5.25  )-----------( 237      ( 21 Mar 2024 06:52 )             42.6     -    140  |  101  |  37<H>  ----------------------------<  90  4.4   |  22  |  1.59<H>    Ca    9.6      21 Mar 2024 06:52  Phos  5.2       Mg     2.0         TPro  7.9  /  Alb  3.5  /  TBili  1.2  /  DBili  x   /  AST  45<H>  /  ALT  63<H>  /  AlkPhos  107  -              I&O's Summary        RADIOLOGY & ADDITIONAL STUDIES:    ECHO:

## 2024-03-21 NOTE — PROGRESS NOTE ADULT - SUBJECTIVE AND OBJECTIVE BOX
Patient is a 58y old  Male who presents with a chief complaint of a fib with RVR (21 Mar 2024 13:16)      Subjective: Patient was seen and examined this morning at bedside. Pt reports feeling well, tolerating diet and having regular bowel movements. Pending transfer.  Overnight events: none    REVIEW OF SYSTEMS:  CONSTITUTIONAL: No weakness, fevers or chills  EYES/ENT: No visual changes;  No vertigo or throat pain   NECK: No pain or stiffness  RESPIRATORY: No cough, wheezing, hemoptysis; No shortness of breath  CARDIOVASCULAR: No chest pain or palpitations  GASTROINTESTINAL: No abdominal or epigastric pain. No nausea, vomiting; No diarrhea or constipation.   GENITOURINARY: No dysuria, frequency or hematuria  NEUROLOGICAL: No numbness or weakness  SKIN: No itching, burning, rashes, or lesions       Vital Signs Last 24 Hrs  T(C): 36.3 (21 Mar 2024 11:47), Max: 37.1 (21 Mar 2024 05:24)  T(F): 97.4 (21 Mar 2024 11:47), Max: 98.7 (21 Mar 2024 05:24)  HR: 71 (21 Mar 2024 17:32) (62 - 79)  BP: 109/73 (21 Mar 2024 17:32) (98/73 - 117/70)  BP(mean): 64 (21 Mar 2024 05:24) (64 - 64)  RR: 18 (21 Mar 2024 11:47) (16 - 18)  SpO2: 94% (21 Mar 2024 11:47) (91% - 94%)    Parameters below as of 21 Mar 2024 11:47  Patient On (Oxygen Delivery Method): room air        PHYSICAL EXAM  Constitutional: Pt sitting in chair, awake and alert, NAD  HEENT: EOMI, normocephalic, moist mucous membranes  Respiratory: CTABL, No wheezing, rales or rhonchi  Cardiovascular: S1S2+, irregular, no M/G/R  Gastrointestinal: BS+, soft, obese abdomen, nontender, nondistended, no guarding, no rebound  Extremities: +improving Pitting edema on b/l lower ext, No calf pain   Vascular: Peripheral pulses present  Neurological: AAOx3, no focal deficits  Musculoskeletal: Normal muscle tone, no atrophy, no rigidity, no contractions  Skin: +b/l feet xerosis, No significant new skin lesions or rashes    MEDICATIONS:  MEDICATIONS  (STANDING):  allopurinol 100 milliGRAM(s) Oral daily  aMIOdarone    Tablet   Oral   aMIOdarone    Tablet 200 milliGRAM(s) Oral two times a day  apixaban 5 milliGRAM(s) Oral every 12 hours  AQUAPHOR (petrolatum Ointment) 1 Application(s) Topical daily  atorvastatin 80 milliGRAM(s) Oral at bedtime  dextrose 5%. 1000 milliLiter(s) (50 mL/Hr) IV Continuous <Continuous>  dextrose 5%. 1000 milliLiter(s) (100 mL/Hr) IV Continuous <Continuous>  dextrose 50% Injectable 25 Gram(s) IV Push once  dextrose 50% Injectable 12.5 Gram(s) IV Push once  dextrose 50% Injectable 25 Gram(s) IV Push once  digoxin     Tablet 125 MICROGram(s) Oral daily  furosemide    Tablet 40 milliGRAM(s) Oral two times a day  glucagon  Injectable 1 milliGRAM(s) IntraMuscular once  hydrALAZINE 25 milliGRAM(s) Oral three times a day  insulin lispro (ADMELOG) corrective regimen sliding scale   SubCutaneous three times a day before meals  insulin lispro (ADMELOG) corrective regimen sliding scale   SubCutaneous at bedtime  isosorbide   dinitrate Tablet (ISORDIL) 5 milliGRAM(s) Oral three times a day  melatonin 6 milliGRAM(s) Oral at bedtime  metoprolol succinate ER 50 milliGRAM(s) Oral two times a day  tamsulosin 0.4 milliGRAM(s) Oral at bedtime    MEDICATIONS  (PRN):  acetaminophen     Tablet .. 650 milliGRAM(s) Oral every 6 hours PRN Temp greater or equal to 38C (100.4F), Mild Pain (1 - 3)  albuterol    90 MICROgram(s) HFA Inhaler 1 Puff(s) Inhalation every 4 hours PRN for bronchospasm  artificial  tears Solution 1 Drop(s) Both EYES three times a day PRN Dry Eyes  dextrose Oral Gel 15 Gram(s) Oral once PRN Blood Glucose LESS THAN 70 milliGRAM(s)/deciliter      LABS: All Labs Reviewed:                        13.6   5.25  )-----------( 237      ( 21 Mar 2024 06:52 )             42.6     03-21    140  |  101  |  37<H>  ----------------------------<  90  4.4   |  22  |  1.59<H>    Ca    9.6      21 Mar 2024 06:52  Phos  5.2     03-21  Mg     2.0     03-21    TPro  7.9  /  Alb  3.5  /  TBili  1.2  /  DBili  x   /  AST  45<H>  /  ALT  63<H>  /  AlkPhos  107  03-21      CAPILLARY BLOOD GLUCOSE      POCT Blood Glucose.: 84 mg/dL (21 Mar 2024 17:09)  POCT Blood Glucose.: 95 mg/dL (21 Mar 2024 11:52)  POCT Blood Glucose.: 89 mg/dL (21 Mar 2024 08:28)  POCT Blood Glucose.: 116 mg/dL (20 Mar 2024 21:08)        RADIOLOGY/EKG:  Xray Chest 1 View- PORTABLE-Urgent (03.05.24 @ 12:14)   IMPRESSION: Slightly more prominent parahilar interstitial markings since   previous exam. Pacer as before. Cardiomegaly. I would favor congestive   changes. Regional osseous structures appropriate for age.    NM Pulmonary Ventilation/Perfusion Scan (03.05.24 @ 14:59)   IMPRESSION: Very low probability of pulmonary embolus.    US Duplex Venous Lower Ext Complete, Bilateral (03.06.24 @ 17:32)   IMPRESSION:  No evidence of deep venous thrombosis in either lower extremity.    US Renal (03.07.24 @ 19:58)   IMPRESSION:  No renal mass, hydronephrosis or calculus is visualized sonographically.  A small right pleural effusion is partially visualized.

## 2024-03-21 NOTE — PROGRESS NOTE ADULT - ASSESSMENT
58y old  Male PMHx CHF, Afib, h/o Vtach s/p AICD, PATTIE, HLD, HTN, Anxiety and Asthma,  who presents with respiratory distress and found to have Afib with RVR. Pt was admitted to ICU and downgraded to medicine on 3/11.    #Afib with RVR  -uncontrolled HR, improving max 120s  -EKG shows Afib with RVR  -VQ scan shows low probability of pulmonary embolism  -Pacemaker interrogated twice with no events  -hold entresto until renal function stabilizes  -c/w Metoprolol Succinate 50mg bid   -c/w spironolactone 25mg qd  -c/w digoxin 125 qd (renally dosed), currently at therapeutic level  -c/w aspirin  -c/w eliquis 5mg bid  -c/w amiodarone  -continue telemetry  -monitor digoxin level  -cardio consult appreciated  -pending transfer to [facility] for heart failure evaluation and EP evaluation    #Acute on Chronic systolic CHF  -fluid overload improving s/p diuresis  -Trops neg, d-dimer elevated, probnp elevated  -Echo LVEF 20-25%, dilated LV cavity, severe left atrial enlargement, mild pHTN  -Cardio consult appreciated  -change IV lasix 40mg bid to PO lasix 40mg bid  -c/w hydralazine 25mg tid and isosorbide dinitrate 5mg tid  -c/w albuterol inhaler prn  -c/w spironolactone 25mg qd  -strict I&Os, daily weight  -PT eval appreciated  -maintain K>4 and Mg>2, will replete as needed    #Cardio renal MAYTE  -Cr 2.84 on admission  -improving Cr and GFR trend  -US renal no hydronephrosis b/l  -avoid nephrotoxins  -continue with diuresis  -c/w allopurinol 50mg qd (renally dosed)  -Nephro consult appreciated    #hypomagnesemia, hypokalemia  -resolved after repletion  -continue to monitor    #T2DM  -A1C 7.2%  -c/w mod ISS, accuchecks and hypoglycemia protocol    #Obesity with PATTIE  -c/w CPAP 4L qhs    #BPH  -c/w flomax 0.4 qhs    #HLD  -c/w atorvastatin 80mg qhs    #Xerosis  -c/w aquaphor ointment to b/l feet    #DVT ppx  -c/w eliquis    #FULL CODE      Dispo: pending transfer to [facility] for HF and EP eval    Patient updated regarding plan of care at bedside.     Discussed with Dr. Schaefer

## 2024-03-21 NOTE — PROGRESS NOTE ADULT - ASSESSMENT
cardiomyopathy with compensated chf, reduced lvef  af  renal insufficiency      awaiting tx to [facility] for ep evaluation  would increase b blocker if tolerated for better rate control

## 2024-03-22 DIAGNOSIS — Z29.9 ENCOUNTER FOR PROPHYLACTIC MEASURES, UNSPECIFIED: ICD-10-CM

## 2024-03-22 DIAGNOSIS — Z71.89 OTHER SPECIFIED COUNSELING: ICD-10-CM

## 2024-03-22 DIAGNOSIS — I48.20 CHRONIC ATRIAL FIBRILLATION, UNSPECIFIED: ICD-10-CM

## 2024-03-22 DIAGNOSIS — J45.909 UNSPECIFIED ASTHMA, UNCOMPLICATED: ICD-10-CM

## 2024-03-22 DIAGNOSIS — G47.33 OBSTRUCTIVE SLEEP APNEA (ADULT) (PEDIATRIC): ICD-10-CM

## 2024-03-22 DIAGNOSIS — I50.22 CHRONIC SYSTOLIC (CONGESTIVE) HEART FAILURE: ICD-10-CM

## 2024-03-22 DIAGNOSIS — I10 ESSENTIAL (PRIMARY) HYPERTENSION: ICD-10-CM

## 2024-03-22 DIAGNOSIS — E78.5 HYPERLIPIDEMIA, UNSPECIFIED: ICD-10-CM

## 2024-03-22 DIAGNOSIS — Z79.899 OTHER LONG TERM (CURRENT) DRUG THERAPY: ICD-10-CM

## 2024-03-22 LAB
ALBUMIN SERPL ELPH-MCNC: 3.9 G/DL — SIGNIFICANT CHANGE UP (ref 3.3–5)
ALP SERPL-CCNC: 115 U/L — SIGNIFICANT CHANGE UP (ref 40–120)
ALT FLD-CCNC: 43 U/L — HIGH (ref 4–41)
ANION GAP SERPL CALC-SCNC: 14 MMOL/L — SIGNIFICANT CHANGE UP (ref 7–14)
AST SERPL-CCNC: 35 U/L — SIGNIFICANT CHANGE UP (ref 4–40)
BILIRUB SERPL-MCNC: 1.1 MG/DL — SIGNIFICANT CHANGE UP (ref 0.2–1.2)
BLD GP AB SCN SERPL QL: NEGATIVE — SIGNIFICANT CHANGE UP
BUN SERPL-MCNC: 33 MG/DL — HIGH (ref 7–23)
CALCIUM SERPL-MCNC: 9.5 MG/DL — SIGNIFICANT CHANGE UP (ref 8.4–10.5)
CHLORIDE SERPL-SCNC: 104 MMOL/L — SIGNIFICANT CHANGE UP (ref 98–107)
CO2 SERPL-SCNC: 22 MMOL/L — SIGNIFICANT CHANGE UP (ref 22–31)
CREAT SERPL-MCNC: 1.52 MG/DL — HIGH (ref 0.5–1.3)
EGFR: 53 ML/MIN/1.73M2 — LOW
GLUCOSE SERPL-MCNC: 92 MG/DL — SIGNIFICANT CHANGE UP (ref 70–99)
HCT VFR BLD CALC: 43.3 % — SIGNIFICANT CHANGE UP (ref 39–50)
HGB BLD-MCNC: 13.4 G/DL — SIGNIFICANT CHANGE UP (ref 13–17)
MAGNESIUM SERPL-MCNC: 1.9 MG/DL — SIGNIFICANT CHANGE UP (ref 1.6–2.6)
MCHC RBC-ENTMCNC: 25 PG — LOW (ref 27–34)
MCHC RBC-ENTMCNC: 30.9 GM/DL — LOW (ref 32–36)
MCV RBC AUTO: 80.9 FL — SIGNIFICANT CHANGE UP (ref 80–100)
NRBC # BLD: 0 /100 WBCS — SIGNIFICANT CHANGE UP (ref 0–0)
NRBC # FLD: 0 K/UL — SIGNIFICANT CHANGE UP (ref 0–0)
PHOSPHATE SERPL-MCNC: 4.4 MG/DL — SIGNIFICANT CHANGE UP (ref 2.5–4.5)
PLATELET # BLD AUTO: 222 K/UL — SIGNIFICANT CHANGE UP (ref 150–400)
POTASSIUM SERPL-MCNC: 4.3 MMOL/L — SIGNIFICANT CHANGE UP (ref 3.5–5.3)
POTASSIUM SERPL-SCNC: 4.3 MMOL/L — SIGNIFICANT CHANGE UP (ref 3.5–5.3)
PROT SERPL-MCNC: 8.1 G/DL — SIGNIFICANT CHANGE UP (ref 6–8.3)
RBC # BLD: 5.35 M/UL — SIGNIFICANT CHANGE UP (ref 4.2–5.8)
RBC # FLD: 16.4 % — HIGH (ref 10.3–14.5)
RH IG SCN BLD-IMP: POSITIVE — SIGNIFICANT CHANGE UP
SODIUM SERPL-SCNC: 140 MMOL/L — SIGNIFICANT CHANGE UP (ref 135–145)
WBC # BLD: 4.93 K/UL — SIGNIFICANT CHANGE UP (ref 3.8–10.5)
WBC # FLD AUTO: 4.93 K/UL — SIGNIFICANT CHANGE UP (ref 3.8–10.5)

## 2024-03-22 PROCEDURE — 93010 ELECTROCARDIOGRAM REPORT: CPT

## 2024-03-22 PROCEDURE — 71045 X-RAY EXAM CHEST 1 VIEW: CPT | Mod: 26

## 2024-03-22 PROCEDURE — 93010 ELECTROCARDIOGRAM REPORT: CPT | Mod: 77

## 2024-03-22 PROCEDURE — 99232 SBSQ HOSP IP/OBS MODERATE 35: CPT | Mod: 25,FS

## 2024-03-22 PROCEDURE — 92960 CARDIOVERSION ELECTRIC EXT: CPT

## 2024-03-22 RX ORDER — ALBUTEROL 90 UG/1
2 AEROSOL, METERED ORAL
Refills: 0 | Status: DISCONTINUED | OUTPATIENT
Start: 2024-03-22 | End: 2024-03-24

## 2024-03-22 RX ORDER — INFLUENZA VIRUS VACCINE 15; 15; 15; 15 UG/.5ML; UG/.5ML; UG/.5ML; UG/.5ML
0.5 SUSPENSION INTRAMUSCULAR ONCE
Refills: 0 | Status: DISCONTINUED | OUTPATIENT
Start: 2024-03-22 | End: 2024-03-24

## 2024-03-22 RX ORDER — IPRATROPIUM/ALBUTEROL SULFATE 18-103MCG
3 AEROSOL WITH ADAPTER (GRAM) INHALATION EVERY 6 HOURS
Refills: 0 | Status: DISCONTINUED | OUTPATIENT
Start: 2024-03-22 | End: 2024-03-24

## 2024-03-22 RX ADMIN — Medication 25 MILLIGRAM(S): at 06:00

## 2024-03-22 RX ADMIN — MAGNESIUM OXIDE 400 MG ORAL TABLET 400 MILLIGRAM(S): 241.3 TABLET ORAL at 08:46

## 2024-03-22 RX ADMIN — Medication 40 MILLIGRAM(S): at 06:00

## 2024-03-22 RX ADMIN — ATORVASTATIN CALCIUM 80 MILLIGRAM(S): 80 TABLET, FILM COATED ORAL at 21:56

## 2024-03-22 RX ADMIN — Medication 81 MILLIGRAM(S): at 12:41

## 2024-03-22 RX ADMIN — APIXABAN 5 MILLIGRAM(S): 2.5 TABLET, FILM COATED ORAL at 18:04

## 2024-03-22 RX ADMIN — Medication 25 MILLIGRAM(S): at 21:56

## 2024-03-22 RX ADMIN — SPIRONOLACTONE 25 MILLIGRAM(S): 25 TABLET, FILM COATED ORAL at 06:00

## 2024-03-22 RX ADMIN — Medication 3 MILLIGRAM(S): at 21:58

## 2024-03-22 RX ADMIN — ISOSORBIDE DINITRATE 5 MILLIGRAM(S): 5 TABLET ORAL at 06:01

## 2024-03-22 RX ADMIN — MAGNESIUM OXIDE 400 MG ORAL TABLET 400 MILLIGRAM(S): 241.3 TABLET ORAL at 18:04

## 2024-03-22 RX ADMIN — Medication 125 MICROGRAM(S): at 06:01

## 2024-03-22 RX ADMIN — SACUBITRIL AND VALSARTAN 1 TABLET(S): 24; 26 TABLET, FILM COATED ORAL at 06:01

## 2024-03-22 RX ADMIN — Medication 3 MILLILITER(S): at 10:06

## 2024-03-22 RX ADMIN — AMIODARONE HYDROCHLORIDE 200 MILLIGRAM(S): 400 TABLET ORAL at 06:01

## 2024-03-22 RX ADMIN — Medication 3 MILLILITER(S): at 17:06

## 2024-03-22 RX ADMIN — Medication 100 MILLIGRAM(S): at 12:41

## 2024-03-22 RX ADMIN — Medication 40 MILLIGRAM(S): at 16:20

## 2024-03-22 RX ADMIN — Medication 50 MILLIGRAM(S): at 06:01

## 2024-03-22 RX ADMIN — APIXABAN 5 MILLIGRAM(S): 2.5 TABLET, FILM COATED ORAL at 06:01

## 2024-03-22 RX ADMIN — TAMSULOSIN HYDROCHLORIDE 0.4 MILLIGRAM(S): 0.4 CAPSULE ORAL at 21:56

## 2024-03-22 RX ADMIN — Medication 3 MILLILITER(S): at 22:16

## 2024-03-22 RX ADMIN — Medication 3 MILLILITER(S): at 03:54

## 2024-03-22 NOTE — CONSULT NOTE ADULT - ASSESSMENT
58 year old male with a PMH of chronic HFrEF, PAF-on Eliquis, h/o V s/p [employer] [place] ICD, ICD gen change, PATTIE, HLD, HTN, Anxiety and Asthma, presented to the ED at [facility] for worsening shortness of breath.  Found to be in acute decompensated heart failure with persistent AF with RVR.  Rate controlled improved after Amio load and Digoxin load.  MAYTE associated with cardio-renal syndrome resolving now (Scr trending down from 2.4 to 1.5). Patient now presents as a transfer to [facility] for rhythm control for AF management.   ICD interrogation done this am revealed onset of Atrial fibrillation with RVR started in Jan 2024 which correlated with elevated Optivol fluid index.  No ICD shocks noted (VT shock at 188 bpm). Patient denies prior cardioversion or HF admission.  He has been adherent to his anticoagulation therapy states "I always take Eliquis".  Presently hemodynamically stable and appears euvolemic.    Echo done on 3/6/2024 showed LVEF 20-25%.     ADHF associated with long-standing persistent AF with RVR since Jan 2024  Rhythm control with DCCV as a short term goal and long term goal should include AF ablation as an outpatient with short term use of Amiodarone for sinus rhythm maintenance      -Case discussed with Dr. Connell, d/c Dig, continue Amio 200mg bid, continue Metoprolol 50mg bid with hold parameter HR <50, can switch to 100mg ER daily upon discharge  -NPO excepts meds for DCCV today  -Continue AC Eliquis 5mg bid, CHADS2-VASC2 score is 2   -Continue home meds, will provide Amio  Post ablation/device implantation teaching with instructions provided.  Patient and family verbalized understanding.  Follow up on   .  Stable for discharge home.   -Will provide a follow up for AF ablation as an outpatient  -Keep K >4 and Mg >2  -Continue care per primary team

## 2024-03-22 NOTE — H&P ADULT - PROBLEM SELECTOR PLAN 2
- patient had CHF exacerbation from outside hospital s/p diuresis  - now appears euvolemic  - daily weights  - monitor I/O  - will resume home PO lasix  - c/w BP and cardiac meds  - c/w entresto  - will obtain repeat CXR  - fluid restrict 800cc/day, low salt diet  - cardio renal syndrome improving, Cr downtrending

## 2024-03-22 NOTE — CONSULT NOTE ADULT - NS ATTEND AMEND GEN_ALL_CORE FT
HFrEF with persistent symptomatic AF with RVR. plan for BG/DCCV today. Will need outpatient ablation.

## 2024-03-22 NOTE — H&P ADULT - HISTORY OF PRESENT ILLNESS
This is a 59 y/o M with pmhx of CHF, Afib, h/o Vtach s/p AICD, PATTIE, HLD, HTN, Anxiety and Asthma, presented to the ED for new onset shortness of breath. He reported dyspnea on exertion. In addition has orthopnea. SOB has been present for 3 weeks. Also had chest tightness with the symptoms. The patient was admitted at [facility] and was diagnosed with CHF exacerbation complicated by afib RVR. The patient was hypotensive which required ICU admission for management of CHF. He was started on IV lasix and given amiodarone, metoprolol and digoxin for afib RVR. Also had MAYTE which was diagnosed as cardio-renal syndrome and is improved after diuretics. The patient was stabilized and was downgraded to the medical floor, and needs EP eval for afib management. The patient was transferred to [facility] for EP consultation.    At this time the patient denies shortness of breath or chest tightness, reports significant improvement. The patient also reports improvement in his leg swelling.

## 2024-03-22 NOTE — H&P ADULT - LOCATION OF DISCUSSION
"1145  Data:  Therapist met with Patient in office today. Patient reports feeling \"not good\" reporting \"I don't feel like myself\". Patient proceeded to discuss current withdrawal symptoms such as \"leg cramping, I'm more irritable, kathe sick feeling, just not myself\". Patient informed of   attending the unit today for a meeting. Patient agreeable and acknowledged.     Assessment:  Patient appeared distressed today and seemed anxious with poor eye contact. Patient appeared irritable and uncomfortable discussing current withdrawal symptoms and increased cravings.     Plan:  Patient will continue stabilization.   Patient will follow-up with [facility] at discharge.   Patient's medication will require a PA; Therapist will notify Lead.   " Face to face

## 2024-03-22 NOTE — H&P ADULT - ASSESSMENT
57 y/o M with pmhx of CHF, Afib, h/o Vtach s/p AICD, PATTIE, HLD, HTN, Anxiety and Asthma, presented to the ED for new onset shortness of breath. A 57 y/o M with pmhx of CHF, Afib, h/o Vtach s/p AICD, PATTIE, HLD, HTN, Anxiety and Asthma, presented to the ED for new onset shortness of breath. Was treated for chf exacerbation now resolving. Patient is now here for EP eval for afib RVR. Admit for EP consult.

## 2024-03-22 NOTE — H&P ADULT - CONVERSATION DETAILS
Patient had a prior discussion with team from other hospital about goals of care.    HCP paperwork noted - Sarah York is HCP    I again discussed goals of care with the patient at bedside. I explained to the patient and/or family member what cardiopulmonary resuscitation is. Explained the risks and benefits of CPR such has hypoxic brain injury and rib fractures, and low likelihood of survival. I also explained to the patient the risks and benefits of artifical ventilation such as failure to wean off mechanical ventilation.      Patient states he wants to be full code at this time.    Patient is full code

## 2024-03-22 NOTE — H&P ADULT - PROBLEM SELECTOR PROBLEM 9
details… Regular rate & rhythm, normal S1, S2; no murmurs, gallops or rubs; no S3, S4 Prophylactic measure

## 2024-03-22 NOTE — H&P ADULT - NSHPADDITIONALINFOADULT_GEN_ALL_CORE
- the diagnosis, imaging results and plan above explained to patient at bedside, expressed understanding and is in agreement with the plan

## 2024-03-22 NOTE — CONSULT NOTE ADULT - SUBJECTIVE AND OBJECTIVE BOX
Patient is a 58y old  Male who presents with a chief complaint of afib rvr (22 Mar 2024 01:34)          HPI:      58 year old male with a PMH of chronic HFrEF, PAF-on Eliquis, h/o V s/p [employer] [place] ICD, ICD gen change, PATTIE, HLD, HTN, Anxiety and Asthma, presented to the ED at [facility] for worsening shortness of breath.  Found to be in acute decompensated heart failure with persistent AF with RVR.  Rate controlled improved after Amio load and Digoxin load.  MAYTE associated with cardio-renal syndrome resolving now (Scr trending down from 2.4 to 1.5). Patient now presents as a transfer to [facility] for rhythm control for AF management.   ICD interrogation done this am revealed onset of Atrial fibrillation with RVR started in Jan 2024 which correlated with elevated Optivol fluid index.  No ICD shocks noted (VT shock at 188 bpm). Patient denies prior cardioversion or HF admission.  He has been adherent to his anticoagulation therapy states "I always take Eliquis".  Presently hemodynamically stable and appears euvolemic.    Echo done on 3/6/2024 showed LVEF 20-25%.         PAST MEDICAL & SURGICAL HISTORY:  Asthma      Anxiety      Atrial fibrillation, unspecified type      Essential hypertension      Prediabetes      Prolonged QT interval      Cardiomyopathy, unspecified type      AICD (automatic cardioverter/defibrillator) present      Hyperlipidemia      CHF (congestive heart failure)      PAfib      PATTIE (obstructive sleep apnea)      History of tonsillectomy      AICD (automatic cardioverter/defibrillator) present          MEDICATIONS  (STANDING):  albuterol/ipratropium for Nebulization 3 milliLiter(s) Nebulizer every 6 hours  allopurinol 100 milliGRAM(s) Oral daily  aMIOdarone    Tablet 200 milliGRAM(s) Oral two times a day  apixaban 5 milliGRAM(s) Oral two times a day  aspirin enteric coated 81 milliGRAM(s) Oral daily  atorvastatin 80 milliGRAM(s) Oral at bedtime  furosemide    Tablet 40 milliGRAM(s) Oral two times a day  hydrALAZINE 25 milliGRAM(s) Oral three times a day  influenza   Vaccine 0.5 milliLiter(s) IntraMuscular once  isosorbide   dinitrate Tablet (ISORDIL) 5 milliGRAM(s) Oral three times a day  magnesium oxide 400 milliGRAM(s) Oral two times a day with meals  metoprolol succinate ER 50 milliGRAM(s) Oral daily  sacubitril 97 mG/valsartan 103 mG 1 Tablet(s) Oral two times a day  spironolactone 25 milliGRAM(s) Oral daily  tamsulosin 0.4 milliGRAM(s) Oral at bedtime    MEDICATIONS  (PRN):  acetaminophen     Tablet .. 650 milliGRAM(s) Oral every 6 hours PRN Temp greater or equal to 38C (100.4F), Mild Pain (1 - 3)  albuterol    90 MICROgram(s) HFA Inhaler 2 Puff(s) Inhalation every 3 hours PRN Shortness of Breath and/or Wheezing  aluminum hydroxide/magnesium hydroxide/simethicone Suspension 30 milliLiter(s) Oral every 4 hours PRN Dyspepsia  melatonin 3 milliGRAM(s) Oral at bedtime PRN Insomnia  ondansetron Injectable 4 milliGRAM(s) IV Push every 8 hours PRN Nausea and/or Vomiting    Allergies    No Known Allergies    Intolerances      FAMILY HISTORY:  No pertinent family history in first degree relatives        SOCIAL HISTORY:  Denies smoking; no   Alcohol  or  Drug abuse               REVIEW OF SYSTEMS:    CONSTITUTIONAL: No fever, weight loss, chills, shakes, or fatigue  EYES: No eye pain, visual disturbances, or discharge  ENMT:  No difficulty hearing, tinnitus, vertigo; No sinus or throat pain  NECK: No pain or stiffness  RESPIRATORY: No cough, wheezing, hemoptysis, + shortness of breath  CARDIOVASCULAR: No dizziness, syncope, paroxysmal nocturnal dyspnea, orthopnea, or arm +leg swelling +chest pain, +dyspnea, +palpitations,  GASTROINTESTINAL: No abdominal  or epigastric pain, nausea, vomiting, hematemesis, diarrhea, constipation, melena or bright red blood.  GENITOURINARY: No dysuria, nocturia, hematuria, or urinary incontinence  NEUROLOGICAL: No headaches, memory loss, slurred speech, limb weakness, loss of strength, numbness, or tremors  SKIN: No itching, burning, rashes, or lesions   LYMPH NODES: No enlarged glands  ENDOCRINE: No heat or cold intolerance, or hair loss  MUSCULOSKELETAL: No joint pain or swelling, muscle, back, or extremity pain  PSYCHIATRIC: No depression, anxiety, or difficulty sleeping  HEME/LYMPH: No easy bruising or bleeding gums  ALLERY AND IMMUNOLOGIC: No hives or rash.      Vital Signs Last 24 Hrs  T(C): 36.7 (22 Mar 2024 07:20), Max: 36.7 (22 Mar 2024 07:20)  T(F): 98 (22 Mar 2024 07:20), Max: 98 (22 Mar 2024 07:20)  HR: 65 (22 Mar 2024 07:20) (50 - 79)  BP: 117/78 (22 Mar 2024 07:20) (97/70 - 117/78)  BP(mean): --  RR: 18 (22 Mar 2024 07:20) (18 - 18)  SpO2: 98% (22 Mar 2024 07:20) (94% - 98%)    Parameters below as of 22 Mar 2024 07:20  Patient On (Oxygen Delivery Method): room air        PHYSICAL EXAM:    GENERAL: In no apparent distress, obese  HEAD:  Atraumatic, Normocephalic  NECK: Supple . No JVD or carotid bruit or thyroidmegaly.  Carotid pulse is 2+ bilaterally.  PULMONARY: Clear to auscultation and perfusion.  No rales, wheezing, or rhonchi bilaterally.  HEART: S1S2 irregularly irregular,  No murmurs, rubs, or gallops.  L ICD  ABDOMEN: Soft, Nontender, Nondistended; Bowel sounds present  EXTREMITIES: No clubbing, cyanosis, +trace pitting edema jami  NEUROLOGICAL: Alert oriented to person, place and time.  Speech clear.  Skin: Dry intact, no rashes or lesions.          INTERPRETATION OF TELEMETRY:  AF with V rate in 's bpm and occasional PVC's/ V couplets    ECG: AF      LABS:                        13.6   5.25  )-----------( 237      ( 21 Mar 2024 06:52 )             42.6     03-21    140  |  101  |  37<H>  ----------------------------<  90  4.4   |  22  |  1.59<H>    Ca    9.6      21 Mar 2024 06:52  Phos  5.2     03-21  Mg     2.0     03-21    TPro  7.9  /  Alb  3.5  /  TBili  1.2  /  DBili  x   /  AST  45<H>  /  ALT  63<H>  /  AlkPhos  107  03-21          Urinalysis Basic - ( 21 Mar 2024 06:52 )    Color: x / Appearance: x / SG: x / pH: x  Gluc: 90 mg/dL / Ketone: x  / Bili: x / Urobili: x   Blood: x / Protein: x / Nitrite: x   Leuk Esterase: x / RBC: x / WBC x   Sq Epi: x / Non Sq Epi: x / Bacteria: x        BNP  RADIOLOGY & ADDITIONAL STUDIES:  PREVIOUS DIAGNOSTIC TESTING:      ECHO FINDINGS:3/6/2024: EF 20-25%, dilated cardiomyopathy, jami atrial enlargement, severely enlarged LA with LA volume index 74, moderate MVR        STRESS FINDINGS:    CATHETERIZATION FINDINGS:

## 2024-03-22 NOTE — CHART NOTE - NSCHARTNOTEFT_GEN_A_CORE
Patient seen and examined at bedside.  Denies chest pain, Sob or palpitations. Plan for DCCV today.  Rest of plan per H & P Patient seen and examined at bedside. Digoxin discontinued per EP recs.  Denies chest pain, Sob or palpitations. Plan for DCCV today.  Rest of plan per H & P

## 2024-03-22 NOTE — H&P ADULT - NSHPPHYSICALEXAM_GEN_ALL_CORE
PHYSICAL EXAM:  VITALS: Vital Signs Last 24 Hrs  T(C): 36.4 (21 Mar 2024 20:21), Max: 37.1 (21 Mar 2024 05:24)  T(F): 97.5 (21 Mar 2024 20:21), Max: 98.7 (21 Mar 2024 05:24)  HR: 50 (21 Mar 2024 20:21) (50 - 79)  BP: 97/70 (21 Mar 2024 20:21) (97/70 - 117/70)  BP(mean): 64 (21 Mar 2024 05:24) (64 - 64)  RR: 18 (21 Mar 2024 20:21) (16 - 18)  SpO2: 98% (21 Mar 2024 20:21) (91% - 98%)    Parameters below as of 21 Mar 2024 20:21  Patient On (Oxygen Delivery Method): room air      GENERAL: NAD, comfortable at bedside  HEAD:  Atraumatic, Normocephalic  EYES: EOMI, PERRL, conjunctiva and sclera clear  ENT: Moist Mucus Membranes present, no ulcers appreciated  NECK: Supple, No JVD  CHEST/LUNG: Clear to auscultation bilaterally; No wheezes, rales or rhonchi, no accessory muscle use  HEART: Regular rate and rhythm; No murmurs, rubs, or gallops, (+)S1, S2  ABDOMEN: Soft, Nontender, Nondistended; Normal Bowel sounds   EXTREMITIES: No clubbing, cyanosis, or edema  PSYCH: normal mood and affect  NEUROLOGY: AAOx3, non-focal  SKIN: No rashes or lesions PHYSICAL EXAM:  VITALS: Vital Signs Last 24 Hrs  T(C): 36.4 (21 Mar 2024 20:21), Max: 37.1 (21 Mar 2024 05:24)  T(F): 97.5 (21 Mar 2024 20:21), Max: 98.7 (21 Mar 2024 05:24)  HR: 50 (21 Mar 2024 20:21) (50 - 79)  BP: 97/70 (21 Mar 2024 20:21) (97/70 - 117/70)  BP(mean): 64 (21 Mar 2024 05:24) (64 - 64)  RR: 18 (21 Mar 2024 20:21) (16 - 18)  SpO2: 98% (21 Mar 2024 20:21) (91% - 98%)    Parameters below as of 21 Mar 2024 20:21  Patient On (Oxygen Delivery Method): room air      GENERAL: NAD, comfortable at bedside  HEAD:  Atraumatic, Normocephalic  EYES: EOMI, PERRL, conjunctiva and sclera clear  ENT: Moist Mucus Membranes present, no ulcers appreciated  NECK: Supple, No JVD  CHEST/LUNG: Clear to auscultation bilaterally; No wheezes, rales or rhonchi, no accessory muscle use  HEART: irregular rate and rhythm; No murmurs, rubs, or gallops, (+)S1, S2  ABDOMEN: Soft, Nontender, Nondistended; Normal Bowel sounds   EXTREMITIES: No clubbing, cyanosis, or edema  PSYCH: normal mood and affect  NEUROLOGY: AAOx3, non-focal  SKIN: No rashes or lesions

## 2024-03-22 NOTE — H&P ADULT - NSHPREVIEWOFSYSTEMS_GEN_ALL_CORE
Please sign order    REVIEW OF SYSTEMS:    CONSTITUTIONAL: No weakness, fevers or chills  EYES/ENT: No visual changes;  No dysphagia; No sore throat; No rhinorrhea; No sinus pain/pressure  NECK: No pain or stiffness  RESPIRATORY: No cough, wheezing, hemoptysis; + shortness of breath, + dyspnea on exertion, + chest tightness  CARDIOVASCULAR: No chest pain or palpitations; No lower extremity edema  GASTROINTESTINAL: No abdominal or epigastric pain. No nausea, vomiting, or hematemesis; No diarrhea or constipation. No melena or hematochezia.  GENITOURINARY: No dysuria, frequency or hematuria  NEUROLOGICAL: No numbness or weakness  MSK: ambulates without assistance  SKIN: No itching, burning, rashes, or lesions   All other review of systems is negative unless indicated above.

## 2024-03-22 NOTE — H&P ADULT - NSHPLABSRESULTS_GEN_ALL_CORE
13.6   5.25  )-----------( 237      ( 21 Mar 2024 06:52 )             42.6       03-21    140  |  101  |  37<H>  ----------------------------<  90  4.4   |  22  |  1.59<H>    Ca    9.6      21 Mar 2024 06:52  Phos  5.2     03-21  Mg     2.0     03-21    TPro  7.9  /  Alb  3.5  /  TBili  1.2  /  DBili  x   /  AST  45<H>  /  ALT  63<H>  /  AlkPhos  107  03-21                    Urinalysis Basic - ( 21 Mar 2024 06:52 )    Color: x / Appearance: x / SG: x / pH: x  Gluc: 90 mg/dL / Ketone: x  / Bili: x / Urobili: x   Blood: x / Protein: x / Nitrite: x   Leuk Esterase: x / RBC: x / WBC x   Sq Epi: x / Non Sq Epi: x / Bacteria: x            CXR: As per my read - No radiographic evidence of active chest disease..      EKG: As per my read - pending 13.6   5.25  )-----------( 237      ( 21 Mar 2024 06:52 )             42.6       03-21    140  |  101  |  37<H>  ----------------------------<  90  4.4   |  22  |  1.59<H>    Ca    9.6      21 Mar 2024 06:52  Phos  5.2     03-21  Mg     2.0     03-21    TPro  7.9  /  Alb  3.5  /  TBili  1.2  /  DBili  x   /  AST  45<H>  /  ALT  63<H>  /  AlkPhos  107  03-21                    Urinalysis Basic - ( 21 Mar 2024 06:52 )    Color: x / Appearance: x / SG: x / pH: x  Gluc: 90 mg/dL / Ketone: x  / Bili: x / Urobili: x   Blood: x / Protein: x / Nitrite: x   Leuk Esterase: x / RBC: x / WBC x   Sq Epi: x / Non Sq Epi: x / Bacteria: x            CXR: As per my read - No radiographic evidence of active chest disease..      EKG: As per my read - afib QTc 481 ms

## 2024-03-22 NOTE — H&P ADULT - PROBLEM SELECTOR PLAN 1
- patient presented for afib RVR from outside hospital  - appears rate controlled now  - official EKG pending  - c/w digoxin, amiodarone  - c/w metoprolol  -  EP consult in the AM for DCCV eval  - c/w apixaban

## 2024-03-22 NOTE — PROCEDURE NOTE - ADDITIONAL PROCEDURE DETAILS
AF with RVR since Jan 2024  with 100% AF burden   Elevated OptiVol fluid since onset of AF in Jan, 2024  EP consult to follow

## 2024-03-23 ENCOUNTER — TRANSCRIPTION ENCOUNTER (OUTPATIENT)
Age: 59
End: 2024-03-23

## 2024-03-23 LAB
ALBUMIN SERPL ELPH-MCNC: 3.8 G/DL — SIGNIFICANT CHANGE UP (ref 3.3–5)
ALP SERPL-CCNC: 107 U/L — SIGNIFICANT CHANGE UP (ref 40–120)
ALT FLD-CCNC: 45 U/L — HIGH (ref 4–41)
ANION GAP SERPL CALC-SCNC: 15 MMOL/L — HIGH (ref 7–14)
AST SERPL-CCNC: 38 U/L — SIGNIFICANT CHANGE UP (ref 4–40)
BILIRUB SERPL-MCNC: 1.1 MG/DL — SIGNIFICANT CHANGE UP (ref 0.2–1.2)
BUN SERPL-MCNC: 28 MG/DL — HIGH (ref 7–23)
CALCIUM SERPL-MCNC: 9.4 MG/DL — SIGNIFICANT CHANGE UP (ref 8.4–10.5)
CHLORIDE SERPL-SCNC: 104 MMOL/L — SIGNIFICANT CHANGE UP (ref 98–107)
CO2 SERPL-SCNC: 21 MMOL/L — LOW (ref 22–31)
CREAT SERPL-MCNC: 1.42 MG/DL — HIGH (ref 0.5–1.3)
EGFR: 58 ML/MIN/1.73M2 — LOW
GLUCOSE SERPL-MCNC: 93 MG/DL — SIGNIFICANT CHANGE UP (ref 70–99)
HCT VFR BLD CALC: 42.7 % — SIGNIFICANT CHANGE UP (ref 39–50)
HGB BLD-MCNC: 13.6 G/DL — SIGNIFICANT CHANGE UP (ref 13–17)
MAGNESIUM SERPL-MCNC: 1.9 MG/DL — SIGNIFICANT CHANGE UP (ref 1.6–2.6)
MCHC RBC-ENTMCNC: 26 PG — LOW (ref 27–34)
MCHC RBC-ENTMCNC: 31.9 GM/DL — LOW (ref 32–36)
MCV RBC AUTO: 81.6 FL — SIGNIFICANT CHANGE UP (ref 80–100)
NRBC # BLD: 0 /100 WBCS — SIGNIFICANT CHANGE UP (ref 0–0)
NRBC # FLD: 0 K/UL — SIGNIFICANT CHANGE UP (ref 0–0)
PHOSPHATE SERPL-MCNC: 4.9 MG/DL — HIGH (ref 2.5–4.5)
PLATELET # BLD AUTO: 248 K/UL — SIGNIFICANT CHANGE UP (ref 150–400)
POTASSIUM SERPL-MCNC: 3.8 MMOL/L — SIGNIFICANT CHANGE UP (ref 3.5–5.3)
POTASSIUM SERPL-SCNC: 3.8 MMOL/L — SIGNIFICANT CHANGE UP (ref 3.5–5.3)
PROT SERPL-MCNC: 8.3 G/DL — SIGNIFICANT CHANGE UP (ref 6–8.3)
RBC # BLD: 5.23 M/UL — SIGNIFICANT CHANGE UP (ref 4.2–5.8)
RBC # FLD: 16.3 % — HIGH (ref 10.3–14.5)
SODIUM SERPL-SCNC: 140 MMOL/L — SIGNIFICANT CHANGE UP (ref 135–145)
WBC # BLD: 5.55 K/UL — SIGNIFICANT CHANGE UP (ref 3.8–10.5)
WBC # FLD AUTO: 5.55 K/UL — SIGNIFICANT CHANGE UP (ref 3.8–10.5)

## 2024-03-23 PROCEDURE — 93010 ELECTROCARDIOGRAM REPORT: CPT

## 2024-03-23 PROCEDURE — 99231 SBSQ HOSP IP/OBS SF/LOW 25: CPT

## 2024-03-23 PROCEDURE — 99232 SBSQ HOSP IP/OBS MODERATE 35: CPT

## 2024-03-23 RX ORDER — METOPROLOL TARTRATE 50 MG
100 TABLET ORAL DAILY
Refills: 0 | Status: DISCONTINUED | OUTPATIENT
Start: 2024-03-24 | End: 2024-03-24

## 2024-03-23 RX ORDER — METOPROLOL TARTRATE 50 MG
50 TABLET ORAL ONCE
Refills: 0 | Status: COMPLETED | OUTPATIENT
Start: 2024-03-23 | End: 2024-03-23

## 2024-03-23 RX ADMIN — SACUBITRIL AND VALSARTAN 1 TABLET(S): 24; 26 TABLET, FILM COATED ORAL at 05:42

## 2024-03-23 RX ADMIN — ATORVASTATIN CALCIUM 80 MILLIGRAM(S): 80 TABLET, FILM COATED ORAL at 21:18

## 2024-03-23 RX ADMIN — Medication 650 MILLIGRAM(S): at 14:52

## 2024-03-23 RX ADMIN — Medication 25 MILLIGRAM(S): at 13:17

## 2024-03-23 RX ADMIN — MAGNESIUM OXIDE 400 MG ORAL TABLET 400 MILLIGRAM(S): 241.3 TABLET ORAL at 09:02

## 2024-03-23 RX ADMIN — Medication 81 MILLIGRAM(S): at 12:59

## 2024-03-23 RX ADMIN — Medication 40 MILLIGRAM(S): at 13:17

## 2024-03-23 RX ADMIN — SACUBITRIL AND VALSARTAN 1 TABLET(S): 24; 26 TABLET, FILM COATED ORAL at 18:21

## 2024-03-23 RX ADMIN — ISOSORBIDE DINITRATE 5 MILLIGRAM(S): 5 TABLET ORAL at 05:43

## 2024-03-23 RX ADMIN — ISOSORBIDE DINITRATE 5 MILLIGRAM(S): 5 TABLET ORAL at 12:04

## 2024-03-23 RX ADMIN — Medication 40 MILLIGRAM(S): at 05:42

## 2024-03-23 RX ADMIN — ISOSORBIDE DINITRATE 5 MILLIGRAM(S): 5 TABLET ORAL at 16:13

## 2024-03-23 RX ADMIN — Medication 50 MILLIGRAM(S): at 05:42

## 2024-03-23 RX ADMIN — Medication 3 MILLILITER(S): at 16:48

## 2024-03-23 RX ADMIN — Medication 3 MILLILITER(S): at 04:10

## 2024-03-23 RX ADMIN — AMIODARONE HYDROCHLORIDE 200 MILLIGRAM(S): 400 TABLET ORAL at 05:42

## 2024-03-23 RX ADMIN — Medication 3 MILLILITER(S): at 22:26

## 2024-03-23 RX ADMIN — TAMSULOSIN HYDROCHLORIDE 0.4 MILLIGRAM(S): 0.4 CAPSULE ORAL at 21:18

## 2024-03-23 RX ADMIN — Medication 650 MILLIGRAM(S): at 15:50

## 2024-03-23 RX ADMIN — Medication 25 MILLIGRAM(S): at 21:18

## 2024-03-23 RX ADMIN — MAGNESIUM OXIDE 400 MG ORAL TABLET 400 MILLIGRAM(S): 241.3 TABLET ORAL at 17:32

## 2024-03-23 RX ADMIN — APIXABAN 5 MILLIGRAM(S): 2.5 TABLET, FILM COATED ORAL at 05:43

## 2024-03-23 RX ADMIN — Medication 100 MILLIGRAM(S): at 12:59

## 2024-03-23 RX ADMIN — SPIRONOLACTONE 25 MILLIGRAM(S): 25 TABLET, FILM COATED ORAL at 05:42

## 2024-03-23 RX ADMIN — Medication 50 MILLIGRAM(S): at 17:35

## 2024-03-23 RX ADMIN — Medication 25 MILLIGRAM(S): at 05:42

## 2024-03-23 RX ADMIN — AMIODARONE HYDROCHLORIDE 200 MILLIGRAM(S): 400 TABLET ORAL at 17:31

## 2024-03-23 RX ADMIN — APIXABAN 5 MILLIGRAM(S): 2.5 TABLET, FILM COATED ORAL at 17:31

## 2024-03-23 NOTE — DISCHARGE NOTE PROVIDER - HOSPITAL COURSE
59 y/o M with pmhx of CHF, Afib, h/o Vtach s/p AICD, PATTIE, HLD, HTN, Anxiety and Asthma, presented to the ED for new onset shortness of breath. He reported dyspnea on exertion. In addition has orthopnea. SOB has been present for 3 weeks. Also had chest tightness with the symptoms. The patient was admitted at [facility] and was diagnosed with CHF exacerbation complicated by afib RVR. The patient was hypotensive which required ICU admission for management of CHF. He was started on IV lasix and given amiodarone, metoprolol and digoxin for afib RVR. Also had MAYTE which was diagnosed as cardio-renal syndrome and is improved after diuretics. The patient was stabilized and was downgraded to the medical floor, and needs EP eval for afib management. The patient was transferred to [facility] for EP consultation. At this time the patient denies shortness of breath or chest tightness, reports significant improvement. The patient also reports improvement in his leg swelling. He was seen by EP and underwent BG/DCCV. outpt f/u with EP and PCP

## 2024-03-23 NOTE — DISCHARGE NOTE PROVIDER - NSDCCPTREATMENT_GEN_ALL_CORE_FT
PRINCIPAL PROCEDURE  Procedure: Cardioversion, with BG if indicated  Findings and Treatment: 3/22/24

## 2024-03-23 NOTE — DISCHARGE NOTE PROVIDER - NSDCCPCAREPLAN_GEN_ALL_CORE_FT
PRINCIPAL DISCHARGE DIAGNOSIS  Diagnosis: Chronic atrial fibrillation  Assessment and Plan of Treatment: you underwent a cardioversion please f/u with Electrophysiologist for ablation

## 2024-03-23 NOTE — DISCHARGE NOTE PROVIDER - ATTENDING DISCHARGE PHYSICAL EXAMINATION:
Vital Signs Last 24 Hrs  T(C): 36.7 (23 Mar 2024 04:35), Max: 36.7 (23 Mar 2024 04:35)  T(F): 98.1 (23 Mar 2024 04:35), Max: 98.1 (23 Mar 2024 04:35)  HR: 88 (23 Mar 2024 09:04) (52 - 105)  BP: 117/67 (23 Mar 2024 04:35) (95/66 - 125/91)  BP(mean): --  RR: 17 (23 Mar 2024 04:35) (17 - 18)  SpO2: 97% (23 Mar 2024 09:04) (95% - 100%)    Parameters below as of 23 Mar 2024 04:35  Patient On (Oxygen Delivery Method): room air        PHYSICAL EXAM:  GENERAL: NAD, well-developed  HEAD:  Atraumatic, Normocephalic  EYES: EOMI, PERRLA, conjunctiva and sclera clear  NECK: Supple, No JVD  CHEST/LUNG: Clear to auscultation bilaterally; No wheeze  HEART: Regular rate and rhythm; No murmurs, rubs, or gallops  ABDOMEN: Soft, Nontender, Nondistended; Bowel sounds present  PSYCH: AAOx3  NEUROLOGY: non-focal  SKIN: No rashes or lesions

## 2024-03-23 NOTE — DISCHARGE NOTE PROVIDER - NSDCMRMEDTOKEN_GEN_ALL_CORE_FT
Albuterol (Eqv-ProAir HFA) 90 mcg/inh inhalation aerosol: 2 puff(s) inhaled every 4 hours as needed for  bronchospasm  allopurinol 100 mg oral tablet: 1 tab(s) orally once a day  amiodarone 200 mg oral tablet: 1 tab(s) orally 2 times a day  aspirin 81 mg oral tablet: 1 tab(s) orally once a day  atorvastatin 80 mg oral tablet: 1 tab(s) orally once a day (at bedtime)  carvedilol 25 mg oral tablet: 1 tab(s) orally 2 times a day  dapagliflozin 10 mg oral tablet: 1 tab(s) orally once a day  digoxin 125 mcg (0.125 mg) oral tablet: 1 tab(s) orally once a day  Eliquis 5 mg oral tablet: 1 tab(s) orally 2 times a day  RESTART 7/10  Entresto 97 mg-103 mg oral tablet: 1 tab(s) orally 2 times a day  furosemide 40 mg oral tablet: 1 tab(s) orally 2 times a day  hydrALAZINE 25 mg oral tablet: 1 tab(s) orally 3 times a day  isosorbide dinitrate 5 mg oral tablet: 1 tab(s) orally 3 times a day  magnesium oxide 400 mg oral tablet: 1 tab(s) orally 2 times a day (with meals)  metoprolol succinate 50 mg oral tablet, extended release: 1 tab(s) orally 2 times a day  ocular lubricant ophthalmic solution: 1 drop(s) to each affected eye 3 times a day As needed Dry Eyes  petrolatum topical ointment: 1 Apply topically to affected area once a day  spironolactone 25 mg oral tablet: 1 tab(s) orally once a day  tamsulosin 0.4 mg oral capsule: 1 cap(s) orally once a day (at bedtime)   acetaminophen 325 mg oral tablet: 2 tab(s) orally every 6 hours As needed Temp greater or equal to 38C (100.4F), Mild Pain (1 - 3)  Albuterol (Eqv-ProAir HFA) 90 mcg/inh inhalation aerosol: 2 puff(s) inhaled every 4 hours as needed for  bronchospasm  allopurinol 100 mg oral tablet: 1 tab(s) orally once a day  aspirin 81 mg oral delayed release tablet: 1 tab(s) orally once a day  atorvastatin 80 mg oral tablet: 1 tab(s) orally once a day (at bedtime)  dapagliflozin 10 mg oral tablet: 1 tab(s) orally once a day  Entresto 97 mg-103 mg oral tablet: 1 tab(s) orally 2 times a day  furosemide 40 mg oral tablet: 1 tab(s) orally 2 times a day  hydrALAZINE 25 mg oral tablet: 1 tab(s) orally 3 times a day  isosorbide dinitrate 5 mg oral tablet: 1 tab(s) orally 3 times a day  magnesium oxide 400 mg oral tablet: 1 tab(s) orally 2 times a day (with meals)  metoprolol succinate 100 mg oral tablet, extended release: 1 tab(s) orally once a day  ocular lubricant ophthalmic solution: 1 drop(s) to each affected eye 3 times a day as needed for Dry Eyes  petrolatum topical ointment: 1 Apply topically to affected area once a day  spironolactone 25 mg oral tablet: 1 tab(s) orally once a day  tamsulosin 0.4 mg oral capsule: 1 cap(s) orally once a day (at bedtime)

## 2024-03-23 NOTE — PROGRESS NOTE ADULT - SUBJECTIVE AND OBJECTIVE BOX
Cardiac Electrophysiology Progress Note    Patient seen and examined at bedside.    Overnight Events:  S/p DCCV   NAEO  Tele: SR with rates 90s-120s, ventricular ectopy with frequent PVCs, couplets, occasional bigeminy    Review Of Systems: No chest pain, shortness of breath, or palpitations            Current Meds:  acetaminophen     Tablet .. 650 milliGRAM(s) Oral every 6 hours PRN  albuterol    90 MICROgram(s) HFA Inhaler 2 Puff(s) Inhalation every 3 hours PRN  albuterol/ipratropium for Nebulization 3 milliLiter(s) Nebulizer every 6 hours  allopurinol 100 milliGRAM(s) Oral daily  aluminum hydroxide/magnesium hydroxide/simethicone Suspension 30 milliLiter(s) Oral every 4 hours PRN  aMIOdarone    Tablet 200 milliGRAM(s) Oral two times a day  apixaban 5 milliGRAM(s) Oral two times a day  aspirin enteric coated 81 milliGRAM(s) Oral daily  atorvastatin 80 milliGRAM(s) Oral at bedtime  furosemide    Tablet 40 milliGRAM(s) Oral two times a day  hydrALAZINE 25 milliGRAM(s) Oral three times a day  influenza   Vaccine 0.5 milliLiter(s) IntraMuscular once  isosorbide   dinitrate Tablet (ISORDIL) 5 milliGRAM(s) Oral three times a day  magnesium oxide 400 milliGRAM(s) Oral two times a day with meals  melatonin 3 milliGRAM(s) Oral at bedtime PRN  metoprolol succinate ER 50 milliGRAM(s) Oral daily  ondansetron Injectable 4 milliGRAM(s) IV Push every 8 hours PRN  sacubitril 97 mG/valsartan 103 mG 1 Tablet(s) Oral two times a day  spironolactone 25 milliGRAM(s) Oral daily  tamsulosin 0.4 milliGRAM(s) Oral at bedtime    Vitals:  T(F): 98 (03-23), Max: 98.1 (03-23)  HR: 90 (03-23) (52 - 105)  BP: 132/94 (03-23) (99/74 - 132/94)  RR: 17 (03-23)  SpO2: 98% (03-23)  I&O's Summary    Physical Exam:  GENERAL: In no apparent distress, obese  HEAD:  Atraumatic, Normocephalic  NECK: Supple. No JVD or carotid bruit or thyroidmegaly.  Carotid pulse is 2+ bilaterally.  PULMONARY: Bibasilar rales. No wheezing or rhonchi bilaterally.  HEART: Irregular, S1S2,  No murmurs, rubs, or gallops.  L ICD.  ABDOMEN: Soft, Nontender, Nondistended; Bowel sounds present  EXTREMITIES: No clubbing, cyanosis, 1+ pitting edema to midshins bilaterally\  NEUROLOGICAL: Alert oriented to person, place and time.  Speech clear.  Skin: Dry intact, no rashes or lesions.                          13.6   5.55  )-----------( 248      ( 23 Mar 2024 06:06 )             42.7     03-23    140  |  104  |  28<H>  ----------------------------<  93  3.8   |  21<L>  |  1.42<H>    Ca    9.4      23 Mar 2024 06:06  Phos  4.9     03-23  Mg     1.90     03-23    TPro  8.3  /  Alb  3.8  /  TBili  1.1  /  DBili  x   /  AST  38  /  ALT  45<H>  /  AlkPhos  107  03-23

## 2024-03-23 NOTE — PROGRESS NOTE ADULT - SUBJECTIVE AND OBJECTIVE BOX
[facility] Medicine  Karina Servin MD  Pager (R-F, 9P-5G): [phone]  Other Times:  [phone]    Patient is a 58y old  Male who presents with a chief complaint of afib rvr (23 Mar 2024 12:33)      SUBJECTIVE / OVERNIGHT EVENTS: SR rates up to 120 on tele; no acute events ON       MEDICATIONS  (STANDING):  albuterol/ipratropium for Nebulization 3 milliLiter(s) Nebulizer every 6 hours  allopurinol 100 milliGRAM(s) Oral daily  aMIOdarone    Tablet 200 milliGRAM(s) Oral two times a day  apixaban 5 milliGRAM(s) Oral two times a day  aspirin enteric coated 81 milliGRAM(s) Oral daily  atorvastatin 80 milliGRAM(s) Oral at bedtime  furosemide    Tablet 40 milliGRAM(s) Oral two times a day  hydrALAZINE 25 milliGRAM(s) Oral three times a day  influenza   Vaccine 0.5 milliLiter(s) IntraMuscular once  isosorbide   dinitrate Tablet (ISORDIL) 5 milliGRAM(s) Oral three times a day  magnesium oxide 400 milliGRAM(s) Oral two times a day with meals  metoprolol succinate ER 50 milliGRAM(s) Oral daily  sacubitril 97 mG/valsartan 103 mG 1 Tablet(s) Oral two times a day  spironolactone 25 milliGRAM(s) Oral daily  tamsulosin 0.4 milliGRAM(s) Oral at bedtime    MEDICATIONS  (PRN):  acetaminophen     Tablet .. 650 milliGRAM(s) Oral every 6 hours PRN Temp greater or equal to 38C (100.4F), Mild Pain (1 - 3)  albuterol    90 MICROgram(s) HFA Inhaler 2 Puff(s) Inhalation every 3 hours PRN Shortness of Breath and/or Wheezing  aluminum hydroxide/magnesium hydroxide/simethicone Suspension 30 milliLiter(s) Oral every 4 hours PRN Dyspepsia  melatonin 3 milliGRAM(s) Oral at bedtime PRN Insomnia  ondansetron Injectable 4 milliGRAM(s) IV Push every 8 hours PRN Nausea and/or Vomiting      CAPILLARY BLOOD GLUCOSE        I&O's Summary      PHYSICAL EXAM:  Vital Signs Last 24 Hrs  T(C): 36.7 (23 Mar 2024 10:30), Max: 36.7 (23 Mar 2024 04:35)  T(F): 98 (23 Mar 2024 10:30), Max: 98.1 (23 Mar 2024 04:35)  HR: 100 (23 Mar 2024 13:15) (52 - 105)  BP: 119/70 (23 Mar 2024 13:15) (99/74 - 132/94)  BP(mean): 70 (23 Mar 2024 13:15) (70 - 70)  RR: 18 (23 Mar 2024 13:15) (17 - 18)  SpO2: 99% (23 Mar 2024 13:15) (95% - 100%)    Parameters below as of 23 Mar 2024 13:15  Patient On (Oxygen Delivery Method): room air      CONSTITUTIONAL: NAD  EYES: PERRLA; conjunctiva and sclera clear  ENMT: Moist oral mucosa, no pharyngeal injection or exudates; normal dentition  NECK: Supple, no palpable masses; no thyromegaly  RESPIRATORY: Normal respiratory effort; lungs are clear to auscultation bilaterally  CARDIOVASCULAR: Regular rate and rhythm, normal S1 and S2, no murmur/rub/gallop; No lower extremity edema; Peripheral pulses are 2+ bilaterally  ABDOMEN: Nontender to palpation, normoactive bowel sounds, no rebound/guarding; No hepatosplenomegaly  MUSCULOSKELETAL:  Normal gait; no clubbing or cyanosis of digits; no joint swelling or tenderness to palpation  PSYCH: A+O to person, place, and time; affect appropriate  NEUROLOGY: CN 2-12 are intact and symmetric; no gross sensory deficits   SKIN: No rashes; no palpable lesions    LABS:                        13.6   5.55  )-----------( 248      ( 23 Mar 2024 06:06 )             42.7     03-23    140  |  104  |  28<H>  ----------------------------<  93  3.8   |  21<L>  |  1.42<H>    Ca    9.4      23 Mar 2024 06:06  Phos  4.9     03-23  Mg     1.90     03-23    TPro  8.3  /  Alb  3.8  /  TBili  1.1  /  DBili  x   /  AST  38  /  ALT  45<H>  /  AlkPhos  107  03-23          Urinalysis Basic - ( 23 Mar 2024 06:06 )    Color: x / Appearance: x / SG: x / pH: x  Gluc: 93 mg/dL / Ketone: x  / Bili: x / Urobili: x   Blood: x / Protein: x / Nitrite: x   Leuk Esterase: x / RBC: x / WBC x   Sq Epi: x / Non Sq Epi: x / Bacteria: x          RADIOLOGY & ADDITIONAL TESTS:  Results Reviewed:   Imaging Personally Reviewed:  Electrocardiogram Personally Reviewed:    COORDINATION OF CARE:  Care Discussed with Consultants/Other Providers [Y/N]:  Prior or Outpatient Records Reviewed [Y/N]:

## 2024-03-23 NOTE — DISCHARGE NOTE PROVIDER - NSDCFUSCHEDAPPT_GEN_ALL_CORE_FT
[facility]  [address]  Scheduled Appointment: 05/01/2024    Mariana Connell  [facility]  [id number]  Scheduled Appointment: 05/01/2024

## 2024-03-23 NOTE — PROGRESS NOTE ADULT - PROBLEM SELECTOR PLAN 1
- patient presented for afib RVR from outside hospital  - s/p cardioversion  - c/w apixaban; amiodarone 200 BID  - off digoxin  - increase metoprolol as per cards recs

## 2024-03-23 NOTE — DISCHARGE NOTE PROVIDER - CARE PROVIDER_API CALL
Whitney Benavides  [facility]  [address] [place] [address]  Phone: [phone]  Fax: [phone]  Follow Up Time: 1 week

## 2024-03-24 ENCOUNTER — TRANSCRIPTION ENCOUNTER (OUTPATIENT)
Age: 59
End: 2024-03-24

## 2024-03-24 VITALS — OXYGEN SATURATION: 98 % | HEART RATE: 96 BPM

## 2024-03-24 LAB
ALBUMIN SERPL ELPH-MCNC: 3.8 G/DL — SIGNIFICANT CHANGE UP (ref 3.3–5)
ALP SERPL-CCNC: 101 U/L — SIGNIFICANT CHANGE UP (ref 40–120)
ALT FLD-CCNC: 38 U/L — SIGNIFICANT CHANGE UP (ref 4–41)
ANION GAP SERPL CALC-SCNC: 15 MMOL/L — HIGH (ref 7–14)
AST SERPL-CCNC: 34 U/L — SIGNIFICANT CHANGE UP (ref 4–40)
BILIRUB SERPL-MCNC: 1.2 MG/DL — SIGNIFICANT CHANGE UP (ref 0.2–1.2)
BUN SERPL-MCNC: 23 MG/DL — SIGNIFICANT CHANGE UP (ref 7–23)
CALCIUM SERPL-MCNC: 9.2 MG/DL — SIGNIFICANT CHANGE UP (ref 8.4–10.5)
CHLORIDE SERPL-SCNC: 102 MMOL/L — SIGNIFICANT CHANGE UP (ref 98–107)
CO2 SERPL-SCNC: 23 MMOL/L — SIGNIFICANT CHANGE UP (ref 22–31)
CREAT SERPL-MCNC: 1.37 MG/DL — HIGH (ref 0.5–1.3)
EGFR: 60 ML/MIN/1.73M2 — SIGNIFICANT CHANGE UP
GLUCOSE SERPL-MCNC: 88 MG/DL — SIGNIFICANT CHANGE UP (ref 70–99)
HCT VFR BLD CALC: 42.3 % — SIGNIFICANT CHANGE UP (ref 39–50)
HGB BLD-MCNC: 13.5 G/DL — SIGNIFICANT CHANGE UP (ref 13–17)
MAGNESIUM SERPL-MCNC: 1.9 MG/DL — SIGNIFICANT CHANGE UP (ref 1.6–2.6)
MCHC RBC-ENTMCNC: 26 PG — LOW (ref 27–34)
MCHC RBC-ENTMCNC: 31.9 GM/DL — LOW (ref 32–36)
MCV RBC AUTO: 81.5 FL — SIGNIFICANT CHANGE UP (ref 80–100)
NRBC # BLD: 0 /100 WBCS — SIGNIFICANT CHANGE UP (ref 0–0)
NRBC # FLD: 0 K/UL — SIGNIFICANT CHANGE UP (ref 0–0)
PHOSPHATE SERPL-MCNC: 4.5 MG/DL — SIGNIFICANT CHANGE UP (ref 2.5–4.5)
PLATELET # BLD AUTO: 243 K/UL — SIGNIFICANT CHANGE UP (ref 150–400)
POTASSIUM SERPL-MCNC: 3.9 MMOL/L — SIGNIFICANT CHANGE UP (ref 3.5–5.3)
POTASSIUM SERPL-SCNC: 3.9 MMOL/L — SIGNIFICANT CHANGE UP (ref 3.5–5.3)
PROT SERPL-MCNC: 7.9 G/DL — SIGNIFICANT CHANGE UP (ref 6–8.3)
RBC # BLD: 5.19 M/UL — SIGNIFICANT CHANGE UP (ref 4.2–5.8)
RBC # FLD: 16.4 % — HIGH (ref 10.3–14.5)
SODIUM SERPL-SCNC: 140 MMOL/L — SIGNIFICANT CHANGE UP (ref 135–145)
WBC # BLD: 4.42 K/UL — SIGNIFICANT CHANGE UP (ref 3.8–10.5)
WBC # FLD AUTO: 4.42 K/UL — SIGNIFICANT CHANGE UP (ref 3.8–10.5)

## 2024-03-24 PROCEDURE — 99239 HOSP IP/OBS DSCHRG MGMT >30: CPT

## 2024-03-24 PROCEDURE — 99231 SBSQ HOSP IP/OBS SF/LOW 25: CPT

## 2024-03-24 RX ORDER — TAMSULOSIN HYDROCHLORIDE 0.4 MG/1
1 CAPSULE ORAL
Qty: 30 | Refills: 0
Start: 2024-03-24 | End: 2024-04-22

## 2024-03-24 RX ORDER — CARVEDILOL PHOSPHATE 80 MG/1
1 CAPSULE, EXTENDED RELEASE ORAL
Refills: 0 | DISCHARGE

## 2024-03-24 RX ORDER — SPIRONOLACTONE 25 MG/1
1 TABLET, FILM COATED ORAL
Qty: 30 | Refills: 0
Start: 2024-03-24 | End: 2024-04-22

## 2024-03-24 RX ORDER — SACUBITRIL AND VALSARTAN 24; 26 MG/1; MG/1
1 TABLET, FILM COATED ORAL
Qty: 0 | Refills: 0 | DISCHARGE

## 2024-03-24 RX ORDER — APIXABAN 2.5 MG/1
1 TABLET, FILM COATED ORAL
Qty: 60 | Refills: 0
Start: 2024-03-24 | End: 2024-04-22

## 2024-03-24 RX ORDER — ATORVASTATIN CALCIUM 80 MG/1
1 TABLET, FILM COATED ORAL
Qty: 30 | Refills: 0
Start: 2024-03-24 | End: 2024-04-22

## 2024-03-24 RX ORDER — ALLOPURINOL 300 MG
1 TABLET ORAL
Qty: 30 | Refills: 0
Start: 2024-03-24 | End: 2024-04-22

## 2024-03-24 RX ORDER — SACUBITRIL AND VALSARTAN 24; 26 MG/1; MG/1
1 TABLET, FILM COATED ORAL
Qty: 60 | Refills: 0
Start: 2024-03-24 | End: 2024-04-22

## 2024-03-24 RX ORDER — ALBUTEROL 90 UG/1
2 AEROSOL, METERED ORAL
Refills: 0 | DISCHARGE

## 2024-03-24 RX ORDER — FUROSEMIDE 40 MG
1 TABLET ORAL
Qty: 60 | Refills: 0
Start: 2024-03-24 | End: 2024-04-22

## 2024-03-24 RX ORDER — HYDRALAZINE HCL 50 MG
1 TABLET ORAL
Qty: 90 | Refills: 0
Start: 2024-03-24 | End: 2024-04-22

## 2024-03-24 RX ORDER — DAPAGLIFLOZIN 10 MG/1
1 TABLET, FILM COATED ORAL
Qty: 30 | Refills: 0
Start: 2024-03-24 | End: 2024-04-22

## 2024-03-24 RX ORDER — ISOSORBIDE DINITRATE 5 MG/1
1 TABLET ORAL
Qty: 90 | Refills: 0
Start: 2024-03-24 | End: 2024-04-22

## 2024-03-24 RX ORDER — ALBUTEROL 90 UG/1
2 AEROSOL, METERED ORAL
Qty: 1 | Refills: 0
Start: 2024-03-24 | End: 2024-04-22

## 2024-03-24 RX ORDER — APIXABAN 2.5 MG/1
1 TABLET, FILM COATED ORAL
Qty: 0 | Refills: 0 | DISCHARGE

## 2024-03-24 RX ORDER — METOPROLOL TARTRATE 50 MG
1 TABLET ORAL
Qty: 30 | Refills: 0
Start: 2024-03-24 | End: 2024-04-22

## 2024-03-24 RX ORDER — ASPIRIN/CALCIUM CARB/MAGNESIUM 324 MG
1 TABLET ORAL
Qty: 30 | Refills: 0
Start: 2024-03-24 | End: 2024-04-22

## 2024-03-24 RX ORDER — ACETAMINOPHEN 500 MG
2 TABLET ORAL
Qty: 0 | Refills: 0 | DISCHARGE
Start: 2024-03-24

## 2024-03-24 RX ORDER — MAGNESIUM OXIDE 400 MG ORAL TABLET 241.3 MG
1 TABLET ORAL
Qty: 60 | Refills: 0
Start: 2024-03-24 | End: 2024-04-22

## 2024-03-24 RX ORDER — AMIODARONE HYDROCHLORIDE 400 MG/1
1 TABLET ORAL
Qty: 60 | Refills: 0
Start: 2024-03-24 | End: 2024-04-22

## 2024-03-24 RX ORDER — ASPIRIN/CALCIUM CARB/MAGNESIUM 324 MG
1 TABLET ORAL
Qty: 0 | Refills: 0 | DISCHARGE

## 2024-03-24 RX ADMIN — APIXABAN 5 MILLIGRAM(S): 2.5 TABLET, FILM COATED ORAL at 05:19

## 2024-03-24 RX ADMIN — MAGNESIUM OXIDE 400 MG ORAL TABLET 400 MILLIGRAM(S): 241.3 TABLET ORAL at 08:58

## 2024-03-24 RX ADMIN — Medication 40 MILLIGRAM(S): at 13:01

## 2024-03-24 RX ADMIN — Medication 3 MILLILITER(S): at 03:59

## 2024-03-24 RX ADMIN — MAGNESIUM OXIDE 400 MG ORAL TABLET 400 MILLIGRAM(S): 241.3 TABLET ORAL at 17:19

## 2024-03-24 RX ADMIN — SPIRONOLACTONE 25 MILLIGRAM(S): 25 TABLET, FILM COATED ORAL at 05:21

## 2024-03-24 RX ADMIN — Medication 3 MILLILITER(S): at 16:56

## 2024-03-24 RX ADMIN — AMIODARONE HYDROCHLORIDE 200 MILLIGRAM(S): 400 TABLET ORAL at 13:03

## 2024-03-24 RX ADMIN — Medication 40 MILLIGRAM(S): at 05:21

## 2024-03-24 RX ADMIN — SACUBITRIL AND VALSARTAN 1 TABLET(S): 24; 26 TABLET, FILM COATED ORAL at 13:00

## 2024-03-24 RX ADMIN — Medication 3 MILLILITER(S): at 10:48

## 2024-03-24 RX ADMIN — APIXABAN 5 MILLIGRAM(S): 2.5 TABLET, FILM COATED ORAL at 17:18

## 2024-03-24 RX ADMIN — Medication 25 MILLIGRAM(S): at 11:00

## 2024-03-24 RX ADMIN — Medication 81 MILLIGRAM(S): at 11:13

## 2024-03-24 RX ADMIN — Medication 100 MILLIGRAM(S): at 11:12

## 2024-03-24 RX ADMIN — ISOSORBIDE DINITRATE 5 MILLIGRAM(S): 5 TABLET ORAL at 14:42

## 2024-03-24 RX ADMIN — ISOSORBIDE DINITRATE 5 MILLIGRAM(S): 5 TABLET ORAL at 11:16

## 2024-03-24 NOTE — PROGRESS NOTE ADULT - SUBJECTIVE AND OBJECTIVE BOX
Cardiac Electrophysiology Progress Note    Patient seen and examined at bedside.    Overnight Events:   NAEO  Tele: Sinus tachycardia, PVCs, bigeminy, SVT to 170s overnight (nonsustained)     Review Of Systems: No chest pain, shortness of breath, or palpitations            Current Meds:  acetaminophen     Tablet .. 650 milliGRAM(s) Oral every 6 hours PRN  albuterol    90 MICROgram(s) HFA Inhaler 2 Puff(s) Inhalation every 3 hours PRN  albuterol/ipratropium for Nebulization 3 milliLiter(s) Nebulizer every 6 hours  allopurinol 100 milliGRAM(s) Oral daily  aluminum hydroxide/magnesium hydroxide/simethicone Suspension 30 milliLiter(s) Oral every 4 hours PRN  aMIOdarone    Tablet 200 milliGRAM(s) Oral two times a day  apixaban 5 milliGRAM(s) Oral two times a day  aspirin enteric coated 81 milliGRAM(s) Oral daily  atorvastatin 80 milliGRAM(s) Oral at bedtime  furosemide    Tablet 40 milliGRAM(s) Oral two times a day  hydrALAZINE 25 milliGRAM(s) Oral three times a day  influenza   Vaccine 0.5 milliLiter(s) IntraMuscular once  isosorbide   dinitrate Tablet (ISORDIL) 5 milliGRAM(s) Oral three times a day  magnesium oxide 400 milliGRAM(s) Oral two times a day with meals  melatonin 3 milliGRAM(s) Oral at bedtime PRN  metoprolol succinate  milliGRAM(s) Oral daily  ondansetron Injectable 4 milliGRAM(s) IV Push every 8 hours PRN  sacubitril 97 mG/valsartan 103 mG 1 Tablet(s) Oral two times a day  spironolactone 25 milliGRAM(s) Oral daily  tamsulosin 0.4 milliGRAM(s) Oral at bedtime      Vitals:  T(F): 97.2 (03-24), Max: 97.8 (03-23)  HR: 49 (03-24) (49 - 109)  BP: 148/70 (03-24) (100/70 - 148/70)  RR: 18 (03-24)  SpO2: 100% (03-24)  I&O's Summary      Physical Exam:  GENERAL: In no apparent distress, obese  HEAD:  Atraumatic, Normocephalic  NECK: Supple. No JVD or carotid bruit or thyroidmegaly.  Carotid pulse is 2+ bilaterally.  PULMONARY: Bibasilar rales with end expiratory wheeze  HEART: Irregular, S1S2,  No murmurs, rubs, or gallops.  L ICD.  ABDOMEN: Soft, Nontender, Nondistended; Bowel sounds present  EXTREMITIES: No clubbing, cyanosis, 1+ pitting edema to midshins bilaterally  NEUROLOGICAL: Alert oriented to person, place and time.  Speech clear.  Skin: Dry intact, no rashes or lesions.                          13.5   4.42  )-----------( 243      ( 24 Mar 2024 05:43 )             42.3     03-24    140  |  102  |  23  ----------------------------<  88  3.9   |  23  |  1.37<H>    Ca    9.2      24 Mar 2024 05:43  Phos  4.5     03-24  Mg     1.90     03-24    TPro  7.9  /  Alb  3.8  /  TBili  1.2  /  DBili  x   /  AST  34  /  ALT  38  /  AlkPhos  101  03-24

## 2024-03-24 NOTE — PROGRESS NOTE ADULT - PROBLEM SELECTOR PLAN 1
- patient presented for afib RVR from outside hospital  - s/p cardioversion  - c/w apixaban; amiodarone 200 BID  - off digoxin  - increase metoprolol as per cards recs - patient presented for afib RVR from outside hospital  - s/p cardioversion  - c/w apixaban; amiodarone 200 BID  - off digoxin  - 3/23 SVT c/w metoproolol 100 - patient presented for afib RVR from outside hospital  - s/p cardioversion  - c/w apixaban; amiodarone 200 BID  - off digoxin  - 3/23 SVT c/w metoproolol 100  -

## 2024-03-24 NOTE — PROVIDER CONTACT NOTE (EICU) - SITUATION
Lizbeth velásquez reported , Pt is hypotensive when sitting , lying /94
Pt is admitted for AFib, S/P Cardioversion

## 2024-03-24 NOTE — PROVIDER CONTACT NOTE (EICU) - ACTION/TREATMENT ORDERED:
Printed report in chart as per ACP orders.
ACP Jonah will get with cardiologist team  close monitoring in progress.

## 2024-03-24 NOTE — PROGRESS NOTE ADULT - ATTENDING COMMENTS
Patient with knonw HFrEF presented in AF withr RVR. s/p DCCV. Having runs of pAT, non-sustained. Ok to continue current therapy and discharge home. Plan for outpatient ablation with me. Continue AC.
Patient with knonw HFrEF presented in AF withr RVR. s/p DCCV. Having runs of pAT, non-sustained. Continue AC. Plan for outpatient AF ablation.

## 2024-03-24 NOTE — PROGRESS NOTE ADULT - PROBLEM SELECTOR PLAN 6
- will start duonebs prn  - no signs of wheezing on exam  - monitor for now
- will start duonebs prn  - no signs of wheezing on exam  - monitor for now

## 2024-03-24 NOTE — PROGRESS NOTE ADULT - PROBLEM SELECTOR PLAN 2
- patient had CHF exacerbation from outside hospital s/p diuresis  - now appears euvolemic  - daily weights  - monitor I/O  - c/w lasix Entresto hydralazine 25 mg TID Isordil 5 mg PO TID  spironolactone 25 mg daily with hold parameters
- patient had CHF exacerbation from outside hospital s/p diuresis  - now appears euvolemic  - daily weights  - monitor I/O  - c/w lasix Entresto hydralazine 25 mg TID Isordil 5 mg PO TID  spironolactone 25 mg daily with hold parameters

## 2024-03-24 NOTE — DISCHARGE NOTE NURSING/CASE MANAGEMENT/SOCIAL WORK - PATIENT PORTAL LINK FT
You can access the FollowMyHealth Patient Portal offered by [facility] by registering at the following website: [email or web address]. By joining [employer]’s FollowMyHealth portal, you will also be able to view your health information using other applications (apps) compatible with our system.

## 2024-03-24 NOTE — PROGRESS NOTE ADULT - SUBJECTIVE AND OBJECTIVE BOX
[facility] of Hospital Medicine  Karina Servin MD  Pager (M-F, 0E-5Z): [phone]  Other Times:  [phone]    Patient is a 58y old  Male who presents with a chief complaint of afib rvr (23 Mar 2024 15:48)      SUBJECTIVE / OVERNIGHT EVENTS:  ADDITIONAL REVIEW OF SYSTEMS:    MEDICATIONS  (STANDING):  albuterol/ipratropium for Nebulization 3 milliLiter(s) Nebulizer every 6 hours  allopurinol 100 milliGRAM(s) Oral daily  aMIOdarone    Tablet 200 milliGRAM(s) Oral two times a day  apixaban 5 milliGRAM(s) Oral two times a day  aspirin enteric coated 81 milliGRAM(s) Oral daily  atorvastatin 80 milliGRAM(s) Oral at bedtime  furosemide    Tablet 40 milliGRAM(s) Oral two times a day  hydrALAZINE 25 milliGRAM(s) Oral three times a day  influenza   Vaccine 0.5 milliLiter(s) IntraMuscular once  isosorbide   dinitrate Tablet (ISORDIL) 5 milliGRAM(s) Oral three times a day  magnesium oxide 400 milliGRAM(s) Oral two times a day with meals  metoprolol succinate  milliGRAM(s) Oral daily  sacubitril 97 mG/valsartan 103 mG 1 Tablet(s) Oral two times a day  spironolactone 25 milliGRAM(s) Oral daily  tamsulosin 0.4 milliGRAM(s) Oral at bedtime    MEDICATIONS  (PRN):  acetaminophen     Tablet .. 650 milliGRAM(s) Oral every 6 hours PRN Temp greater or equal to 38C (100.4F), Mild Pain (1 - 3)  albuterol    90 MICROgram(s) HFA Inhaler 2 Puff(s) Inhalation every 3 hours PRN Shortness of Breath and/or Wheezing  aluminum hydroxide/magnesium hydroxide/simethicone Suspension 30 milliLiter(s) Oral every 4 hours PRN Dyspepsia  melatonin 3 milliGRAM(s) Oral at bedtime PRN Insomnia  ondansetron Injectable 4 milliGRAM(s) IV Push every 8 hours PRN Nausea and/or Vomiting      CAPILLARY BLOOD GLUCOSE        I&O's Summary      PHYSICAL EXAM:  Vital Signs Last 24 Hrs  T(C): 36.4 (24 Mar 2024 05:19), Max: 36.7 (23 Mar 2024 10:30)  T(F): 97.6 (24 Mar 2024 05:19), Max: 98 (23 Mar 2024 10:30)  HR: 89 (24 Mar 2024 05:19) (80 - 109)  BP: 100/70 (24 Mar 2024 05:19) (100/70 - 132/94)  BP(mean): 70 (23 Mar 2024 13:15) (70 - 70)  RR: 18 (24 Mar 2024 05:19) (17 - 18)  SpO2: 96% (24 Mar 2024 05:19) (96% - 99%)    Parameters below as of 24 Mar 2024 05:19  Patient On (Oxygen Delivery Method): room air      CONSTITUTIONAL: NAD  EYES: PERRLA; conjunctiva and sclera clear  ENMT: Moist oral mucosa, no pharyngeal injection or exudates; normal dentition  NECK: Supple, no palpable masses; no thyromegaly  RESPIRATORY: Normal respiratory effort; lungs are clear to auscultation bilaterally  CARDIOVASCULAR: Regular rate and rhythm, normal S1 and S2, no murmur/rub/gallop; No lower extremity edema; Peripheral pulses are 2+ bilaterally  ABDOMEN: Nontender to palpation, normoactive bowel sounds, no rebound/guarding; No hepatosplenomegaly  MUSCULOSKELETAL:  Normal gait; no clubbing or cyanosis of digits; no joint swelling or tenderness to palpation  PSYCH: A+O to person, place, and time; affect appropriate  NEUROLOGY: CN 2-12 are intact and symmetric; no gross sensory deficits   SKIN: No rashes; no palpable lesions    LABS:                        13.5   4.42  )-----------( 243      ( 24 Mar 2024 05:43 )             42.3     03-24    140  |  102  |  23  ----------------------------<  88  3.9   |  23  |  1.37<H>    Ca    9.2      24 Mar 2024 05:43  Phos  4.5     03-24  Mg     1.90     03-24    TPro  7.9  /  Alb  3.8  /  TBili  1.2  /  DBili  x   /  AST  34  /  ALT  38  /  AlkPhos  101  03-24          Urinalysis Basic - ( 24 Mar 2024 05:43 )    Color: x / Appearance: x / SG: x / pH: x  Gluc: 88 mg/dL / Ketone: x  / Bili: x / Urobili: x   Blood: x / Protein: x / Nitrite: x   Leuk Esterase: x / RBC: x / WBC x   Sq Epi: x / Non Sq Epi: x / Bacteria: x          RADIOLOGY & ADDITIONAL TESTS:  Results Reviewed:   Imaging Personally Reviewed:  Electrocardiogram Personally Reviewed:    COORDINATION OF CARE:  Care Discussed with Consultants/Other Providers [Y/N]:  Prior or Outpatient Records Reviewed [Y/N]:   [facility]  Karina Servin MD  Pager (D-F, 2P-3C): [phone]  Other Times:  [phone]    Patient is a 58y old  Male who presents with a chief complaint of afib rvr (23 Mar 2024 15:48)      SUBJECTIVE / OVERNIGHT EVENTS: sleeping this AM; Tele with HR 90's: BP meds held this AM for borderline BP in AM      MEDICATIONS  (STANDING):  albuterol/ipratropium for Nebulization 3 milliLiter(s) Nebulizer every 6 hours  allopurinol 100 milliGRAM(s) Oral daily  aMIOdarone    Tablet 200 milliGRAM(s) Oral two times a day  apixaban 5 milliGRAM(s) Oral two times a day  aspirin enteric coated 81 milliGRAM(s) Oral daily  atorvastatin 80 milliGRAM(s) Oral at bedtime  furosemide    Tablet 40 milliGRAM(s) Oral two times a day  hydrALAZINE 25 milliGRAM(s) Oral three times a day  influenza   Vaccine 0.5 milliLiter(s) IntraMuscular once  isosorbide   dinitrate Tablet (ISORDIL) 5 milliGRAM(s) Oral three times a day  magnesium oxide 400 milliGRAM(s) Oral two times a day with meals  metoprolol succinate  milliGRAM(s) Oral daily  sacubitril 97 mG/valsartan 103 mG 1 Tablet(s) Oral two times a day  spironolactone 25 milliGRAM(s) Oral daily  tamsulosin 0.4 milliGRAM(s) Oral at bedtime    MEDICATIONS  (PRN):  acetaminophen     Tablet .. 650 milliGRAM(s) Oral every 6 hours PRN Temp greater or equal to 38C (100.4F), Mild Pain (1 - 3)  albuterol    90 MICROgram(s) HFA Inhaler 2 Puff(s) Inhalation every 3 hours PRN Shortness of Breath and/or Wheezing  aluminum hydroxide/magnesium hydroxide/simethicone Suspension 30 milliLiter(s) Oral every 4 hours PRN Dyspepsia  melatonin 3 milliGRAM(s) Oral at bedtime PRN Insomnia  ondansetron Injectable 4 milliGRAM(s) IV Push every 8 hours PRN Nausea and/or Vomiting      CAPILLARY BLOOD GLUCOSE        I&O's Summary      PHYSICAL EXAM:  Vital Signs Last 24 Hrs  T(C): 36.4 (24 Mar 2024 05:19), Max: 36.7 (23 Mar 2024 10:30)  T(F): 97.6 (24 Mar 2024 05:19), Max: 98 (23 Mar 2024 10:30)  HR: 89 (24 Mar 2024 05:19) (80 - 109)  BP: 100/70 (24 Mar 2024 05:19) (100/70 - 132/94)  BP(mean): 70 (23 Mar 2024 13:15) (70 - 70)  RR: 18 (24 Mar 2024 05:19) (17 - 18)  SpO2: 96% (24 Mar 2024 05:19) (96% - 99%)    Parameters below as of 24 Mar 2024 05:19  Patient On (Oxygen Delivery Method): room air      CONSTITUTIONAL: NAD  RESPIRATORY: Normal respiratory effort; lungs are clear to auscultation bilaterally  CARDIOVASCULAR: Regular rate and rhythm, normal S1 and S2, No lower extremity edema; ABDOMEN: Nontender to palpation, normoactive bowel sounds, no rebound/guarding;  PSYCH: AA answers questions appropriately  NEUROLOGY: CN 2-12 are intact and symmetric; no gross  deficits       LABS:                        13.5   4.42  )-----------( 243      ( 24 Mar 2024 05:43 )             42.3     03-24    140  |  102  |  23  ----------------------------<  88  3.9   |  23  |  1.37<H>    Ca    9.2      24 Mar 2024 05:43  Phos  4.5     03-24  Mg     1.90     03-24    TPro  7.9  /  Alb  3.8  /  TBili  1.2  /  DBili  x   /  AST  34  /  ALT  38  /  AlkPhos  101  03-24          Urinalysis Basic - ( 24 Mar 2024 05:43 )    Color: x / Appearance: x / SG: x / pH: x  Gluc: 88 mg/dL / Ketone: x  / Bili: x / Urobili: x   Blood: x / Protein: x / Nitrite: x   Leuk Esterase: x / RBC: x / WBC x   Sq Epi: x / Non Sq Epi: x / Bacteria: x          RADIOLOGY & ADDITIONAL TESTS:  Results Reviewed:   Imaging Personally Reviewed:  Electrocardiogram Personally Reviewed:    COORDINATION OF CARE:  Care Discussed with Consultants/Other Providers [Y/N]:  Prior or Outpatient Records Reviewed [Y/N]:   [facility]  Karina Servin MD  Pager (M-F, 2A-2H): [phone]  Other Times:  [phone]    Patient is a 58y old  Male who presents with a chief complaint of afib rvr (23 Mar 2024 15:48)      SUBJECTIVE / OVERNIGHT EVENTS: sleeping this AM; Tele with HR 90's: BP meds held this AM for borderline BP in AM; repeat BP stable asked RN to administer AM meds       MEDICATIONS  (STANDING):  albuterol/ipratropium for Nebulization 3 milliLiter(s) Nebulizer every 6 hours  allopurinol 100 milliGRAM(s) Oral daily  aMIOdarone    Tablet 200 milliGRAM(s) Oral two times a day  apixaban 5 milliGRAM(s) Oral two times a day  aspirin enteric coated 81 milliGRAM(s) Oral daily  atorvastatin 80 milliGRAM(s) Oral at bedtime  furosemide    Tablet 40 milliGRAM(s) Oral two times a day  hydrALAZINE 25 milliGRAM(s) Oral three times a day  influenza   Vaccine 0.5 milliLiter(s) IntraMuscular once  isosorbide   dinitrate Tablet (ISORDIL) 5 milliGRAM(s) Oral three times a day  magnesium oxide 400 milliGRAM(s) Oral two times a day with meals  metoprolol succinate  milliGRAM(s) Oral daily  sacubitril 97 mG/valsartan 103 mG 1 Tablet(s) Oral two times a day  spironolactone 25 milliGRAM(s) Oral daily  tamsulosin 0.4 milliGRAM(s) Oral at bedtime    MEDICATIONS  (PRN):  acetaminophen     Tablet .. 650 milliGRAM(s) Oral every 6 hours PRN Temp greater or equal to 38C (100.4F), Mild Pain (1 - 3)  albuterol    90 MICROgram(s) HFA Inhaler 2 Puff(s) Inhalation every 3 hours PRN Shortness of Breath and/or Wheezing  aluminum hydroxide/magnesium hydroxide/simethicone Suspension 30 milliLiter(s) Oral every 4 hours PRN Dyspepsia  melatonin 3 milliGRAM(s) Oral at bedtime PRN Insomnia  ondansetron Injectable 4 milliGRAM(s) IV Push every 8 hours PRN Nausea and/or Vomiting      CAPILLARY BLOOD GLUCOSE        I&O's Summary      PHYSICAL EXAM:  Vital Signs Last 24 Hrs  T(C): 36.4 (24 Mar 2024 05:19), Max: 36.7 (23 Mar 2024 10:30)  T(F): 97.6 (24 Mar 2024 05:19), Max: 98 (23 Mar 2024 10:30)  HR: 89 (24 Mar 2024 05:19) (80 - 109)  BP: 100/70 (24 Mar 2024 05:19) (100/70 - 132/94)  BP(mean): 70 (23 Mar 2024 13:15) (70 - 70)  RR: 18 (24 Mar 2024 05:19) (17 - 18)  SpO2: 96% (24 Mar 2024 05:19) (96% - 99%)    Parameters below as of 24 Mar 2024 05:19  Patient On (Oxygen Delivery Method): room air      CONSTITUTIONAL: NAD  RESPIRATORY: Normal respiratory effort; lungs are clear to auscultation bilaterally  CARDIOVASCULAR: Regular rate and rhythm, normal S1 and S2, No lower extremity edema; ABDOMEN: Nontender to palpation, normoactive bowel sounds, no rebound/guarding;  PSYCH: AA answers questions appropriately  NEUROLOGY: CN 2-12 are intact and symmetric; no gross  deficits       LABS:                        13.5   4.42  )-----------( 243      ( 24 Mar 2024 05:43 )             42.3     03-24    140  |  102  |  23  ----------------------------<  88  3.9   |  23  |  1.37<H>    Ca    9.2      24 Mar 2024 05:43  Phos  4.5     03-24  Mg     1.90     03-24    TPro  7.9  /  Alb  3.8  /  TBili  1.2  /  DBili  x   /  AST  34  /  ALT  38  /  AlkPhos  101  03-24          Urinalysis Basic - ( 24 Mar 2024 05:43 )    Color: x / Appearance: x / SG: x / pH: x  Gluc: 88 mg/dL / Ketone: x  / Bili: x / Urobili: x   Blood: x / Protein: x / Nitrite: x   Leuk Esterase: x / RBC: x / WBC x   Sq Epi: x / Non Sq Epi: x / Bacteria: x          RADIOLOGY & ADDITIONAL TESTS:  Results Reviewed:   Imaging Personally Reviewed:  Electrocardiogram Personally Reviewed:    COORDINATION OF CARE:  Care Discussed with Consultants/Other Providers [Y/N]:  Prior or Outpatient Records Reviewed [Y/N]:

## 2024-03-24 NOTE — PROGRESS NOTE ADULT - ASSESSMENT
58 year old male with a PMH of chronic HFrEF, PAF-on Eliquis, h/o V s/p Medtronic [place] ICD, ICD gen change, PATTIE, HLD, HTN, Anxiety and Asthma, presented to the ED at [facility] for worsening shortness of breath.  Found to be in acute decompensated heart failure with persistent AF with RVR.  Rate controlled improved after Amio load and Digoxin load.  MAYTE associated with cardio-renal syndrome resolving now (Scr trending down from 2.4 to 1.5). Patient now presents as a transfer to [facility] for rhythm control for AF management.   ICD interrogation done this am revealed onset of Atrial fibrillation with RVR started in Jan 2024 which correlated with elevated Optivol fluid index.  No ICD shocks noted (VT shock at 188 bpm). Patient denies prior cardioversion or HF admission.  He has been adherent to his anticoagulation therapy states "I always take Eliquis".  Presently hemodynamically stable and appears euvolemic.    Echo done on 3/6/2024 showed LVEF 20-25%.     ADHF associated with long-standing persistent AF with RVR since Jan 2024  Rhythm control with DCCV as a short term goal and long term goal should include AF ablation as an outpatient with short term use of Amiodarone for sinus rhythm maintenance     S/p DCCV to SR    Recommendations:  1. Continue amiodarone 200 mg PO BID  2. Would increase metoprolol. Can give additional 50 mg metoprolol succinate this evening around 5-6pm and start 100 mg PO daily tomorrow.  3. Continue apixaban 5 mg PO BID.  4. Continue Entresto 97/103 mg BID (hold for SBP <100 mmHg)  5. Continue hydralazine 25 mg TID (hold for SBP <100 mmHg)  6. Continue Isordil 5 mg PO TID (hold for SBP <100 mmHg)  7. Continue spironolactone 25 mg daily  8. Continue PO furosemide 40 mg BID     Luis Armando Macario MD  Department of Cardiology  Cardiology Fellow, PGY6  
57 y/o M with pmhx of CHF, Afib, h/o Vtach s/p AICD, PATTIE, HLD, HTN, Anxiety and Asthma, presented to the ED for new onset shortness of breath. Was treated for chf exacerbation now resolving. Patient is now here for EP eval for afib RVR. s/p cardioversion. 
58 year old male with a PMH of chronic HFrEF, PAF-on Eliquis, h/o V s/p Medtronic [place] ICD, ICD gen change, PATTIE, HLD, HTN, Anxiety and Asthma, presented to the ED at [facility] for worsening shortness of breath.  Found to be in acute decompensated heart failure with persistent AF with RVR.  Rate controlled improved after Amio load and Digoxin load.  MAYTE associated with cardio-renal syndrome resolving now (Scr trending down from 2.4 to 1.5). Patient now presents as a transfer to [facility] for rhythm control for AF management.   ICD interrogation done this am revealed onset of Atrial fibrillation with RVR started in Jan 2024 which correlated with elevated Optivol fluid index.  No ICD shocks noted (VT shock at 188 bpm). Patient denies prior cardioversion or HF admission.  He has been adherent to his anticoagulation therapy states "I always take Eliquis".  Presently hemodynamically stable and appears euvolemic.    Echo done on 3/6/2024 showed LVEF 20-25%.     ADHF associated with long-standing persistent AF with RVR since Jan 2024  Rhythm control with DCCV as a short term goal and long term goal should include AF ablation as an outpatient with short term use of Amiodarone for sinus rhythm maintenance     S/p DCCV to SR    Recommendations:  1. Continue amiodarone 200 mg PO BID  2. Continue metoprolol succinate 100 mg PO daily (hold for HR <55)  3. Continue apixaban 5 mg PO BID  4. Continue Entresto 97/103 mg BID (hold for SBP <100 mmHg)  5. Continue hydralazine 25 mg TID (hold for SBP <100 mmHg)  6. Continue Isordil 5 mg PO TID (hold for SBP <100 mmHg)  7. Continue spironolactone 25 mg daily  8. Continue PO furosemide 40 mg BID   9. Consider adding inhaled corticosteroid for asthma    Luis Armando Macario MD  Department of Cardiology  Cardiology Fellow, PGY6
59 y/o M with pmhx of CHF, Afib, h/o Vtach s/p AICD, PATTIE, HLD, HTN, Anxiety and Asthma, presented to the ED for new onset shortness of breath. Was treated for chf exacerbation now resolving. Patient is now here for EP eval for afib RVR. s/p cardioversion.

## 2024-04-01 ENCOUNTER — NON-APPOINTMENT (OUTPATIENT)
Age: 59
End: 2024-04-01

## 2024-04-02 ENCOUNTER — OUTPATIENT (OUTPATIENT)
Dept: OUTPATIENT SERVICES | Facility: HOSPITAL | Age: 59
LOS: 1 days | End: 2024-04-02
Payer: MEDICARE

## 2024-04-02 ENCOUNTER — APPOINTMENT (OUTPATIENT)
Dept: FAMILY MEDICINE | Facility: HOSPITAL | Age: 59
End: 2024-04-02

## 2024-04-02 VITALS
HEART RATE: 96 BPM | TEMPERATURE: 97.3 F | RESPIRATION RATE: 16 BRPM | OXYGEN SATURATION: 96 % | WEIGHT: 270 LBS | BODY MASS INDEX: 35.62 KG/M2

## 2024-04-02 VITALS — SYSTOLIC BLOOD PRESSURE: 148 MMHG | DIASTOLIC BLOOD PRESSURE: 60 MMHG

## 2024-04-02 DIAGNOSIS — I48.91 UNSPECIFIED ATRIAL FIBRILLATION: ICD-10-CM

## 2024-04-02 DIAGNOSIS — Z95.810 PRESENCE OF AUTOMATIC (IMPLANTABLE) CARDIAC DEFIBRILLATOR: Chronic | ICD-10-CM

## 2024-04-02 DIAGNOSIS — Z00.00 ENCOUNTER FOR GENERAL ADULT MEDICAL EXAMINATION WITHOUT ABNORMAL FINDINGS: ICD-10-CM

## 2024-04-02 DIAGNOSIS — E11.9 TYPE 2 DIABETES MELLITUS WITHOUT COMPLICATIONS: ICD-10-CM

## 2024-04-02 DIAGNOSIS — Z90.89 ACQUIRED ABSENCE OF OTHER ORGANS: Chronic | ICD-10-CM

## 2024-04-02 LAB — HBA1C MFR BLD HPLC: 6.2

## 2024-04-02 PROCEDURE — 83036 HEMOGLOBIN GLYCOSYLATED A1C: CPT

## 2024-04-02 PROCEDURE — G0463: CPT

## 2024-04-02 NOTE — HISTORY OF PRESENT ILLNESS
[de-identified] : 55 y/o M with PMHx of Afib (on Eliquis), HFrEF (EF: 30-35%)s/p AICD placement, non-obstructive CAD, Cardiomyopathy, HTN, HLD, PATTIE on CPAP, Asthma, DM, Anxiety, Anemia and Hx of Positive FIT test presents to clinic for chronic disease management, labs results and medication refill. Reports compliance with medications. Patient did not bring his BP/DM logs or medications for review. Lab results discussed with the patient.  Afib/ HfREF : Following with cardiology Dr. Tafoya, previous EKG at march 20th in sinus rhythm. AICD monitored at [place]  Severe PATTIE: CPAP 14 cm H2O  [place]

## 2024-04-03 NOTE — PHYSICAL EXAM
With report of brief loss consciousness  No entrance or exit burn lesion noted  Cardiac monitor for arrhythmia  Trend CK, BMP for possible rhabdomyolysis/acute kidney injury  Trend troponin for possible cardiac injury  IV hydration, normal saline at 150 mL/hr  Consult surgery for possible internal injury     [No Acute Distress] : no acute distress [No Respiratory Distress] : no respiratory distress  [Normal] : no respiratory distress, lungs were clear to auscultation bilaterally and no accessory muscle use [No CVA Tenderness] : no CVA  tenderness [No Edema] : there was no peripheral edema [No Extremity Clubbing/Cyanosis] : no extremity clubbing/cyanosis [de-identified] : obese [de-identified] : irregularly irregular

## 2024-04-03 NOTE — HISTORY OF PRESENT ILLNESS
[Admitted on: ___] : The patient was admitted on [unfilled] [Discharged on ___] : discharged on [unfilled] [Discharge Summary] : discharge summary [Pertinent Labs] : pertinent labs [Radiology Findings] : radiology findings [Discharge Med List] : discharge medication list [Post-hospitalization from ___ Hospital] : Post-hospitalization from [unfilled] Hospital [FreeTextEntry2] : Reason for Admission: afib rvr 	 59 y/o M with pmhx of CHF, Afib, h/o Vtach s/p AICD, PATTIE, HLD, HTN, Anxiety and Asthma, presented to the ED for new onset shortness of breath. He reported dyspnea on exertion and orthopnea x 3weeks. Also had chest tightness with the symptoms. The patient was admitted at [facility] and was diagnosed with CHF exacerbation complicated by afib RVR. The patient was hypotensive which required ICU admission for management of CHF. He was started on IV lasix and given amiodarone, metoprolol and digoxin for afib RVR. Also had MAYTE which was diagnosed as cardio-renal syndrome and is improved after diuretics. The patient was stabilized and was downgraded to the medical floor, and needed EP eval for afib management. The patient was transferred to [facility] for EP consultation. Upon discharge the patient denied shortness of breath or chest tightness, reported significant improvement.  The patient also reported improvement in his leg swelling. He was seen by EP and underwent BG/DCCV. with outpt f/u with EP and PCP    Discharge Medications	 acetaminophen 325 mg oral tablet: 2 tab(s) orally every 6 hours As needed Temp greater or equal to 38C (100.4F), Mild Pain (1 - 3) Albuterol (Eqv-ProAir HFA) 90 mcg/inh inhalation aerosol: 2 puff(s) inhaled every 4 hours as needed for  bronchospasm allopurinol 100 mg oral tablet: 1 tab(s) orally once a day aspirin 81 mg oral delayed release tablet: 1 tab(s) orally once a day atorvastatin 80 mg oral tablet: 1 tab(s) orally once a day (at bedtime) dapagliflozin 10 mg oral tablet: 1 tab(s) orally once a day Entresto 97 mg-103 mg oral tablet: 1 tab(s) orally 2 times a day furosemide 40 mg oral tablet: 1 tab(s) orally 2 times a day hydrALAZINE 25 mg oral tablet: 1 tab(s) orally 3 times a day isosorbide dinitrate 5 mg oral tablet: 1 tab(s) orally 3 times a day magnesium oxide 400 mg oral tablet: 1 tab(s) orally 2 times a day (with meals) metoprolol succinate 100 mg oral tablet, extended release: 1 tab(s) orally once a day ocular lubricant ophthalmic solution: 1 drop(s) to each affected eye 3 times a day as needed for Dry Eyes petrolatum topical ointment: 1 Apply topically to affected area once a day spironolactone 25 mg oral tablet: 1 tab(s) orally once a day tamsulosin 0.4 mg oral capsule: 1 cap(s) orally once a day (at bedtime)   Today in clinic the patient he feels well, no chest pain, no trouble breathing, no polyuria, or leg swelling. He is concerned becaue he lost weight due to the food in the hosptial.

## 2024-04-16 ENCOUNTER — OUTPATIENT (OUTPATIENT)
Dept: OUTPATIENT SERVICES | Facility: HOSPITAL | Age: 59
LOS: 1 days | End: 2024-04-16
Payer: MEDICARE

## 2024-04-16 ENCOUNTER — APPOINTMENT (OUTPATIENT)
Dept: FAMILY MEDICINE | Facility: HOSPITAL | Age: 59
End: 2024-04-16

## 2024-04-16 VITALS
HEART RATE: 77 BPM | OXYGEN SATURATION: 97 % | WEIGHT: 276 LBS | RESPIRATION RATE: 16 BRPM | BODY MASS INDEX: 36.41 KG/M2 | TEMPERATURE: 98.6 F

## 2024-04-16 VITALS — SYSTOLIC BLOOD PRESSURE: 130 MMHG | DIASTOLIC BLOOD PRESSURE: 60 MMHG

## 2024-04-16 DIAGNOSIS — E78.5 HYPERLIPIDEMIA, UNSPECIFIED: ICD-10-CM

## 2024-04-16 DIAGNOSIS — Z95.810 PRESENCE OF AUTOMATIC (IMPLANTABLE) CARDIAC DEFIBRILLATOR: Chronic | ICD-10-CM

## 2024-04-16 DIAGNOSIS — D68.69 OTHER THROMBOPHILIA: ICD-10-CM

## 2024-04-16 DIAGNOSIS — N17.9 ACUTE KIDNEY FAILURE, UNSPECIFIED: ICD-10-CM

## 2024-04-16 DIAGNOSIS — Z90.89 ACQUIRED ABSENCE OF OTHER ORGANS: Chronic | ICD-10-CM

## 2024-04-16 DIAGNOSIS — J45.909 UNSPECIFIED ASTHMA, UNCOMPLICATED: ICD-10-CM

## 2024-04-16 DIAGNOSIS — G47.33 OBSTRUCTIVE SLEEP APNEA (ADULT) (PEDIATRIC): ICD-10-CM

## 2024-04-16 DIAGNOSIS — E11.9 TYPE 2 DIABETES MELLITUS W/OUT COMPLICATIONS: ICD-10-CM

## 2024-04-16 DIAGNOSIS — I50.42 CHRONIC COMBINED SYSTOLIC (CONGESTIVE) AND DIASTOLIC (CONGESTIVE) HEART FAILURE: ICD-10-CM

## 2024-04-16 DIAGNOSIS — F41.9 ANXIETY DISORDER, UNSPECIFIED: ICD-10-CM

## 2024-04-16 DIAGNOSIS — Z00.00 ENCOUNTER FOR GENERAL ADULT MEDICAL EXAMINATION W/OUT ABNORMAL FINDINGS: ICD-10-CM

## 2024-04-16 DIAGNOSIS — Z00.00 ENCOUNTER FOR GENERAL ADULT MEDICAL EXAMINATION WITHOUT ABNORMAL FINDINGS: ICD-10-CM

## 2024-04-16 DIAGNOSIS — I48.91 OTHER THROMBOPHILIA: ICD-10-CM

## 2024-04-16 DIAGNOSIS — R33.9 RETENTION OF URINE, UNSPECIFIED: ICD-10-CM

## 2024-04-16 DIAGNOSIS — I10 ESSENTIAL (PRIMARY) HYPERTENSION: ICD-10-CM

## 2024-04-16 DIAGNOSIS — I48.91 UNSPECIFIED ATRIAL FIBRILLATION: ICD-10-CM

## 2024-04-16 PROCEDURE — 80061 LIPID PANEL: CPT

## 2024-04-16 PROCEDURE — G0463: CPT

## 2024-04-16 PROCEDURE — 80053 COMPREHEN METABOLIC PANEL: CPT

## 2024-04-16 PROCEDURE — 85025 COMPLETE CBC W/AUTO DIFF WBC: CPT

## 2024-04-16 PROCEDURE — 36415 COLL VENOUS BLD VENIPUNCTURE: CPT

## 2024-04-16 RX ORDER — MONTELUKAST 10 MG/1
10 TABLET, FILM COATED ORAL DAILY
Qty: 1 | Refills: 3 | Status: DISCONTINUED | COMMUNITY
Start: 2017-03-24 | End: 2024-04-16

## 2024-04-16 RX ORDER — LISINOPRIL 20 MG/1
20 TABLET ORAL DAILY
Qty: 90 | Refills: 3 | Status: DISCONTINUED | COMMUNITY
Start: 2017-04-15 | End: 2024-04-16

## 2024-04-16 RX ORDER — INSULIN GLARGINE 100 [IU]/ML
100 INJECTION, SOLUTION SUBCUTANEOUS AT BEDTIME
Qty: 1 | Refills: 0 | Status: DISCONTINUED | COMMUNITY
Start: 2021-04-30 | End: 2024-04-16

## 2024-04-16 RX ORDER — SEMAGLUTIDE 1.34 MG/ML
2 INJECTION, SOLUTION SUBCUTANEOUS
Refills: 0 | Status: DISCONTINUED | COMMUNITY
Start: 2021-06-17 | End: 2024-04-16

## 2024-04-16 RX ORDER — LORATADINE 5 MG/5 ML
10 SOLUTION, ORAL ORAL AT BEDTIME
Qty: 30 | Refills: 0 | Status: ACTIVE | COMMUNITY
Start: 2024-04-16

## 2024-04-16 RX ORDER — METFORMIN HYDROCHLORIDE 1000 MG/1
1000 TABLET, COATED ORAL
Qty: 60 | Refills: 2 | Status: DISCONTINUED | COMMUNITY
Start: 2019-05-20 | End: 2024-04-16

## 2024-04-16 RX ORDER — ALLOPURINOL 100 MG/1
100 TABLET ORAL
Qty: 90 | Refills: 1 | Status: ACTIVE | COMMUNITY
Start: 2024-04-16 | End: 1900-01-01

## 2024-04-16 RX ORDER — FLUTICASONE PROPIONATE AND SALMETEROL 250; 50 UG/1; UG/1
250-50 POWDER RESPIRATORY (INHALATION)
Qty: 60 | Refills: 2 | Status: DISCONTINUED | COMMUNITY
Start: 2022-05-19 | End: 2024-04-16

## 2024-04-16 RX ORDER — CARVEDILOL 6.25 MG/1
6.25 TABLET, FILM COATED ORAL
Qty: 180 | Refills: 3 | Status: DISCONTINUED | COMMUNITY
Start: 2017-05-30 | End: 2024-04-16

## 2024-04-16 RX ORDER — EMPAGLIFLOZIN 10 MG/1
10 TABLET, FILM COATED ORAL
Qty: 90 | Refills: 2 | Status: DISCONTINUED | COMMUNITY
Start: 2021-11-14 | End: 2024-04-16

## 2024-04-17 PROBLEM — G47.33 OBSTRUCTIVE SLEEP APNEA, ADULT: Status: ACTIVE | Noted: 2020-02-10

## 2024-04-17 PROBLEM — I50.42 CHRONIC COMBINED SYSTOLIC AND DIASTOLIC CONGESTIVE HEART FAILURE: Noted: 2017-08-28

## 2024-04-17 PROBLEM — Z00.00 ANNUAL PHYSICAL EXAM: Status: ACTIVE | Noted: 2024-04-16

## 2024-04-17 PROBLEM — N17.9 AKI (ACUTE KIDNEY INJURY): Status: ACTIVE | Noted: 2024-04-03

## 2024-04-17 PROBLEM — E11.9 DIABETES MELLITUS, TYPE 2: Status: ACTIVE | Noted: 2019-05-18

## 2024-04-17 LAB
ALBUMIN SERPL ELPH-MCNC: 4.1 G/DL
ALP BLD-CCNC: 76 U/L
ALT SERPL-CCNC: 22 U/L
ANION GAP SERPL CALC-SCNC: 16 MMOL/L
AST SERPL-CCNC: 18 U/L
BASOPHILS # BLD AUTO: 0.03 K/UL
BASOPHILS NFR BLD AUTO: 0.5 %
BILIRUB SERPL-MCNC: 0.6 MG/DL
BUN SERPL-MCNC: 36 MG/DL
CALCIUM SERPL-MCNC: 9.3 MG/DL
CHLORIDE SERPL-SCNC: 107 MMOL/L
CHOLEST SERPL-MCNC: 92 MG/DL
CO2 SERPL-SCNC: 19 MMOL/L
CREAT SERPL-MCNC: 1.87 MG/DL
EGFR: 41 ML/MIN/1.73M2
EOSINOPHIL # BLD AUTO: 0.22 K/UL
EOSINOPHIL NFR BLD AUTO: 3.9 %
GLUCOSE SERPL-MCNC: 82 MG/DL
HCT VFR BLD CALC: 43 %
HDLC SERPL-MCNC: 41 MG/DL
HGB BLD-MCNC: 13.1 G/DL
IMM GRANULOCYTES NFR BLD AUTO: 0.2 %
LDLC SERPL CALC-MCNC: 31 MG/DL
LYMPHOCYTES # BLD AUTO: 1.85 K/UL
LYMPHOCYTES NFR BLD AUTO: 32.7 %
MAN DIFF?: NORMAL
MCHC RBC-ENTMCNC: 25.7 PG
MCHC RBC-ENTMCNC: 30.5 GM/DL
MCV RBC AUTO: 84.3 FL
MONOCYTES # BLD AUTO: 0.75 K/UL
MONOCYTES NFR BLD AUTO: 13.3 %
NEUTROPHILS # BLD AUTO: 2.79 K/UL
NEUTROPHILS NFR BLD AUTO: 49.4 %
NONHDLC SERPL-MCNC: 51 MG/DL
PLATELET # BLD AUTO: 174 K/UL
POTASSIUM SERPL-SCNC: 4.6 MMOL/L
PROT SERPL-MCNC: 6.9 G/DL
RBC # BLD: 5.1 M/UL
RBC # FLD: 19.2 %
SODIUM SERPL-SCNC: 141 MMOL/L
TRIGL SERPL-MCNC: 109 MG/DL
WBC # FLD AUTO: 5.65 K/UL

## 2024-04-18 NOTE — PHYSICAL EXAM
[No Acute Distress] : no acute distress [Well-Appearing] : well-appearing [Normal Sclera/Conjunctiva] : normal sclera/conjunctiva [PERRL] : pupils equal round and reactive to light [EOMI] : extraocular movements intact [Normal Outer Ear/Nose] : the outer ears and nose were normal in appearance [Normal Oropharynx] : the oropharynx was normal [No JVD] : no jugular venous distention [No Lymphadenopathy] : no lymphadenopathy [Supple] : supple [Thyroid Normal, No Nodules] : the thyroid was normal and there were no nodules present [No Carotid Bruits] : no carotid bruits [No Abdominal Bruit] : a ~M bruit was not heard ~T in the abdomen [No Varicosities] : no varicosities [No Edema] : there was no peripheral edema [No Extremity Clubbing/Cyanosis] : no extremity clubbing/cyanosis [Soft] : abdomen soft [Non Tender] : non-tender [Non-distended] : non-distended [No Masses] : no abdominal mass palpated [No HSM] : no HSM [Normal Bowel Sounds] : normal bowel sounds [Normal Supraclavicular Nodes] : no supraclavicular lymphadenopathy [Normal Posterior Cervical Nodes] : no posterior cervical lymphadenopathy [Normal Anterior Cervical Nodes] : no anterior cervical lymphadenopathy [No CVA Tenderness] : no CVA  tenderness [No Spinal Tenderness] : no spinal tenderness [Grossly Normal Strength/Tone] : grossly normal strength/tone [No Rash] : no rash [Coordination Grossly Intact] : coordination grossly intact [Normal] : affect was normal and insight and judgment were intact [de-identified] : Obese  [de-identified] : irregularly irregular, soft pulses  [de-identified] : obese

## 2024-04-18 NOTE — HISTORY OF PRESENT ILLNESS
[FreeTextEntry1] : CPE  [de-identified] : 57 yo male with hx of HFrEF, Afib, h/o Vtach s/p AICD, PATTIE, HLD, HTN, Anxiety and Asthma, T2DM controlled here for CPE. No complaints today.   Screenings:  Last colonoscopy March 9 2022 for positive FIT: Diverticulosis and nonbleeding internal hemorrhoid. repeat in 5 years. (2027). Report in sunrise

## 2024-04-18 NOTE — HEALTH RISK ASSESSMENT
[Yes] : Yes [Monthly or less (1 pt)] : Monthly or less (1 point) [1 or 2 (0 pts)] : 1 or 2 (0 points) [Never (0 pts)] : Never (0 points) [No falls in past year] : Patient reported no falls in the past year [0] : 2) Feeling down, depressed, or hopeless: Not at all (0) [PHQ-2 Negative - No further assessment needed] : PHQ-2 Negative - No further assessment needed [On disability] : on disability [] :  [Feels Safe at Home] : Feels safe at home [Fully functional (bathing, dressing, toileting, transferring, walking, feeding)] : Fully functional (bathing, dressing, toileting, transferring, walking, feeding) [Fully functional (using the telephone, shopping, preparing meals, housekeeping, doing laundry, using] : Fully functional and needs no help or supervision to perform IADLs (using the telephone, shopping, preparing meals, housekeeping, doing laundry, using transportation, managing medications and managing finances) [Reports changes in hearing] : Reports changes in hearing [Former] : Former [< 15 Years] : < 15 Years [Audit-CScore] : 1 [IBG4Mcktd] : 0 [Sexually Active] : not sexually active [Reports changes in vision] : Reports no changes in vision [de-identified] : with parents  [de-identified] : chronic poor hearing in right ear  [de-identified] : smoked for 2 years in [place]

## 2024-04-23 ENCOUNTER — APPOINTMENT (OUTPATIENT)
Dept: OPHTHALMOLOGY | Facility: HOSPITAL | Age: 59
End: 2024-04-23

## 2024-05-01 ENCOUNTER — NON-APPOINTMENT (OUTPATIENT)
Age: 59
End: 2024-05-01

## 2024-05-01 ENCOUNTER — APPOINTMENT (OUTPATIENT)
Dept: ELECTROPHYSIOLOGY | Facility: CLINIC | Age: 59
End: 2024-05-01
Payer: MEDICARE

## 2024-05-01 VITALS — WEIGHT: 276 LBS | OXYGEN SATURATION: 95 % | HEART RATE: 79 BPM | HEIGHT: 73 IN | BODY MASS INDEX: 36.58 KG/M2

## 2024-05-01 VITALS
HEART RATE: 80 BPM | HEIGHT: 73 IN | SYSTOLIC BLOOD PRESSURE: 136 MMHG | BODY MASS INDEX: 36.58 KG/M2 | OXYGEN SATURATION: 95 % | WEIGHT: 276 LBS | DIASTOLIC BLOOD PRESSURE: 74 MMHG

## 2024-05-01 PROCEDURE — 93000 ELECTROCARDIOGRAM COMPLETE: CPT

## 2024-05-01 PROCEDURE — 99204 OFFICE O/P NEW MOD 45 MIN: CPT

## 2024-05-01 PROCEDURE — 99214 OFFICE O/P EST MOD 30 MIN: CPT

## 2024-05-01 RX ORDER — DIGOXIN 125 UG/1
125 TABLET ORAL
Qty: 90 | Refills: 3 | Status: ACTIVE | COMMUNITY
Start: 2024-05-01 | End: 1900-01-01

## 2024-05-01 NOTE — DISCUSSION/SUMMARY
[FreeTextEntry1] : In summary, this is a 58-year-old man with Stage C/Class II HFrEF, status post Medtronic cobalt ICD placement, persistent atrial fibrillation on Eliquis, PATTIE, hyperlipidemia, hypertension, anxiety, and Asthma.  He appears to be back in atrial fibrillation today.  He is additionally not rate controlled but after sitting for a while his rates did improve.  Interrogation of his device reveals that overall he is rate controlled.  I do think he would benefit from an extra agent so I have added digoxin today.  I am unable to initiate him on an antiarrhythmic given his history of prolonged QT.  I think he would benefit from an ablation procedure though the success rate may be lower in someone like him.  I did discuss that with him today. The rationale for the procedure as well as its risks--including but not limited to bleeding, vascular injury, pericardial effusion/tamponade, heart block requiring pacemaker, stroke, and death--were reviewed in detail. After consideration of this information, the decision was made to proceed with the procedure.  I would like him to see advanced heart failure prior to undergoing the ablation.  He will see Dr. Israel next week.  Mr. York appeared to understand the whole discussion and verbalized that all of his questions were answered to his satisfaction.  Thank you for allowing me to be involved in the care of this pleasant man. Please feel free to contact me with any questions. [EKG obtained to assist in diagnosis and management of assessed problem(s)] : EKG obtained to assist in diagnosis and management of assessed problem(s)

## 2024-05-01 NOTE — HISTORY OF PRESENT ILLNESS
[FreeTextEntry1] : Referring Physician: Dinesh Garcia MD   Dear Dr. Garcia:   Mr. York was seen in the [facility] today. For our records, please allow me to summarize the history and my findings.   This pleasant 58-year-old man has a cardiovascular history significant for Stage C/Class II HFrEF, status post Medtronic cobalt ICD placement, persistent atrial fibrillation on Eliquis, PATTIE, hyperlipidemia, and hypertension.  His past medical history significant for anxiety and asthma.  He presented in March 2024 to [facility] in acute decompensated heart failure.  He was found to be in A-fib with RVR.  He was diuresed and transferred to [facility] for further A-fib management.  He underwent a cardioversion with complete resolution of the symptoms post cardioversion.  He did have some PAT on telemetry post cardioversion.  He was discharged home and presents today for follow-up.  Unfortunately he is back in atrial fibrillation today.  His rates are elevated today in the office.  However review of his interrogation today reveals that overall he is well rate controlled.  He continues to take Eliquis without any issues.  He is on metoprolol  mg daily as well.  He feels well without any shortness of breath.  Mr. York denies any recent history of chest pain, shortness of breath, palpitations, dizziness, or syncope.

## 2024-05-01 NOTE — CARDIOLOGY SUMMARY
[de-identified] : 5/1/2024: Atrial fibrillation at 163 bpm [de-identified] : 3/6/2024:  1. Severely decreased global left ventricular systolic function.  2. Left ventricular ejection fraction, by visual estimation, is 20 to  25%.  3. The left ventricle endocardium is not well visualized, consider use  of IV ultrasonic enhancing agent to better evaluate endocardium, if  clinically indicated. Calculated LVEF by Simpsons Biplane Method 20%.  Severe global left ventricle hypokinesis.  4. Severely increased left ventricular internal cavity size.  5. Dilated cardiomyopathy.  6. The mitral in-flow pattern reveals no discernable A-wave, therefore  no comment on diastolic function can be made.  7. Mildly enlarged right ventricle with low normal right ventricle  systolic function.  8. Severely enlarged left atrium.  9. Right atrial enlargement. 10. Mild thickening of the anterior and posterior mitral valve leaflets. 11. Moderate mitral valve regurgitation. 12. Mild-moderate tricuspid regurgitation. 13. Mild aortic valve leaflet calcification. No aortic valve stenosis. 14. Mild aortic regurgitation. 15. Mild to moderate pulmonic valve regurgitation. 16. Top normal sized Aorta at the Sinuses of Valsalva. 17. Estimated pulmonary artery systolic pressure is 42.5 mmHg assuming a  right atrial pressure of 15 mmHg, which is consistent with mild pulmonary  hypertension. 18. There is no evidence of pericardial effusion.

## 2024-05-14 ENCOUNTER — NON-APPOINTMENT (OUTPATIENT)
Age: 59
End: 2024-05-14

## 2024-05-14 ENCOUNTER — APPOINTMENT (OUTPATIENT)
Dept: HEART FAILURE | Facility: CLINIC | Age: 59
End: 2024-05-14
Payer: MEDICARE

## 2024-05-14 VITALS
BODY MASS INDEX: 37.77 KG/M2 | OXYGEN SATURATION: 95 % | SYSTOLIC BLOOD PRESSURE: 108 MMHG | DIASTOLIC BLOOD PRESSURE: 70 MMHG | HEIGHT: 73 IN | HEART RATE: 66 BPM | WEIGHT: 285 LBS

## 2024-05-14 DIAGNOSIS — Z83.3 FAMILY HISTORY OF DIABETES MELLITUS: ICD-10-CM

## 2024-05-14 DIAGNOSIS — E78.5 HYPERLIPIDEMIA, UNSPECIFIED: ICD-10-CM

## 2024-05-14 PROCEDURE — 93000 ELECTROCARDIOGRAM COMPLETE: CPT

## 2024-05-14 PROCEDURE — 99205 OFFICE O/P NEW HI 60 MIN: CPT

## 2024-05-14 RX ORDER — APIXABAN 5 MG/1
5 TABLET, FILM COATED ORAL
Qty: 60 | Refills: 3 | Status: ACTIVE | COMMUNITY
Start: 2017-06-27 | End: 1900-01-01

## 2024-05-14 RX ORDER — SACUBITRIL AND VALSARTAN 97; 103 MG/1; MG/1
97-103 TABLET, FILM COATED ORAL TWICE DAILY
Qty: 180 | Refills: 3 | Status: ACTIVE | COMMUNITY
Start: 2020-11-30 | End: 1900-01-01

## 2024-05-15 LAB
ALBUMIN SERPL ELPH-MCNC: 4.5 G/DL
ALP BLD-CCNC: 107 U/L
ALT SERPL-CCNC: 24 U/L
ANION GAP SERPL CALC-SCNC: 19 MMOL/L
AST SERPL-CCNC: 22 U/L
BILIRUB SERPL-MCNC: 0.4 MG/DL
BUN SERPL-MCNC: 29 MG/DL
CALCIUM SERPL-MCNC: 8.5 MG/DL
CHLORIDE SERPL-SCNC: 108 MMOL/L
CO2 SERPL-SCNC: 14 MMOL/L
CREAT SERPL-MCNC: 1.55 MG/DL
EGFR: 52 ML/MIN/1.73M2
ESTIMATED AVERAGE GLUCOSE: 140 MG/DL
HBA1C MFR BLD HPLC: 6.5 %
HCT VFR BLD CALC: 38.5 %
HGB BLD-MCNC: 12.3 G/DL
MAGNESIUM SERPL-MCNC: 1.8 MG/DL
MCHC RBC-ENTMCNC: 25.9 PG
MCHC RBC-ENTMCNC: 31.9 GM/DL
MCV RBC AUTO: 81.2 FL
NT-PROBNP SERPL-MCNC: 325 PG/ML
PLATELET # BLD AUTO: 252 K/UL
POTASSIUM SERPL-SCNC: 5 MMOL/L
PROT SERPL-MCNC: 7.2 G/DL
RBC # BLD: 4.74 M/UL
RBC # FLD: 20.4 %
SODIUM SERPL-SCNC: 141 MMOL/L
TSH SERPL-ACNC: 2.13 UIU/ML
WBC # FLD AUTO: 6.03 K/UL

## 2024-05-15 NOTE — CARDIOLOGY SUMMARY
[de-identified] : 5/14/24 AF at 132 bpm 5/1/2024: Atrial fibrillation at 163 bpm [de-identified] : 3/6/2024:  1. Severely decreased global left ventricular systolic function.  2. Left ventricular ejection fraction, by visual estimation, is 20 to  25%.  3. The left ventricle endocardium is not well visualized, consider use  of IV ultrasonic enhancing agent to better evaluate endocardium, if  clinically indicated. Calculated LVEF by Simpsons Biplane Method 20%.  Severe global left ventricle hypokinesis.  4. Severely increased left ventricular internal cavity size.  5. Dilated cardiomyopathy.  6. The mitral in-flow pattern reveals no discernable A-wave, therefore  no comment on diastolic function can be made.  7. Mildly enlarged right ventricle with low normal right ventricle  systolic function.  8. Severely enlarged left atrium.  9. Right atrial enlargement. 10. Mild thickening of the anterior and posterior mitral valve leaflets. 11. Moderate mitral valve regurgitation. 12. Mild-moderate tricuspid regurgitation. 13. Mild aortic valve leaflet calcification. No aortic valve stenosis. 14. Mild aortic regurgitation. 15. Mild to moderate pulmonic valve regurgitation. 16. Top normal sized Aorta at the Sinuses of Valsalva. 17. Estimated pulmonary artery systolic pressure is 42.5 mmHg assuming a  right atrial pressure of 15 mmHg, which is consistent with mild pulmonary  hypertension. 18. There is no evidence of pericardial effusion.

## 2024-05-15 NOTE — SOCIAL HISTORY
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

## 2024-05-15 NOTE — END OF VISIT
[FreeTextEntry3] : Increase Toprol to 100 mg bid. Add 80 mg Lasix on MWF in the afternoon. Refer to Dr. Connell for AF ablation.

## 2024-05-15 NOTE — PHYSICAL EXAM
[Well Nourished] : well nourished [Obese] : obese [Normal Conjunctiva] : normal conjunctiva [Normal S1, S2] : normal S1, S2 [Clear Lung Fields] : clear lung fields [Good Air Entry] : good air entry [Non Tender] : non-tender [Normal Gait] : normal gait [No Rash] : no rash [Normal] : alert and oriented, normal memory [de-identified] : JVP 10 cm [de-identified] : softly distended [de-identified] : Left chest ICD, irregularly irregular 130'S [de-identified] : trace lower extremitye edema bilaterally

## 2024-05-15 NOTE — HISTORY OF PRESENT ILLNESS
[FreeTextEntry1] : Mr. Allen York is a 58-year-old man with PMH Stage C/Class II HFrEF EF 20% (3/6/24), s/p Medtronic ICD ( 3/22/24), persistent atrial fibrillation on Eliquis, PATTIE, hyperlipidemia, and hypertension, anxiety and asthma. Pt is here for an initial consultation for HF.  Referred by Dr. Connell.   HPI; A per patient, pt has had heart issues since about 2012. He had an MI in [place] and then went on disability from [employer] [facility] and moved back to [place].  with no children and has stepchildren. He is currently living with his parents. .   Course in hospital: Pt went to [facility] 3/2024 for ADHF with dizziness, shortness of beath and palpitations. He was found to be in A-fib with RVR. He was diuresed and transferred to [facility] for further A-fib management. He underwent a cardioversion with complete resolution of the symptoms post cardioversion. He did have some PAT on telemetry post cardioversion. He followed up in the office with EP and found to be back in AF with elevated rates.  Last labs 4/16/24 notable for Na 141, K 4.6, BUN/creat 36/1.87, LDL 31 (at goal), HgA1C 6.2% and H&H 13.1/43.   Currently, pt is feeling better. States he can walk approx. 1 block and climb 1/2 flight of stairs without stopping. States his d/c weight was 275 lbs and is 275  lbs at home and is 285 lbs in office with clothes. Sleeps with 2 pillows with CPAP with no orthopnea.   He has occasional chest pains if he is lifting or if walking " too much". He could previously walk 2 miles 2022.  Denies dizziness/LH, syncope and no ICD shocks.

## 2024-05-15 NOTE — ASSESSMENT
[FreeTextEntry1] : 58-year-old man with PMH Stage C/Class II HFrEF EF 20% (3/6/24), s/p Medtronic ICD ( 3/22/24), persistent atrial fibrillation on Eliquis, PATTIE, hyperlipidemia, and hypertension, anxiety and asthma. Pt is here for an initial consultation for HF.  Appears volume overloaded and normotensive and EKG with AF with 's   1. HFrEF LVEF 20% ( 3/2024) - on Entresto  mg bid - on metoprolol 100 mg daily, will increase to 100 mg bid - on furosemide 40 mg- 2 tabs ( 80 mg) daily, will add 80 mg additional on Monday, Wed and Fridays - on hydralazine 25 mg q 8 - on isosorbide 5 mg q 8 - on will 25 mg daily - check labs today and will call with results - will repeat TTE on GDMT  2. AF S/P ICD - continue on Eliquis or oral a/c - metoprolol as above - on digoxin 0.125 mg daily - for tentative AF ablation when optimized  Follow up in office in 4-6 weeks  Labs reviewed notable for K 5,BUN/crat 29/1.55 with HgA1C 6.5% and serum pro . Diuretics adjusted as above.

## 2024-05-22 ENCOUNTER — APPOINTMENT (OUTPATIENT)
Dept: ELECTROPHYSIOLOGY | Facility: CLINIC | Age: 59
End: 2024-05-22
Payer: MEDICARE

## 2024-05-22 ENCOUNTER — NON-APPOINTMENT (OUTPATIENT)
Age: 59
End: 2024-05-22

## 2024-05-22 VITALS
HEART RATE: 66 BPM | HEIGHT: 73 IN | WEIGHT: 285 LBS | DIASTOLIC BLOOD PRESSURE: 68 MMHG | BODY MASS INDEX: 37.77 KG/M2 | SYSTOLIC BLOOD PRESSURE: 120 MMHG | OXYGEN SATURATION: 97 %

## 2024-05-22 PROCEDURE — 99215 OFFICE O/P EST HI 40 MIN: CPT

## 2024-05-22 PROCEDURE — 93000 ELECTROCARDIOGRAM COMPLETE: CPT

## 2024-05-22 NOTE — DISCUSSION/SUMMARY
[EKG obtained to assist in diagnosis and management of assessed problem(s)] : EKG obtained to assist in diagnosis and management of assessed problem(s) [FreeTextEntry1] : In summary, this is a 58-year-old man with Stage C/Class II HFrEF, status post Medtronic cobalt ICD placement, persistent atrial fibrillation on Eliquis, PATTIE, hyperlipidemia, hypertension, anxiety, and Asthma.  He appeared to be back in atrial fibrillation 5/1/24  He is additionally not rate controlled but after sitting for a while his rates did improve.  Interrogation of his device reveals that overall he is rate controlled.  He was started on digoxin last appt.  I am unable to initiate him on an antiarrhythmic given his history of prolonged QT.  I think he would benefit from an ablation procedure though the success rate may be lower in someone like him.  I did discuss that with him today. The rationale for the procedure as well as its risks--including but not limited to bleeding, vascular injury, pericardial effusion/tamponade, heart block requiring pacemaker, stroke, and death--were reviewed in detail. After consideration of this information, the decision was made to proceed with the procedure.  Mr. York appeared to understand the whole discussion and verbalized that all of his questions were answered to his satisfaction.  Thank you for allowing me to be involved in the care of this pleasant man. Please feel free to contact me with any questions.

## 2024-05-22 NOTE — CARDIOLOGY SUMMARY
[de-identified] : 5/22/24: Atrial fibrillation at 119 bpm, IVCD 5/1/2024: Atrial fibrillation at 163 bpm [de-identified] : 3/6/2024:  1. Severely decreased global left ventricular systolic function.  2. Left ventricular ejection fraction, by visual estimation, is 20 to  25%.  3. The left ventricle endocardium is not well visualized, consider use  of IV ultrasonic enhancing agent to better evaluate endocardium, if  clinically indicated. Calculated LVEF by Simpsons Biplane Method 20%.  Severe global left ventricle hypokinesis.  4. Severely increased left ventricular internal cavity size.  5. Dilated cardiomyopathy.  6. The mitral in-flow pattern reveals no discernable A-wave, therefore  no comment on diastolic function can be made.  7. Mildly enlarged right ventricle with low normal right ventricle  systolic function.  8. Severely enlarged left atrium.  9. Right atrial enlargement. 10. Mild thickening of the anterior and posterior mitral valve leaflets. 11. Moderate mitral valve regurgitation. 12. Mild-moderate tricuspid regurgitation. 13. Mild aortic valve leaflet calcification. No aortic valve stenosis. 14. Mild aortic regurgitation. 15. Mild to moderate pulmonic valve regurgitation. 16. Top normal sized Aorta at the Sinuses of Valsalva. 17. Estimated pulmonary artery systolic pressure is 42.5 mmHg assuming a  right atrial pressure of 15 mmHg, which is consistent with mild pulmonary  hypertension. 18. There is no evidence of pericardial effusion.

## 2024-05-22 NOTE — HISTORY OF PRESENT ILLNESS
[FreeTextEntry1] : Referring Physician: Dinesh Garcia MD   Dear Dr. Garcia:   Mr. York was seen in the [facility] today. For our records, please allow me to summarize the history and my findings.   This pleasant 58-year-old man has a cardiovascular history significant for Stage C/Class II HFrEF, status post Medtronic cobalt ICD placement, persistent atrial fibrillation on Eliquis, PATTIE, hyperlipidemia, and hypertension.  His past medical history significant for anxiety and asthma.  He presented in March 2024 to [facility] in acute decompensated heart failure.  He was found to be in A-fib with RVR.  He was diuresed and transferred to [facility] for further A-fib management.  He underwent a cardioversion with complete resolution of the symptoms post cardioversion.  He did have some PAT on telemetry post cardioversion.  He was discharged home and presented today for follow-up 5/1/24 and was found to back in atrial fibrillation with elevated heart rate s in the office.  However review of his interrogation today reveals that overall, he is well rate controlled.  He continues to take Eliquis without any issues.  He is on metoprolol  mg daily as well.  He feels well without any shortness of breath. He was seen for an initial consultation for heart failure and was found to be volume overladed and diuretics were adjusted. His was going to continue optimization of HF meds.   Mr. York denies any recent history of chest pain, shortness of breath, palpitations, dizziness, or syncope.

## 2024-05-30 ENCOUNTER — OUTPATIENT (OUTPATIENT)
Dept: OUTPATIENT SERVICES | Facility: HOSPITAL | Age: 59
LOS: 1 days | End: 2024-05-30

## 2024-05-30 VITALS
SYSTOLIC BLOOD PRESSURE: 100 MMHG | HEIGHT: 72 IN | TEMPERATURE: 98 F | OXYGEN SATURATION: 96 % | DIASTOLIC BLOOD PRESSURE: 60 MMHG | WEIGHT: 279.99 LBS | HEART RATE: 62 BPM | RESPIRATION RATE: 16 BRPM

## 2024-05-30 DIAGNOSIS — Z90.89 ACQUIRED ABSENCE OF OTHER ORGANS: Chronic | ICD-10-CM

## 2024-05-30 DIAGNOSIS — G47.33 OBSTRUCTIVE SLEEP APNEA (ADULT) (PEDIATRIC): ICD-10-CM

## 2024-05-30 DIAGNOSIS — I48.91 UNSPECIFIED ATRIAL FIBRILLATION: ICD-10-CM

## 2024-05-30 DIAGNOSIS — Z95.810 PRESENCE OF AUTOMATIC (IMPLANTABLE) CARDIAC DEFIBRILLATOR: ICD-10-CM

## 2024-05-30 DIAGNOSIS — J45.909 UNSPECIFIED ASTHMA, UNCOMPLICATED: ICD-10-CM

## 2024-05-30 DIAGNOSIS — Z95.810 PRESENCE OF AUTOMATIC (IMPLANTABLE) CARDIAC DEFIBRILLATOR: Chronic | ICD-10-CM

## 2024-05-30 DIAGNOSIS — I10 ESSENTIAL (PRIMARY) HYPERTENSION: ICD-10-CM

## 2024-05-30 LAB
ALBUMIN SERPL ELPH-MCNC: 4.2 G/DL — SIGNIFICANT CHANGE UP (ref 3.3–5)
ALP SERPL-CCNC: 80 U/L — SIGNIFICANT CHANGE UP (ref 40–120)
ALT FLD-CCNC: 25 U/L — SIGNIFICANT CHANGE UP (ref 4–41)
ANION GAP SERPL CALC-SCNC: 16 MMOL/L — HIGH (ref 7–14)
AST SERPL-CCNC: 15 U/L — SIGNIFICANT CHANGE UP (ref 4–40)
BILIRUB SERPL-MCNC: 0.5 MG/DL — SIGNIFICANT CHANGE UP (ref 0.2–1.2)
BLD GP AB SCN SERPL QL: NEGATIVE — SIGNIFICANT CHANGE UP
BUN SERPL-MCNC: 65 MG/DL — HIGH (ref 7–23)
CALCIUM SERPL-MCNC: 9 MG/DL — SIGNIFICANT CHANGE UP (ref 8.4–10.5)
CHLORIDE SERPL-SCNC: 106 MMOL/L — SIGNIFICANT CHANGE UP (ref 98–107)
CO2 SERPL-SCNC: 15 MMOL/L — LOW (ref 22–31)
CREAT SERPL-MCNC: 1.85 MG/DL — HIGH (ref 0.5–1.3)
EGFR: 42 ML/MIN/1.73M2 — LOW
GLUCOSE SERPL-MCNC: 78 MG/DL — SIGNIFICANT CHANGE UP (ref 70–99)
HCT VFR BLD CALC: 37.9 % — LOW (ref 39–50)
HGB BLD-MCNC: 12.4 G/DL — LOW (ref 13–17)
MCHC RBC-ENTMCNC: 26.1 PG — LOW (ref 27–34)
MCHC RBC-ENTMCNC: 32.7 GM/DL — SIGNIFICANT CHANGE UP (ref 32–36)
MCV RBC AUTO: 79.6 FL — LOW (ref 80–100)
NRBC # BLD: 0 /100 WBCS — SIGNIFICANT CHANGE UP (ref 0–0)
NRBC # FLD: 0 K/UL — SIGNIFICANT CHANGE UP (ref 0–0)
PLATELET # BLD AUTO: 200 K/UL — SIGNIFICANT CHANGE UP (ref 150–400)
POTASSIUM SERPL-MCNC: 4.2 MMOL/L — SIGNIFICANT CHANGE UP (ref 3.5–5.3)
POTASSIUM SERPL-SCNC: 4.2 MMOL/L — SIGNIFICANT CHANGE UP (ref 3.5–5.3)
PROT SERPL-MCNC: 7.4 G/DL — SIGNIFICANT CHANGE UP (ref 6–8.3)
RBC # BLD: 4.76 M/UL — SIGNIFICANT CHANGE UP (ref 4.2–5.8)
RBC # FLD: 20.2 % — HIGH (ref 10.3–14.5)
RH IG SCN BLD-IMP: POSITIVE — SIGNIFICANT CHANGE UP
RH IG SCN BLD-IMP: POSITIVE — SIGNIFICANT CHANGE UP
SODIUM SERPL-SCNC: 137 MMOL/L — SIGNIFICANT CHANGE UP (ref 135–145)
WBC # BLD: 6.08 K/UL — SIGNIFICANT CHANGE UP (ref 3.8–10.5)
WBC # FLD AUTO: 6.08 K/UL — SIGNIFICANT CHANGE UP (ref 3.8–10.5)

## 2024-05-30 NOTE — H&P PST ADULT - NS MD HP INPLANTS MED DEV
Automatic Implantable Cardioverter Defibrillator Medtronic - model # [id number], Serial # [id number]/Automatic Implantable Cardioverter Defibrillator

## 2024-05-30 NOTE — H&P PST ADULT - RESPIRATORY
normal/clear to auscultation bilaterally/no wheezes/no rales/no rhonchi normal/clear to auscultation bilaterally/no wheezes/no rales/no rhonchi/subcutaneous emphysema

## 2024-05-30 NOTE — H&P PST ADULT - HEIGHT IN FEET
(1) Humalog - take this just before each meal        30 units before breakfast        30 units before lunch        30 units before dinner    Add on EXTRA Humalog to your mealtime doses depending on your blood sugar reading as per the following sliding scale:    151-175 + 1 unit  176-200 + 2 unit   201-225 + 3 unit   226-250 + 4 unit  251-275 + 5 unit  276-300 + 6 unit  301-325 + 7 units  326-350 + 8 units  >350 call me     (2) basaglar 30 units in AM and 60 units in PM         -Take this around the same time every day no matter what (even if you haven't eaten)        -Do NOT adjust the dose until you have spoken to your doctor    (3) Check your blood sugars at least 4 times a day     -Before each meal and at bedtime         -Anytime you feel \"low\"    (4) Call Marivel Vogel DO office Dept: [phone] if your blood sugar goes below 70 or if it is above 300 and not coming down.    (5) Bring in your blood sugar readings or glucose meter to every appointment with Marivel Vogel DO.    Do thyroid labs today            6

## 2024-05-30 NOTE — H&P PST ADULT - PROBLEM SELECTOR PLAN 4
RN agrees with and cosigns all of [facility] Nurse Externs charting and assessments for duration of care.   Instructed to use Albuterol as needed and DOS

## 2024-05-30 NOTE — H&P PST ADULT - PROBLEM SELECTOR PLAN 1
Scheduled for A-Fib Ablation with Biosense tentatively for 6/3/24.  Pending labs; Ekg in chart  Verbal and written pre-op instructions provided to patient. Patient verbalized understanding and will call surgeons office for revised instructions if surgery is rescheduled.

## 2024-05-30 NOTE — H&P PST ADULT - HISTORY OF PRESENT ILLNESS
59 y/o male with hx A-fib, HTN, HDL, Prediabetes, Asthma, CHF,  MI Pacemaker /AICD - changed 2021, CHF, Sleep Apnea, presents for preop evaluation for A-Fib Ablatio with Biosense tentatively for 6/3/24.

## 2024-05-30 NOTE — H&P PST ADULT - NSALCOHOLAMT_GEN_A_CORE_SD
Take naproxen 500mg twice daily for the next 3 days, if it hurts your stomach then take 250mg instead of 500mg     Call if you want to start physical therapy     Increase hydration to 4-6 bottles of water daily     Establish care with counselor to give yourself some more options for school eligibility     [facility] : Please call [phone] to schedule this appointment.           We want to give the best care possible. If you receive a [employer] survey from our office or on your [employer], please take the time to fill out the survey and return it in the envelope provided. Your feedback helps us know how we are doing and we really appreciate it.     Please call [phone] as soon as possible to schedule a follow-up appointment- ( if unable to make for you today).   If you have any questions, please send a message in [employer] or call [phone].   Thank you, Dr. Shepherd and staff.     
1-2 drinks

## 2024-06-03 ENCOUNTER — APPOINTMENT (OUTPATIENT)
Dept: OPHTHALMOLOGY | Facility: CLINIC | Age: 59
End: 2024-06-03

## 2024-06-03 ENCOUNTER — INPATIENT (INPATIENT)
Facility: HOSPITAL | Age: 59
LOS: 0 days | Discharge: HOME | End: 2024-06-04
Attending: INTERNAL MEDICINE | Admitting: INTERNAL MEDICINE
Payer: MEDICARE

## 2024-06-03 VITALS
RESPIRATION RATE: 18 BRPM | HEIGHT: 73 IN | HEART RATE: 89 BPM | DIASTOLIC BLOOD PRESSURE: 66 MMHG | TEMPERATURE: 97 F | WEIGHT: 279.99 LBS | SYSTOLIC BLOOD PRESSURE: 93 MMHG | OXYGEN SATURATION: 100 %

## 2024-06-03 DIAGNOSIS — Z95.810 PRESENCE OF AUTOMATIC (IMPLANTABLE) CARDIAC DEFIBRILLATOR: Chronic | ICD-10-CM

## 2024-06-03 DIAGNOSIS — I48.91 UNSPECIFIED ATRIAL FIBRILLATION: ICD-10-CM

## 2024-06-03 DIAGNOSIS — Z90.89 ACQUIRED ABSENCE OF OTHER ORGANS: Chronic | ICD-10-CM

## 2024-06-03 PROCEDURE — 93010 ELECTROCARDIOGRAM REPORT: CPT

## 2024-06-03 PROCEDURE — 93656 COMPRE EP EVAL ABLTJ ATR FIB: CPT

## 2024-06-03 PROCEDURE — 93655 ICAR CATH ABLTJ DSCRT ARRHYT: CPT

## 2024-06-03 RX ORDER — PANTOPRAZOLE SODIUM 20 MG/1
40 TABLET, DELAYED RELEASE ORAL
Refills: 0 | Status: DISCONTINUED | OUTPATIENT
Start: 2024-06-03 | End: 2024-06-04

## 2024-06-03 RX ORDER — ATORVASTATIN CALCIUM 80 MG/1
1 TABLET, FILM COATED ORAL
Refills: 0 | DISCHARGE

## 2024-06-03 RX ORDER — LORATADINE 10 MG/1
10 TABLET ORAL DAILY
Refills: 0 | Status: DISCONTINUED | OUTPATIENT
Start: 2024-06-04 | End: 2024-06-04

## 2024-06-03 RX ORDER — DIGOXIN 250 MCG
125 TABLET ORAL DAILY
Refills: 0 | Status: DISCONTINUED | OUTPATIENT
Start: 2024-06-04 | End: 2024-06-04

## 2024-06-03 RX ORDER — SPIRONOLACTONE 25 MG/1
25 TABLET, FILM COATED ORAL DAILY
Refills: 0 | Status: DISCONTINUED | OUTPATIENT
Start: 2024-06-04 | End: 2024-06-04

## 2024-06-03 RX ORDER — TAMSULOSIN HYDROCHLORIDE 0.4 MG/1
0.4 CAPSULE ORAL AT BEDTIME
Refills: 0 | Status: DISCONTINUED | OUTPATIENT
Start: 2024-06-03 | End: 2024-06-04

## 2024-06-03 RX ORDER — SODIUM CHLORIDE 9 MG/ML
3 INJECTION INTRAMUSCULAR; INTRAVENOUS; SUBCUTANEOUS EVERY 8 HOURS
Refills: 0 | Status: DISCONTINUED | OUTPATIENT
Start: 2024-06-03 | End: 2024-06-04

## 2024-06-03 RX ORDER — HYDRALAZINE HCL 50 MG
25 TABLET ORAL THREE TIMES A DAY
Refills: 0 | Status: DISCONTINUED | OUTPATIENT
Start: 2024-06-03 | End: 2024-06-04

## 2024-06-03 RX ORDER — METOPROLOL TARTRATE 50 MG
100 TABLET ORAL DAILY
Refills: 0 | Status: DISCONTINUED | OUTPATIENT
Start: 2024-06-03 | End: 2024-06-04

## 2024-06-03 RX ORDER — FUROSEMIDE 40 MG
40 TABLET ORAL
Refills: 0 | Status: DISCONTINUED | OUTPATIENT
Start: 2024-06-03 | End: 2024-06-04

## 2024-06-03 RX ORDER — METOPROLOL TARTRATE 50 MG
1 TABLET ORAL
Qty: 30 | Refills: 0 | DISCHARGE

## 2024-06-03 RX ORDER — LORATADINE 10 MG/1
1 TABLET ORAL
Refills: 0 | DISCHARGE

## 2024-06-03 RX ORDER — SPIRONOLACTONE 25 MG/1
1 TABLET, FILM COATED ORAL
Qty: 30 | Refills: 0 | DISCHARGE

## 2024-06-03 RX ORDER — ATORVASTATIN CALCIUM 80 MG/1
80 TABLET, FILM COATED ORAL AT BEDTIME
Refills: 0 | Status: DISCONTINUED | OUTPATIENT
Start: 2024-06-03 | End: 2024-06-04

## 2024-06-03 RX ORDER — MONTELUKAST 4 MG/1
10 TABLET, CHEWABLE ORAL AT BEDTIME
Refills: 0 | Status: DISCONTINUED | OUTPATIENT
Start: 2024-06-03 | End: 2024-06-04

## 2024-06-03 RX ORDER — DIGOXIN 250 MCG
1 TABLET ORAL
Refills: 0 | DISCHARGE

## 2024-06-03 RX ORDER — APIXABAN 2.5 MG/1
5 TABLET, FILM COATED ORAL EVERY 12 HOURS
Refills: 0 | Status: DISCONTINUED | OUTPATIENT
Start: 2024-06-03 | End: 2024-06-04

## 2024-06-03 RX ORDER — MONTELUKAST 4 MG/1
1 TABLET, CHEWABLE ORAL
Refills: 0 | DISCHARGE

## 2024-06-03 RX ORDER — ISOSORBIDE DINITRATE 5 MG/1
5 TABLET ORAL THREE TIMES A DAY
Refills: 0 | Status: DISCONTINUED | OUTPATIENT
Start: 2024-06-03 | End: 2024-06-04

## 2024-06-03 RX ADMIN — APIXABAN 5 MILLIGRAM(S): 2.5 TABLET, FILM COATED ORAL at 21:17

## 2024-06-03 RX ADMIN — MONTELUKAST 10 MILLIGRAM(S): 4 TABLET, CHEWABLE ORAL at 21:17

## 2024-06-03 RX ADMIN — ATORVASTATIN CALCIUM 80 MILLIGRAM(S): 80 TABLET, FILM COATED ORAL at 21:17

## 2024-06-03 RX ADMIN — TAMSULOSIN HYDROCHLORIDE 0.4 MILLIGRAM(S): 0.4 CAPSULE ORAL at 21:17

## 2024-06-03 NOTE — ASU PATIENT PROFILE, ADULT - FALL HARM RISK - PT AGE POPULATION HIDDEN
Pt with poor nutrition, decreased appetite, and weight loss in setting of metastatic lung cancer.   - Plan as above for Odynophagia  - Palliative consult for symptomatic management  - Nutrition consult  - PT eval
Adult

## 2024-06-03 NOTE — CHART NOTE - NSCHARTNOTEFT_GEN_A_CORE
Interventional Recovery Suite Pre-Procedure PA Note    In brief, this is a 59 y/o male with a PMHx of NICM s/p ICD placement, atrial fibrillation on Eliquis, PATTIE, HTN, HLD, and asthma presents for elective atrial fibrillation ablation. H&P from PST reviewed. Medication reconciliation edited/updated where appropriate. Last dose of Eliquis 6/3/2024 at 06:00. Last PO intake was 6/2/2024 at 21:00. No dentures or loose teeth. EKG reviewed. Procedure explained in detail. Risks/benefits discussed. All questions answered. Consent obtained.

## 2024-06-03 NOTE — PATIENT PROFILE ADULT - FALL HARM RISK - HARM RISK INTERVENTIONS

## 2024-06-03 NOTE — CHART NOTE - NSCHARTNOTEFT_GEN_A_CORE
58years old male ,  s/p cardiac ablation  via right femoral access.  Site is stable with no hematoma, active bleed or swelling.  Dressing is clean/dry/intact.  DP pulse is palpable.  Patient denies pain, numbness, tingling, CP, SOB. VSS. Will continue to monitor.     T(C): 36.4 (06-03-24 @ 18:15), Max: 36.4 (06-03-24 @ 16:30)  HR: 72 (06-03-24 @ 18:15) (72 - 89)  BP: 97/61 (06-03-24 @ 18:15) (81/63 - 97/61)  RR: 16 (06-03-24 @ 18:15) (16 - 20)  SpO2: 100% (06-03-24 @ 18:15) (97% - 100%)    Site checked at 7 pm. Pt to start Eliquis at 8 pm , endorsed to night ACP to f/u.     Charmaine Frances NP-C  Department of Medicine- ACP  In House Pager #[phone] 58years old male ,  s/p cardiac ablation  via right femoral access.  Site is stable with no hematoma, active bleed or swelling.  Dressing is clean/dry/intact.  DP pulse is palpable.  Patient denies pain, numbness, tingling, CP, SOB. VSS. Will continue to monitor.     T(C): 36.4 (06-03-24 @ 18:15), Max: 36.4 (06-03-24 @ 16:30)  HR: 72 (06-03-24 @ 18:15) (72 - 89)  BP: 97/61 (06-03-24 @ 18:15) (81/63 - 97/61)  RR: 16 (06-03-24 @ 18:15) (16 - 20)  SpO2: 100% (06-03-24 @ 18:15) (97% - 100%)    Site checked at 7 pm. Pt to start Eliquis at 9 pm , endorsed to night ACP to f/u.     Charmaine Frances NP-C  Department of Medicine- ACP  In House Pager #[phone]

## 2024-06-03 NOTE — ASU PATIENT PROFILE, ADULT - FALL HARM RISK - RISK INTERVENTIONS
Assistance OOB with selected safe patient handling equipment/Communicate Fall Risk and Risk Factors to all staff, patient, and family/Monitor for mental status changes/Move patient closer to nurses' station/Reinforce activity limits and safety measures with patient and family/Reorient to person, place and time as needed/Toileting schedule using arm’s reach rule for commode and bathroom/Use of alarms - bed, chair and/or voice tab/Visual Cue: Yellow wristband/Bed in lowest position, wheels locked, appropriate side rails in place/Call bell, personal items and telephone in reach/Instruct patient to call for assistance before getting out of bed or chair/Non-slip footwear when patient is out of bed/[place] to call system/Physically safe environment - no spills, clutter or unnecessary equipment/Purposeful Proactive Rounding/Room/bathroom lighting operational, light cord in reach

## 2024-06-04 ENCOUNTER — TRANSCRIPTION ENCOUNTER (OUTPATIENT)
Age: 59
End: 2024-06-04

## 2024-06-04 VITALS
HEART RATE: 75 BPM | SYSTOLIC BLOOD PRESSURE: 97 MMHG | OXYGEN SATURATION: 100 % | TEMPERATURE: 98 F | RESPIRATION RATE: 18 BRPM | DIASTOLIC BLOOD PRESSURE: 60 MMHG

## 2024-06-04 LAB
ANION GAP SERPL CALC-SCNC: 15 MMOL/L — HIGH (ref 7–14)
BUN SERPL-MCNC: 48 MG/DL — HIGH (ref 7–23)
CALCIUM SERPL-MCNC: 9.4 MG/DL — SIGNIFICANT CHANGE UP (ref 8.4–10.5)
CHLORIDE SERPL-SCNC: 111 MMOL/L — HIGH (ref 98–107)
CO2 SERPL-SCNC: 15 MMOL/L — LOW (ref 22–31)
CREAT SERPL-MCNC: 1.78 MG/DL — HIGH (ref 0.5–1.3)
EGFR: 44 ML/MIN/1.73M2 — LOW
GLUCOSE SERPL-MCNC: 108 MG/DL — HIGH (ref 70–99)
HCT VFR BLD CALC: 35.9 % — LOW (ref 39–50)
HGB BLD-MCNC: 11.5 G/DL — LOW (ref 13–17)
MAGNESIUM SERPL-MCNC: 1.8 MG/DL — SIGNIFICANT CHANGE UP (ref 1.6–2.6)
MCHC RBC-ENTMCNC: 25.7 PG — LOW (ref 27–34)
MCHC RBC-ENTMCNC: 32 GM/DL — SIGNIFICANT CHANGE UP (ref 32–36)
MCV RBC AUTO: 80.3 FL — SIGNIFICANT CHANGE UP (ref 80–100)
NRBC # BLD: 0 /100 WBCS — SIGNIFICANT CHANGE UP (ref 0–0)
NRBC # FLD: 0 K/UL — SIGNIFICANT CHANGE UP (ref 0–0)
PHOSPHATE SERPL-MCNC: 4.1 MG/DL — SIGNIFICANT CHANGE UP (ref 2.5–4.5)
PLATELET # BLD AUTO: 155 K/UL — SIGNIFICANT CHANGE UP (ref 150–400)
POTASSIUM SERPL-MCNC: 4.7 MMOL/L — SIGNIFICANT CHANGE UP (ref 3.5–5.3)
POTASSIUM SERPL-SCNC: 4.7 MMOL/L — SIGNIFICANT CHANGE UP (ref 3.5–5.3)
RBC # BLD: 4.47 M/UL — SIGNIFICANT CHANGE UP (ref 4.2–5.8)
RBC # FLD: 20.6 % — HIGH (ref 10.3–14.5)
SODIUM SERPL-SCNC: 141 MMOL/L — SIGNIFICANT CHANGE UP (ref 135–145)
WBC # BLD: 8.53 K/UL — SIGNIFICANT CHANGE UP (ref 3.8–10.5)
WBC # FLD AUTO: 8.53 K/UL — SIGNIFICANT CHANGE UP (ref 3.8–10.5)

## 2024-06-04 RX ORDER — PANTOPRAZOLE SODIUM 20 MG/1
1 TABLET, DELAYED RELEASE ORAL
Qty: 30 | Refills: 0
Start: 2024-06-04 | End: 2024-07-03

## 2024-06-04 RX ORDER — APIXABAN 2.5 MG/1
1 TABLET, FILM COATED ORAL
Qty: 60 | Refills: 0
Start: 2024-06-04 | End: 2024-07-03

## 2024-06-04 RX ORDER — APIXABAN 2.5 MG/1
1 TABLET, FILM COATED ORAL
Refills: 0 | DISCHARGE

## 2024-06-04 RX ADMIN — Medication 40 MILLIGRAM(S): at 05:23

## 2024-06-04 RX ADMIN — ISOSORBIDE DINITRATE 5 MILLIGRAM(S): 5 TABLET ORAL at 05:20

## 2024-06-04 RX ADMIN — PANTOPRAZOLE SODIUM 40 MILLIGRAM(S): 20 TABLET, DELAYED RELEASE ORAL at 05:20

## 2024-06-04 RX ADMIN — Medication 25 MILLIGRAM(S): at 13:56

## 2024-06-04 RX ADMIN — LORATADINE 10 MILLIGRAM(S): 10 TABLET ORAL at 12:08

## 2024-06-04 RX ADMIN — SPIRONOLACTONE 25 MILLIGRAM(S): 25 TABLET, FILM COATED ORAL at 05:20

## 2024-06-04 RX ADMIN — Medication 40 MILLIGRAM(S): at 13:56

## 2024-06-04 RX ADMIN — Medication 100 MILLIGRAM(S): at 05:20

## 2024-06-04 RX ADMIN — APIXABAN 5 MILLIGRAM(S): 2.5 TABLET, FILM COATED ORAL at 08:29

## 2024-06-04 RX ADMIN — Medication 125 MICROGRAM(S): at 05:21

## 2024-06-04 RX ADMIN — Medication 25 MILLIGRAM(S): at 05:20

## 2024-06-04 NOTE — DISCHARGE NOTE PROVIDER - NSDCMRMEDTOKEN_GEN_ALL_CORE_FT
atorvastatin 80 mg oral tablet: 1 tab(s) orally once a day (at bedtime)  Claritin 10 mg oral tablet: 1 tab(s) orally once a day (in the morning)  digoxin 125 mcg (0.125 mg) oral tablet: 1 tab(s) orally once a day  Eliquis 5 mg oral tablet: 1 tab(s) orally 2 times a day  furosemide 40 mg oral tablet: 1 tab(s) orally 2 times a day  hydrALAZINE 25 mg oral tablet: 1 tab(s) orally 3 times a day  isosorbide dinitrate 5 mg oral tablet: 1 tab(s) orally 3 times a day  metoprolol succinate 100 mg oral tablet, extended release: 1 tab(s) orally 2 times a day  montelukast 10 mg oral tablet: 1 tab(s) orally once a day (at bedtime)  spironolactone 25 mg oral tablet: 1 tab(s) orally once a day  tamsulosin 0.4 mg oral capsule: 1 cap(s) orally once a day (at bedtime)   apixaban 5 mg oral tablet: 1 tab(s) orally every 12 hours  atorvastatin 80 mg oral tablet: 1 tab(s) orally once a day (at bedtime)  Claritin 10 mg oral tablet: 1 tab(s) orally once a day (in the morning)  digoxin 125 mcg (0.125 mg) oral tablet: 1 tab(s) orally once a day  furosemide 40 mg oral tablet: 1 tab(s) orally 2 times a day  hydrALAZINE 25 mg oral tablet: 1 tab(s) orally 3 times a day  isosorbide dinitrate 5 mg oral tablet: 1 tab(s) orally 3 times a day  metoprolol succinate 100 mg oral tablet, extended release: 1 tab(s) orally 2 times a day  montelukast 10 mg oral tablet: 1 tab(s) orally once a day (at bedtime)  pantoprazole 40 mg oral delayed release tablet: 1 tab(s) orally once a day (before a meal)  spironolactone 25 mg oral tablet: 1 tab(s) orally once a day  tamsulosin 0.4 mg oral capsule: 1 cap(s) orally once a day (at bedtime)

## 2024-06-04 NOTE — DISCHARGE NOTE PROVIDER - NSDCCPCAREPLAN_GEN_ALL_CORE_FT
PRINCIPAL DISCHARGE DIAGNOSIS  Diagnosis: Atrial fibrillation and flutter  Assessment and Plan of Treatment: You underwent a successful ablation. Follow up scheduled for 7/3/2024 @4pm in EP clinic (4th floor oncology building) with Dr. Connell. Please call the office with any questions, [phone]. Ensure compliance with your medications as you were previously taking at home.      SECONDARY DISCHARGE DIAGNOSES  Diagnosis: CKD (chronic kidney disease)  Assessment and Plan of Treatment: Avoid medications that may be harmful to your kidneys such as NSAID pain relievers (Advil, Aleve, Motrin, etc.) Use Tylenol for pain if needed. Avoid contrast if you require any medical testing. Follow up with your primary care physician Dr. Benavides for further monitoring in 1-2 weeks. Please call to arrange appointment.    Diagnosis: PATTIE on CPAP  Assessment and Plan of Treatment: Ensure compliance with your CPAP nightly. Follow up with your primary care physician for further monitoring in 1-2 weeks. Please call to arrange appointment.    Diagnosis: HTN (hypertension)  Assessment and Plan of Treatment: Ensure compliance with your medications as directed Follow up with your primary care physician for further monitoring in 1-2 weeks. Please call to arrange appointment. Low salt diet.    Diagnosis: HLD (hyperlipidemia)  Assessment and Plan of Treatment: Continue lipitor at bedtime. Follow up with your primary care physician for further monitoring in 1-2 weeks. Please call to arrange appointment. Low cholesterol diet.    Diagnosis: NICM (nonischemic cardiomyopathy)  Assessment and Plan of Treatment: Ensure compliance with your medications as directed. Follow up with your heart failure specialist Dr. Merino for further monitoring in 1-2 weeks. Please call to arrange appointment.

## 2024-06-04 NOTE — PROGRESS NOTE ADULT - NS ATTEND AMEND GEN_ALL_CORE FT
57 y/o male with hx AF on Eliquis, HTN, HDL, Prediabetes, Asthma, CHF, MI, S/P MDT ICD (changed 2021), CHF, Sleep Apnea, presents for for AF ablation on 6/3/2024. S/P AF ablation on 6/3 with Dr. Connell. Post operative education provided and discussed in detail with patient. Tele SR with frequent PVCs, pt currently asymptomatic. Continue Toprol XL 100mg daily. Eliquis resumed overnight post-procedure. Continue full AC due to elevated [id number] for primary stroke prevention.  Follow-up as outpatient.

## 2024-06-04 NOTE — DISCHARGE NOTE PROVIDER - CARE PROVIDERS DIRECT ADDRESSES
,DirectAddress_Unknown,[email or web address]

## 2024-06-04 NOTE — PROGRESS NOTE ADULT - ASSESSMENT
57 y/o male with hx AF on Eliquis, HTN, HDL, Prediabetes, Asthma, CHF, MI, S/P MDT ICD (changed 2021), CHF, Sleep Apnea, presents for for AF ablation on 6/3/2024.    1. AF on Eliquis    - S/P AF ablation on 6/3 with Dr. Connell  - Post operative education provided and discussed in detail with patient.   - Tele SR with frequent PVCs, pt currently asymptomatic. Continue Toprol XL 100mg daily.  - Eliquis resumed overnight post-procedure. Continue full AC due to elevated [id number] for primary stroke prevention.  - Groin site soft, NT, without hematoma. Remove dressing at discharge.   - Follow up scheduled for 7/3/2024 @ 1600 in EP clinic (4th floor oncology building).   - Pt cleared for discharge planning from EP perspective.       KALIA WhitesideC  [id number]

## 2024-06-04 NOTE — DISCHARGE NOTE PROVIDER - NSDCFUSCHEDAPPT_GEN_ALL_CORE_FT
Asher Merino  [facility]  HEARTFAIL [address]  Scheduled Appointment: 06/24/2024    Mariana Connell  [facility]  ELECTROPH [id number]  Scheduled Appointment: 07/03/2024    [facility]  ELECTROPH [address]  Scheduled Appointment: 07/31/2024

## 2024-06-04 NOTE — DISCHARGE NOTE PROVIDER - CARE PROVIDER_API CALL
Mariana Connell  Cardiac Electrophysiology  [address] [place] [address], [address]  [place] [address]  Phone: [phone]  Fax: [phone]  Follow Up Time:     Asher Merino  Adv Heart Fail Trnsplnt Cardio  [address] [place] [address], [address]  [place] [address]  Phone: [phone]  Fax: [phone]  Follow Up Time:

## 2024-06-04 NOTE — DISCHARGE NOTE NURSING/CASE MANAGEMENT/SOCIAL WORK - PATIENT PORTAL LINK FT
You can access the FollowMyHealth Patient Portal offered by [facility] by registering at the following website: [email or web address]. By joining [facility]’s FollowMyHealth portal, you will also be able to view your health information using other applications (apps) compatible with our system.

## 2024-06-04 NOTE — DISCHARGE NOTE NURSING/CASE MANAGEMENT/SOCIAL WORK - NSDCPEFALRISK_GEN_ALL_CORE
For information on Fall & Injury Prevention, visit: [email or web address] OR  [email or web address] OR  https://www.cdc.gov/steadi/patient.html

## 2024-06-04 NOTE — DISCHARGE NOTE PROVIDER - HOSPITAL COURSE
57 y/o male with hx AF on Eliquis, HTN, HDL, Prediabetes, Asthma, CHF, MI, S/P MDT ICD (changed 2021), CHF, Sleep Apnea, presents for for AF ablation on 6/3/2024.    On 6/3, Pt underwent AF ablation with Dr. Connell. Post operative education provided and discussed in detail with patient by JO DEGROOT. Tele SR with frequent PVCs, pt currently asymptomatic. Continue Toprol XL 100mg daily. Eliquis resumed overnight post-procedure. Continue full AC due to elevated [id number] for primary stroke prevention. Groin site soft, NT, without hematoma. Remove dressing at discharge.     For hx of HTN, Pt was continued on medications per home regimen. For asthma, c/w rescue inhaler PRN. Pt with h/o PATTIE, c/w CPAP mask nightly.    Case discussed with JO Miramontes, labs/vitals/medications/tele reviewed, Pt cleared for discharge to home with outpt follow up as directed. 57 y/o male with hx AF on Eliquis, HTN, HDL, Prediabetes, Asthma, CHF, MI, S/P MDT ICD (changed 2021), CHF, Sleep Apnea, presents for for AF ablation on 6/3/2024.    On 6/3, Pt underwent AF ablation with Dr. Connell. Post operative education provided and discussed in detail with patient by JO DEGROOT. Tele SR with frequent PVCs, pt currently asymptomatic. Continue Toprol XL 100mg daily. Eliquis resumed overnight post-procedure. Continue full AC due to elevated [id number] for primary stroke prevention. Groin site soft, NT, without hematoma. Remove dressing at discharge.     For hx of HTN, Pt was continued on medications per home regimen. For asthma, c/w rescue inhaler PRN. Pt with h/o PATTIE, c/w CPAP mask nightly.    Case discussed with JO Miramontes, labs/vitals/medications/tele reviewed, recommend to resume home medications (increased dose of Toprol XL as prescribed by HF team) upon discharge, Pt cleared for discharge to home with outpt follow up as directed.

## 2024-06-04 NOTE — PROGRESS NOTE ADULT - SUBJECTIVE AND OBJECTIVE BOX
24H hour events: Pt seen and examined at bedside. No acute complaints. Denies fever, chills, NVD, SOB, CP, palpitations, dizziness, HA  Overnight tele: SR 70-80s, frequent PVCs    MEDICATIONS:  apixaban 5 milliGRAM(s) Oral every 12 hours  digoxin     Tablet 125 MICROGram(s) Oral daily  furosemide    Tablet 40 milliGRAM(s) Oral two times a day  hydrALAZINE 25 milliGRAM(s) Oral three times a day  isosorbide   dinitrate Tablet (ISORDIL) 5 milliGRAM(s) Oral three times a day  metoprolol succinate  milliGRAM(s) Oral daily  spironolactone 25 milliGRAM(s) Oral daily  loratadine 10 milliGRAM(s) Oral daily  montelukast 10 milliGRAM(s) Oral at bedtime  pantoprazole    Tablet 40 milliGRAM(s) Oral before breakfast  atorvastatin 80 milliGRAM(s) Oral at bedtime  sodium chloride 0.9% lock flush 3 milliLiter(s) IV Push every 8 hours  tamsulosin 0.4 milliGRAM(s) Oral at bedtime    REVIEW OF SYSTEMS:  See HPI, otherwise ROS negative.    PHYSICAL EXAM:  T(C): 36.6 (06-04-24 @ 09:33), Max: 36.7 (06-04-24 @ 05:24)  HR: 84 (06-04-24 @ 09:33) (72 - 89)  BP: 96/61 (06-04-24 @ 09:33) (81/63 - 118/85)  RR: 17 (06-04-24 @ 09:33) (16 - 20)  SpO2: 100% (06-04-24 @ 09:33) (97% - 100%)  Wt(kg): --  I&O's Summary    03 Jun 2024 07:01  -  04 Jun 2024 07:00  --------------------------------------------------------  IN: 0 mL / OUT: 800 mL / NET: -800 mL    Appearance: Alert. NAD	  Cardiovascular: +S1S2 RRR no m/g/r  Respiratory: CTA B/L	  Psychiatry: A & O x 3, Mood & affect appropriate  Gastrointestinal:  Soft, NT. ND. +BS	  Skin: No rashes on visible skin, groin site soft NT, no hematoma or bleeding  Extremities: No edema BLE  Vascular: Peripheral pulses palpable 2+ bilaterally    LABS:	 	    CBC Full  -  ( 04 Jun 2024 06:10 )  WBC Count : 8.53 K/uL  Hemoglobin : 11.5 g/dL  Hematocrit : 35.9 %  Platelet Count - Automated : 155 K/uL  Mean Cell Volume : 80.3 fL  Mean Cell Hemoglobin : 25.7 pg  Mean Cell Hemoglobin Concentration : 32.0 gm/dL  Auto Neutrophil # : x  Auto Lymphocyte # : x  Auto Monocyte # : x  Auto Eosinophil # : x  Auto Basophil # : x  Auto Neutrophil % : x  Auto Lymphocyte % : x  Auto Monocyte % : x  Auto Eosinophil % : x  Auto Basophil % : x    06-04    141  |  111<H>  |  48<H>  ----------------------------<  108<H>  4.7   |  15<L>  |  1.78<H>    Ca    9.4      04 Jun 2024 06:10  Phos  4.1     06-04  Mg     1.80     06-04    TELEMETRY: SR 70-80s, frequent PVCs

## 2024-06-05 ENCOUNTER — NON-APPOINTMENT (OUTPATIENT)
Age: 59
End: 2024-06-05

## 2024-06-17 ENCOUNTER — RX CHANGE (OUTPATIENT)
Age: 59
End: 2024-06-17

## 2024-06-17 RX ORDER — METOPROLOL SUCCINATE 100 MG/1
100 TABLET, EXTENDED RELEASE ORAL
Qty: 180 | Refills: 3 | Status: ACTIVE | COMMUNITY
Start: 1900-01-01 | End: 1900-01-01

## 2024-06-17 RX ORDER — METOPROLOL SUCCINATE 100 MG/1
100 TABLET, EXTENDED RELEASE ORAL
Qty: 180 | Refills: 3 | Status: DISCONTINUED | COMMUNITY
Start: 2024-05-14 | End: 2024-06-17

## 2024-06-21 RX ORDER — ISOSORBIDE DINITRATE 5 MG/1
1 TABLET ORAL
Qty: 90 | Refills: 0 | DISCHARGE

## 2024-06-21 RX ORDER — ATORVASTATIN CALCIUM 80 MG/1
1 TABLET, FILM COATED ORAL
Qty: 30 | Refills: 0 | DISCHARGE

## 2024-06-21 RX ORDER — HYDRALAZINE HCL 50 MG
1 TABLET ORAL
Qty: 90 | Refills: 0 | DISCHARGE

## 2024-06-21 RX ORDER — TAMSULOSIN HYDROCHLORIDE 0.4 MG/1
1 CAPSULE ORAL
Qty: 30 | Refills: 0 | DISCHARGE

## 2024-06-21 RX ORDER — FUROSEMIDE 40 MG
1 TABLET ORAL
Qty: 60 | Refills: 0 | DISCHARGE

## 2024-06-24 ENCOUNTER — NON-APPOINTMENT (OUTPATIENT)
Age: 59
End: 2024-06-24

## 2024-06-24 ENCOUNTER — APPOINTMENT (OUTPATIENT)
Dept: HEART FAILURE | Facility: CLINIC | Age: 59
End: 2024-06-24
Payer: MEDICARE

## 2024-06-24 VITALS
SYSTOLIC BLOOD PRESSURE: 124 MMHG | WEIGHT: 285 LBS | BODY MASS INDEX: 37.77 KG/M2 | DIASTOLIC BLOOD PRESSURE: 82 MMHG | HEIGHT: 73 IN | HEART RATE: 69 BPM | OXYGEN SATURATION: 97 %

## 2024-06-24 DIAGNOSIS — I50.20 UNSPECIFIED SYSTOLIC (CONGESTIVE) HEART FAILURE: ICD-10-CM

## 2024-06-24 DIAGNOSIS — I48.91 UNSPECIFIED ATRIAL FIBRILLATION: ICD-10-CM

## 2024-06-24 PROCEDURE — 93000 ELECTROCARDIOGRAM COMPLETE: CPT

## 2024-06-24 PROCEDURE — 99214 OFFICE O/P EST MOD 30 MIN: CPT

## 2024-06-24 RX ORDER — ATORVASTATIN CALCIUM 80 MG/1
80 TABLET, FILM COATED ORAL
Qty: 1 | Refills: 1 | Status: ACTIVE | COMMUNITY
Start: 2024-04-16 | End: 1900-01-01

## 2024-06-24 RX ORDER — PANTOPRAZOLE 40 MG/1
40 TABLET, DELAYED RELEASE ORAL DAILY
Qty: 90 | Refills: 3 | Status: ACTIVE | COMMUNITY
Start: 2024-06-24

## 2024-06-24 RX ORDER — SPIRONOLACTONE 25 MG/1
25 TABLET ORAL DAILY
Qty: 90 | Refills: 3 | Status: ACTIVE | COMMUNITY
Start: 2024-04-16 | End: 1900-01-01

## 2024-06-24 RX ORDER — FUROSEMIDE 80 MG/1
80 TABLET ORAL TWICE DAILY
Qty: 180 | Refills: 0 | Status: ACTIVE | COMMUNITY
Start: 2024-06-24 | End: 1900-01-01

## 2024-07-03 ENCOUNTER — APPOINTMENT (OUTPATIENT)
Dept: ELECTROPHYSIOLOGY | Facility: CLINIC | Age: 59
End: 2024-07-03

## 2024-07-08 ENCOUNTER — RX RENEWAL (OUTPATIENT)
Age: 59
End: 2024-07-08

## 2024-07-08 RX ORDER — ASPIRIN 81 MG/1
81 TABLET, COATED ORAL
Qty: 30 | Refills: 1 | Status: ACTIVE | COMMUNITY
Start: 2024-07-08 | End: 1900-01-01

## 2024-07-30 ENCOUNTER — RX CHANGE (OUTPATIENT)
Age: 59
End: 2024-07-30

## 2024-07-30 RX ORDER — ASPIRIN 81 MG/1
81 TABLET, COATED ORAL
Qty: 90 | Refills: 3 | Status: ACTIVE | COMMUNITY
Start: 1900-01-01 | End: 1900-01-01

## 2024-07-31 ENCOUNTER — APPOINTMENT (OUTPATIENT)
Dept: ELECTROPHYSIOLOGY | Facility: CLINIC | Age: 59
End: 2024-07-31

## 2024-08-22 ENCOUNTER — APPOINTMENT (OUTPATIENT)
Dept: ELECTROPHYSIOLOGY | Facility: CLINIC | Age: 59
End: 2024-08-22

## 2024-08-22 ENCOUNTER — NON-APPOINTMENT (OUTPATIENT)
Age: 59
End: 2024-08-22

## 2024-08-22 PROCEDURE — 93296 REM INTERROG EVL PM/IDS: CPT

## 2024-08-22 PROCEDURE — 93295 DEV INTERROG REMOTE 1/2/MLT: CPT

## 2024-11-21 ENCOUNTER — APPOINTMENT (OUTPATIENT)
Dept: ELECTROPHYSIOLOGY | Facility: CLINIC | Age: 59
End: 2024-11-21
Payer: MEDICARE

## 2024-11-21 ENCOUNTER — NON-APPOINTMENT (OUTPATIENT)
Age: 59
End: 2024-11-21

## 2024-11-21 PROCEDURE — 93296 REM INTERROG EVL PM/IDS: CPT

## 2024-11-21 PROCEDURE — 93295 DEV INTERROG REMOTE 1/2/MLT: CPT

## 2024-12-18 ENCOUNTER — LABORATORY RESULT (OUTPATIENT)
Age: 59
End: 2024-12-18

## 2024-12-18 ENCOUNTER — APPOINTMENT (OUTPATIENT)
Dept: FAMILY MEDICINE | Facility: HOSPITAL | Age: 59
End: 2024-12-18

## 2024-12-18 ENCOUNTER — OUTPATIENT (OUTPATIENT)
Dept: OUTPATIENT SERVICES | Facility: HOSPITAL | Age: 59
LOS: 1 days | End: 2024-12-18
Payer: MEDICAID

## 2024-12-18 VITALS
SYSTOLIC BLOOD PRESSURE: 100 MMHG | OXYGEN SATURATION: 97 % | TEMPERATURE: 97.2 F | RESPIRATION RATE: 18 BRPM | DIASTOLIC BLOOD PRESSURE: 73 MMHG | WEIGHT: 304 LBS | HEART RATE: 66 BPM | BODY MASS INDEX: 40.11 KG/M2

## 2024-12-18 DIAGNOSIS — N18.30 CHRONIC KIDNEY DISEASE, STAGE 3 UNSPECIFIED: ICD-10-CM

## 2024-12-18 DIAGNOSIS — Z23 ENCOUNTER FOR IMMUNIZATION: ICD-10-CM

## 2024-12-18 DIAGNOSIS — E11.9 TYPE 2 DIABETES MELLITUS W/OUT COMPLICATIONS: ICD-10-CM

## 2024-12-18 DIAGNOSIS — E11.9 TYPE 2 DIABETES MELLITUS WITHOUT COMPLICATIONS: ICD-10-CM

## 2024-12-18 DIAGNOSIS — Z90.89 ACQUIRED ABSENCE OF OTHER ORGANS: Chronic | ICD-10-CM

## 2024-12-18 DIAGNOSIS — Z00.00 ENCOUNTER FOR GENERAL ADULT MEDICAL EXAMINATION WITHOUT ABNORMAL FINDINGS: ICD-10-CM

## 2024-12-18 LAB
BILIRUB UR QL STRIP: NORMAL
GLUCOSE BLDC GLUCOMTR-MCNC: 325
GLUCOSE BLDC GLUCOMTR-MCNC: 325 MG/DL — HIGH (ref 70–99)
GLUCOSE BLDC GLUCOMTR-MCNC: 350
GLUCOSE BLDC GLUCOMTR-MCNC: 383 MG/DL — HIGH (ref 70–99)
GLUCOSE UR-MCNC: 250
HBA1C MFR BLD HPLC: 13.9
HCG UR QL: 0.2 EU/DL
HGB UR QL STRIP.AUTO: NORMAL
KETONES UR-MCNC: NORMAL
LEUKOCYTE ESTERASE UR QL STRIP: NORMAL
NITRITE UR QL STRIP: NORMAL
PH UR STRIP: 5.5
PROT UR STRIP-MCNC: NORMAL
SP GR UR STRIP: 1.01

## 2024-12-18 PROCEDURE — 82962 GLUCOSE BLOOD TEST: CPT

## 2024-12-18 PROCEDURE — 85025 COMPLETE CBC W/AUTO DIFF WBC: CPT

## 2024-12-18 PROCEDURE — 80053 COMPREHEN METABOLIC PANEL: CPT

## 2024-12-18 PROCEDURE — G0463: CPT

## 2024-12-18 PROCEDURE — 82043 UR ALBUMIN QUANTITATIVE: CPT

## 2024-12-18 RX ORDER — INSULIN GLARGINE 100 [IU]/ML
100 INJECTION, SOLUTION SUBCUTANEOUS
Qty: 1 | Refills: 3 | Status: ACTIVE | COMMUNITY
Start: 2024-12-18 | End: 1900-01-01

## 2024-12-18 RX ORDER — METFORMIN HYDROCHLORIDE 1000 MG/1
1000 TABLET, COATED ORAL TWICE DAILY
Qty: 120 | Refills: 2 | Status: DISCONTINUED | COMMUNITY
Start: 2024-12-18 | End: 2024-12-18

## 2024-12-18 RX ORDER — METFORMIN ER 500 MG 500 MG/1
500 TABLET ORAL
Qty: 360 | Refills: 1 | Status: ACTIVE | COMMUNITY
Start: 2024-12-18 | End: 1900-01-01

## 2024-12-18 RX ORDER — BLOOD-GLUCOSE METER
EACH MISCELLANEOUS
Qty: 1 | Refills: 0 | Status: ACTIVE | COMMUNITY
Start: 2024-12-18 | End: 1900-01-01

## 2024-12-18 RX ORDER — BLOOD-GLUCOSE METER
70 EACH MISCELLANEOUS
Qty: 1 | Refills: 2 | Status: ACTIVE | COMMUNITY
Start: 2024-12-18 | End: 1900-01-01

## 2024-12-19 LAB
ALBUMIN SERPL ELPH-MCNC: 4.1 G/DL
ALP BLD-CCNC: 84 U/L
ALT SERPL-CCNC: 28 U/L
ANION GAP SERPL CALC-SCNC: 16 MMOL/L
AST SERPL-CCNC: 25 U/L
BASOPHILS # BLD AUTO: 0.03 K/UL
BASOPHILS NFR BLD AUTO: 0.4 %
BILIRUB SERPL-MCNC: 0.3 MG/DL
BUN SERPL-MCNC: 40 MG/DL
CALCIUM SERPL-MCNC: 8.4 MG/DL
CHLORIDE SERPL-SCNC: 93 MMOL/L
CO2 SERPL-SCNC: 22 MMOL/L
CREAT SERPL-MCNC: 1.99 MG/DL
CREAT SPEC-SCNC: 147 MG/DL
EGFR: 38 ML/MIN/1.73M2
EOSINOPHIL # BLD AUTO: 0.29 K/UL
EOSINOPHIL NFR BLD AUTO: 3.6 %
GLUCOSE SERPL-MCNC: 322 MG/DL
HCT VFR BLD CALC: 34.3 %
HGB BLD-MCNC: 10.7 G/DL
IMM GRANULOCYTES NFR BLD AUTO: 0.6 %
LYMPHOCYTES # BLD AUTO: 2.12 K/UL
LYMPHOCYTES NFR BLD AUTO: 26.3 %
MAN DIFF?: NORMAL
MCHC RBC-ENTMCNC: 27.6 PG
MCHC RBC-ENTMCNC: 31.2 G/DL
MCV RBC AUTO: 88.4 FL
MICROALBUMIN 24H UR DL<=1MG/L-MCNC: 2.2 MG/DL
MICROALBUMIN/CREAT 24H UR-RTO: 15 MG/G
MONOCYTES # BLD AUTO: 0.8 K/UL
MONOCYTES NFR BLD AUTO: 9.9 %
NEUTROPHILS # BLD AUTO: 4.76 K/UL
NEUTROPHILS NFR BLD AUTO: 59.2 %
PLATELET # BLD AUTO: 211 K/UL
POTASSIUM SERPL-SCNC: 3.9 MMOL/L
PROT SERPL-MCNC: 7.4 G/DL
RBC # BLD: 3.88 M/UL
RBC # FLD: 14.1 %
SODIUM SERPL-SCNC: 130 MMOL/L
WBC # FLD AUTO: 8.05 K/UL

## 2024-12-26 ENCOUNTER — APPOINTMENT (OUTPATIENT)
Dept: FAMILY MEDICINE | Facility: HOSPITAL | Age: 59
End: 2024-12-26

## 2025-01-06 ENCOUNTER — APPOINTMENT (OUTPATIENT)
Dept: FAMILY MEDICINE | Facility: HOSPITAL | Age: 60
End: 2025-01-06

## 2025-01-07 ENCOUNTER — APPOINTMENT (OUTPATIENT)
Dept: HEART FAILURE | Facility: CLINIC | Age: 60
End: 2025-01-07
Payer: MEDICARE

## 2025-01-07 ENCOUNTER — NON-APPOINTMENT (OUTPATIENT)
Age: 60
End: 2025-01-07

## 2025-01-07 VITALS
SYSTOLIC BLOOD PRESSURE: 120 MMHG | HEART RATE: 92 BPM | HEIGHT: 73 IN | OXYGEN SATURATION: 95 % | WEIGHT: 255 LBS | BODY MASS INDEX: 33.8 KG/M2 | DIASTOLIC BLOOD PRESSURE: 74 MMHG

## 2025-01-07 PROCEDURE — 93000 ELECTROCARDIOGRAM COMPLETE: CPT

## 2025-01-07 PROCEDURE — G2211 COMPLEX E/M VISIT ADD ON: CPT

## 2025-01-07 PROCEDURE — 99214 OFFICE O/P EST MOD 30 MIN: CPT

## 2025-01-29 ENCOUNTER — NON-APPOINTMENT (OUTPATIENT)
Age: 60
End: 2025-01-29

## 2025-01-29 ENCOUNTER — APPOINTMENT (OUTPATIENT)
Dept: ELECTROPHYSIOLOGY | Facility: CLINIC | Age: 60
End: 2025-01-29
Payer: MEDICARE

## 2025-01-29 VITALS
OXYGEN SATURATION: 94 % | HEART RATE: 93 BPM | DIASTOLIC BLOOD PRESSURE: 74 MMHG | TEMPERATURE: 98 F | HEIGHT: 73 IN | WEIGHT: 255 LBS | BODY MASS INDEX: 33.8 KG/M2 | SYSTOLIC BLOOD PRESSURE: 109 MMHG

## 2025-01-29 DIAGNOSIS — I50.20 UNSPECIFIED SYSTOLIC (CONGESTIVE) HEART FAILURE: ICD-10-CM

## 2025-01-29 DIAGNOSIS — I48.91 UNSPECIFIED ATRIAL FIBRILLATION: ICD-10-CM

## 2025-01-29 PROCEDURE — 93000 ELECTROCARDIOGRAM COMPLETE: CPT

## 2025-01-29 PROCEDURE — 99214 OFFICE O/P EST MOD 30 MIN: CPT

## 2025-02-20 ENCOUNTER — APPOINTMENT (OUTPATIENT)
Dept: ELECTROPHYSIOLOGY | Facility: CLINIC | Age: 60
End: 2025-02-20
Payer: MEDICARE

## 2025-02-20 ENCOUNTER — NON-APPOINTMENT (OUTPATIENT)
Age: 60
End: 2025-02-20

## 2025-02-20 PROCEDURE — 93295 DEV INTERROG REMOTE 1/2/MLT: CPT

## 2025-02-20 PROCEDURE — 93296 REM INTERROG EVL PM/IDS: CPT

## 2025-03-17 ENCOUNTER — NON-APPOINTMENT (OUTPATIENT)
Age: 60
End: 2025-03-17

## 2025-03-27 ENCOUNTER — APPOINTMENT (OUTPATIENT)
Dept: PULMONOLOGY | Facility: CLINIC | Age: 60
End: 2025-03-27
Payer: MEDICARE

## 2025-03-27 ENCOUNTER — NON-APPOINTMENT (OUTPATIENT)
Age: 60
End: 2025-03-27

## 2025-03-27 VITALS
OXYGEN SATURATION: 95 % | SYSTOLIC BLOOD PRESSURE: 113 MMHG | TEMPERATURE: 98.3 F | WEIGHT: 307 LBS | BODY MASS INDEX: 40.69 KG/M2 | DIASTOLIC BLOOD PRESSURE: 72 MMHG | HEIGHT: 73 IN

## 2025-03-27 DIAGNOSIS — E66.01 MORBID (SEVERE) OBESITY DUE TO EXCESS CALORIES: ICD-10-CM

## 2025-03-27 DIAGNOSIS — G47.33 OBSTRUCTIVE SLEEP APNEA (ADULT) (PEDIATRIC): ICD-10-CM

## 2025-03-27 DIAGNOSIS — J45.909 UNSPECIFIED ASTHMA, UNCOMPLICATED: ICD-10-CM

## 2025-03-27 PROCEDURE — 99214 OFFICE O/P EST MOD 30 MIN: CPT

## 2025-04-04 DIAGNOSIS — E11.65 TYPE 2 DIABETES MELLITUS WITH HYPERGLYCEMIA: ICD-10-CM

## 2025-04-04 RX ORDER — PEN NEEDLE, DIABETIC 32GX 5/32"
32G X 4 MM NEEDLE, DISPOSABLE MISCELLANEOUS
Qty: 2 | Refills: 2 | Status: ACTIVE | COMMUNITY
Start: 2025-04-04 | End: 1900-01-01

## 2025-05-06 ENCOUNTER — APPOINTMENT (OUTPATIENT)
Dept: HEART FAILURE | Facility: CLINIC | Age: 60
End: 2025-05-06

## 2025-05-13 ENCOUNTER — NON-APPOINTMENT (OUTPATIENT)
Age: 60
End: 2025-05-13

## 2025-05-13 ENCOUNTER — APPOINTMENT (OUTPATIENT)
Dept: HEART FAILURE | Facility: CLINIC | Age: 60
End: 2025-05-13
Payer: MEDICARE

## 2025-05-13 VITALS — WEIGHT: 307 LBS | BODY MASS INDEX: 40.5 KG/M2

## 2025-05-13 VITALS
BODY MASS INDEX: 31.81 KG/M2 | WEIGHT: 240 LBS | SYSTOLIC BLOOD PRESSURE: 106 MMHG | DIASTOLIC BLOOD PRESSURE: 72 MMHG | HEIGHT: 73 IN | HEART RATE: 89 BPM | TEMPERATURE: 97.9 F | OXYGEN SATURATION: 95 %

## 2025-05-13 DIAGNOSIS — I50.20 UNSPECIFIED SYSTOLIC (CONGESTIVE) HEART FAILURE: ICD-10-CM

## 2025-05-13 DIAGNOSIS — E11.9 TYPE 2 DIABETES MELLITUS W/OUT COMPLICATIONS: ICD-10-CM

## 2025-05-13 PROCEDURE — 99215 OFFICE O/P EST HI 40 MIN: CPT

## 2025-05-13 PROCEDURE — 36415 COLL VENOUS BLD VENIPUNCTURE: CPT

## 2025-05-13 PROCEDURE — 93000 ELECTROCARDIOGRAM COMPLETE: CPT

## 2025-05-13 PROCEDURE — G2211 COMPLEX E/M VISIT ADD ON: CPT

## 2025-05-13 RX ORDER — DAPAGLIFLOZIN 5 MG/1
5 TABLET, FILM COATED ORAL
Qty: 90 | Refills: 1 | Status: ACTIVE | COMMUNITY
Start: 2025-05-13 | End: 1900-01-01

## 2025-05-15 ENCOUNTER — APPOINTMENT (OUTPATIENT)
Age: 60
End: 2025-05-15

## 2025-05-20 DIAGNOSIS — E78.1 PURE HYPERGLYCERIDEMIA: ICD-10-CM

## 2025-05-20 LAB
ALBUMIN SERPL ELPH-MCNC: 4.3 G/DL
ALP BLD-CCNC: 102 U/L
ALT SERPL-CCNC: 20 U/L
ANION GAP SERPL CALC-SCNC: 19 MMOL/L
AST SERPL-CCNC: 27 U/L
BILIRUB SERPL-MCNC: 0.2 MG/DL
BUN SERPL-MCNC: 29 MG/DL
CALCIUM SERPL-MCNC: 9.1 MG/DL
CHLORIDE SERPL-SCNC: 98 MMOL/L
CHOLEST SERPL-MCNC: 130 MG/DL
CO2 SERPL-SCNC: 20 MMOL/L
CREAT SERPL-MCNC: 1.59 MG/DL
EGFRCR SERPLBLD CKD-EPI 2021: 50 ML/MIN/1.73M2
ESTIMATED AVERAGE GLUCOSE: 292 MG/DL
GLUCOSE SERPL-MCNC: 362 MG/DL
HBA1C MFR BLD HPLC: 11.8 %
HDLC SERPL-MCNC: 34 MG/DL
LDLC SERPL-MCNC: 57 MG/DL
NONHDLC SERPL-MCNC: 96 MG/DL
NT-PROBNP SERPL-MCNC: 72 PG/ML
POTASSIUM SERPL-SCNC: 4.6 MMOL/L
PROT SERPL-MCNC: 8 G/DL
SODIUM SERPL-SCNC: 137 MMOL/L
TRIGL SERPL-MCNC: 245 MG/DL

## 2025-05-20 RX ORDER — ICOSAPENT ETHYL 1 G/1
1 CAPSULE ORAL DAILY
Qty: 90 | Refills: 1 | Status: ACTIVE | COMMUNITY
Start: 2025-05-20 | End: 1900-01-01

## 2025-05-22 ENCOUNTER — NON-APPOINTMENT (OUTPATIENT)
Age: 60
End: 2025-05-22

## 2025-05-22 ENCOUNTER — APPOINTMENT (OUTPATIENT)
Dept: ELECTROPHYSIOLOGY | Facility: CLINIC | Age: 60
End: 2025-05-22
Payer: MEDICARE

## 2025-05-22 PROCEDURE — 93296 REM INTERROG EVL PM/IDS: CPT

## 2025-05-22 PROCEDURE — 93295 DEV INTERROG REMOTE 1/2/MLT: CPT

## 2025-07-16 ENCOUNTER — APPOINTMENT (OUTPATIENT)
Dept: CV DIAGNOSITCS | Facility: HOSPITAL | Age: 60
End: 2025-07-16

## 2025-08-05 ENCOUNTER — APPOINTMENT (OUTPATIENT)
Dept: HEART FAILURE | Facility: CLINIC | Age: 60
End: 2025-08-05

## 2025-08-13 ENCOUNTER — APPOINTMENT (OUTPATIENT)
Dept: ELECTROPHYSIOLOGY | Facility: CLINIC | Age: 60
End: 2025-08-13

## 2025-08-18 ENCOUNTER — RESULT CHARGE (OUTPATIENT)
Age: 60
End: 2025-08-18

## 2025-08-18 ENCOUNTER — APPOINTMENT (OUTPATIENT)
Age: 60
End: 2025-08-18

## 2025-08-18 ENCOUNTER — OUTPATIENT (OUTPATIENT)
Dept: OUTPATIENT SERVICES | Facility: HOSPITAL | Age: 60
LOS: 1 days | End: 2025-08-18
Payer: MEDICARE

## 2025-08-18 VITALS
HEART RATE: 82 BPM | RESPIRATION RATE: 14 BRPM | DIASTOLIC BLOOD PRESSURE: 87 MMHG | BODY MASS INDEX: 40.82 KG/M2 | OXYGEN SATURATION: 97 % | SYSTOLIC BLOOD PRESSURE: 124 MMHG | HEIGHT: 73 IN | TEMPERATURE: 97.7 F | WEIGHT: 308 LBS

## 2025-08-18 DIAGNOSIS — I50.20 UNSPECIFIED SYSTOLIC (CONGESTIVE) HEART FAILURE: ICD-10-CM

## 2025-08-18 DIAGNOSIS — E11.65 TYPE 2 DIABETES MELLITUS WITH HYPERGLYCEMIA: ICD-10-CM

## 2025-08-18 DIAGNOSIS — Z95.810 PRESENCE OF AUTOMATIC (IMPLANTABLE) CARDIAC DEFIBRILLATOR: Chronic | ICD-10-CM

## 2025-08-18 DIAGNOSIS — M10.9 GOUT, UNSPECIFIED: ICD-10-CM

## 2025-08-18 DIAGNOSIS — L85.9 EPIDERMAL THICKENING, UNSPECIFIED: ICD-10-CM

## 2025-08-18 DIAGNOSIS — E66.9 OBESITY, UNSPECIFIED: ICD-10-CM

## 2025-08-18 DIAGNOSIS — Z90.89 ACQUIRED ABSENCE OF OTHER ORGANS: Chronic | ICD-10-CM

## 2025-08-18 DIAGNOSIS — I10 ESSENTIAL (PRIMARY) HYPERTENSION: ICD-10-CM

## 2025-08-18 DIAGNOSIS — H61.21 IMPACTED CERUMEN, RIGHT EAR: ICD-10-CM

## 2025-08-18 DIAGNOSIS — Z00.00 ENCOUNTER FOR GENERAL ADULT MEDICAL EXAMINATION WITHOUT ABNORMAL FINDINGS: ICD-10-CM

## 2025-08-18 DIAGNOSIS — Z00.00 ENCOUNTER FOR GENERAL ADULT MEDICAL EXAMINATION W/OUT ABNORMAL FINDINGS: ICD-10-CM

## 2025-08-18 PROCEDURE — G0463: CPT

## 2025-08-18 RX ORDER — CARBAMIDE PEROXIDE - 6.5% 65 MG/ML
6.5 SOLUTION/ DROPS TOPICAL 4 TIMES DAILY
Qty: 1 | Refills: 0 | Status: ACTIVE | COMMUNITY
Start: 2025-08-18 | End: 1900-01-01

## 2025-08-21 ENCOUNTER — NON-APPOINTMENT (OUTPATIENT)
Age: 60
End: 2025-08-21

## 2025-08-21 ENCOUNTER — APPOINTMENT (OUTPATIENT)
Dept: ELECTROPHYSIOLOGY | Facility: CLINIC | Age: 60
End: 2025-08-21
Payer: MEDICARE

## 2025-08-21 ENCOUNTER — RX RENEWAL (OUTPATIENT)
Age: 60
End: 2025-08-21

## 2025-08-21 LAB — HBA1C MFR BLD HPLC: 12.7

## 2025-08-21 PROCEDURE — 93295 DEV INTERROG REMOTE 1/2/MLT: CPT

## 2025-08-21 PROCEDURE — 93296 REM INTERROG EVL PM/IDS: CPT

## 2025-08-21 RX ORDER — ASPIRIN 81 MG/1
81 TABLET, COATED ORAL
Qty: 90 | Refills: 3 | Status: ACTIVE | COMMUNITY
Start: 2025-08-21 | End: 1900-01-01

## 2025-08-25 ENCOUNTER — RX RENEWAL (OUTPATIENT)
Age: 60
End: 2025-08-25

## 2025-08-25 RX ORDER — METFORMIN HYDROCHLORIDE 500 MG/1
500 TABLET, EXTENDED RELEASE ORAL DAILY
Qty: 30 | Refills: 0 | Status: ACTIVE | COMMUNITY
Start: 2025-08-18 | End: 1900-01-01

## 2025-08-25 RX ORDER — AMMONIUM LACTATE 12 %
12 CREAM (GRAM) TOPICAL TWICE DAILY
Qty: 140 | Refills: 0 | Status: ACTIVE | COMMUNITY
Start: 2025-08-18 | End: 1900-01-01

## 2025-08-26 ENCOUNTER — APPOINTMENT (OUTPATIENT)
Dept: ELECTROPHYSIOLOGY | Facility: CLINIC | Age: 60
End: 2025-08-26

## 2025-08-27 ENCOUNTER — APPOINTMENT (OUTPATIENT)
Dept: ELECTROPHYSIOLOGY | Facility: CLINIC | Age: 60
End: 2025-08-27
Payer: MEDICARE

## 2025-08-27 VITALS
HEIGHT: 73 IN | SYSTOLIC BLOOD PRESSURE: 90 MMHG | HEART RATE: 81 BPM | WEIGHT: 302 LBS | OXYGEN SATURATION: 96 % | BODY MASS INDEX: 40.02 KG/M2 | DIASTOLIC BLOOD PRESSURE: 66 MMHG

## 2025-08-27 VITALS — DIASTOLIC BLOOD PRESSURE: 70 MMHG | SYSTOLIC BLOOD PRESSURE: 110 MMHG

## 2025-08-27 DIAGNOSIS — I50.20 UNSPECIFIED SYSTOLIC (CONGESTIVE) HEART FAILURE: ICD-10-CM

## 2025-08-27 DIAGNOSIS — I48.91 UNSPECIFIED ATRIAL FIBRILLATION: ICD-10-CM

## 2025-08-27 PROCEDURE — 93000 ELECTROCARDIOGRAM COMPLETE: CPT

## 2025-08-27 PROCEDURE — 99214 OFFICE O/P EST MOD 30 MIN: CPT

## 2025-09-03 ENCOUNTER — OUTPATIENT (OUTPATIENT)
Dept: OUTPATIENT SERVICES | Facility: HOSPITAL | Age: 60
LOS: 1 days | End: 2025-09-03
Payer: MEDICARE

## 2025-09-03 ENCOUNTER — APPOINTMENT (OUTPATIENT)
Age: 60
End: 2025-09-03

## 2025-09-03 VITALS
DIASTOLIC BLOOD PRESSURE: 70 MMHG | SYSTOLIC BLOOD PRESSURE: 110 MMHG | TEMPERATURE: 97.9 F | HEART RATE: 57 BPM | WEIGHT: 305 LBS | OXYGEN SATURATION: 96 % | BODY MASS INDEX: 40.24 KG/M2 | RESPIRATION RATE: 14 BRPM

## 2025-09-03 DIAGNOSIS — H61.23 IMPACTED CERUMEN, BILATERAL: ICD-10-CM

## 2025-09-03 DIAGNOSIS — E11.65 TYPE 2 DIABETES MELLITUS WITH HYPERGLYCEMIA: ICD-10-CM

## 2025-09-03 DIAGNOSIS — Z00.00 ENCOUNTER FOR GENERAL ADULT MEDICAL EXAMINATION WITHOUT ABNORMAL FINDINGS: ICD-10-CM

## 2025-09-03 DIAGNOSIS — Z95.810 PRESENCE OF AUTOMATIC (IMPLANTABLE) CARDIAC DEFIBRILLATOR: Chronic | ICD-10-CM

## 2025-09-03 DIAGNOSIS — Z90.89 ACQUIRED ABSENCE OF OTHER ORGANS: Chronic | ICD-10-CM

## 2025-09-03 DIAGNOSIS — I50.20 UNSPECIFIED SYSTOLIC (CONGESTIVE) HEART FAILURE: ICD-10-CM

## 2025-09-03 LAB — GLUCOSE BLDC GLUCOMTR-MCNC: 165 MG/DL — HIGH (ref 70–99)

## 2025-09-03 PROCEDURE — 69210 REMOVE IMPACTED EAR WAX UNI: CPT

## 2025-09-03 PROCEDURE — 82962 GLUCOSE BLOOD TEST: CPT

## 2025-09-03 PROCEDURE — G0463: CPT

## 2025-09-03 RX ORDER — BLOOD-GLUCOSE SENSOR
EACH MISCELLANEOUS
Qty: 2 | Refills: 6 | Status: ACTIVE | COMMUNITY
Start: 2025-09-03 | End: 1900-01-01

## 2025-09-03 RX ORDER — DAPAGLIFLOZIN 10 MG/1
10 TABLET, FILM COATED ORAL DAILY
Qty: 90 | Refills: 3 | Status: ACTIVE | COMMUNITY
Start: 2025-09-03 | End: 1900-01-01

## 2025-09-04 ENCOUNTER — APPOINTMENT (OUTPATIENT)
Dept: PULMONOLOGY | Facility: CLINIC | Age: 60
End: 2025-09-04
Payer: MEDICARE

## 2025-09-04 VITALS
WEIGHT: 305 LBS | SYSTOLIC BLOOD PRESSURE: 129 MMHG | TEMPERATURE: 98 F | BODY MASS INDEX: 40.42 KG/M2 | OXYGEN SATURATION: 93 % | DIASTOLIC BLOOD PRESSURE: 88 MMHG | HEIGHT: 73 IN | HEART RATE: 84 BPM

## 2025-09-04 DIAGNOSIS — I10 OBESITY, UNSPECIFIED: ICD-10-CM

## 2025-09-04 DIAGNOSIS — E66.9 OBESITY, UNSPECIFIED: ICD-10-CM

## 2025-09-04 DIAGNOSIS — G47.33 OBSTRUCTIVE SLEEP APNEA (ADULT) (PEDIATRIC): ICD-10-CM

## 2025-09-04 DIAGNOSIS — E11.9 OBESITY, UNSPECIFIED: ICD-10-CM

## 2025-09-04 PROCEDURE — 99213 OFFICE O/P EST LOW 20 MIN: CPT

## 2025-09-09 ENCOUNTER — APPOINTMENT (OUTPATIENT)
Dept: HEART FAILURE | Facility: CLINIC | Age: 60
End: 2025-09-09

## 2025-09-16 ENCOUNTER — APPOINTMENT (OUTPATIENT)
Age: 60
End: 2025-09-16

## 2025-09-16 ENCOUNTER — RESULT CHARGE (OUTPATIENT)
Age: 60
End: 2025-09-16

## 2025-09-16 VITALS
TEMPERATURE: 98 F | WEIGHT: 305 LBS | SYSTOLIC BLOOD PRESSURE: 114 MMHG | RESPIRATION RATE: 16 BRPM | HEART RATE: 62 BPM | DIASTOLIC BLOOD PRESSURE: 68 MMHG | BODY MASS INDEX: 40.24 KG/M2 | OXYGEN SATURATION: 97 %

## 2025-09-16 DIAGNOSIS — E11.9 TYPE 2 DIABETES MELLITUS W/OUT COMPLICATIONS: ICD-10-CM

## 2025-09-17 LAB — GLUCOSE BLDC GLUCOMTR-MCNC: 156
